# Patient Record
Sex: MALE | Race: WHITE | NOT HISPANIC OR LATINO | Employment: FULL TIME | URBAN - METROPOLITAN AREA
[De-identification: names, ages, dates, MRNs, and addresses within clinical notes are randomized per-mention and may not be internally consistent; named-entity substitution may affect disease eponyms.]

---

## 2018-01-09 ENCOUNTER — APPOINTMENT (OUTPATIENT)
Dept: URGENT CARE | Facility: CLINIC | Age: 33
End: 2018-01-09

## 2018-01-09 ENCOUNTER — TRANSCRIBE ORDERS (OUTPATIENT)
Dept: URGENT CARE | Facility: CLINIC | Age: 33
End: 2018-01-09

## 2018-01-09 DIAGNOSIS — Z02.1 PRE-EMPLOYMENT HEALTH SCREENING EXAMINATION: ICD-10-CM

## 2018-01-09 DIAGNOSIS — Z02.1 PRE-EMPLOYMENT HEALTH SCREENING EXAMINATION: Primary | ICD-10-CM

## 2018-01-09 PROCEDURE — 86480 TB TEST CELL IMMUN MEASURE: CPT

## 2018-01-09 PROCEDURE — 36415 COLL VENOUS BLD VENIPUNCTURE: CPT

## 2018-01-09 PROCEDURE — 86762 RUBELLA ANTIBODY: CPT

## 2018-01-09 PROCEDURE — 86787 VARICELLA-ZOSTER ANTIBODY: CPT

## 2018-01-09 PROCEDURE — 86765 RUBEOLA ANTIBODY: CPT

## 2018-01-09 PROCEDURE — 86735 MUMPS ANTIBODY: CPT

## 2018-01-10 LAB — RUBV IGG SERPL IA-ACNC: >175 IU/ML

## 2018-01-11 LAB
ANNOTATION COMMENT IMP: NORMAL
GAMMA INTERFERON BACKGROUND BLD IA-ACNC: 0.03 IU/ML
M TB IFN-G BLD-IMP: NEGATIVE
M TB IFN-G CD4+ BCKGRND COR BLD-ACNC: 0 IU/ML
M TB IFN-G CD4+ T-CELLS BLD-ACNC: 0.03 IU/ML
MEV IGG SER QL: NORMAL
MITOGEN IGNF BLD-ACNC: 6.84 IU/ML
MUV IGG SER QL: NORMAL
QUANTIFERON-TB GOLD IN TUBE: NORMAL
SERVICE CMNT-IMP: NORMAL
VZV IGG SER IA-ACNC: NORMAL

## 2018-08-16 ENCOUNTER — TRANSCRIBE ORDERS (OUTPATIENT)
Dept: ADMINISTRATIVE | Facility: HOSPITAL | Age: 33
End: 2018-08-16

## 2018-08-16 ENCOUNTER — APPOINTMENT (OUTPATIENT)
Dept: LAB | Facility: HOSPITAL | Age: 33
End: 2018-08-16

## 2018-08-16 DIAGNOSIS — Z00.8 HEALTH EXAMINATION IN POPULATION SURVEY: Primary | ICD-10-CM

## 2018-08-16 DIAGNOSIS — Z00.8 HEALTH EXAMINATION IN POPULATION SURVEY: ICD-10-CM

## 2018-08-16 LAB
CHOLEST SERPL-MCNC: 203 MG/DL (ref 50–200)
EST. AVERAGE GLUCOSE BLD GHB EST-MCNC: 108 MG/DL
HBA1C MFR BLD: 5.4 % (ref 4.2–6.3)
HDLC SERPL-MCNC: 48 MG/DL (ref 40–60)
LDLC SERPL CALC-MCNC: 127 MG/DL (ref 0–100)
NONHDLC SERPL-MCNC: 155 MG/DL
TRIGL SERPL-MCNC: 140 MG/DL

## 2018-08-16 PROCEDURE — 83036 HEMOGLOBIN GLYCOSYLATED A1C: CPT

## 2018-08-16 PROCEDURE — 80061 LIPID PANEL: CPT

## 2018-08-16 PROCEDURE — 36415 COLL VENOUS BLD VENIPUNCTURE: CPT

## 2018-12-11 ENCOUNTER — TRANSCRIBE ORDERS (OUTPATIENT)
Dept: ADMINISTRATIVE | Facility: HOSPITAL | Age: 33
End: 2018-12-11

## 2018-12-11 ENCOUNTER — APPOINTMENT (OUTPATIENT)
Dept: LAB | Facility: HOSPITAL | Age: 33
End: 2018-12-11
Payer: COMMERCIAL

## 2018-12-11 DIAGNOSIS — E29.1 3-OXO-5 ALPHA-STEROID DELTA 4-DEHYDROGENASE DEFICIENCY: Primary | ICD-10-CM

## 2018-12-11 DIAGNOSIS — E29.1 3-OXO-5 ALPHA-STEROID DELTA 4-DEHYDROGENASE DEFICIENCY: ICD-10-CM

## 2018-12-11 PROCEDURE — 84402 ASSAY OF FREE TESTOSTERONE: CPT

## 2018-12-11 PROCEDURE — 84403 ASSAY OF TOTAL TESTOSTERONE: CPT

## 2018-12-11 PROCEDURE — 36415 COLL VENOUS BLD VENIPUNCTURE: CPT

## 2018-12-13 LAB
TESTOST FREE SERPL-MCNC: 5.2 PG/ML (ref 8.7–25.1)
TESTOST SERPL-MCNC: 239 NG/DL (ref 264–916)

## 2019-05-22 ENCOUNTER — OFFICE VISIT (OUTPATIENT)
Dept: PODIATRY | Facility: CLINIC | Age: 34
End: 2019-05-22
Payer: COMMERCIAL

## 2019-05-22 VITALS
DIASTOLIC BLOOD PRESSURE: 78 MMHG | WEIGHT: 250 LBS | HEIGHT: 75 IN | BODY MASS INDEX: 31.08 KG/M2 | SYSTOLIC BLOOD PRESSURE: 132 MMHG

## 2019-05-22 DIAGNOSIS — B07.0 PLANTAR WART OF LEFT FOOT: Primary | ICD-10-CM

## 2019-05-22 PROCEDURE — 99202 OFFICE O/P NEW SF 15 MIN: CPT | Performed by: PODIATRIST

## 2019-05-22 RX ORDER — OXYCODONE HYDROCHLORIDE 15 MG/1
10 TABLET, FILM COATED, EXTENDED RELEASE ORAL 2 TIMES DAILY
Refills: 0 | COMMUNITY
Start: 2019-04-29 | End: 2021-07-02

## 2019-05-22 RX ORDER — GABAPENTIN 600 MG/1
600 TABLET ORAL 2 TIMES DAILY
Refills: 0 | COMMUNITY
Start: 2019-04-18

## 2019-05-22 RX ORDER — IBUPROFEN 800 MG/1
800 TABLET ORAL EVERY 6 HOURS PRN
Refills: 0 | COMMUNITY
Start: 2019-04-05 | End: 2022-04-12 | Stop reason: ALTCHOICE

## 2019-05-22 RX ORDER — RISPERIDONE 3 MG/1
3 TABLET, FILM COATED ORAL
Refills: 0 | COMMUNITY
Start: 2019-04-18

## 2019-05-22 RX ORDER — OXYCODONE AND ACETAMINOPHEN 10; 325 MG/1; MG/1
TABLET ORAL
Refills: 0 | COMMUNITY
Start: 2019-04-26

## 2019-05-22 RX ORDER — DEXTROAMPHETAMINE SACCHARATE, AMPHETAMINE ASPARTATE, DEXTROAMPHETAMINE SULFATE AND AMPHETAMINE SULFATE 5; 5; 5; 5 MG/1; MG/1; MG/1; MG/1
TABLET ORAL
Refills: 0 | COMMUNITY
Start: 2019-04-22 | End: 2021-12-12 | Stop reason: ALTCHOICE

## 2019-05-22 RX ORDER — HYDROXYZINE 50 MG/1
50 TABLET, FILM COATED ORAL 2 TIMES DAILY PRN
Refills: 1 | COMMUNITY
Start: 2019-04-25

## 2019-07-12 ENCOUNTER — OFFICE VISIT (OUTPATIENT)
Dept: NEUROLOGY | Facility: CLINIC | Age: 34
End: 2019-07-12
Payer: COMMERCIAL

## 2019-07-12 VITALS
BODY MASS INDEX: 31.25 KG/M2 | DIASTOLIC BLOOD PRESSURE: 92 MMHG | HEART RATE: 110 BPM | HEIGHT: 75 IN | SYSTOLIC BLOOD PRESSURE: 138 MMHG

## 2019-07-12 DIAGNOSIS — R25.1 TREMOR: Primary | ICD-10-CM

## 2019-07-12 PROBLEM — F31.9 BIPOLAR AFFECTIVE DISORDER (HCC): Status: ACTIVE | Noted: 2019-07-12

## 2019-07-12 PROBLEM — F98.8 ADD (ATTENTION DEFICIT DISORDER): Status: ACTIVE | Noted: 2019-07-12

## 2019-07-12 PROCEDURE — 99244 OFF/OP CNSLTJ NEW/EST MOD 40: CPT | Performed by: PSYCHIATRY & NEUROLOGY

## 2019-07-12 PROCEDURE — 99204 OFFICE O/P NEW MOD 45 MIN: CPT | Performed by: PSYCHIATRY & NEUROLOGY

## 2019-07-12 RX ORDER — ZOLPIDEM TARTRATE 10 MG/1
10 TABLET ORAL
COMMUNITY

## 2019-07-12 NOTE — PATIENT INSTRUCTIONS
The types of tremor that we talked about were "medication side effect" "enhanced physiologic tremor" and "essential tremor"     The medications that we talked about include:  Propranolol and Primidone   Other medications we sometimes use include topamax and zonisamide

## 2019-07-12 NOTE — PROGRESS NOTES
Assessment/Plan:    Tremor  64-year-old right-handed man with past history significant for bipolar disorder and ADHD on chronic stimulants an neuroleptics presents for evaluation of a progressive bilateral hand tremor which began about 10 years ago  On examination the patient has high-frequency low-amplitude bilateral posture and kinetic tremor  He has no evidence Parkinson's disease  No 1st degree relatives with Parkinson's disease  The etiology of the patient's tremor is most likely an isolated tremor disorder either enhanced physiologic tremor, medication side effect or early essential tremor  The patient did try coming off of his risperidone for a few months without any improvement in his tremor  We discussed different options regarding the treatment of tremor  Patient is already on gabapentin and has been on higher doses without tremor improvement  We discussed the use of primidone, propranolol, Topamax and zonisamide  I would like the patient to discuss these option with his psychiatrist as all could have impact on his mood  Diagnoses and all orders for this visit:    Tremor    Other orders  -     zolpidem (AMBIEN) 10 mg tablet; Take 10 mg by mouth daily at bedtime as needed for sleep        Subjective:     Patient ID: Abhay Jones is a 37 y o  male  I had the pleasure of seeing your patient, Abhay Jones in the 2020 [facility] at the [address] [facility]  The patient is a 37 y o  right handed male who presents for evaluation bilateral hand tremor  The patient works for [employer] and [employer]  He presents today with his mother  The patient believes his tremor probably started about 10 years ago and has gotten progressively worse over time  It was assumed that the tremor was a medication side effect and so was ignored until more recently when it became more bothersome    The tremor is worse when he has to concentrate or if he tries to intentionally suppress the tremor  Using a screwdriver or drinking soup are particularly difficult  He has never been evaluated by a neurologist in the past   He was tried on Cogentin which she did not find helpful  The patient has been on risperidone for the past 4 years for his bipolar  He did try coming off of this medication for about 3 months without any change in tremor severity  He has been on Adderall since childhood  Gabapentin, oxycodone and OxyContin were started in 2010 after he fell off of a roof and shattered his feet  These medicines are being managed by a pain specialist   He has been on higher doses of gabapentin, up to 600 3 times daily without any improvement in his tremor  In the past the patient has been on multiple other medications for his mood including Abilify, Lamictal and Seroquel  The patient is followed by a psychiatrist, Sheila Young @ [facility]  Family History:   PGF with Parkinson disease  MGGF Parkinson disease  No one with ET  Father may have dream reenactment  Regarding motor symptoms:   Tremor: both hands  Mostly on the right  Slowness: no   Stiffness: no   Dystonia: no   Changes in facial expression: no   Changes in voice: no   Changes in writing: bad and getting worse  Sloppy, not small   Changes in gait: no    Falls: no   Trouble with swallowing: no   Clumsiness/dexterity: no     Regarding non-motor symptoms:   Anosmia: no   Constipation: no   Lightheadedness: no   Changes in Mood: good place   Trouble with sleep: Ambien      REM behavior Disorder: no (father dose)   Memory trouble: no issues     Coffee: 2-3 per day   Alcohol: Some, never an issue   No change in tremor       The following portions of the patient's history were reviewed and updated as appropriate: allergies, current medications, past family history, past medical history, past social history, past surgical history and problem list       Objective:  /92 (BP Location: Left arm, Patient Position: Standing, Cuff Size: Standard)   Pulse (!) 110   Ht 6' 3" (1 905 m)   BMI 31 25 kg/m²     Physical Exam    Neurological Exam    GENERAL MEDICAL EXAMINATION:  General appearance: alert, in no apparent distress  Appropriately dressed and groomed  Conversing and interacting appropriately  Eyes: Sclera are non-injected  Ears, nose, Mouth, Throat: Mucous membranes are moist    Resp: Breathing comfortably on RA   Musculoskeletal: No evidence of deformities  No contractures  No Edema  Skin: No visible rashes  Warm and well perfused  Psych: psychomotor agitation, cooperative    NEUROLOGICAL EXAMINATION:  Mental Status: Alert and oriented to person place and time  Able to relate history without difficulty  Attentive: able to name HERNANDO backward without difficulty  Language is fluent and appropriate with normal prosody  There were no paraphasic errors  Speech was not dysarthric  There was no pallilalia noted  Able to follow both midline and appendicular commands  Cranial Nerves:  II:  pupils equally round and briskly reactive to light, both directly and consensually  III-IV-VI: Full and conjugate, extra-ocular eye movements in all directions of gaze  No nystagmus or diplopia  Intact smooth pursuit  VII: Face is symmetric at rest and with activation; symmetric speed and excursion with smile  Slighlty less activation of the left lower face  IX-X: Palate elevates symmetrically  XII: No tongue deviation or fasciculations    Motor:   Normal muscle bulk throughout  There were no dyskinesias or dystonia noted  No pronator drift or rebound  No asterixis or myoclonus noted      UPDRS motor:                              Time since last dose:  X      Speech  0     Facial Expression  0     Rigidity - Neck  0     Rigidity - Upper Extremity (Right)  t     Rigidity - Upper Extremity (Left)   0     Rigidity - Lower Extremity (Right)  0     Rigidity - Lower Extremity (Left)   0     Finger Taps (Right)   1 Finger Taps (Left)   1     Hand Movement (Right)  0     Hand Movement (Left)   0     Pronation/Supination (Right)  0     Pronation/Supination (Left)   0     Toe Tapping (Right) 0     Toe Tapping (Left) 1 irreg    Leg Agility (Right)  0     Leg Agility (Left)   0     Arising from Chair   0     Gait   0     Freezing of Gait 0     Postural Stability   0     Posture 0     Global spontaneity of movement 0     Postural Tremor (Right) 1     Postural Tremor (Left) 1 slighlyt more in wing    Kinetic Tremor (Right)  1     Kinetic Tremor (Left)  1 Up to 1cm    Rest tremor amplitude RUE 0     Rest tremor amplitude LUE 0     Rest tremor amplitude RLE 0     Reset tremor amplitude LLE 0     Lip/Jaw Tremor  0     Consistency of tremor 0     Motor Exam Total:            Strength:   Delt Bi Tri We FE    C5 C6 C7 C6 C7   R 5 5 5 5 5   L 5 5 5 5 5      IP Quad Hamst TA    L2 L3 L4-S1 L4   R 5 5 5 5   L 5 5 5 5     Reflexes:  2+ and symmetric throughout    Sensory: normal and symmetric perception of light touch, vibration and temperature  Coordination: Finger to nose without dysmetria bilaterally  No intention tremor  Gait: Able to rise from a chair without the use of arms  Posture was normal  Narrow-base and stable stance  Normal initiation  Normal stride length, step height, arm swing  Turn completed in 2 steps  Romberg is negative  No truncal ataxia  Tandem gait is stable- 1-2 side steps in 10  Review of Systems   Constitutional: Negative  Negative for appetite change and fever  HENT: Negative  Negative for hearing loss, tinnitus, trouble swallowing and voice change  Eyes: Negative  Negative for photophobia and pain  Respiratory: Negative  Negative for shortness of breath  Cardiovascular: Negative  Negative for palpitations  Gastrointestinal: Negative  Negative for nausea and vomiting  Endocrine: Negative  Negative for cold intolerance and heat intolerance  Genitourinary: Negative    Negative for dysuria, frequency and urgency  Musculoskeletal: Negative  Negative for myalgias and neck pain  Skin: Negative  Negative for rash  Neurological: Positive for tremors  Negative for dizziness, seizures, syncope, facial asymmetry, speech difficulty, weakness, light-headedness, numbness and headaches  Hematological: Negative  Does not bruise/bleed easily  Psychiatric/Behavioral: Positive for sleep disturbance (trouble falling asleep)  Negative for confusion and hallucinations  The above ROS was reviewed and updated  Sumi Garcia MD  Medical Director   [facility]  [facility]       Current Outpatient Medications on File Prior to Visit   Medication Sig Dispense Refill    amphetamine-dextroamphetamine (ADDERALL) 20 mg tablet TAKE 1 TABLET BY MOUTH TWICE A DAY *FILL 4/20  0    DEXILANT 60 MG capsule Take 1 capsule by mouth daily  3    gabapentin (NEURONTIN) 600 MG tablet Take 600 mg by mouth 2 (two) times a day  0    hydrOXYzine HCL (ATARAX) 50 mg tablet Take 50 mg by mouth 2 (two) times a day as needed  1    ibuprofen (MOTRIN) 800 mg tablet Take 800 mg by mouth every 6 (six) hours as needed  0    oxyCODONE-acetaminophen (PERCOCET)  mg per tablet TAKE 1 TABLET BY MOUTH EVERY 8 HOURS AS NEEDED FOR ONGOING THERAPY  0    OXYCONTIN 15 MG 12 hr tablet Take 10 mg by mouth 2 (two) times a day   0    risperiDONE (RisperDAL) 3 mg tablet Take 3 mg by mouth daily at bedtime  0    zolpidem (AMBIEN) 10 mg tablet Take 10 mg by mouth daily at bedtime as needed for sleep       No current facility-administered medications on file prior to visit

## 2019-07-12 NOTE — ASSESSMENT & PLAN NOTE
78-year-old right-handed man with past history significant for bipolar disorder and ADHD on chronic stimulants an neuroleptics presents for evaluation of a progressive bilateral hand tremor which began about 10 years ago  On examination the patient has high-frequency low-amplitude bilateral posture and kinetic tremor  He has no evidence Parkinson's disease  No 1st degree relatives with Parkinson's disease  The etiology of the patient's tremor is most likely an isolated tremor disorder either enhanced physiologic tremor, medication side effect or early essential tremor  The patient did try coming off of his risperidone for a few months without any improvement in his tremor  We discussed different options regarding the treatment of tremor  Patient is already on gabapentin and has been on higher doses without tremor improvement  We discussed the use of primidone, propranolol, Topamax and zonisamide  I would like the patient to discuss these option with his psychiatrist as all could have impact on his mood

## 2019-08-06 ENCOUNTER — OFFICE VISIT (OUTPATIENT)
Dept: URGENT CARE | Facility: CLINIC | Age: 34
End: 2019-08-06
Payer: COMMERCIAL

## 2019-08-06 VITALS
SYSTOLIC BLOOD PRESSURE: 130 MMHG | DIASTOLIC BLOOD PRESSURE: 88 MMHG | BODY MASS INDEX: 35.93 KG/M2 | TEMPERATURE: 98.4 F | HEART RATE: 86 BPM | HEIGHT: 75 IN | WEIGHT: 289 LBS | RESPIRATION RATE: 20 BRPM | OXYGEN SATURATION: 96 %

## 2019-08-06 DIAGNOSIS — J02.9 SORE THROAT: Primary | ICD-10-CM

## 2019-08-06 LAB — S PYO AG THROAT QL: NEGATIVE

## 2019-08-06 PROCEDURE — 99213 OFFICE O/P EST LOW 20 MIN: CPT | Performed by: PREVENTIVE MEDICINE

## 2019-08-06 PROCEDURE — 87880 STREP A ASSAY W/OPTIC: CPT | Performed by: PREVENTIVE MEDICINE

## 2019-08-06 PROCEDURE — S9088 SERVICES PROVIDED IN URGENT: HCPCS | Performed by: PREVENTIVE MEDICINE

## 2019-08-06 RX ORDER — OXYCODONE HYDROCHLORIDE 10 MG/1
10 TABLET, FILM COATED, EXTENDED RELEASE ORAL 2 TIMES DAILY
Refills: 0 | COMMUNITY
Start: 2019-07-25

## 2019-08-06 NOTE — PATIENT INSTRUCTIONS
Hot salt water gargles may be helpful  Cepacol lozenges for pain  Motrin or Tylenol for fever, chills or aches  Follow up with PCP in 3-5 days if no improvement in symptoms

## 2019-08-06 NOTE — PROGRESS NOTES
[facility]        NAME: Alban Hernadez is a 37 y o  male  : 1985    MRN: [id number]  DATE: 2019  TIME: 7:38 PM    Assessment and Plan   Sore throat [J02 9]  1  Sore throat  POCT rapid strepA         Patient Instructions       Follow up with PCP in 3-5 days  Proceed to  ER if symptoms worsen  Chief Complaint     Chief Complaint   Patient presents with    Sore Throat     patient reports he has had a sore throat along with a productive cough with green mucus since Friday  History of Present Illness       Sore throat and productive cough since Friday  Review of Systems   Review of Systems   HENT: Positive for sore throat  Respiratory: Positive for cough            Current Medications       Current Outpatient Medications:     amphetamine-dextroamphetamine (ADDERALL) 20 mg tablet, TAKE 1 TABLET BY MOUTH TWICE A DAY *FILL , Disp: , Rfl: 0    DEXILANT 60 MG capsule, Take 1 capsule by mouth daily, Disp: , Rfl: 3    gabapentin (NEURONTIN) 600 MG tablet, Take 600 mg by mouth 2 (two) times a day, Disp: , Rfl: 0    hydrOXYzine HCL (ATARAX) 50 mg tablet, Take 50 mg by mouth 2 (two) times a day as needed, Disp: , Rfl: 1    ibuprofen (MOTRIN) 800 mg tablet, Take 800 mg by mouth every 6 (six) hours as needed, Disp: , Rfl: 0    oxyCODONE-acetaminophen (PERCOCET)  mg per tablet, TAKE 1 TABLET BY MOUTH EVERY 8 HOURS AS NEEDED FOR ONGOING THERAPY, Disp: , Rfl: 0    OXYCONTIN 15 MG 12 hr tablet, Take 10 mg by mouth 2 (two) times a day , Disp: , Rfl: 0    risperiDONE (RisperDAL) 3 mg tablet, Take 3 mg by mouth daily at bedtime, Disp: , Rfl: 0    zolpidem (AMBIEN) 10 mg tablet, Take 10 mg by mouth daily at bedtime as needed for sleep, Disp: , Rfl:     OXYCONTIN 10 MG 12 hr tablet, Take 10 mg by mouth 2 (two) times a day, Disp: , Rfl: 0    Current Allergies     Allergies as of 2019    (No Known Allergies)            The following portions of the patient's history were reviewed and updated as appropriate: allergies, current medications, past family history, past medical history, past social history, past surgical history and problem list      Past Medical History:   Diagnosis Date    ADD (attention deficit disorder)     Chronic pain     Heartburn     Mood disorder ([address] )        History reviewed  No pertinent surgical history  Family History   Problem Relation Age of Onset    Parkinsonism Paternal Grandfather          Medications have been verified  Objective   /88   Pulse 86   Temp 98 4 °F (36 9 °C)   Resp 20   Ht 6' 3" (1 905 m)   Wt 131 kg (289 lb)   SpO2 96%   BMI 36 12 kg/m²        Physical Exam     Physical Exam   HENT:   Right Ear: Tympanic membrane normal    Left Ear: Tympanic membrane normal    Mouth/Throat: Uvula swelling present  Posterior oropharyngeal edema and posterior oropharyngeal erythema present  Cardiovascular: Normal heart sounds  Pulmonary/Chest: Breath sounds normal    Lymphadenopathy:     He has no cervical adenopathy       Rapid strep screen negative at 5 minutes

## 2019-08-12 ENCOUNTER — OFFICE VISIT (OUTPATIENT)
Dept: UROLOGY | Facility: MEDICAL CENTER | Age: 34
End: 2019-08-12
Payer: COMMERCIAL

## 2019-08-12 VITALS
WEIGHT: 280 LBS | HEART RATE: 82 BPM | HEIGHT: 75 IN | BODY MASS INDEX: 34.82 KG/M2 | DIASTOLIC BLOOD PRESSURE: 84 MMHG | SYSTOLIC BLOOD PRESSURE: 132 MMHG

## 2019-08-12 DIAGNOSIS — R79.89 LOW TESTOSTERONE: Primary | ICD-10-CM

## 2019-08-12 DIAGNOSIS — N52.1 ERECTILE DYSFUNCTION DUE TO DISEASES CLASSIFIED ELSEWHERE: ICD-10-CM

## 2019-08-12 LAB
SL AMB  POCT GLUCOSE, UA: ABNORMAL
SL AMB LEUKOCYTE ESTERASE,UA: ABNORMAL
SL AMB POCT BILIRUBIN,UA: ABNORMAL
SL AMB POCT BLOOD,UA: ABNORMAL
SL AMB POCT CLARITY,UA: ABNORMAL
SL AMB POCT COLOR,UA: ABNORMAL
SL AMB POCT KETONES,UA: ABNORMAL
SL AMB POCT NITRITE,UA: ABNORMAL
SL AMB POCT PH,UA: 5
SL AMB POCT SPECIFIC GRAVITY,UA: 1.02
SL AMB POCT URINE PROTEIN: ABNORMAL
SL AMB POCT UROBILINOGEN: 0.2

## 2019-08-12 PROCEDURE — 81003 URINALYSIS AUTO W/O SCOPE: CPT | Performed by: UROLOGY

## 2019-08-12 PROCEDURE — 99204 OFFICE O/P NEW MOD 45 MIN: CPT | Performed by: UROLOGY

## 2019-08-12 RX ORDER — TADALAFIL 5 MG/1
5 TABLET ORAL DAILY PRN
Qty: 30 TABLET | Refills: 11 | Status: SHIPPED | OUTPATIENT
Start: 2019-08-12 | End: 2020-06-12 | Stop reason: SDUPTHER

## 2019-08-12 NOTE — PROGRESS NOTES
Assessment/Plan:  1  Low testosterone-hypogonadism-the patient is currently receiving exogenous testosterone by injection and therefore will be very difficult to identify a primary cause of his low testosterone  I did speak to the patient and his significant other about potential sterility related to testosterone replacement therapy and in the future testosterone may be replaced by Clomid as a therapeutic tool  Estrogen levels as well as repeat total testosterone after his next testosterone injection will be obtained  [facility] LH and prolactin levels will also be obtained  2  Erectile dysfunction-the patient will start Cialis 5 mg p o  Daily for treatment of erectile dysfunction  No problem-specific Assessment & Plan notes found for this encounter  Diagnoses and all orders for this visit:    Low testosterone  -     POCT urine dip auto non-scope  -     Testosterone; Future  -     Estrogens, Fractionated, LC/MS/MS; Future  -     Comprehensive metabolic panel; Future  -     Prolactin; Future  -     Follicle stimulating hormone; Future  -     Luteinizing hormone; Future    Erectile dysfunction due to diseases classified elsewhere  -     Comprehensive metabolic panel; Future  -     tadalafil (CIALIS) 5 MG tablet; Take 1 tablet (5 mg total) by mouth daily as needed for erectile dysfunction          Subjective:      Patient ID: Mary Garsia is a 37 y o  male  HPI  38-year-old male pre did on multiple pain medications for pain related to a trauma approximately 10 years ago from falling off a roof and having lower extremity fractures  The patient complains of difficulty obtaining and maintaining an erection with overall decreased erectile rigidity  He also notes fatigue falling asleep easily and decreased libido associated with potential hypo gonadal status  The patient is receiving exogenous testosterone from his pain management specialist to hopefully decrease his requirements for analgesia    The following portions of the patient's history were reviewed and updated as appropriate: allergies, current medications, past family history, past medical history, past social history, past surgical history and problem list     Review of Systems   Constitutional: Positive for fatigue  HENT: Negative  Respiratory: Negative  Cardiovascular: Negative  Gastrointestinal: Negative  Endocrine: Positive for heat intolerance  Negative for cold intolerance  Genitourinary: Negative  Musculoskeletal: Negative for back pain, gait problem and myalgias  Neurological: Positive for tremors  Hematological: Negative  Psychiatric/Behavioral: Positive for decreased concentration  Objective:      /84   Pulse 82   Ht 6' 3" (1 905 m)   Wt 127 kg (280 lb)   BMI 35 00 kg/m²          Physical Exam   Constitutional: He is oriented to person, place, and time  He appears well-nourished  No distress  HENT:   Head: Normocephalic and atraumatic  Eyes: EOM are normal    Neck: Neck supple  Pulmonary/Chest: Effort normal    Abdominal: Soft  He exhibits no distension  Genitourinary: Penis normal    Genitourinary Comments: Phallus normal circumcised without cutaneous lesions meatus patent normally placed  Scrotum normal without cutaneous lesions  Testes adnexa palpably normal with a right epididymal head cyst   No evidence of inguinal hernias or varicocele noted   Neurological: He is alert and oriented to person, place, and time  Skin: He is not diaphoretic  Psychiatric: He has a normal mood and affect  His behavior is normal  Judgment and thought content normal    Vitals reviewed

## 2019-09-07 ENCOUNTER — APPOINTMENT (OUTPATIENT)
Dept: LAB | Facility: HOSPITAL | Age: 34
End: 2019-09-07
Attending: UROLOGY
Payer: COMMERCIAL

## 2019-09-07 DIAGNOSIS — R79.89 LOW TESTOSTERONE: ICD-10-CM

## 2019-09-07 DIAGNOSIS — N52.1 ERECTILE DYSFUNCTION DUE TO DISEASES CLASSIFIED ELSEWHERE: ICD-10-CM

## 2019-09-07 LAB
ALBUMIN SERPL BCP-MCNC: 3.6 G/DL (ref 3.5–5)
ALP SERPL-CCNC: 74 U/L (ref 46–116)
ALT SERPL W P-5'-P-CCNC: 39 U/L (ref 12–78)
ANION GAP SERPL CALCULATED.3IONS-SCNC: 11 MMOL/L (ref 4–13)
AST SERPL W P-5'-P-CCNC: 25 U/L (ref 5–45)
BILIRUB SERPL-MCNC: 0.6 MG/DL (ref 0.2–1)
BUN SERPL-MCNC: 9 MG/DL (ref 5–25)
CALCIUM SERPL-MCNC: 9.1 MG/DL (ref 8.3–10.1)
CHLORIDE SERPL-SCNC: 104 MMOL/L (ref 100–108)
CO2 SERPL-SCNC: 24 MMOL/L (ref 21–32)
CREAT SERPL-MCNC: 1.03 MG/DL (ref 0.6–1.3)
FSH SERPL-ACNC: <0.2 MIU/ML (ref 0.7–10.8)
GFR SERPL CREATININE-BSD FRML MDRD: 93 ML/MIN/1.73SQ M
GLUCOSE P FAST SERPL-MCNC: 90 MG/DL (ref 65–99)
LH SERPL-ACNC: <0.2 MIU/ML (ref 1.2–10.6)
POTASSIUM SERPL-SCNC: 4.3 MMOL/L (ref 3.5–5.3)
PROLACTIN SERPL-MCNC: 22.5 NG/ML (ref 2.5–17.4)
PROT SERPL-MCNC: 7.4 G/DL (ref 6.4–8.2)
SODIUM SERPL-SCNC: 139 MMOL/L (ref 136–145)
TESTOST SERPL-MCNC: 1260 NG/DL (ref 113–1065)

## 2019-09-07 PROCEDURE — 83001 ASSAY OF GONADOTROPIN (FSH): CPT

## 2019-09-07 PROCEDURE — 36415 COLL VENOUS BLD VENIPUNCTURE: CPT

## 2019-09-07 PROCEDURE — 84403 ASSAY OF TOTAL TESTOSTERONE: CPT

## 2019-09-07 PROCEDURE — 83002 ASSAY OF GONADOTROPIN (LH): CPT

## 2019-09-07 PROCEDURE — 84146 ASSAY OF PROLACTIN: CPT

## 2019-09-07 PROCEDURE — 82671 ASSAY OF ESTROGENS: CPT

## 2019-09-07 PROCEDURE — 80053 COMPREHEN METABOLIC PANEL: CPT

## 2019-09-11 ENCOUNTER — OFFICE VISIT (OUTPATIENT)
Dept: PODIATRY | Facility: CLINIC | Age: 34
End: 2019-09-11
Payer: COMMERCIAL

## 2019-09-11 VITALS
HEART RATE: 97 BPM | SYSTOLIC BLOOD PRESSURE: 142 MMHG | BODY MASS INDEX: 34.91 KG/M2 | HEIGHT: 75 IN | DIASTOLIC BLOOD PRESSURE: 94 MMHG | WEIGHT: 280.8 LBS

## 2019-09-11 DIAGNOSIS — B07.0 PLANTAR WART OF LEFT FOOT: Primary | ICD-10-CM

## 2019-09-11 PROCEDURE — 99212 OFFICE O/P EST SF 10 MIN: CPT | Performed by: PODIATRIST

## 2019-09-11 PROCEDURE — 17110 DESTRUCTION B9 LES UP TO 14: CPT | Performed by: PODIATRIST

## 2019-09-11 NOTE — PROGRESS NOTES
PATIENT:  Hyacinth Pepper  1985       ASSESSMENT:     1  Plantar wart of left foot  Lesion Destruction             PLAN:  Patient was counseled and educated on the condition and the diagnosis  The diagnosis, treatment options and prognosis were discussed with the patient  He has not been here since May  Recommended periodic debridement along with topical treatment  Will refill compound cream      Verbal consent was obtained for chemical treatment today  All the verrucoid lesion(s) and any surround hyperkeratotic skin lesions were debrided to a level of pinpoint bleeding using a sterile #15 scapel  The lesions were then cauterized with Cantharidin  An occlusive dressing was applied to the areas  Instructed to remove the dressing in the morning  Instruction was given for possible local care  The patient tolerated the procedure well and without complications  The patient will return in 4 weeks for follow-up  Lesion Destruction  Date/Time: 9/11/2019 2:37 PM  Performed by: Juaquin Smith DPM  Authorized by: Juaquin Smith DPM     Procedure Details - Lesion Destruction:     Number of Lesions:  1  Lesion 1:     Body area:  Lower extremity    Lower extremity location:  L foot    Malignancy: benign lesion      Destruction method: chemical removal            Subjective:       HPI  The patient presents with chief complaint of left foot wart  Cream has been helping him, but the wart did not go away  Pain is under control  No numbness or paresthesia  The following portions of the patient's history were reviewed and updated as appropriate: allergies, current medications, past family history, past medical history, past social history, past surgical history and problem list   All pertinent labs and images were reviewed        Past Medical History  Past Medical History:   Diagnosis Date    ADD (attention deficit disorder)     Arthritis     Chronic pain     Heartburn     Mood disorder (HCC)     Tremor        Past Surgical History  Past Surgical History:   Procedure Laterality Date    CLOSED REDUCTION CALCANEAL FRACTURE      PERONEAL TENDON EXPLORATION          Allergies:  Patient has no known allergies  Medications:  Current Outpatient Medications   Medication Sig Dispense Refill    amphetamine-dextroamphetamine (ADDERALL) 20 mg tablet TAKE 1 TABLET BY MOUTH TWICE A DAY *FILL 4/20  0    DEXILANT 60 MG capsule Take 1 capsule by mouth daily  3    gabapentin (NEURONTIN) 600 MG tablet Take 600 mg by mouth 2 (two) times a day  0    hydrOXYzine HCL (ATARAX) 50 mg tablet Take 50 mg by mouth 2 (two) times a day as needed  1    ibuprofen (MOTRIN) 800 mg tablet Take 800 mg by mouth every 6 (six) hours as needed  0    oxyCODONE-acetaminophen (PERCOCET)  mg per tablet TAKE 1 TABLET BY MOUTH EVERY 8 HOURS AS NEEDED FOR ONGOING THERAPY  0    OXYCONTIN 10 MG 12 hr tablet Take 10 mg by mouth 2 (two) times a day  0    OXYCONTIN 15 MG 12 hr tablet Take 10 mg by mouth 2 (two) times a day   0    risperiDONE (RisperDAL) 3 mg tablet Take 3 mg by mouth daily at bedtime  0    tadalafil (CIALIS) 5 MG tablet Take 1 tablet (5 mg total) by mouth daily as needed for erectile dysfunction 30 tablet 11    zolpidem (AMBIEN) 10 mg tablet Take 10 mg by mouth daily at bedtime as needed for sleep       No current facility-administered medications for this visit          Social History:  Social History     Socioeconomic History    Marital status: Single     Spouse name: None    Number of children: None    Years of education: None    Highest education level: None   Occupational History    None   Social Needs    Financial resource strain: None    Food insecurity:     Worry: None     Inability: None    Transportation needs:     Medical: None     Non-medical: None   Tobacco Use    Smoking status: Current Some Day Smoker    Smokeless tobacco: Never Used   Substance and Sexual Activity    Alcohol use: Yes     Comment: social    Drug use: Never    Sexual activity: None   Lifestyle    Physical activity:     Days per week: None     Minutes per session: None    Stress: None   Relationships    Social connections:     Talks on phone: None     Gets together: None     Attends Rastafarian service: None     Active member of club or organization: None     Attends meetings of clubs or organizations: None     Relationship status: None    Intimate partner violence:     Fear of current or ex partner: None     Emotionally abused: None     Physically abused: None     Forced sexual activity: None   Other Topics Concern    None   Social History Narrative    None          Review of Systems   Constitutional: Negative for chills and fever  Respiratory: Negative for cough and shortness of breath  Cardiovascular: Negative for chest pain and leg swelling  Gastrointestinal: Negative for nausea and vomiting  Neurological: Negative for numbness  Objective:      /94   Pulse 97   Ht 6' 3" (1 905 m)   Wt 127 kg (280 lb 12 8 oz)   BMI 35 10 kg/m²          Physical Exam   Constitutional: He is oriented to person, place, and time  He appears well-developed and well-nourished  No distress  Cardiovascular: Normal rate, regular rhythm and intact distal pulses  Pulmonary/Chest: Effort normal and breath sounds normal    Musculoskeletal: He exhibits no edema or tenderness  Pes planus noted  Neurological: He is alert and oriented to person, place, and time  He displays normal reflexes  No sensory deficit  Skin: Skin is warm  No rash noted  No erythema  Verrucoid lesion on left heel with punctate bleeding and keratosis  Decreased keratosis  Mild maceration noted  It measures about 6 X 3 6 cm  Vitals reviewed

## 2019-09-12 ENCOUNTER — OFFICE VISIT (OUTPATIENT)
Dept: URGENT CARE | Facility: CLINIC | Age: 34
End: 2019-09-12
Payer: COMMERCIAL

## 2019-09-12 VITALS
RESPIRATION RATE: 18 BRPM | OXYGEN SATURATION: 98 % | BODY MASS INDEX: 34.79 KG/M2 | SYSTOLIC BLOOD PRESSURE: 144 MMHG | TEMPERATURE: 98.2 F | HEART RATE: 117 BPM | HEIGHT: 75 IN | WEIGHT: 279.8 LBS | DIASTOLIC BLOOD PRESSURE: 90 MMHG

## 2019-09-12 DIAGNOSIS — J01.10 ACUTE NON-RECURRENT FRONTAL SINUSITIS: Primary | ICD-10-CM

## 2019-09-12 PROCEDURE — S9088 SERVICES PROVIDED IN URGENT: HCPCS | Performed by: FAMILY MEDICINE

## 2019-09-12 PROCEDURE — 99213 OFFICE O/P EST LOW 20 MIN: CPT | Performed by: FAMILY MEDICINE

## 2019-09-12 RX ORDER — FLUTICASONE PROPIONATE 50 MCG
2 SPRAY, SUSPENSION (ML) NASAL DAILY
Qty: 1 BOTTLE | Refills: 0 | Status: SHIPPED | OUTPATIENT
Start: 2019-09-12 | End: 2021-05-10 | Stop reason: ALTCHOICE

## 2019-09-12 RX ORDER — ESZOPICLONE 2 MG/1
2 TABLET, FILM COATED ORAL
Refills: 5 | COMMUNITY
Start: 2019-08-24 | End: 2021-12-12 | Stop reason: ALTCHOICE

## 2019-09-12 NOTE — LETTER
September 12, 2019     Patient: Yahaira Singh   YOB: 1985   Date of Visit: 9/12/2019       To Whom it May Concern:    Yahaira Singh was seen in my clinic on 9/12/2019  He is excused from work 9/11-13/2019 due to illness    If you have any questions or concerns, please don't hesitate to call           Sincerely,          Lindsey Red MD        CC: No Recipients

## 2019-09-12 NOTE — PATIENT INSTRUCTIONS
F/u as needed  Ibuprofen as needed  Fluids  flonase as neededSinusitis   WHAT YOU NEED TO KNOW:   Sinusitis is inflammation or infection of your sinuses  It is most often caused by a virus  Acute sinusitis may last up to 12 weeks  Chronic sinusitis lasts longer than 12 weeks  Recurrent sinusitis means you have 4 or more times in 1 year  DISCHARGE INSTRUCTIONS:   Return to the emergency department if:   · Your eye and eyelid are red, swollen, and painful  · You cannot open your eye  · You have vision changes, such as double vision  · Your eyeball bulges out or you cannot move your eye  · You are more sleepy than normal, or you notice changes in your ability to think, move, or talk  · You have a stiff neck, a fever, or a bad headache  · You have swelling of your forehead or scalp  Contact your healthcare provider if:   · Your symptoms do not improve after 3 days  · Your symptoms do not go away after 10 days  · You have nausea and are vomiting  · Your nose is bleeding  · You have questions or concerns about your condition or care  Medicines: Your symptoms may go away on their own  Your healthcare provider may recommend watchful waiting for up to 10 days before starting antibiotics  You may  need any of the following:  · Acetaminophen  decreases pain and fever  It is available without a doctor's order  Ask how much to take and how often to take it  Follow directions  Read the labels of all other medicines you are using to see if they also contain acetaminophen, or ask your doctor or pharmacist  Acetaminophen can cause liver damage if not taken correctly  Do not use more than 4 grams (4,000 milligrams) total of acetaminophen in one day  · NSAIDs , such as ibuprofen, help decrease swelling, pain, and fever  This medicine is available with or without a doctor's order  NSAIDs can cause stomach bleeding or kidney problems in certain people   If you take blood thinner medicine, always ask your healthcare provider if NSAIDs are safe for you  Always read the medicine label and follow directions  · Nasal steroid sprays  may help decrease inflammation in your nose and sinuses  · Decongestants  help reduce swelling and drain mucus in the nose and sinuses  They may help you breathe easier  · Antihistamines  help dry mucus in the nose and relieve sneezing  · Antibiotics  help treat or prevent a bacterial infection  · Take your medicine as directed  Contact your healthcare provider if you think your medicine is not helping or if you have side effects  Tell him or her if you are allergic to any medicine  Keep a list of the medicines, vitamins, and herbs you take  Include the amounts, and when and why you take them  Bring the list or the pill bottles to follow-up visits  Carry your medicine list with you in case of an emergency  Self-care:   · Rinse your sinuses  Use a sinus rinse device to rinse your nasal passages with a saline (salt water) solution or distilled water  Do not use tap water  This will help thin the mucus in your nose and rinse away pollen and dirt  It will also help reduce swelling so you can breathe normally  Ask your healthcare provider how often to do this  · Breathe in steam   Heat a bowl of water until you see steam  Lean over the bowl and make a tent over your head with a large towel  Breathe deeply for about 20 minutes  Be careful not to get too close to the steam or burn yourself  Do this 3 times a day  You can also breathe deeply when you take a hot shower  · Sleep with your head elevated  Place an extra pillow under your head before you go to sleep to help your sinuses drain  · Drink liquids as directed  Ask your healthcare provider how much liquid to drink each day and which liquids are best for you  Liquids will thin the mucus in your nose and help it drain  Avoid drinks that contain alcohol or caffeine       · Do not smoke, and avoid secondhand smoke  Nicotine and other chemicals in cigarettes and cigars can make your symptoms worse  Ask your healthcare provider for information if you currently smoke and need help to quit  E-cigarettes or smokeless tobacco still contain nicotine  Talk to your healthcare provider before you use these products  Prevent the spread of germs that cause sinusitis:  Wash your hands often with soap and water  Wash your hands after you use the bathroom, change a child's diaper, or sneeze  Wash your hands before you prepare or eat food  Follow up with your healthcare provider as directed: You may be referred to an ear, nose, and throat specialist  Write down your questions so you remember to ask them during your visits  © 2017 [employer]  Information is for End User's use only and may not be sold, redistributed or otherwise used for commercial purposes  All illustrations and images included in CareNotes® are the copyrighted property of [employer]  or [employer]  The above information is an  only  It is not intended as medical advice for individual conditions or treatments  Talk to your doctor, nurse or pharmacist before following any medical regimen to see if it is safe and effective for you

## 2019-09-12 NOTE — PROGRESS NOTES
NAME: Jose Cullen is a 37 y o  male  : 1985    MRN: [id number]      Assessment and Plan   Acute non-recurrent frontal sinusitis [J01 10]  1  Acute non-recurrent frontal sinusitis  fluticasone (FLONASE) 50 mcg/act nasal spray           Patient Instructions   Patient Instructions     F/u as needed  Ibuprofen as needed  Fluids  flonase as neededSinusitis   WHAT YOU NEED TO KNOW:   Sinusitis is inflammation or infection of your sinuses  It is most often caused by a virus  Acute sinusitis may last up to 12 weeks  Chronic sinusitis lasts longer than 12 weeks  Recurrent sinusitis means you have 4 or more times in 1 year  DISCHARGE INSTRUCTIONS:   Return to the emergency department if:   · Your eye and eyelid are red, swollen, and painful  · You cannot open your eye  · You have vision changes, such as double vision  · Your eyeball bulges out or you cannot move your eye  · You are more sleepy than normal, or you notice changes in your ability to think, move, or talk  · You have a stiff neck, a fever, or a bad headache  · You have swelling of your forehead or scalp  Contact your healthcare provider if:   · Your symptoms do not improve after 3 days  · Your symptoms do not go away after 10 days  · You have nausea and are vomiting  · Your nose is bleeding  · You have questions or concerns about your condition or care  Medicines: Your symptoms may go away on their own  Your healthcare provider may recommend watchful waiting for up to 10 days before starting antibiotics  You may  need any of the following:  · Acetaminophen  decreases pain and fever  It is available without a doctor's order  Ask how much to take and how often to take it  Follow directions  Read the labels of all other medicines you are using to see if they also contain acetaminophen, or ask your doctor or pharmacist  Acetaminophen can cause liver damage if not taken correctly   Do not use more than 4 grams (4,000 milligrams) total of acetaminophen in one day  · NSAIDs , such as ibuprofen, help decrease swelling, pain, and fever  This medicine is available with or without a doctor's order  NSAIDs can cause stomach bleeding or kidney problems in certain people  If you take blood thinner medicine, always ask your healthcare provider if NSAIDs are safe for you  Always read the medicine label and follow directions  · Nasal steroid sprays  may help decrease inflammation in your nose and sinuses  · Decongestants  help reduce swelling and drain mucus in the nose and sinuses  They may help you breathe easier  · Antihistamines  help dry mucus in the nose and relieve sneezing  · Antibiotics  help treat or prevent a bacterial infection  · Take your medicine as directed  Contact your healthcare provider if you think your medicine is not helping or if you have side effects  Tell him or her if you are allergic to any medicine  Keep a list of the medicines, vitamins, and herbs you take  Include the amounts, and when and why you take them  Bring the list or the pill bottles to follow-up visits  Carry your medicine list with you in case of an emergency  Self-care:   · Rinse your sinuses  Use a sinus rinse device to rinse your nasal passages with a saline (salt water) solution or distilled water  Do not use tap water  This will help thin the mucus in your nose and rinse away pollen and dirt  It will also help reduce swelling so you can breathe normally  Ask your healthcare provider how often to do this  · Breathe in steam   Heat a bowl of water until you see steam  Lean over the bowl and make a tent over your head with a large towel  Breathe deeply for about 20 minutes  Be careful not to get too close to the steam or burn yourself  Do this 3 times a day  You can also breathe deeply when you take a hot shower  · Sleep with your head elevated    Place an extra pillow under your head before you go to sleep to help your sinuses drain  · Drink liquids as directed  Ask your healthcare provider how much liquid to drink each day and which liquids are best for you  Liquids will thin the mucus in your nose and help it drain  Avoid drinks that contain alcohol or caffeine  · Do not smoke, and avoid secondhand smoke  Nicotine and other chemicals in cigarettes and cigars can make your symptoms worse  Ask your healthcare provider for information if you currently smoke and need help to quit  E-cigarettes or smokeless tobacco still contain nicotine  Talk to your healthcare provider before you use these products  Prevent the spread of germs that cause sinusitis:  Wash your hands often with soap and water  Wash your hands after you use the bathroom, change a child's diaper, or sneeze  Wash your hands before you prepare or eat food  Follow up with your healthcare provider as directed: You may be referred to an ear, nose, and throat specialist  Write down your questions so you remember to ask them during your visits  © 2017 [employer]  Information is for End User's use only and may not be sold, redistributed or otherwise used for commercial purposes  All illustrations and images included in CareNotes® are the copyrighted property of [employer]  or [employer]  The above information is an  only  It is not intended as medical advice for individual conditions or treatments  Talk to your doctor, nurse or pharmacist before following any medical regimen to see if it is safe and effective for you  Proceed to ER if symptoms worsen  Chief Complaint     Chief Complaint   Patient presents with    Headache     started yesterday morning; today woke up and was worse; pt asking for doctor's note         History of Present Illness   Here c/o HA  Started yesterday morning  Denies any injury  He has HA in the past- sinus related  No fevers  Denies CP/SOB  Mild cough  No meds   But taking percocet from something else- did relieve pain a little  Throbbing  Yesterday light bothering him- none today  Review of Systems   Review of Systems   Constitutional: Negative for fatigue and fever  HENT: Negative for ear pain, sore throat and trouble swallowing  Eyes: Negative for visual disturbance  Respiratory: Negative for chest tightness and shortness of breath  Cardiovascular: Negative for chest pain  Gastrointestinal: Negative for abdominal pain, diarrhea, nausea and vomiting  Genitourinary: Negative for difficulty urinating and dysuria  Skin: Negative for rash and wound  Neurological: Positive for headaches  Negative for weakness  Psychiatric/Behavioral: Negative for behavioral problems and confusion           Current Medications       Current Outpatient Medications:     amphetamine-dextroamphetamine (ADDERALL) 20 mg tablet, TAKE 1 TABLET BY MOUTH TWICE A DAY *FILL 4/20, Disp: , Rfl: 0    DEXILANT 60 MG capsule, Take 1 capsule by mouth daily, Disp: , Rfl: 3    gabapentin (NEURONTIN) 600 MG tablet, Take 600 mg by mouth 2 (two) times a day, Disp: , Rfl: 0    hydrOXYzine HCL (ATARAX) 50 mg tablet, Take 50 mg by mouth 2 (two) times a day as needed, Disp: , Rfl: 1    ibuprofen (MOTRIN) 800 mg tablet, Take 800 mg by mouth every 6 (six) hours as needed, Disp: , Rfl: 0    oxyCODONE-acetaminophen (PERCOCET)  mg per tablet, TAKE 1 TABLET BY MOUTH EVERY 8 HOURS AS NEEDED FOR ONGOING THERAPY, Disp: , Rfl: 0    OXYCONTIN 10 MG 12 hr tablet, Take 10 mg by mouth 2 (two) times a day, Disp: , Rfl: 0    risperiDONE (RisperDAL) 3 mg tablet, Take 3 mg by mouth daily at bedtime, Disp: , Rfl: 0    tadalafil (CIALIS) 5 MG tablet, Take 1 tablet (5 mg total) by mouth daily as needed for erectile dysfunction, Disp: 30 tablet, Rfl: 11    zolpidem (AMBIEN) 10 mg tablet, Take 10 mg by mouth daily at bedtime as needed for sleep, Disp: , Rfl:     eszopiclone (LUNESTA) 2 mg tablet, Take 2 mg by mouth daily at bedtime, Disp: , Rfl: 5    fluticasone (FLONASE) 50 mcg/act nasal spray, 2 sprays into each nostril daily, Disp: 1 Bottle, Rfl: 0    OXYCONTIN 15 MG 12 hr tablet, Take 10 mg by mouth 2 (two) times a day , Disp: , Rfl: 0    Current Allergies     Allergies as of 09/12/2019    (No Known Allergies)              Past Medical History:   Diagnosis Date    ADD (attention deficit disorder)     Arthritis     Chronic pain     Heartburn     Mood disorder (HCC)     Tremor        Past Surgical History:   Procedure Laterality Date    CLOSED REDUCTION CALCANEAL FRACTURE      PERONEAL TENDON EXPLORATION         Family History   Problem Relation Age of Onset    Parkinsonism Paternal Grandfather          Medications have been verified  The following portions of the patient's history were reviewed and updated as appropriate: allergies, current medications, past family history, past medical history, past social history, past surgical history and problem list     Objective   /90   Pulse (!) 117   Temp 98 2 °F (36 8 °C) (Tympanic)   Resp 18   Ht 6' 3" (1 905 m)   Wt 127 kg (279 lb 12 8 oz)   SpO2 98%   BMI 34 97 kg/m²      Physical Exam     Physical Exam   Constitutional: He is oriented to person, place, and time  He appears well-developed and well-nourished  HENT:   Head: Normocephalic  Frontal sinus TTP   Eyes: EOM are normal    Neck: Normal range of motion  Cardiovascular: Normal rate, regular rhythm and normal heart sounds  Exam reveals no gallop and no friction rub  No murmur heard  Pulmonary/Chest: Effort normal and breath sounds normal  No respiratory distress  He has no wheezes  He has no rales  Abdominal: Soft  Bowel sounds are normal  He exhibits no distension  There is no tenderness  There is no rebound and no guarding  Musculoskeletal: Normal range of motion  Neurological: He is oriented to person, place, and time  Skin: Skin is warm and dry  No rash noted  Psychiatric: He has a normal mood and affect  Thought content normal    Nursing note and vitals reviewed

## 2019-09-14 LAB — MISCELLANEOUS LAB TEST RESULT: NORMAL

## 2019-09-17 ENCOUNTER — OFFICE VISIT (OUTPATIENT)
Dept: UROLOGY | Facility: MEDICAL CENTER | Age: 34
End: 2019-09-17
Payer: COMMERCIAL

## 2019-09-17 VITALS
SYSTOLIC BLOOD PRESSURE: 150 MMHG | HEIGHT: 75 IN | BODY MASS INDEX: 34.69 KG/M2 | HEART RATE: 98 BPM | WEIGHT: 279 LBS | DIASTOLIC BLOOD PRESSURE: 90 MMHG

## 2019-09-17 DIAGNOSIS — R79.89 LOW TESTOSTERONE: Primary | ICD-10-CM

## 2019-09-17 PROCEDURE — 99214 OFFICE O/P EST MOD 30 MIN: CPT | Performed by: UROLOGY

## 2019-10-02 ENCOUNTER — APPOINTMENT (OUTPATIENT)
Dept: LAB | Facility: HOSPITAL | Age: 34
End: 2019-10-02
Attending: UROLOGY
Payer: COMMERCIAL

## 2019-10-02 DIAGNOSIS — R79.89 LOW TESTOSTERONE: ICD-10-CM

## 2019-10-02 LAB — TESTOST SERPL-MCNC: 130 NG/DL (ref 113–1065)

## 2019-10-02 PROCEDURE — 36415 COLL VENOUS BLD VENIPUNCTURE: CPT

## 2019-10-02 PROCEDURE — 84403 ASSAY OF TOTAL TESTOSTERONE: CPT

## 2019-12-12 ENCOUNTER — HOSPITAL ENCOUNTER (OUTPATIENT)
Dept: MRI IMAGING | Facility: HOSPITAL | Age: 34
Discharge: HOME | End: 2019-12-12
Attending: UROLOGY
Payer: COMMERCIAL

## 2019-12-12 DIAGNOSIS — R79.89 LOW TESTOSTERONE: ICD-10-CM

## 2019-12-12 PROCEDURE — 70553 MRI BRAIN STEM W/O & W/DYE: CPT

## 2019-12-12 PROCEDURE — A9585 GADOBUTROL INJECTION: HCPCS | Performed by: UROLOGY

## 2019-12-12 RX ADMIN — GADOBUTROL 12 ML: 604.72 INJECTION INTRAVENOUS at 17:59

## 2020-01-09 ENCOUNTER — OFFICE VISIT (OUTPATIENT)
Dept: URGENT CARE | Facility: CLINIC | Age: 35
End: 2020-01-09
Payer: COMMERCIAL

## 2020-01-09 VITALS
TEMPERATURE: 97 F | OXYGEN SATURATION: 97 % | HEIGHT: 75 IN | HEART RATE: 110 BPM | DIASTOLIC BLOOD PRESSURE: 92 MMHG | WEIGHT: 293 LBS | BODY MASS INDEX: 36.43 KG/M2 | SYSTOLIC BLOOD PRESSURE: 142 MMHG | RESPIRATION RATE: 18 BRPM

## 2020-01-09 DIAGNOSIS — J01.10 ACUTE FRONTAL SINUSITIS, RECURRENCE NOT SPECIFIED: Primary | ICD-10-CM

## 2020-01-09 PROCEDURE — 99213 OFFICE O/P EST LOW 20 MIN: CPT | Performed by: PHYSICIAN ASSISTANT

## 2020-01-09 RX ORDER — AMOXICILLIN AND CLAVULANATE POTASSIUM 875; 125 MG/1; MG/1
1 TABLET, FILM COATED ORAL EVERY 12 HOURS SCHEDULED
Qty: 20 TABLET | Refills: 0 | Status: SHIPPED | OUTPATIENT
Start: 2020-01-09 | End: 2020-01-19

## 2020-01-09 NOTE — LETTER
January 9, 2020     Patient: Gulshan Smith   YOB: 1985   Date of Visit: 1/9/2020       To Whom it May Concern:    Gulshan Smith was seen in my clinic on 1/9/2020  He may return to work on 1/11/2020  If you have any questions or concerns, please don't hesitate to call           Sincerely,          Dom Montelongo PA-C        CC: No Recipients

## 2020-01-09 NOTE — PROGRESS NOTES
NAME: Dasia Valdivia is a 37 y o  male  : 1985    MRN: [id number]      Assessment and Plan   Acute frontal sinusitis, recurrence not specified [J01 10]  1  Acute frontal sinusitis, recurrence not specified  amoxicillin-clavulanate (AUGMENTIN) 875-125 mg per tablet     pulse re-check 98- states he has been taking decongestants  Patient Instructions   Patient Instructions   augmentin as directed  Continue OTC meds  Increase fluids and rest  If no improvement in 3-4 days f/u with PCP   If anything changes or worsens f/u sooner     Proceed to ER if symptoms worsen  Chief Complaint     Chief Complaint   Patient presents with    Sinusitis     week and half  History of Present Illness   Patient who reports no pmhx presents complaining of sinus congestion x 10 days  States he started with stuffy and runny nose and reports he has worsening sinus p/p  He states he feels some chest congestion developing as well and complains of PND  He states his cough is becoming productive  Denies sore throat, fevers, chills, ear pain, chest tightness, SOB, or dyspnea  States he has been taking advil cold and flu with temporary relief  Review of Systems   Review of Systems   Constitutional: Negative for chills and fever  HENT: Positive for congestion, postnasal drip, rhinorrhea, sinus pressure and sinus pain  Negative for ear pain, sore throat and trouble swallowing  Respiratory: Positive for cough  Negative for chest tightness, shortness of breath, wheezing and stridor  Cardiovascular: Negative for chest pain and palpitations           Current Medications       Current Outpatient Medications:     amphetamine-dextroamphetamine (ADDERALL) 20 mg tablet, TAKE 1 TABLET BY MOUTH TWICE A DAY *FILL , Disp: , Rfl: 0    DEXILANT 60 MG capsule, Take 1 capsule by mouth daily, Disp: , Rfl: 3    eszopiclone (LUNESTA) 2 mg tablet, Take 2 mg by mouth daily at bedtime, Disp: , Rfl: 5    gabapentin (NEURONTIN) 600 MG tablet, Take 600 mg by mouth 2 (two) times a day, Disp: , Rfl: 0    hydrOXYzine HCL (ATARAX) 50 mg tablet, Take 50 mg by mouth 2 (two) times a day as needed, Disp: , Rfl: 1    ibuprofen (MOTRIN) 800 mg tablet, Take 800 mg by mouth every 6 (six) hours as needed, Disp: , Rfl: 0    oxyCODONE-acetaminophen (PERCOCET)  mg per tablet, TAKE 1 TABLET BY MOUTH EVERY 8 HOURS AS NEEDED FOR ONGOING THERAPY, Disp: , Rfl: 0    OXYCONTIN 10 MG 12 hr tablet, Take 10 mg by mouth 2 (two) times a day, Disp: , Rfl: 0    risperiDONE (RisperDAL) 3 mg tablet, Take 3 mg by mouth daily at bedtime, Disp: , Rfl: 0    tadalafil (CIALIS) 5 MG tablet, Take 1 tablet (5 mg total) by mouth daily as needed for erectile dysfunction, Disp: 30 tablet, Rfl: 11    zolpidem (AMBIEN) 10 mg tablet, Take 10 mg by mouth daily at bedtime as needed for sleep, Disp: , Rfl:     amoxicillin-clavulanate (AUGMENTIN) 875-125 mg per tablet, Take 1 tablet by mouth every 12 (twelve) hours for 10 days, Disp: 20 tablet, Rfl: 0    fluticasone (FLONASE) 50 mcg/act nasal spray, 2 sprays into each nostril daily (Patient not taking: Reported on 1/9/2020), Disp: 1 Bottle, Rfl: 0    OXYCONTIN 15 MG 12 hr tablet, Take 10 mg by mouth 2 (two) times a day , Disp: , Rfl: 0    Current Allergies     Allergies as of 01/09/2020    (No Known Allergies)              Past Medical History:   Diagnosis Date    ADD (attention deficit disorder)     Arthritis     Chronic pain     Heartburn     Mood disorder (HCC)     Tremor        Past Surgical History:   Procedure Laterality Date    CLOSED REDUCTION CALCANEAL FRACTURE      PERONEAL TENDON EXPLORATION         Family History   Problem Relation Age of Onset    Parkinsonism Paternal Grandfather     Breast cancer Mother          Medications have been verified      The following portions of the patient's history were reviewed and updated as appropriate: allergies, current medications, past family history, past medical history, past social history, past surgical history and problem list     Objective   /92   Pulse (!) 110   Temp (!) 97 °F (36 1 °C)   Resp 18   Ht 6' 3" (1 905 m)   Wt 133 kg (293 lb)   SpO2 97%   BMI 36 62 kg/m²      Physical Exam     Physical Exam   Constitutional: He appears well-developed and well-nourished  No distress  HENT:   TMs clear b/l without erythema or bulging  Some purulent fluid behind right TM  Nasal mucosa is erythematous and edematous  Posterior oropharynx is clear without erythema, edema, tonsillar hypertrophy or exudates  +PND   Cardiovascular: Normal rate, regular rhythm and normal heart sounds  Pulmonary/Chest: Effort normal and breath sounds normal  No stridor  No respiratory distress  He has no wheezes  He has no rales  Lymphadenopathy:     He has no cervical adenopathy  Skin: He is not diaphoretic  Nursing note and vitals reviewed

## 2020-01-09 NOTE — PATIENT INSTRUCTIONS
augmentin as directed  Continue OTC meds  Increase fluids and rest  If no improvement in 3-4 days f/u with PCP   If anything changes or worsens f/u sooner

## 2020-02-25 ENCOUNTER — TRANSCRIBE ORDERS (OUTPATIENT)
Dept: ADMINISTRATIVE | Facility: HOSPITAL | Age: 35
End: 2020-02-25

## 2020-02-25 ENCOUNTER — APPOINTMENT (OUTPATIENT)
Dept: LAB | Facility: HOSPITAL | Age: 35
End: 2020-02-25
Payer: COMMERCIAL

## 2020-02-25 DIAGNOSIS — E29.1 MALE HYPOGONADISM: ICD-10-CM

## 2020-02-25 DIAGNOSIS — E29.1 MALE HYPOGONADISM: Primary | ICD-10-CM

## 2020-02-25 PROCEDURE — 84403 ASSAY OF TOTAL TESTOSTERONE: CPT

## 2020-02-25 PROCEDURE — 84402 ASSAY OF FREE TESTOSTERONE: CPT

## 2020-02-25 PROCEDURE — 36415 COLL VENOUS BLD VENIPUNCTURE: CPT

## 2020-02-26 LAB
TESTOST FREE SERPL-MCNC: 10.6 PG/ML (ref 8.7–25.1)
TESTOST SERPL-MCNC: 396 NG/DL (ref 264–916)

## 2020-03-14 ENCOUNTER — APPOINTMENT (OUTPATIENT)
Dept: LAB | Facility: HOSPITAL | Age: 35
End: 2020-03-14

## 2020-03-14 ENCOUNTER — TRANSCRIBE ORDERS (OUTPATIENT)
Dept: ADMINISTRATIVE | Facility: HOSPITAL | Age: 35
End: 2020-03-14

## 2020-03-14 DIAGNOSIS — Z00.8 HEALTH EXAMINATION IN POPULATION SURVEY: ICD-10-CM

## 2020-03-14 DIAGNOSIS — Z00.8 HEALTH EXAMINATION IN POPULATION SURVEY: Primary | ICD-10-CM

## 2020-03-14 LAB
CHOLEST SERPL-MCNC: 239 MG/DL (ref 50–200)
EST. AVERAGE GLUCOSE BLD GHB EST-MCNC: 108 MG/DL
HBA1C MFR BLD: 5.4 %
HDLC SERPL-MCNC: 33 MG/DL
LDLC SERPL CALC-MCNC: 149 MG/DL (ref 0–100)
NONHDLC SERPL-MCNC: 206 MG/DL
TRIGL SERPL-MCNC: 283 MG/DL

## 2020-03-14 PROCEDURE — 80061 LIPID PANEL: CPT

## 2020-03-14 PROCEDURE — 36415 COLL VENOUS BLD VENIPUNCTURE: CPT

## 2020-03-14 PROCEDURE — 83036 HEMOGLOBIN GLYCOSYLATED A1C: CPT

## 2020-04-13 ENCOUNTER — OFFICE VISIT (OUTPATIENT)
Dept: PODIATRY | Facility: CLINIC | Age: 35
End: 2020-04-13
Payer: COMMERCIAL

## 2020-04-13 VITALS — BODY MASS INDEX: 36.43 KG/M2 | WEIGHT: 293 LBS | HEIGHT: 75 IN

## 2020-04-13 DIAGNOSIS — B07.0 PLANTAR WART OF LEFT FOOT: Primary | ICD-10-CM

## 2020-04-13 PROCEDURE — 99213 OFFICE O/P EST LOW 20 MIN: CPT | Performed by: PODIATRIST

## 2020-04-13 PROCEDURE — 17110 DESTRUCTION B9 LES UP TO 14: CPT | Performed by: PODIATRIST

## 2020-04-27 ENCOUNTER — OFFICE VISIT (OUTPATIENT)
Dept: PODIATRY | Facility: CLINIC | Age: 35
End: 2020-04-27
Payer: COMMERCIAL

## 2020-04-27 VITALS — HEIGHT: 75 IN | BODY MASS INDEX: 36.43 KG/M2 | WEIGHT: 293 LBS

## 2020-04-27 DIAGNOSIS — B07.0 PLANTAR WART OF LEFT FOOT: Primary | ICD-10-CM

## 2020-04-27 PROCEDURE — 99213 OFFICE O/P EST LOW 20 MIN: CPT | Performed by: PODIATRIST

## 2020-05-11 ENCOUNTER — OFFICE VISIT (OUTPATIENT)
Dept: PODIATRY | Facility: CLINIC | Age: 35
End: 2020-05-11
Payer: COMMERCIAL

## 2020-05-11 VITALS — BODY MASS INDEX: 36.43 KG/M2 | HEIGHT: 75 IN | WEIGHT: 293 LBS

## 2020-05-11 DIAGNOSIS — B07.0 PLANTAR WART OF LEFT FOOT: Primary | ICD-10-CM

## 2020-05-11 PROCEDURE — 99213 OFFICE O/P EST LOW 20 MIN: CPT | Performed by: PODIATRIST

## 2020-05-18 ENCOUNTER — OFFICE VISIT (OUTPATIENT)
Dept: PODIATRY | Facility: CLINIC | Age: 35
End: 2020-05-18
Payer: COMMERCIAL

## 2020-05-18 VITALS — WEIGHT: 293 LBS | BODY MASS INDEX: 36.43 KG/M2 | HEIGHT: 75 IN

## 2020-05-18 DIAGNOSIS — B07.0 PLANTAR WART OF LEFT FOOT: Primary | ICD-10-CM

## 2020-05-18 PROCEDURE — 99213 OFFICE O/P EST LOW 20 MIN: CPT | Performed by: PODIATRIST

## 2020-05-28 ENCOUNTER — OFFICE VISIT (OUTPATIENT)
Dept: PODIATRY | Facility: CLINIC | Age: 35
End: 2020-05-28
Payer: COMMERCIAL

## 2020-05-28 VITALS — BODY MASS INDEX: 36.43 KG/M2 | HEIGHT: 75 IN | WEIGHT: 293 LBS

## 2020-05-28 DIAGNOSIS — B07.0 PLANTAR WART OF LEFT FOOT: Primary | ICD-10-CM

## 2020-05-28 PROCEDURE — 99213 OFFICE O/P EST LOW 20 MIN: CPT | Performed by: PODIATRIST

## 2020-05-28 RX ORDER — VARENICLINE TARTRATE
KIT
COMMUNITY
Start: 2020-05-28 | End: 2022-05-31

## 2020-06-02 ENCOUNTER — TELEMEDICINE (OUTPATIENT)
Dept: DERMATOLOGY | Facility: CLINIC | Age: 35
End: 2020-06-02
Payer: COMMERCIAL

## 2020-06-02 DIAGNOSIS — B07.9 VERRUCA VULGARIS: Primary | ICD-10-CM

## 2020-06-02 PROCEDURE — 99214 OFFICE O/P EST MOD 30 MIN: CPT | Performed by: STUDENT IN AN ORGANIZED HEALTH CARE EDUCATION/TRAINING PROGRAM

## 2020-06-10 ENCOUNTER — OFFICE VISIT (OUTPATIENT)
Dept: PODIATRY | Facility: CLINIC | Age: 35
End: 2020-06-10
Payer: COMMERCIAL

## 2020-06-10 VITALS — WEIGHT: 293 LBS | BODY MASS INDEX: 36.43 KG/M2 | HEIGHT: 75 IN

## 2020-06-10 DIAGNOSIS — B07.0 PLANTAR WART OF LEFT FOOT: Primary | ICD-10-CM

## 2020-06-10 PROCEDURE — 99213 OFFICE O/P EST LOW 20 MIN: CPT | Performed by: PODIATRIST

## 2020-06-12 ENCOUNTER — TELEPHONE (OUTPATIENT)
Dept: UROLOGY | Facility: AMBULATORY SURGERY CENTER | Age: 35
End: 2020-06-12

## 2020-06-12 DIAGNOSIS — N52.1 ERECTILE DYSFUNCTION DUE TO DISEASES CLASSIFIED ELSEWHERE: ICD-10-CM

## 2020-06-12 DIAGNOSIS — N46.9 INFERTILITY MALE: Primary | ICD-10-CM

## 2020-06-13 ENCOUNTER — APPOINTMENT (OUTPATIENT)
Dept: LAB | Facility: HOSPITAL | Age: 35
End: 2020-06-13
Attending: UROLOGY
Payer: COMMERCIAL

## 2020-06-13 DIAGNOSIS — N46.9 INFERTILITY MALE: ICD-10-CM

## 2020-06-13 LAB — TESTOST SERPL-MCNC: 622 NG/DL (ref 113–1065)

## 2020-06-13 PROCEDURE — 84403 ASSAY OF TOTAL TESTOSTERONE: CPT

## 2020-06-13 PROCEDURE — 36415 COLL VENOUS BLD VENIPUNCTURE: CPT

## 2020-06-15 RX ORDER — TADALAFIL 5 MG/1
5 TABLET ORAL DAILY
Qty: 30 TABLET | Refills: 0 | Status: SHIPPED | OUTPATIENT
Start: 2020-06-15 | End: 2020-07-15

## 2020-06-19 ENCOUNTER — TELEPHONE (OUTPATIENT)
Dept: UROLOGY | Facility: MEDICAL CENTER | Age: 35
End: 2020-06-19

## 2020-07-15 ENCOUNTER — OFFICE VISIT (OUTPATIENT)
Dept: UROLOGY | Facility: MEDICAL CENTER | Age: 35
End: 2020-07-15
Payer: COMMERCIAL

## 2020-07-15 ENCOUNTER — APPOINTMENT (OUTPATIENT)
Dept: LAB | Facility: HOSPITAL | Age: 35
End: 2020-07-15
Attending: UROLOGY
Payer: COMMERCIAL

## 2020-07-15 ENCOUNTER — TRANSCRIBE ORDERS (OUTPATIENT)
Dept: LAB | Facility: HOSPITAL | Age: 35
End: 2020-07-15

## 2020-07-15 VITALS
SYSTOLIC BLOOD PRESSURE: 150 MMHG | DIASTOLIC BLOOD PRESSURE: 78 MMHG | BODY MASS INDEX: 37.3 KG/M2 | HEIGHT: 75 IN | WEIGHT: 300 LBS | HEART RATE: 99 BPM | TEMPERATURE: 97.1 F

## 2020-07-15 DIAGNOSIS — N46.021: ICD-10-CM

## 2020-07-15 DIAGNOSIS — E29.1 HYPOGONADISM IN MALE: ICD-10-CM

## 2020-07-15 DIAGNOSIS — N52.1 ERECTILE DYSFUNCTION DUE TO DISEASES CLASSIFIED ELSEWHERE: Primary | ICD-10-CM

## 2020-07-15 DIAGNOSIS — N46.9 INFERTILITY MALE: ICD-10-CM

## 2020-07-15 LAB
EPI CELLS SMN QL MICRO: ABNORMAL
LIQUEFACTION TIME SMN: 15 MIN
PH SMN: 8.3 [PH] (ref 7.2–8.6)
SEX ABSTIN DURATION TIME PATIENT: 1 D
SPECIMEN VOL SMN: 0.8 ML (ref 1–5)
SPERM # SMN: 0 MILLION/EJACULATION (ref 40–1000)
SPERM SMN: 0 /ML (ref 20–999)
VISC SMN: 4 CP (ref 3–4)
WBC SMN QL: 6 "HPF"

## 2020-07-15 PROCEDURE — 82757 ASSAY OF SEMEN FRUCTOSE: CPT

## 2020-07-15 PROCEDURE — 99214 OFFICE O/P EST MOD 30 MIN: CPT | Performed by: UROLOGY

## 2020-07-15 PROCEDURE — 36415 COLL VENOUS BLD VENIPUNCTURE: CPT

## 2020-07-15 PROCEDURE — 89320 SEMEN ANAL VOL/COUNT/MOT: CPT

## 2020-07-15 RX ORDER — SILDENAFIL 100 MG/1
100 TABLET, FILM COATED ORAL AS NEEDED
Qty: 2 TABLET | Refills: 0 | Status: SHIPPED | OUTPATIENT
Start: 2020-07-15 | End: 2020-10-07 | Stop reason: SDUPTHER

## 2020-07-15 RX ORDER — TADALAFIL 20 MG/1
20 TABLET ORAL DAILY PRN
Qty: 2 TABLET | Refills: 0 | Status: SHIPPED | OUTPATIENT
Start: 2020-07-15 | End: 2020-10-07 | Stop reason: DRUGHIGH

## 2020-07-15 NOTE — PATIENT INSTRUCTIONS
Trial of Viagra 100 mg approximately 2 hours prior to sexual stimulation taken on an empty stomach  Trial of Cialis 20 mg approximately 4 hours prior to sexual stimulation taken empty stomach      Fill PGE/alprostadil prescription and return to office is scheduled for test penile injection

## 2020-07-15 NOTE — PROGRESS NOTES
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

## 2020-07-22 LAB — MISCELLANEOUS LAB TEST RESULT: NORMAL

## 2020-08-05 ENCOUNTER — OFFICE VISIT (OUTPATIENT)
Dept: PODIATRY | Facility: CLINIC | Age: 35
End: 2020-08-05
Payer: COMMERCIAL

## 2020-08-05 VITALS — TEMPERATURE: 97.7 F | BODY MASS INDEX: 37.3 KG/M2 | WEIGHT: 300 LBS | HEIGHT: 75 IN

## 2020-08-05 DIAGNOSIS — B07.0 PLANTAR WART OF LEFT FOOT: Primary | ICD-10-CM

## 2020-08-05 PROCEDURE — 99213 OFFICE O/P EST LOW 20 MIN: CPT | Performed by: PODIATRIST

## 2020-08-05 RX ORDER — FLUOROURACIL 5 MG/G
CREAM TOPICAL DAILY
Qty: 30 G | Refills: 2 | Status: SHIPPED | OUTPATIENT
Start: 2020-08-05 | End: 2021-05-10 | Stop reason: SDUPTHER

## 2020-08-05 NOTE — PROGRESS NOTES
PATIENT:  Soraya Morris  1985         ASSESSMENT:     1  Plantar wart of left foot  fluoruracil (CARAC) 0 5 % cream       PLAN:  He has recalcitrant wart on left heel  Will hold further chemical treatment or topical medication  Oral Tagamet  Rx: Carac cream   Cryotherapy at home  RA in 4 weeks  Subjective: The patient presents for evaluation of left foot wart  Pain is minimal   No wounds  He tried freeze-off and it seemed to help  Then, it got worse again after stopping it  No numbness or paresthesia  No redness or cellulitis  The following portions of the patient's history were reviewed and updated as appropriate: allergies, current medications, past family history, past medical history, past social history, past surgical history and problem list   All pertinent labs and images were reviewed  Past Medical History  Past Medical History:   Diagnosis Date    ADD (attention deficit disorder)     Arthritis     Chronic pain     Heartburn     Mood disorder (HCC)     Tremor        Past Surgical History  Past Surgical History:   Procedure Laterality Date    CLOSED REDUCTION CALCANEAL FRACTURE      PERONEAL TENDON EXPLORATION          Allergies:  Patient has no known allergies      Medications:  Current Outpatient Medications   Medication Sig Dispense Refill    amphetamine-dextroamphetamine (ADDERALL) 20 mg tablet TAKE 1 TABLET BY MOUTH TWICE A DAY *FILL 4/20  0    CHANTIX STARTING MONTH ELIZABETH 0 5 MG X 11 & 1 MG X 42 tablet       DEXILANT 60 MG capsule Take 1 capsule by mouth daily  3    eszopiclone (LUNESTA) 2 mg tablet Take 2 mg by mouth daily at bedtime  5    fluoruracil (CARAC) 0 5 % cream Apply topically daily 30 g 2    fluticasone (FLONASE) 50 mcg/act nasal spray 2 sprays into each nostril daily (Patient not taking: Reported on 1/9/2020) 1 Bottle 0    gabapentin (NEURONTIN) 600 MG tablet Take 600 mg by mouth 2 (two) times a day  0    hydrOXYzine HCL (ATARAX) 50 mg tablet Take 50 mg by mouth 2 (two) times a day as needed  1    ibuprofen (MOTRIN) 800 mg tablet Take 800 mg by mouth every 6 (six) hours as needed  0    oxyCODONE-acetaminophen (PERCOCET)  mg per tablet TAKE 1 TABLET BY MOUTH EVERY 8 HOURS AS NEEDED FOR ONGOING THERAPY  0    OXYCONTIN 10 MG 12 hr tablet Take 10 mg by mouth 2 (two) times a day  0    OXYCONTIN 15 MG 12 hr tablet Take 10 mg by mouth 2 (two) times a day   0    risperiDONE (RisperDAL) 3 mg tablet Take 3 mg by mouth daily at bedtime  0    sildenafil (VIAGRA) 100 mg tablet Take 1 tablet (100 mg total) by mouth as needed for erectile dysfunction 2 tablet 0    tadalafil (CIALIS) 20 MG tablet Take 1 tablet (20 mg total) by mouth daily as needed for erectile dysfunction 2 tablet 0    zolpidem (AMBIEN) 10 mg tablet Take 10 mg by mouth daily at bedtime as needed for sleep       No current facility-administered medications for this visit          Social History:  Social History     Socioeconomic History    Marital status: Single     Spouse name: None    Number of children: None    Years of education: None    Highest education level: None   Occupational History    None   Social Needs    Financial resource strain: None    Food insecurity     Worry: None     Inability: None    Transportation needs     Medical: None     Non-medical: None   Tobacco Use    Smoking status: Current Some Day Smoker     Types: Cigarettes    Smokeless tobacco: Never Used   Substance and Sexual Activity    Alcohol use: Yes     Comment: social    Drug use: Never    Sexual activity: None   Lifestyle    Physical activity     Days per week: None     Minutes per session: None    Stress: None   Relationships    Social connections     Talks on phone: None     Gets together: None     Attends Orthodox service: None     Active member of club or organization: None     Attends meetings of clubs or organizations: None     Relationship status: None    Intimate partner violence     Fear of current or ex partner: None     Emotionally abused: None     Physically abused: None     Forced sexual activity: None   Other Topics Concern    None   Social History Narrative    None          Review of Systems   Constitutional: Negative for chills and fever  Respiratory: Negative for cough and shortness of breath  Cardiovascular: Negative for chest pain and leg swelling  Gastrointestinal: Negative for nausea and vomiting  Neurological: Negative for numbness  Objective:      Temp 97 7 °F (36 5 °C)   Ht 6' 3" (1 905 m)   Wt 136 kg (300 lb)   BMI 37 50 kg/m²          Physical Exam   Constitutional: He is oriented to person, place, and time  He appears well-developed  No distress  Cardiovascular: Normal rate and regular rhythm  Pulmonary/Chest: Effort normal and breath sounds normal    Musculoskeletal:         General: No tenderness  Comments: Pes planus noted  Neurological: He is alert and oriented to person, place, and time  He displays normal reflexes  No sensory deficit  Skin: Skin is warm  No rash noted  No erythema  No wounds  No cellulitis  Large verrucoid lesion on left medial and posterior heel  Mild keratosis with punctate bleeding  Psychiatric: His behavior is normal    Vitals reviewed

## 2020-08-25 ENCOUNTER — TELEPHONE (OUTPATIENT)
Dept: UROLOGY | Facility: MEDICAL CENTER | Age: 35
End: 2020-08-25

## 2020-08-25 NOTE — TELEPHONE ENCOUNTER
Call placed to patient and spoke with him  He is rescheduled for next available opening with Dr Isabel Lee on 10/8/2020 at 3:30pm  Pt is aware and accepted appointment

## 2020-08-25 NOTE — TELEPHONE ENCOUNTER
Patient managed by Dr Veto Weir  Patient called in stating he needs to cancel alprostadil penile injection appointment today at 2:30pm  and reschedule out 3 to 4 weeks    Patient can be reached at [phone]

## 2020-09-02 ENCOUNTER — OFFICE VISIT (OUTPATIENT)
Dept: PODIATRY | Facility: CLINIC | Age: 35
End: 2020-09-02
Payer: COMMERCIAL

## 2020-09-02 VITALS — HEIGHT: 75 IN | WEIGHT: 300 LBS | TEMPERATURE: 98 F | BODY MASS INDEX: 37.3 KG/M2

## 2020-09-02 DIAGNOSIS — B07.0 PLANTAR WART OF LEFT FOOT: Primary | ICD-10-CM

## 2020-09-02 PROCEDURE — 99213 OFFICE O/P EST LOW 20 MIN: CPT | Performed by: PODIATRIST

## 2020-09-03 NOTE — PROGRESS NOTES
PATIENT:  Bishop Santos  1985         ASSESSMENT:     1  Plantar wart of left foot         PLAN:  Continue oral Tagamet 400mg tid  Continue Carac cream   Following verbal consent 0 1 ml Candin injection was given around the lesion left heel  RA in 4 weeks  Subjective: The patient presents for evaluation of left foot wart  Pain is minimal   No wounds  Decreased in size  Doing well with Tagamet and Carac  No numbness or paresthesia  No redness or cellulitis  The following portions of the patient's history were reviewed and updated as appropriate: allergies, current medications, past family history, past medical history, past social history, past surgical history and problem list   All pertinent labs and images were reviewed  Past Medical History  Past Medical History:   Diagnosis Date    ADD (attention deficit disorder)     Arthritis     Chronic pain     Heartburn     Mood disorder (HCC)     Tremor        Past Surgical History  Past Surgical History:   Procedure Laterality Date    CLOSED REDUCTION CALCANEAL FRACTURE      PERONEAL TENDON EXPLORATION          Allergies:  Patient has no known allergies      Medications:  Current Outpatient Medications   Medication Sig Dispense Refill    amphetamine-dextroamphetamine (ADDERALL) 20 mg tablet TAKE 1 TABLET BY MOUTH TWICE A DAY *FILL 4/20  0    CHANTIX STARTING MONTH ELIZABETH 0 5 MG X 11 & 1 MG X 42 tablet       DEXILANT 60 MG capsule Take 1 capsule by mouth daily  3    eszopiclone (LUNESTA) 2 mg tablet Take 2 mg by mouth daily at bedtime  5    fluoruracil (CARAC) 0 5 % cream Apply topically daily 30 g 2    fluticasone (FLONASE) 50 mcg/act nasal spray 2 sprays into each nostril daily (Patient not taking: Reported on 1/9/2020) 1 Bottle 0    gabapentin (NEURONTIN) 600 MG tablet Take 600 mg by mouth 2 (two) times a day  0    hydrOXYzine HCL (ATARAX) 50 mg tablet Take 50 mg by mouth 2 (two) times a day as needed  1    ibuprofen (MOTRIN) 800 mg tablet Take 800 mg by mouth every 6 (six) hours as needed  0    methylphenidate (CONCERTA) 36 MG ER tablet       oxyCODONE-acetaminophen (PERCOCET)  mg per tablet TAKE 1 TABLET BY MOUTH EVERY 8 HOURS AS NEEDED FOR ONGOING THERAPY  0    OXYCONTIN 10 MG 12 hr tablet Take 10 mg by mouth 2 (two) times a day  0    OXYCONTIN 15 MG 12 hr tablet Take 10 mg by mouth 2 (two) times a day   0    risperiDONE (RisperDAL) 3 mg tablet Take 3 mg by mouth daily at bedtime  0    sildenafil (VIAGRA) 100 mg tablet Take 1 tablet (100 mg total) by mouth as needed for erectile dysfunction 2 tablet 0    tadalafil (CIALIS) 20 MG tablet Take 1 tablet (20 mg total) by mouth daily as needed for erectile dysfunction 2 tablet 0    tadalafil (CIALIS) 5 MG tablet TAKE ONE TABLET DAILY AS NEEDED FOR ERECTILE DYSFUNCTION      zolpidem (AMBIEN) 10 mg tablet Take 10 mg by mouth daily at bedtime as needed for sleep       No current facility-administered medications for this visit          Social History:  Social History     Socioeconomic History    Marital status: Single     Spouse name: None    Number of children: None    Years of education: None    Highest education level: None   Occupational History    None   Social Needs    Financial resource strain: None    Food insecurity     Worry: None     Inability: None    Transportation needs     Medical: None     Non-medical: None   Tobacco Use    Smoking status: Current Some Day Smoker     Types: Cigarettes    Smokeless tobacco: Never Used   Substance and Sexual Activity    Alcohol use: Yes     Comment: social    Drug use: Never    Sexual activity: None   Lifestyle    Physical activity     Days per week: None     Minutes per session: None    Stress: None   Relationships    Social connections     Talks on phone: None     Gets together: None     Attends Episcopalian service: None     Active member of club or organization: None     Attends meetings of clubs or organizations: None     Relationship status: None    Intimate partner violence     Fear of current or ex partner: None     Emotionally abused: None     Physically abused: None     Forced sexual activity: None   Other Topics Concern    None   Social History Narrative    None          Review of Systems   Constitutional: Negative for chills and fever  Respiratory: Negative for cough and shortness of breath  Cardiovascular: Negative for chest pain and leg swelling  Gastrointestinal: Negative for nausea and vomiting  Neurological: Negative for numbness  Objective:      Temp 98 °F (36 7 °C)   Ht 6' 3" (1 905 m)   Wt 136 kg (300 lb) Comment: Verbal  BMI 37 50 kg/m²          Physical Exam  Vitals signs reviewed  Constitutional:       General: He is not in acute distress  Appearance: Normal appearance  He is well-developed  Cardiovascular:      Rate and Rhythm: Normal rate and regular rhythm  Pulses: Normal pulses  Pulmonary:      Effort: Pulmonary effort is normal       Breath sounds: Normal breath sounds  Musculoskeletal:         General: No tenderness  Comments: Pes planus noted  Skin:     General: Skin is warm  Findings: No erythema or rash  Comments: No wounds  No cellulitis  Decreased verrucoid lesion on left medial and posterior heel  Decreased keratosis and punctate bleeding  Neurological:      Mental Status: He is alert and oriented to person, place, and time  Sensory: No sensory deficit        Deep Tendon Reflexes: Reflexes normal    Psychiatric:         Behavior: Behavior normal

## 2020-09-14 ENCOUNTER — TRANSCRIBE ORDERS (OUTPATIENT)
Dept: ADMINISTRATIVE | Facility: HOSPITAL | Age: 35
End: 2020-09-14

## 2020-09-14 DIAGNOSIS — S86.302D: Primary | ICD-10-CM

## 2020-09-30 ENCOUNTER — OFFICE VISIT (OUTPATIENT)
Dept: PODIATRY | Facility: CLINIC | Age: 35
End: 2020-09-30
Payer: COMMERCIAL

## 2020-09-30 VITALS — TEMPERATURE: 98.6 F | WEIGHT: 300 LBS | BODY MASS INDEX: 37.3 KG/M2 | HEIGHT: 75 IN

## 2020-09-30 DIAGNOSIS — B07.0 PLANTAR WART OF LEFT FOOT: Primary | ICD-10-CM

## 2020-09-30 PROCEDURE — 99213 OFFICE O/P EST LOW 20 MIN: CPT | Performed by: PODIATRIST

## 2020-09-30 NOTE — PROGRESS NOTES
PATIENT:  Gaby Morgan  1985         ASSESSMENT:     1  Plantar wart of left foot         PLAN:  Significant reduction of wart  Continue oral Tagamet 400mg tid  Continue Carac cream   Following verbal consent 0 1 ml Candin injection was given around the lesion left heel  RA in 4 weeks  Subjective: The patient presents for evaluation of left foot wart  There is no pain  It looks much better since the last visit  No wounds  Decreased in size  Doing well with Tagamet and Carac  No numbness or paresthesia  No redness or cellulitis  The following portions of the patient's history were reviewed and updated as appropriate: allergies, current medications, past family history, past medical history, past social history, past surgical history and problem list   All pertinent labs and images were reviewed  Past Medical History  Past Medical History:   Diagnosis Date    ADD (attention deficit disorder)     Arthritis     Chronic pain     Heartburn     Mood disorder (HCC)     Tremor        Past Surgical History  Past Surgical History:   Procedure Laterality Date    CLOSED REDUCTION CALCANEAL FRACTURE      PERONEAL TENDON EXPLORATION          Allergies:  Patient has no known allergies      Medications:  Current Outpatient Medications   Medication Sig Dispense Refill    amphetamine-dextroamphetamine (ADDERALL) 20 mg tablet TAKE 1 TABLET BY MOUTH TWICE A DAY *FILL 4/20  0    CHANTIX STARTING MONTH ELIZABETH 0 5 MG X 11 & 1 MG X 42 tablet       DEXILANT 60 MG capsule Take 1 capsule by mouth daily  3    eszopiclone (LUNESTA) 2 mg tablet Take 2 mg by mouth daily at bedtime  5    fluoruracil (CARAC) 0 5 % cream Apply topically daily 30 g 2    fluticasone (FLONASE) 50 mcg/act nasal spray 2 sprays into each nostril daily (Patient not taking: Reported on 1/9/2020) 1 Bottle 0    gabapentin (NEURONTIN) 600 MG tablet Take 600 mg by mouth 2 (two) times a day  0    hydrOXYzine HCL (ATARAX) 50 mg tablet Take 50 mg by mouth 2 (two) times a day as needed  1    ibuprofen (MOTRIN) 800 mg tablet Take 800 mg by mouth every 6 (six) hours as needed  0    methylphenidate (CONCERTA) 36 MG ER tablet       oxyCODONE-acetaminophen (PERCOCET)  mg per tablet TAKE 1 TABLET BY MOUTH EVERY 8 HOURS AS NEEDED FOR ONGOING THERAPY  0    OXYCONTIN 10 MG 12 hr tablet Take 10 mg by mouth 2 (two) times a day  0    OXYCONTIN 15 MG 12 hr tablet Take 10 mg by mouth 2 (two) times a day   0    risperiDONE (RisperDAL) 3 mg tablet Take 3 mg by mouth daily at bedtime  0    sildenafil (VIAGRA) 100 mg tablet Take 1 tablet (100 mg total) by mouth as needed for erectile dysfunction 2 tablet 0    tadalafil (CIALIS) 20 MG tablet Take 1 tablet (20 mg total) by mouth daily as needed for erectile dysfunction 2 tablet 0    tadalafil (CIALIS) 5 MG tablet TAKE ONE TABLET DAILY AS NEEDED FOR ERECTILE DYSFUNCTION      zolpidem (AMBIEN) 10 mg tablet Take 10 mg by mouth daily at bedtime as needed for sleep       No current facility-administered medications for this visit          Social History:  Social History     Socioeconomic History    Marital status: Single     Spouse name: None    Number of children: None    Years of education: None    Highest education level: None   Occupational History    None   Social Needs    Financial resource strain: None    Food insecurity     Worry: None     Inability: None    Transportation needs     Medical: None     Non-medical: None   Tobacco Use    Smoking status: Current Some Day Smoker     Types: Cigarettes    Smokeless tobacco: Never Used   Substance and Sexual Activity    Alcohol use: Yes     Comment: social    Drug use: Never    Sexual activity: None   Lifestyle    Physical activity     Days per week: None     Minutes per session: None    Stress: None   Relationships    Social connections     Talks on phone: None     Gets together: None     Attends Judaism service: None Active member of club or organization: None     Attends meetings of clubs or organizations: None     Relationship status: None    Intimate partner violence     Fear of current or ex partner: None     Emotionally abused: None     Physically abused: None     Forced sexual activity: None   Other Topics Concern    None   Social History Narrative    None          Review of Systems   Constitutional: Negative for chills and fever  Respiratory: Negative for cough and shortness of breath  Cardiovascular: Negative for chest pain and leg swelling  Gastrointestinal: Negative for nausea and vomiting  Neurological: Negative for numbness  Objective:      Temp 98 6 °F (37 °C) (Tympanic Core)   Ht 6' 3" (1 905 m)   Wt 136 kg (300 lb) Comment: Verbal  BMI 37 50 kg/m²          Physical Exam  Vitals signs reviewed  Constitutional:       General: He is not in acute distress  Appearance: Normal appearance  He is well-developed  Cardiovascular:      Rate and Rhythm: Normal rate and regular rhythm  Pulses: Normal pulses  Pulmonary:      Effort: Pulmonary effort is normal    Musculoskeletal:         General: No tenderness  Comments: Pes planus noted  Skin:     General: Skin is warm  Findings: No erythema or rash  Comments: No wounds  No cellulitis  70% reduction of verrucoid lesion on left medial and posterior heel  Decreased keratosis and punctate bleeding  Neurological:      General: No focal deficit present  Mental Status: He is alert and oriented to person, place, and time  Cranial Nerves: No cranial nerve deficit  Sensory: No sensory deficit     Psychiatric:         Mood and Affect: Mood normal          Behavior: Behavior normal

## 2020-10-07 DIAGNOSIS — N52.1 ERECTILE DYSFUNCTION DUE TO DISEASES CLASSIFIED ELSEWHERE: ICD-10-CM

## 2020-10-07 RX ORDER — SILDENAFIL 100 MG/1
TABLET, FILM COATED ORAL
Qty: 30 TABLET | Refills: 3 | Status: SHIPPED | OUTPATIENT
Start: 2020-10-07 | End: 2021-04-05 | Stop reason: SDUPTHER

## 2020-10-07 NOTE — TELEPHONE ENCOUNTER
Patient called   Medication in pill form do work  He is canceling the appointment and need for injectable  Refill requested for Viagra 100 mg   Received a trial of 2 tablets only    This prescription will be sent to the [facility]  Patient can be reached at [phone]  Thank you

## 2020-10-07 NOTE — TELEPHONE ENCOUNTER
Last entry should NOT have been documented on this message vine    Request handled on a separate Medication Refill request

## 2020-12-09 ENCOUNTER — TRANSCRIBE ORDERS (OUTPATIENT)
Dept: ADMINISTRATIVE | Facility: HOSPITAL | Age: 35
End: 2020-12-09

## 2020-12-09 ENCOUNTER — HOSPITAL ENCOUNTER (OUTPATIENT)
Dept: RADIOLOGY | Facility: HOSPITAL | Age: 35
Discharge: HOME | End: 2020-12-09
Payer: OTHER MISCELLANEOUS

## 2020-12-09 DIAGNOSIS — S86.302D PERONEAL TENDON INJURY, LEFT, SUBSEQUENT ENCOUNTER: ICD-10-CM

## 2020-12-09 DIAGNOSIS — S86.302D PERONEAL TENDON INJURY, LEFT, SUBSEQUENT ENCOUNTER: Primary | ICD-10-CM

## 2020-12-09 PROCEDURE — 73610 X-RAY EXAM OF ANKLE: CPT

## 2020-12-24 ENCOUNTER — HOSPITAL ENCOUNTER (OUTPATIENT)
Dept: RADIOLOGY | Facility: HOSPITAL | Age: 35
Discharge: HOME | End: 2020-12-24
Payer: OTHER MISCELLANEOUS

## 2020-12-24 DIAGNOSIS — S86.302D: ICD-10-CM

## 2020-12-24 PROCEDURE — 76882 US LMTD JT/FCL EVL NVASC XTR: CPT

## 2020-12-29 ENCOUNTER — IMMUNIZATIONS (OUTPATIENT)
Dept: FAMILY MEDICINE CLINIC | Facility: HOSPITAL | Age: 35
End: 2020-12-29
Payer: COMMERCIAL

## 2020-12-29 DIAGNOSIS — Z23 ENCOUNTER FOR IMMUNIZATION: ICD-10-CM

## 2020-12-29 PROCEDURE — 91301 SARS-COV-2 / COVID-19 MRNA VACCINE (MODERNA) 100 MCG: CPT

## 2020-12-29 PROCEDURE — 0011A SARS-COV-2 / COVID-19 MRNA VACCINE (MODERNA) 100 MCG: CPT

## 2021-01-26 ENCOUNTER — IMMUNIZATIONS (OUTPATIENT)
Dept: FAMILY MEDICINE CLINIC | Facility: HOSPITAL | Age: 36
End: 2021-01-26

## 2021-01-26 DIAGNOSIS — Z23 ENCOUNTER FOR IMMUNIZATION: Primary | ICD-10-CM

## 2021-01-26 PROCEDURE — 91301 SARS-COV-2 / COVID-19 MRNA VACCINE (MODERNA) 100 MCG: CPT

## 2021-01-26 PROCEDURE — 0012A SARS-COV-2 / COVID-19 MRNA VACCINE (MODERNA) 100 MCG: CPT

## 2021-04-05 DIAGNOSIS — N52.1 ERECTILE DYSFUNCTION DUE TO DISEASES CLASSIFIED ELSEWHERE: ICD-10-CM

## 2021-04-06 RX ORDER — SILDENAFIL 100 MG/1
TABLET, FILM COATED ORAL
Qty: 30 TABLET | Refills: 2 | Status: SHIPPED | OUTPATIENT
Start: 2021-04-06 | End: 2021-06-20 | Stop reason: SDUPTHER

## 2021-04-06 NOTE — TELEPHONE ENCOUNTER
The patient was last seen on 7/15/20 by Dr Dennie Kung in the [facility] location; continuation of the medication was authorized at that time    Request for same, 30 sount supply with 2 refills was queued and forwarded to the Advanced Practitioner covering the [facility] location for approval

## 2021-05-08 ENCOUNTER — LAB (OUTPATIENT)
Dept: LAB | Facility: HOSPITAL | Age: 36
End: 2021-05-08

## 2021-05-08 ENCOUNTER — TRANSCRIBE ORDERS (OUTPATIENT)
Dept: ADMINISTRATIVE | Facility: HOSPITAL | Age: 36
End: 2021-05-08

## 2021-05-08 DIAGNOSIS — Z00.8 HEALTH EXAMINATION IN POPULATION SURVEY: ICD-10-CM

## 2021-05-08 DIAGNOSIS — Z00.8 HEALTH EXAMINATION IN POPULATION SURVEY: Primary | ICD-10-CM

## 2021-05-08 LAB
CHOLEST SERPL-MCNC: 198 MG/DL (ref 50–200)
EST. AVERAGE GLUCOSE BLD GHB EST-MCNC: 117 MG/DL
HBA1C MFR BLD: 5.7 %
HDLC SERPL-MCNC: 29 MG/DL
LDLC SERPL CALC-MCNC: 104 MG/DL (ref 0–100)
NONHDLC SERPL-MCNC: 169 MG/DL
TRIGL SERPL-MCNC: 325 MG/DL

## 2021-05-08 PROCEDURE — 83036 HEMOGLOBIN GLYCOSYLATED A1C: CPT

## 2021-05-08 PROCEDURE — 36415 COLL VENOUS BLD VENIPUNCTURE: CPT

## 2021-05-08 PROCEDURE — 80061 LIPID PANEL: CPT

## 2021-05-10 ENCOUNTER — OFFICE VISIT (OUTPATIENT)
Dept: PODIATRY | Facility: CLINIC | Age: 36
End: 2021-05-10
Payer: COMMERCIAL

## 2021-05-10 VITALS
WEIGHT: 315 LBS | BODY MASS INDEX: 39.85 KG/M2 | SYSTOLIC BLOOD PRESSURE: 141 MMHG | HEART RATE: 101 BPM | DIASTOLIC BLOOD PRESSURE: 94 MMHG

## 2021-05-10 DIAGNOSIS — B07.0 PLANTAR WART OF LEFT FOOT: Primary | ICD-10-CM

## 2021-05-10 DIAGNOSIS — L85.1 ACQUIRED KERATODERMA: ICD-10-CM

## 2021-05-10 PROCEDURE — 99213 OFFICE O/P EST LOW 20 MIN: CPT | Performed by: PODIATRIST

## 2021-05-10 RX ORDER — FLUOROURACIL 5 MG/G
CREAM TOPICAL DAILY
Qty: 30 G | Refills: 2 | Status: SHIPPED | OUTPATIENT
Start: 2021-05-10 | End: 2021-12-12 | Stop reason: ALTCHOICE

## 2021-05-10 NOTE — PROGRESS NOTES
PATIENT:  Bishop Santos  1985         ASSESSMENT:     1  Plantar wart of left foot  fluoruracil (CARAC) 0 5 % cream   2  Acquired keratoderma         PLAN:  Patient was counseled and educated on the condition and the diagnosis  The diagnosis, treatment options and prognosis were discussed with the patient  Reviewed previous office notes  No obvious signs of recurring wart  It could be mild calluses on left heel  Will put him on Carac cream daily for now and monitor for possible recurring wart  Patient will return in 6 weeks for re-evaluation  Subjective: The patient presents for evaluation of left foot  He concerns wart is returning on left foot  No pain  No discoloration  No bleeding  He noticed little pressure on left heel  No edema  No numbness or paresthesia  The following portions of the patient's history were reviewed and updated as appropriate: allergies, current medications, past family history, past medical history, past social history, past surgical history and problem list   All pertinent labs and images were reviewed  Past Medical History  Past Medical History:   Diagnosis Date    ADD (attention deficit disorder)     Arthritis     Chronic pain     Heartburn     Mood disorder (HCC)     Tremor        Past Surgical History  Past Surgical History:   Procedure Laterality Date    CLOSED REDUCTION CALCANEAL FRACTURE      PERONEAL TENDON EXPLORATION          Allergies:  Patient has no known allergies      Medications:  Current Outpatient Medications   Medication Sig Dispense Refill    amphetamine-dextroamphetamine (ADDERALL) 20 mg tablet TAKE 1 TABLET BY MOUTH TWICE A DAY *FILL 4/20  0    CHANTIX STARTING MONTH ELIZABETH 0 5 MG X 11 & 1 MG X 42 tablet       DEXILANT 60 MG capsule Take 1 capsule by mouth daily  3    eszopiclone (LUNESTA) 2 mg tablet Take 2 mg by mouth daily at bedtime  5    fluoruracil (CARAC) 0 5 % cream Apply topically daily 30 g 2    gabapentin (NEURONTIN) 600 MG tablet Take 600 mg by mouth 2 (two) times a day  0    hydrOXYzine HCL (ATARAX) 50 mg tablet Take 50 mg by mouth 2 (two) times a day as needed  1    ibuprofen (MOTRIN) 800 mg tablet Take 800 mg by mouth every 6 (six) hours as needed  0    methylphenidate (CONCERTA) 36 MG ER tablet       oxyCODONE-acetaminophen (PERCOCET)  mg per tablet TAKE 1 TABLET BY MOUTH EVERY 8 HOURS AS NEEDED FOR ONGOING THERAPY  0    OXYCONTIN 10 MG 12 hr tablet Take 10 mg by mouth 2 (two) times a day  0    OXYCONTIN 15 MG 12 hr tablet Take 10 mg by mouth 2 (two) times a day   0    risperiDONE (RisperDAL) 3 mg tablet Take 3 mg by mouth daily at bedtime  0    sildenafil (VIAGRA) 100 mg tablet Take 1 tablet by mouth one (1) hour prior to intercourse on an empty stomach  Limit to 3 encounters per week  30 tablet 2    zolpidem (AMBIEN) 10 mg tablet Take 10 mg by mouth daily at bedtime as needed for sleep       No current facility-administered medications for this visit          Social History:  Social History     Socioeconomic History    Marital status: Single     Spouse name: None    Number of children: None    Years of education: None    Highest education level: None   Occupational History    None   Social Needs    Financial resource strain: None    Food insecurity     Worry: None     Inability: None    Transportation needs     Medical: None     Non-medical: None   Tobacco Use    Smoking status: Current Some Day Smoker     Types: Cigarettes    Smokeless tobacco: Never Used   Substance and Sexual Activity    Alcohol use: Yes     Comment: social    Drug use: Never    Sexual activity: None   Lifestyle    Physical activity     Days per week: None     Minutes per session: None    Stress: None   Relationships    Social connections     Talks on phone: None     Gets together: None     Attends Caodaism service: None     Active member of club or organization: None     Attends meetings of clubs or organizations: None     Relationship status: None    Intimate partner violence     Fear of current or ex partner: None     Emotionally abused: None     Physically abused: None     Forced sexual activity: None   Other Topics Concern    None   Social History Narrative    None          Review of Systems   Constitutional: Negative for chills and fever  Respiratory: Negative for cough and shortness of breath  Cardiovascular: Negative for chest pain and leg swelling  Gastrointestinal: Negative for nausea and vomiting  Neurological: Negative for numbness  Objective:      /94   Pulse 101   Wt (!) 145 kg (318 lb 12 8 oz)   BMI 39 85 kg/m²          Physical Exam  Vitals signs reviewed  Constitutional:       General: He is not in acute distress  Appearance: Normal appearance  He is well-developed  He is not toxic-appearing  Cardiovascular:      Rate and Rhythm: Normal rate and regular rhythm  Pulses: Normal pulses  Dorsalis pedis pulses are 2+ on the right side and 2+ on the left side  Posterior tibial pulses are 2+ on the right side and 2+ on the left side  Pulmonary:      Effort: Pulmonary effort is normal  No respiratory distress  Musculoskeletal:         General: No tenderness or signs of injury  Right lower leg: No edema  Left lower leg: No edema  Right foot: No foot drop  Left foot: No foot drop  Comments: Pes planus noted  Skin:     General: Skin is warm  Coloration: Skin is not cyanotic or mottled  Findings: No abscess, erythema, rash or wound  Nails: There is no clubbing  Comments: No punctate bleeding or keratosis  Mild diffuse callus on left plantar medial heel  No obvious evidence of wart left foot  Neurological:      General: No focal deficit present  Mental Status: He is alert and oriented to person, place, and time  Cranial Nerves: No cranial nerve deficit        Sensory: No sensory deficit  Motor: No weakness  Coordination: Coordination normal       Gait: Gait normal    Psychiatric:         Mood and Affect: Mood normal          Behavior: Behavior normal          Thought Content:  Thought content normal          Judgment: Judgment normal

## 2021-05-28 ENCOUNTER — APPOINTMENT (EMERGENCY)
Dept: CT IMAGING | Facility: HOSPITAL | Age: 36
End: 2021-05-28
Payer: COMMERCIAL

## 2021-05-28 ENCOUNTER — HOSPITAL ENCOUNTER (EMERGENCY)
Facility: HOSPITAL | Age: 36
Discharge: HOME | End: 2021-05-28
Attending: EMERGENCY MEDICINE | Admitting: EMERGENCY MEDICINE
Payer: COMMERCIAL

## 2021-05-28 VITALS
HEART RATE: 79 BPM | BODY MASS INDEX: 39.17 KG/M2 | OXYGEN SATURATION: 97 % | SYSTOLIC BLOOD PRESSURE: 147 MMHG | WEIGHT: 315 LBS | DIASTOLIC BLOOD PRESSURE: 91 MMHG | TEMPERATURE: 96.8 F | HEIGHT: 75 IN | RESPIRATION RATE: 18 BRPM

## 2021-05-28 DIAGNOSIS — D73.5 SPLENIC INFARCT: Primary | ICD-10-CM

## 2021-05-28 DIAGNOSIS — D50.9 MICROCYTIC ANEMIA: ICD-10-CM

## 2021-05-28 PROBLEM — F43.10 PTSD (POST-TRAUMATIC STRESS DISORDER): Status: ACTIVE | Noted: 2021-05-28

## 2021-05-28 PROBLEM — K21.9 GERD (GASTROESOPHAGEAL REFLUX DISEASE): Status: ACTIVE | Noted: 2021-05-28

## 2021-05-28 LAB
ALBUMIN SERPL BCP-MCNC: 3.4 G/DL (ref 3.5–5)
ALP SERPL-CCNC: 80 U/L (ref 46–116)
ALT SERPL W P-5'-P-CCNC: 36 U/L (ref 12–78)
ANION GAP SERPL CALCULATED.3IONS-SCNC: 12 MMOL/L (ref 4–13)
APTT PPP: 33 SECONDS (ref 23–37)
AST SERPL W P-5'-P-CCNC: 21 U/L (ref 5–45)
ATRIAL RATE: 78 BPM
BACTERIA UR QL AUTO: NORMAL /HPF
BASOPHILS # BLD AUTO: 0.05 THOUSANDS/ΜL (ref 0–0.1)
BASOPHILS NFR BLD AUTO: 1 % (ref 0–1)
BILIRUB SERPL-MCNC: 0.8 MG/DL (ref 0.2–1)
BILIRUB UR QL STRIP: NEGATIVE
BUN SERPL-MCNC: 8 MG/DL (ref 5–25)
CALCIUM ALBUM COR SERPL-MCNC: 9.4 MG/DL (ref 8.3–10.1)
CALCIUM SERPL-MCNC: 8.9 MG/DL (ref 8.3–10.1)
CHLORIDE SERPL-SCNC: 102 MMOL/L (ref 100–108)
CLARITY UR: CLEAR
CO2 SERPL-SCNC: 25 MMOL/L (ref 21–32)
COLOR UR: YELLOW
CREAT SERPL-MCNC: 1.18 MG/DL (ref 0.6–1.3)
EOSINOPHIL # BLD AUTO: 0.21 THOUSAND/ΜL (ref 0–0.61)
EOSINOPHIL NFR BLD AUTO: 3 % (ref 0–6)
ERYTHROCYTE [DISTWIDTH] IN BLOOD BY AUTOMATED COUNT: 19 % (ref 11.6–15.1)
GFR SERPL CREATININE-BSD FRML MDRD: 78 ML/MIN/1.73SQ M
GLUCOSE SERPL-MCNC: 111 MG/DL (ref 65–140)
GLUCOSE UR STRIP-MCNC: NEGATIVE MG/DL
HCT VFR BLD AUTO: 35.1 % (ref 36.5–49.3)
HGB BLD-MCNC: 10.3 G/DL (ref 12–17)
HGB UR QL STRIP.AUTO: NEGATIVE
IMM GRANULOCYTES # BLD AUTO: 0.01 THOUSAND/UL (ref 0–0.2)
IMM GRANULOCYTES NFR BLD AUTO: 0 % (ref 0–2)
INR PPP: 1.11 (ref 0.84–1.19)
KETONES UR STRIP-MCNC: NEGATIVE MG/DL
LEUKOCYTE ESTERASE UR QL STRIP: ABNORMAL
LIPASE SERPL-CCNC: 102 U/L (ref 73–393)
LYMPHOCYTES # BLD AUTO: 1.88 THOUSANDS/ΜL (ref 0.6–4.47)
LYMPHOCYTES NFR BLD AUTO: 24 % (ref 14–44)
MCH RBC QN AUTO: 21.5 PG (ref 26.8–34.3)
MCHC RBC AUTO-ENTMCNC: 29.3 G/DL (ref 31.4–37.4)
MCV RBC AUTO: 73 FL (ref 82–98)
MONOCYTES # BLD AUTO: 0.66 THOUSAND/ΜL (ref 0.17–1.22)
MONOCYTES NFR BLD AUTO: 9 % (ref 4–12)
NEUTROPHILS # BLD AUTO: 4.97 THOUSANDS/ΜL (ref 1.85–7.62)
NEUTS SEG NFR BLD AUTO: 63 % (ref 43–75)
NITRITE UR QL STRIP: NEGATIVE
NON-SQ EPI CELLS URNS QL MICRO: NORMAL /HPF
NRBC BLD AUTO-RTO: 0 /100 WBCS
P AXIS: 14 DEGREES
PH UR STRIP.AUTO: 6.5 [PH]
PLATELET # BLD AUTO: 206 THOUSANDS/UL (ref 149–390)
PMV BLD AUTO: 9.9 FL (ref 8.9–12.7)
POTASSIUM SERPL-SCNC: 4.2 MMOL/L (ref 3.5–5.3)
PR INTERVAL: 158 MS
PROT SERPL-MCNC: 7.5 G/DL (ref 6.4–8.2)
PROT UR STRIP-MCNC: NEGATIVE MG/DL
PROTHROMBIN TIME: 14.3 SECONDS (ref 11.6–14.5)
QRS AXIS: 45 DEGREES
QRSD INTERVAL: 102 MS
QT INTERVAL: 414 MS
QTC INTERVAL: 471 MS
RBC # BLD AUTO: 4.79 MILLION/UL (ref 3.88–5.62)
RBC #/AREA URNS AUTO: NORMAL /HPF
SODIUM SERPL-SCNC: 139 MMOL/L (ref 136–145)
SP GR UR STRIP.AUTO: 1.01 (ref 1–1.03)
T WAVE AXIS: 34 DEGREES
UROBILINOGEN UR QL STRIP.AUTO: 0.2 E.U./DL
VENTRICULAR RATE: 78 BPM
WBC # BLD AUTO: 7.78 THOUSAND/UL (ref 4.31–10.16)
WBC #/AREA URNS AUTO: NORMAL /HPF

## 2021-05-28 PROCEDURE — 85025 COMPLETE CBC W/AUTO DIFF WBC: CPT | Performed by: EMERGENCY MEDICINE

## 2021-05-28 PROCEDURE — 85730 THROMBOPLASTIN TIME PARTIAL: CPT | Performed by: EMERGENCY MEDICINE

## 2021-05-28 PROCEDURE — 83690 ASSAY OF LIPASE: CPT | Performed by: EMERGENCY MEDICINE

## 2021-05-28 PROCEDURE — 99284 EMERGENCY DEPT VISIT MOD MDM: CPT

## 2021-05-28 PROCEDURE — 81001 URINALYSIS AUTO W/SCOPE: CPT | Performed by: EMERGENCY MEDICINE

## 2021-05-28 PROCEDURE — 93010 ELECTROCARDIOGRAM REPORT: CPT | Performed by: INTERNAL MEDICINE

## 2021-05-28 PROCEDURE — 99284 EMERGENCY DEPT VISIT MOD MDM: CPT | Performed by: EMERGENCY MEDICINE

## 2021-05-28 PROCEDURE — 93005 ELECTROCARDIOGRAM TRACING: CPT

## 2021-05-28 PROCEDURE — G1004 CDSM NDSC: HCPCS

## 2021-05-28 PROCEDURE — 80053 COMPREHEN METABOLIC PANEL: CPT | Performed by: EMERGENCY MEDICINE

## 2021-05-28 PROCEDURE — 36415 COLL VENOUS BLD VENIPUNCTURE: CPT | Performed by: EMERGENCY MEDICINE

## 2021-05-28 PROCEDURE — 74177 CT ABD & PELVIS W/CONTRAST: CPT

## 2021-05-28 PROCEDURE — 85610 PROTHROMBIN TIME: CPT | Performed by: EMERGENCY MEDICINE

## 2021-05-28 PROCEDURE — 96374 THER/PROPH/DIAG INJ IV PUSH: CPT

## 2021-05-28 RX ORDER — KETOROLAC TROMETHAMINE 30 MG/ML
30 INJECTION, SOLUTION INTRAMUSCULAR; INTRAVENOUS ONCE
Status: COMPLETED | OUTPATIENT
Start: 2021-05-28 | End: 2021-05-28

## 2021-05-28 RX ORDER — MAGNESIUM HYDROXIDE/ALUMINUM HYDROXICE/SIMETHICONE 120; 1200; 1200 MG/30ML; MG/30ML; MG/30ML
30 SUSPENSION ORAL ONCE
Status: COMPLETED | OUTPATIENT
Start: 2021-05-28 | End: 2021-05-28

## 2021-05-28 RX ADMIN — IOHEXOL 100 ML: 350 INJECTION, SOLUTION INTRAVENOUS at 13:27

## 2021-05-28 RX ADMIN — KETOROLAC TROMETHAMINE 30 MG: 30 INJECTION, SOLUTION INTRAMUSCULAR at 12:48

## 2021-05-28 RX ADMIN — ALUMINUM HYDROXIDE, MAGNESIUM HYDROXIDE, AND SIMETHICONE 30 ML: 200; 200; 20 SUSPENSION ORAL at 14:11

## 2021-05-28 NOTE — ED PROVIDER NOTES
History  Chief Complaint   Patient presents with    Abdominal Pain     patient reports LLQ pain x3 days  constant, sharb and stabbing  DOes not radiate  nothing makes it better or worse  No n/v/d, urinary symptoms  last BM today      Patient with past medical history chronic pain from bilateral foot fractures takes Percocet presents here with his wife with concern for left abdominal pain sharp constant for 3 days now started suddenly while he was working on the computer without any recent injuries  It gets worse when he takes a deep breath or moves  Tried taking a Percocet but that did not help  Thought it was gas pain and tried to position himself on the left or right but that did not help  He has no nausea vomiting diarrhea or constipation  Pain does radiate to the left flank area that he has no urinary symptoms  Prior to Admission Medications   Prescriptions Last Dose Informant Patient Reported? Taking?    CHANTIX STARTING MONTH ELIZABETH 0 5 MG X 11 & 1 MG X 42 tablet  Self Yes No   DEXILANT 60 MG capsule  Self Yes No   Sig: Take 1 capsule by mouth daily   OXYCONTIN 10 MG 12 hr tablet  Self Yes No   Sig: Take 10 mg by mouth 2 (two) times a day   OXYCONTIN 15 MG 12 hr tablet  Self Yes No   Sig: Take 10 mg by mouth 2 (two) times a day    amphetamine-dextroamphetamine (ADDERALL) 20 mg tablet  Self Yes No   Sig: TAKE 1 TABLET BY MOUTH TWICE A DAY *FILL 4/20   eszopiclone (LUNESTA) 2 mg tablet  Self Yes No   Sig: Take 2 mg by mouth daily at bedtime   fluoruracil (CARAC) 0 5 % cream   No No   Sig: Apply topically daily   gabapentin (NEURONTIN) 600 MG tablet  Self Yes No   Sig: Take 600 mg by mouth 2 (two) times a day   hydrOXYzine HCL (ATARAX) 50 mg tablet  Self Yes No   Sig: Take 50 mg by mouth 2 (two) times a day as needed   ibuprofen (MOTRIN) 800 mg tablet  Self Yes No   Sig: Take 800 mg by mouth every 6 (six) hours as needed   methylphenidate (CONCERTA) 36 MG ER tablet   Yes No   oxyCODONE-acetaminophen (PERCOCET)  mg per tablet  Self Yes No   Sig: TAKE 1 TABLET BY MOUTH EVERY 8 HOURS AS NEEDED FOR ONGOING THERAPY   risperiDONE (RisperDAL) 3 mg tablet  Self Yes No   Sig: Take 3 mg by mouth daily at bedtime   sildenafil (VIAGRA) 100 mg tablet   No No   Sig: Take 1 tablet by mouth one (1) hour prior to intercourse on an empty stomach  Limit to 3 encounters per week  zolpidem (AMBIEN) 10 mg tablet  Self Yes No   Sig: Take 10 mg by mouth daily at bedtime as needed for sleep      Facility-Administered Medications: None       Past Medical History:   Diagnosis Date    ADD (attention deficit disorder)     Arthritis     Chronic pain     Heartburn     Mood disorder (HCC)     Tremor        Past Surgical History:   Procedure Laterality Date    CLOSED REDUCTION CALCANEAL FRACTURE      PERONEAL TENDON EXPLORATION         Family History   Problem Relation Age of Onset    Parkinsonism Paternal Grandfather     Breast cancer Mother      I have reviewed and agree with the history as documented  E-Cigarette/Vaping    E-Cigarette Use Never User      E-Cigarette/Vaping Substances    Nicotine No     THC No     CBD No     Flavoring No     Other No     Unknown No      Social History     Tobacco Use    Smoking status: Current Every Day Smoker     Packs/day: 0 50     Types: Cigarettes    Smokeless tobacco: Never Used   Substance Use Topics    Alcohol use: Yes     Comment: social    Drug use: Never       Review of Systems   Constitutional: Negative for chills and fever  HENT: Negative for rhinorrhea and sore throat  Respiratory: Negative for shortness of breath  Cardiovascular: Negative for chest pain  Gastrointestinal: Positive for abdominal pain  Negative for constipation, diarrhea, nausea and vomiting  Genitourinary: Positive for flank pain  Negative for dysuria, frequency and scrotal swelling  Skin: Negative for rash  All other systems reviewed and are negative        Physical Exam  Physical Exam  Vitals signs and nursing note reviewed  Constitutional:       Appearance: He is well-developed  HENT:      Head: Normocephalic and atraumatic  Right Ear: External ear normal       Left Ear: External ear normal       Nose: Nose normal    Eyes:      Conjunctiva/sclera: Conjunctivae normal       Pupils: Pupils are equal, round, and reactive to light  Neck:      Musculoskeletal: Normal range of motion and neck supple  No spinous process tenderness  Cardiovascular:      Rate and Rhythm: Normal rate and regular rhythm  Heart sounds: Normal heart sounds  Pulmonary:      Effort: Pulmonary effort is normal  No respiratory distress  Breath sounds: Normal breath sounds  No wheezing  Abdominal:      General: Bowel sounds are normal  There is no distension  Palpations: Abdomen is soft  Tenderness: There is abdominal tenderness in the left upper quadrant and left lower quadrant  There is no right CVA tenderness, left CVA tenderness, guarding or rebound  Genitourinary:     Rectum: Guaiac result negative  Musculoskeletal: Normal range of motion  General: No deformity  Skin:     General: Skin is warm and dry  Findings: No rash  Neurological:      General: No focal deficit present  Mental Status: He is alert  GCS: GCS eye subscore is 4  GCS verbal subscore is 5  GCS motor subscore is 6  Sensory: No sensory deficit     Psychiatric:         Mood and Affect: Mood normal          Vital Signs  ED Triage Vitals [05/28/21 1213]   Temperature Pulse Respirations Blood Pressure SpO2   (!) 96 8 °F (36 °C) 95 18 165/96 98 %      Temp Source Heart Rate Source Patient Position - Orthostatic VS BP Location FiO2 (%)   Temporal Monitor -- -- --      Pain Score       6           Vitals:    05/28/21 1213 05/28/21 1551   BP: 165/96 147/91   Pulse: 95 79         Visual Acuity      ED Medications  Medications   ketorolac (TORADOL) injection 30 mg (30 mg Intravenous Given 5/28/21 1248)   iohexol (OMNIPAQUE) 350 MG/ML injection (SINGLE-DOSE) 100 mL (100 mL Intravenous Given 5/28/21 1327)   aluminum-magnesium hydroxide-simethicone (MYLANTA) oral suspension 30 mL (30 mL Oral Given 5/28/21 1411)       Diagnostic Studies  Results Reviewed     Procedure Component Value Units Date/Time    APTT [[id number]]  (Normal) Collected: 05/28/21 1509    Lab Status: Final result Specimen: Blood from Arm, Left Updated: 05/28/21 1528     PTT 33 seconds     Protime-INR [[id number]]  (Normal) Collected: 05/28/21 1509    Lab Status: Final result Specimen: Blood from Arm, Left Updated: 05/28/21 1528     Protime 14 3 seconds      INR 1 11    Comprehensive metabolic panel [[id number]]  (Abnormal) Collected: 05/28/21 1225    Lab Status: Final result Specimen: Blood from Arm, Left Updated: 05/28/21 1255     Sodium 139 mmol/L      Potassium 4 2 mmol/L      Chloride 102 mmol/L      CO2 25 mmol/L      ANION GAP 12 mmol/L      BUN 8 mg/dL      Creatinine 1 18 mg/dL      Glucose 111 mg/dL      Calcium 8 9 mg/dL      Corrected Calcium 9 4 mg/dL      AST 21 U/L      ALT 36 U/L      Alkaline Phosphatase 80 U/L      Total Protein 7 5 g/dL      Albumin 3 4 g/dL      Total Bilirubin 0 80 mg/dL      eGFR 78 ml/min/1 73sq m     Narrative:      [place] guidelines for Chronic Kidney Disease (CKD):     Stage 1 with normal or high GFR (GFR > 90 mL/min/1 73 square meters)    Stage 2 Mild CKD (GFR = 60-89 mL/min/1 73 square meters)    Stage 3A Moderate CKD (GFR = 45-59 mL/min/1 73 square meters)    Stage 3B Moderate CKD (GFR = 30-44 mL/min/1 73 square meters)    Stage 4 Severe CKD (GFR = 15-29 mL/min/1 73 square meters)    Stage 5 End Stage CKD (GFR <15 mL/min/1 73 square meters)  Note: GFR calculation is accurate only with a steady state creatinine    Lipase [[id number]]  (Normal) Collected: 05/28/21 1225    Lab Status: Final result Specimen: Blood from Arm, Left Updated: 05/28/21 1255     Lipase 102 u/L Urine Microscopic [[id number]]  (Normal) Collected: 05/28/21 1228    Lab Status: Final result Specimen: Urine, Clean Catch Updated: 05/28/21 1246     RBC, UA None Seen /hpf      WBC, UA 1-2 /hpf      Epithelial Cells Occasional /hpf      Bacteria, UA None Seen /hpf     UA w Reflex to Microscopic w Reflex to Culture [[id number]]  (Abnormal) Collected: 05/28/21 1228    Lab Status: Final result Specimen: Urine, Clean Catch Updated: 05/28/21 1233     Color, UA Yellow     Clarity, UA Clear     Specific Gravity, UA 1 010     pH, UA 6 5     Leukocytes, UA Trace     Nitrite, UA Negative     Protein, UA Negative mg/dl      Glucose, UA Negative mg/dl      Ketones, UA Negative mg/dl      Urobilinogen, UA 0 2 E U /dl      Bilirubin, UA Negative     Blood, UA Negative    CBC and differential [[id number]]  (Abnormal) Collected: 05/28/21 1225    Lab Status: Final result Specimen: Blood from Arm, Left Updated: 05/28/21 1232     WBC 7 78 Thousand/uL      RBC 4 79 Million/uL      Hemoglobin 10 3 g/dL      Hematocrit 35 1 %      MCV 73 fL      MCH 21 5 pg      MCHC 29 3 g/dL      RDW 19 0 %      MPV 9 9 fL      Platelets 015 Thousands/uL      nRBC 0 /100 WBCs      Neutrophils Relative 63 %      Immat GRANS % 0 %      Lymphocytes Relative 24 %      Monocytes Relative 9 %      Eosinophils Relative 3 %      Basophils Relative 1 %      Neutrophils Absolute 4 97 Thousands/µL      Immature Grans Absolute 0 01 Thousand/uL      Lymphocytes Absolute 1 88 Thousands/µL      Monocytes Absolute 0 66 Thousand/µL      Eosinophils Absolute 0 21 Thousand/µL      Basophils Absolute 0 05 Thousands/µL                  CT abdomen pelvis with contrast   Final Result by Marques Demarco MD (05/28 2489)      Geographic area of hypodensity in the spleen likely representing infarct  The study was marked in [facility] for immediate notification        Workstation performed: [id number]                    Procedures  ECG 12 Lead Documentation Only    Date/Time: 5/28/2021 9:06 PM  Performed by: Poonam Dan DO  Authorized by: Poonam Dan DO     Indications / Diagnosis:  Abdominal pain  ECG reviewed by me, the ED Provider: yes    Patient location:  ED  Previous ECG:     Previous ECG:  Unavailable  Interpretation:     Interpretation: normal    Rate:     ECG rate:  78    ECG rate assessment: normal    Rhythm:     Rhythm: sinus rhythm    Ectopy:     Ectopy: none    QRS:     QRS axis:  Normal    QRS intervals:  Normal  Conduction:     Conduction: normal    ST segments:     ST segments:  Normal  T waves:     T waves: normal               ED Course  ED Course as of May 28 2124   Fri May 28, 2021   1236 May try simethicone for gas pain  Patient not interested in smoking cessation counseling  36 Texted surgery to review        surgeon Erum Freed recommends no specific treatment for splenic infarct - usually resolves within 1 week  Follow-up with hematology oncology  MDM  Number of Diagnoses or Management Options  Microcytic anemia: new and requires workup  Splenic infarct: new and requires workup     Amount and/or Complexity of Data Reviewed  Clinical lab tests: ordered and reviewed  Tests in the radiology section of CPT®: ordered and reviewed  Discuss the patient with other providers: yes    Patient Progress  Patient progress: improved      Disposition  Final diagnoses:   Splenic infarct - acute   Microcytic anemia     Time reflects when diagnosis was documented in both MDM as applicable and the Disposition within this note     Time User Action Codes Description Comment    5/28/2021  2:34 PM Brenda Paul Add [D73 5] Splenic infarct     5/28/2021  2:34 PM Brenda Paul Modify [D73 5] Splenic infarct acute    5/28/2021  3:22 PM Brenda Paul Add [D50 9] Microcytic anemia       ED Disposition     ED Disposition Condition Date/Time Comment    Discharge Stable Fri May 28, 2021  3:52 PM Efrain Calvin discharge to home/self care  Follow-up Information     Follow up With Specialties Details Why Contact Info Additional [facility] [facility]   [address] [phone] Gadiel Ferreira [facility] [address], [address] [place] [address], [place], [place], [address] [phone]          Discharge Medication List as of 5/28/2021  3:52 PM      CONTINUE these medications which have NOT CHANGED    Details   amphetamine-dextroamphetamine (ADDERALL) 20 mg tablet TAKE 1 TABLET BY MOUTH TWICE A DAY *FILL 4/20, Historical Med      CHANTIX STARTING MONTH ELIZABETH 0 5 MG X 11 & 1 MG X 42 tablet Historical Med      DEXILANT 60 MG capsule Take 1 capsule by mouth daily, Starting Thu 5/9/2019, Historical Med      eszopiclone (LUNESTA) 2 mg tablet Take 2 mg by mouth daily at bedtime, Starting Sat 8/24/2019, Historical Med      fluoruracil (CARAC) 0 5 % cream Apply topically daily, Starting Mon 5/10/2021, Normal      gabapentin (NEURONTIN) 600 MG tablet Take 600 mg by mouth 2 (two) times a day, Starting Thu 4/18/2019, Historical Med      hydrOXYzine HCL (ATARAX) 50 mg tablet Take 50 mg by mouth 2 (two) times a day as needed, Starting Thu 4/25/2019, Historical Med      ibuprofen (MOTRIN) 800 mg tablet Take 800 mg by mouth every 6 (six) hours as needed, Starting Fri 4/5/2019, Historical Med      methylphenidate (CONCERTA) 36 MG ER tablet Starting Mon 7/27/2020, Historical Med      oxyCODONE-acetaminophen (PERCOCET)  mg per tablet TAKE 1 TABLET BY MOUTH EVERY 8 HOURS AS NEEDED FOR ONGOING THERAPY, Historical Med      !! OXYCONTIN 10 MG 12 hr tablet Take 10 mg by mouth 2 (two) times a day, Starting Thu 7/25/2019, Historical Med      !! OXYCONTIN 15 MG 12 hr tablet Take 10 mg by mouth 2 (two) times a day , Starting Mon 4/29/2019, Historical Med      risperiDONE (RisperDAL) 3 mg tablet Take 3 mg by mouth daily at bedtime, Starting Thu 4/18/2019, Historical Med      sildenafil (VIAGRA) 100 mg tablet Take 1 tablet by mouth one (1) hour prior to intercourse on an empty stomach  Limit to 3 encounters per week , Normal      zolpidem (AMBIEN) 10 mg tablet Take 10 mg by mouth daily at bedtime as needed for sleep, Historical Med       !! - Potential duplicate medications found  Please discuss with provider  No discharge procedures on file      PDMP Review     None          ED Provider  Electronically Signed by           Brunilda Valencia DO  05/28/21 0600

## 2021-06-04 ENCOUNTER — TELEPHONE (OUTPATIENT)
Dept: HEMATOLOGY ONCOLOGY | Facility: CLINIC | Age: 36
End: 2021-06-04

## 2021-06-04 NOTE — TELEPHONE ENCOUNTER
New Patient Encounter    New Patient Intake Form   Patient Details:  Amrik Marte  1985  [id number]    Background Information:  [facility] starts by opening a telephone encounter and gathering the following information   Who is calling to schedule? If not self, relationship to patient? Spouse   Referring Provider ER   What is the diagnosis? Splenic infarct, anemia   Is this diagnosis confirmed? Yes   When was the diagnosis? 5/28/21   Is there a confirmed diagnosis from a biopsy/tissue reviewed by pathology? NA   Were outside slides requested? NA   Is patient aware of diagnosis? Yes   Is there a personal history and what kind? No   Is there a family history and what kind? Yes, unknown type   Reason for visit? New Diagnosis   Have you had any imaging or labs done? If so: when, where? yes  [facility]   Are records in [facility]? yes   If patient has a prior history of cancer were old records obtained? NA   Was the patient told to bring a disk? No   Does the patient smoke or Vape? Yes   If yes, how many packs or cartridges per day? 1/2 PPD   Scheduling Information:   Preferred [place]:  [facility]     Are there any dates/time the patient cannot be seen? no   Miscellaneous:    After completing the above information, please route to Financial Counselor and the appropriate Nurse Navigator for review

## 2021-06-11 ENCOUNTER — OFFICE VISIT (OUTPATIENT)
Dept: HEMATOLOGY ONCOLOGY | Facility: HOSPITAL | Age: 36
End: 2021-06-11
Payer: COMMERCIAL

## 2021-06-11 VITALS
SYSTOLIC BLOOD PRESSURE: 142 MMHG | TEMPERATURE: 97.3 F | HEART RATE: 100 BPM | DIASTOLIC BLOOD PRESSURE: 86 MMHG | OXYGEN SATURATION: 99 % | BODY MASS INDEX: 39.17 KG/M2 | HEIGHT: 75 IN | WEIGHT: 315 LBS | RESPIRATION RATE: 16 BRPM

## 2021-06-11 DIAGNOSIS — D50.0 IRON DEFICIENCY ANEMIA DUE TO CHRONIC BLOOD LOSS: Primary | ICD-10-CM

## 2021-06-11 DIAGNOSIS — D68.59 HYPERCOAGULABLE STATE (HCC): ICD-10-CM

## 2021-06-11 DIAGNOSIS — D59.5 PNH (PAROXYSMAL NOCTURNAL HEMOGLOBINURIA) (HCC): ICD-10-CM

## 2021-06-11 PROCEDURE — 99245 OFF/OP CONSLTJ NEW/EST HI 55: CPT | Performed by: INTERNAL MEDICINE

## 2021-06-11 RX ORDER — SODIUM CHLORIDE 9 MG/ML
20 INJECTION, SOLUTION INTRAVENOUS ONCE
Status: CANCELLED | OUTPATIENT
Start: 2021-06-22

## 2021-06-11 NOTE — PROGRESS NOTES
Oncology Outpatient Consult Note  Darryn Bruce 43 y o  male MRN: @ Encounter: [id number]        Date:  6/11/2021        CC:   Splenic infarct and iron deficiency anemia      HPI:  Darryn Bruce is seen for initial consultation 6/11/2021 regarding  A splenic infarct and newly diagnosed iron deficiency anemia  The patient presented to the [facility] Emergency Room  With complaints of left upper quadrant pain  Imaging done revealed a splenic infarct  No malignancy or any other abnormalities were seen  The patient's blood work revealed hypochromic microcytic anemia indicating iron deficiency  The patient states he has a negative and personal family history of blood clots  Today he states his pain has resolved  Denies any nausea denies any vomiting denies any diarrhea  Denies any black stools  Denies any GI complaints or any altered bowel habits  Overall states he is now back to baseline  The rest of his 14 point review of systems today was negative  Test Results:    Imaging: Ct Abdomen Pelvis With Contrast    Result Date: 5/28/2021  Narrative: CT ABDOMEN AND PELVIS WITH IV CONTRAST INDICATION:   LLQ abdominal pain, diverticulitis suspected left abdominal pain  COMPARISON:  None  TECHNIQUE:  CT examination of the abdomen and pelvis was performed  Axial, sagittal, and coronal 2D reformatted images were created from the source data and submitted for interpretation  Radiation dose length product (DLP) for this visit:  0587 mGy-cm   This examination, like all CT scans performed in the [facility], was performed utilizing techniques to minimize radiation dose exposure, including the use of iterative reconstruction and automated exposure control  IV Contrast:  100 mL of iohexol (OMNIPAQUE) Enteric Contrast:  Enteric contrast was not administered  FINDINGS: ABDOMEN LOWER CHEST:  No clinically significant abnormality identified in the visualized lower chest  LIVER/BILIARY TREE:  Unremarkable  GALLBLADDER:  No calcified gallstones  No pericholecystic inflammatory change  SPLEEN:  There is a geographic area of hypodensity in the spleen likely representing infarct  PANCREAS:  Unremarkable  ADRENAL GLANDS:  Unremarkable  KIDNEYS/URETERS:  No hydronephrosis or urinary tract calculus  One or more sharply circumscribed subcentimeter renal hypodensities are present, too small to accurately characterize, and statistically most likely benign findings  According to recent literature (Radiology 2019) no further workup of these findings is recommended  STOMACH AND BOWEL:  Unremarkable  APPENDIX:  No findings to suggest appendicitis  ABDOMINOPELVIC CAVITY:  No ascites  No pneumoperitoneum  No lymphadenopathy  VESSELS:  Unremarkable for patient's age  PELVIS REPRODUCTIVE ORGANS:  Unremarkable for patient's age  URINARY BLADDER:  Unremarkable  ABDOMINAL WALL/INGUINAL REGIONS:  There is a small fat-containing umbilical hernia  OSSEOUS STRUCTURES:  No acute fracture or destructive osseous lesion  Impression: Geographic area of hypodensity in the spleen likely representing infarct  The study was marked in [facility] for immediate notification  Workstation performed: [id number]       Labs:   Lab Results   Component Value Date    WBC 7 78 05/28/2021    HGB 10 3 (L) 05/28/2021    HCT 35 1 (L) 05/28/2021    MCV 73 (L) 05/28/2021     05/28/2021     Lab Results   Component Value Date    K 4 2 05/28/2021     05/28/2021    CO2 25 05/28/2021    BUN 8 05/28/2021    CREATININE 1 18 05/28/2021    GLUF 90 09/07/2019    CALCIUM 8 9 05/28/2021    CORRECTEDCA 9 4 05/28/2021    AST 21 05/28/2021    ALT 36 05/28/2021    ALKPHOS 80 05/28/2021    EGFR 78 05/28/2021       ROS: As stated in history of present illness otherwise her 14 point review of systems today was negative      Active Problems:   Patient Active Problem List   Diagnosis    Bipolar disorder (HCC)    Tremor    ADD (attention deficit disorder)    GERD (gastroesophageal reflux disease)    Tenosynovitis of ankle    Primary localized osteoarthrosis of ankle and foot    PTSD (post-traumatic stress disorder)    Scar tissue    Subluxation    Tear of tendon of lower extremity       Past Medical History:   Past Medical History:   Diagnosis Date    ADD (attention deficit disorder)     Arthritis     Chronic pain     Heartburn     Mood disorder (HCC)     Tremor        Surgical History:   Past Surgical History:   Procedure Laterality Date    CLOSED REDUCTION CALCANEAL FRACTURE      PERONEAL TENDON EXPLORATION         Family History:    Family History   Problem Relation Age of Onset    Parkinsonism Paternal Grandfather     Breast cancer Mother        Cancer-related family history includes Breast cancer in his mother      Social History:   Social History     Socioeconomic History    Marital status: Single     Spouse name: Not on file    Number of children: Not on file    Years of education: Not on file    Highest education level: Not on file   Occupational History    Not on file   Social Needs    Financial resource strain: Not on file    Food insecurity     Worry: Not on file     Inability: Not on file    Transportation needs     Medical: Not on file     Non-medical: Not on file   Tobacco Use    Smoking status: Current Every Day Smoker     Packs/day: 0 50     Types: Cigarettes    Smokeless tobacco: Never Used   Substance and Sexual Activity    Alcohol use: Yes     Comment: social    Drug use: Never    Sexual activity: Not on file   Lifestyle    Physical activity     Days per week: Not on file     Minutes per session: Not on file    Stress: Not on file   Relationships    Social connections     Talks on phone: Not on file     Gets together: Not on file     Attends Hindu service: Not on file     Active member of club or organization: Not on file     Attends meetings of clubs or organizations: Not on file     Relationship status: Not on file   [facility] Intimate partner violence     Fear of current or ex partner: Not on file     Emotionally abused: Not on file     Physically abused: Not on file     Forced sexual activity: Not on file   Other Topics Concern    Not on file   Social History Narrative    Not on file       Current Medications:   Current Outpatient Medications   Medication Sig Dispense Refill    amphetamine-dextroamphetamine (ADDERALL) 20 mg tablet TAKE 1 TABLET BY MOUTH TWICE A DAY *FILL 4/20  0    CHANTIX STARTING MONTH ELIZABETH 0 5 MG X 11 & 1 MG X 42 tablet       DEXILANT 60 MG capsule Take 1 capsule by mouth daily  3    eszopiclone (LUNESTA) 2 mg tablet Take 2 mg by mouth daily at bedtime  5    fluoruracil (CARAC) 0 5 % cream Apply topically daily 30 g 2    gabapentin (NEURONTIN) 600 MG tablet Take 600 mg by mouth 2 (two) times a day  0    hydrOXYzine HCL (ATARAX) 50 mg tablet Take 50 mg by mouth 2 (two) times a day as needed  1    ibuprofen (MOTRIN) 800 mg tablet Take 800 mg by mouth every 6 (six) hours as needed  0    methylphenidate (CONCERTA) 36 MG ER tablet       oxyCODONE-acetaminophen (PERCOCET)  mg per tablet TAKE 1 TABLET BY MOUTH EVERY 8 HOURS AS NEEDED FOR ONGOING THERAPY  0    OXYCONTIN 10 MG 12 hr tablet Take 10 mg by mouth 2 (two) times a day  0    OXYCONTIN 15 MG 12 hr tablet Take 10 mg by mouth 2 (two) times a day   0    risperiDONE (RisperDAL) 3 mg tablet Take 3 mg by mouth daily at bedtime  0    sildenafil (VIAGRA) 100 mg tablet Take 1 tablet by mouth one (1) hour prior to intercourse on an empty stomach  Limit to 3 encounters per week  30 tablet 2    zolpidem (AMBIEN) 10 mg tablet Take 10 mg by mouth daily at bedtime as needed for sleep       No current facility-administered medications for this visit  Allergies: No Known Allergies      Physical Exam:    There is no height or weight on file to calculate BSA      Wt Readings from Last 3 Encounters:   05/28/21 (!) 144 kg (318 lb)   05/10/21 (!) 145 kg (318 lb 12 8 oz)   09/30/20 136 kg (300 lb)        Temp Readings from Last 3 Encounters:   05/28/21 (!) 96 8 °F (36 °C) (Temporal)   09/30/20 98 6 °F (37 °C) (Tympanic Core)   09/02/20 98 °F (36 7 °C)        BP Readings from Last 3 Encounters:   05/28/21 147/91   05/10/21 141/94   07/15/20 150/78         Pulse Readings from Last 3 Encounters:   05/28/21 79   05/10/21 101   07/15/20 99       Physical Exam     Constitutional   General appearance: No acute distress, well appearing and well nourished  Eyes   Conjunctiva and lids: No swelling, erythema or discharge  Pupils and irises: Equal, round and reactive to light  Ears, Nose, Mouth, and Throat   External inspection of ears and nose: Normal     Nasal mucosa, septum, and turbinates: Normal without edema or erythema  Oropharynx: Normal with no erythema, edema, exudate or lesions  Pulmonary   Respiratory effort: No increased work of breathing or signs of respiratory distress  Auscultation of lungs: Clear to auscultation  Cardiovascular   Palpation of heart: Normal PMI, no thrills  Auscultation of heart: Normal rate and rhythm, normal S1 and S2, without murmurs  Examination of extremities for edema and/or varicosities: Normal     Carotid pulses: Normal     Abdomen   Abdomen: Non-tender, no masses  Liver and spleen: No hepatomegaly or splenomegaly  Lymphatic   Palpation of lymph nodes in neck: No lymphadenopathy  Musculoskeletal   Gait and station: Normal     Digits and nails: Normal without clubbing or cyanosis  Inspection/palpation of joints, bones, and muscles: Normal     Skin   Skin and subcutaneous tissue: Normal without rashes or lesions  Neurologic   Cranial nerves: Cranial nerves 2-12 intact  Sensation: No sensory loss      Psychiatric   Orientation to person, place, and time: Normal     Mood and affect: Normal         Assessment/ Plan:        The patient is a pleasant 42-year-old male who was referred to see us for a newly diagnosed splenic infarct  Blood work also revealed iron deficiency anemia with hypochromic microcytic indices  At this point I explained to him as far as his iron deficiency anemia is concerned he needs iron replacement any agreed to IV Venofer  He also needs a GI workup and I will set this up  In terms of his splenic infarct he needs to be on a baby aspirin  I will check a hypercoagulable panel  I will also do PNH testing  I will see the patient back with results of the testing within the next 3-4 weeks  He will get 10 doses of Venofer  When I see him back next time I will order for him to come back to see me in approximately 4 months with iron studies to be repeated once he finishes up his Venofer  The patient is in agreement with the plan  I will see him back in 3-4 weeks with results of hypercoagulable panel along with PNH testing  Until then if he has any questions he will call our office  Goals and Barriers:  Current Goal:  Prolong Survival from   Iron deficiency anemia and splenic infarct   Barriers: None  Patient's Capacity to Self Care:  Patient able to self care  Portions of the record may have been created with voice recognition software   Occasional wrong word or "sound a like" substitutions may have occurred due to the inherent limitations of voice recognition software   Read the chart carefully and recognize, using context, where substitutions have occurred

## 2021-06-12 ENCOUNTER — APPOINTMENT (OUTPATIENT)
Dept: LAB | Facility: HOSPITAL | Age: 36
End: 2021-06-12
Attending: INTERNAL MEDICINE
Payer: COMMERCIAL

## 2021-06-12 DIAGNOSIS — D59.5 PNH (PAROXYSMAL NOCTURNAL HEMOGLOBINURIA) (HCC): ICD-10-CM

## 2021-06-12 DIAGNOSIS — D50.0 IRON DEFICIENCY ANEMIA DUE TO CHRONIC BLOOD LOSS: ICD-10-CM

## 2021-06-12 DIAGNOSIS — D68.59 HYPERCOAGULABLE STATE (HCC): ICD-10-CM

## 2021-06-12 LAB — DEPRECATED AT III PPP: 91 % OF NORMAL (ref 92–136)

## 2021-06-12 PROCEDURE — 85705 THROMBOPLASTIN INHIBITION: CPT

## 2021-06-12 PROCEDURE — 85732 THROMBOPLASTIN TIME PARTIAL: CPT

## 2021-06-12 PROCEDURE — 85613 RUSSELL VIPER VENOM DILUTED: CPT

## 2021-06-12 PROCEDURE — 81241 F5 GENE: CPT

## 2021-06-12 PROCEDURE — 85300 ANTITHROMBIN III ACTIVITY: CPT

## 2021-06-12 PROCEDURE — 85303 CLOT INHIBIT PROT C ACTIVITY: CPT

## 2021-06-12 PROCEDURE — 85306 CLOT INHIBIT PROT S FREE: CPT

## 2021-06-12 PROCEDURE — 36415 COLL VENOUS BLD VENIPUNCTURE: CPT

## 2021-06-12 PROCEDURE — 86146 BETA-2 GLYCOPROTEIN ANTIBODY: CPT

## 2021-06-12 PROCEDURE — 86147 CARDIOLIPIN ANTIBODY EA IG: CPT

## 2021-06-12 PROCEDURE — 81240 F2 GENE: CPT

## 2021-06-12 PROCEDURE — 85305 CLOT INHIBIT PROT S TOTAL: CPT

## 2021-06-12 PROCEDURE — 85670 THROMBIN TIME PLASMA: CPT

## 2021-06-14 ENCOUNTER — TRANSCRIBE ORDERS (OUTPATIENT)
Dept: LAB | Facility: HOSPITAL | Age: 36
End: 2021-06-14

## 2021-06-14 DIAGNOSIS — D59.5 PNH (PAROXYSMAL NOCTURNAL HEMOGLOBINURIA) (HCC): ICD-10-CM

## 2021-06-14 DIAGNOSIS — D50.0 IRON DEFICIENCY ANEMIA DUE TO CHRONIC BLOOD LOSS: ICD-10-CM

## 2021-06-14 DIAGNOSIS — D68.59 HYPERCOAGULABLE STATE (HCC): Primary | ICD-10-CM

## 2021-06-14 LAB
PROT C AG ACT/NOR PPP IA: 95 % OF NORMAL (ref 60–150)
PROT S ACT/NOR PPP: 113 % (ref 71–117)
PROT S ACT/NOR PPP: 168 % (ref 57–157)
PROT S PPP-ACNC: 111 % (ref 60–150)

## 2021-06-15 ENCOUNTER — LAB (OUTPATIENT)
Dept: LAB | Facility: HOSPITAL | Age: 36
End: 2021-06-15
Attending: INTERNAL MEDICINE
Payer: COMMERCIAL

## 2021-06-15 DIAGNOSIS — D50.0 IRON DEFICIENCY ANEMIA DUE TO CHRONIC BLOOD LOSS: ICD-10-CM

## 2021-06-15 DIAGNOSIS — D59.5 PNH (PAROXYSMAL NOCTURNAL HEMOGLOBINURIA) (HCC): ICD-10-CM

## 2021-06-15 DIAGNOSIS — D68.59 HYPERCOAGULABLE STATE (HCC): ICD-10-CM

## 2021-06-15 LAB
APTT SCREEN TO CONFIRM RATIO: 0.98 RATIO (ref 0–1.4)
CONFIRM APTT/NORMAL: 40.3 SEC (ref 0–55)
LA PPP-IMP: NORMAL
SCREEN APTT: 49.6 SEC (ref 0–51.9)
SCREEN DRVVT: 46.7 SEC (ref 0–47)
THROMBIN TIME: 19.5 SEC (ref 0–23)

## 2021-06-15 PROCEDURE — 88185 FLOWCYTOMETRY/TC ADD-ON: CPT

## 2021-06-15 PROCEDURE — 88184 FLOWCYTOMETRY/ TC 1 MARKER: CPT

## 2021-06-15 PROCEDURE — 36415 COLL VENOUS BLD VENIPUNCTURE: CPT

## 2021-06-16 ENCOUNTER — TELEPHONE (OUTPATIENT)
Dept: GASTROENTEROLOGY | Facility: CLINIC | Age: 36
End: 2021-06-16

## 2021-06-16 ENCOUNTER — CONSULT (OUTPATIENT)
Dept: GASTROENTEROLOGY | Facility: CLINIC | Age: 36
End: 2021-06-16
Payer: COMMERCIAL

## 2021-06-16 VITALS
WEIGHT: 315 LBS | SYSTOLIC BLOOD PRESSURE: 140 MMHG | HEIGHT: 75 IN | BODY MASS INDEX: 39.17 KG/M2 | DIASTOLIC BLOOD PRESSURE: 90 MMHG

## 2021-06-16 DIAGNOSIS — K21.9 GASTROESOPHAGEAL REFLUX DISEASE, UNSPECIFIED WHETHER ESOPHAGITIS PRESENT: Primary | ICD-10-CM

## 2021-06-16 DIAGNOSIS — D50.0 IRON DEFICIENCY ANEMIA DUE TO CHRONIC BLOOD LOSS: ICD-10-CM

## 2021-06-16 DIAGNOSIS — G89.4 PAIN SYNDROME, CHRONIC: ICD-10-CM

## 2021-06-16 DIAGNOSIS — D68.59 HYPERCOAGULABLE STATE (HCC): ICD-10-CM

## 2021-06-16 DIAGNOSIS — D73.5 SPLENIC INFARCT: ICD-10-CM

## 2021-06-16 DIAGNOSIS — D50.0 IRON DEFICIENCY ANEMIA DUE TO CHRONIC BLOOD LOSS: Primary | ICD-10-CM

## 2021-06-16 LAB
B2 GLYCOPROT1 IGA SERPL IA-ACNC: 1.8
B2 GLYCOPROT1 IGG SERPL IA-ACNC: 1.6
B2 GLYCOPROT1 IGM SERPL IA-ACNC: <2.9
CARDIOLIPIN IGA SER IA-ACNC: 2
CARDIOLIPIN IGG SER IA-ACNC: 1.9
CARDIOLIPIN IGM SER IA-ACNC: 3.1

## 2021-06-16 PROCEDURE — 99243 OFF/OP CNSLTJ NEW/EST LOW 30: CPT | Performed by: INTERNAL MEDICINE

## 2021-06-16 NOTE — H&P (VIEW-ONLY)
[id number] - Outpatient Consultation  Jose Cullen 43 y o  male MRN: [id number]  Encounter: [id number]    ASSESSMENT AND PLAN:      1  Hypercoagulable state ([address] )  With recent splenic infarct some suspicion of possible hypercoagulable state  Patient presented to the ED within the past 2 weeks with acute left-sided abdominal pain  Now appears to be resolving  - placed on a baby aspirin for now  - Ambulatory referral to Gastroenterology    2  Iron deficiency anemia due to chronic blood loss  - patient with microcytic anemia  Hemoglobin 10 3 with an MCV of  73  Suspect chronic gastrointestinal blood loss  Patient does have chronic GERD but is on Dexilant with good control  Could have gastritis or erosions  : Source and colon neoplasm needs to be excluded  Patient could well have blood loss from use of long-term nonsteroidal anti-inflammatories with small bowel erosions  - he has no significant GI symptomatology  EGD and Colonoscopy at [facility]  - Ambulatory referral to Gastroenterology    -  If EGD and colonoscopy  Her negative would proceed with capsule endoscopy    3  Gastroesophageal reflux disease, unspecified whether esophagitis present  Stable on Dexilant     4  Splenic infarct  Please see above       5  Chronic pain syndrome related to crush injury of calcaneus bilaterally  - on therapeutic narcotics-- and long-term nonsteroidal anti-inflammatories along with gabapentin      Followup Appointment:  Pending  Studies   ______________________________________________________________________    Chief Complaint   Patient presents with    slub follow up    needs colon/egd      Patient is referred by Dr Pepe Barrios for evaluation of iron deficiency anemia    HPI:   Jose Cullen is a 43y o  year old male who presents  for evaluation of microcytic anemia  Patient presented to [facility]  ED  for evaluation of left-sided abdominal pain    This had been occurring about 48-72 hours  CT scan examination revealed evidence of a splenic infarct  This was managed conservatively  Routine lab work revealed anemia with a hemoglobin of 10 3 in low MCV  Patient denies any significant problems with dysphagia or odynophagia  He does have chronic gastroesophageal reflux symptoms and is maintained on Dexilant  He has never had an upper endoscopy  Has no history of ulcer disease and he does not any significant dyspepsia  Patient does take ibuprofen 800 mg  Even twice a day for chronic pain  Patient sustained severe crush injury to his feet a little over 10 years ago and has had 15 surgery since that time  There is also on chronic narcotics to help  manage the pain  Patient denies any recent change in bowel habit  There is no rectal bleeding  No family history of colorectal cancer      Historical Information   Past Medical History:   Diagnosis Date    ADD (attention deficit disorder)     Arthritis     Chronic pain     Heartburn     Mood disorder (HCC)     Tremor      Past Surgical History:   Procedure Laterality Date    CLOSED REDUCTION CALCANEAL FRACTURE      PERONEAL TENDON EXPLORATION       Social History     Substance and Sexual Activity   Alcohol Use Yes    Alcohol/week: 7 0 standard drinks    Types: 7 Cans of beer per week    Comment: social     Social History     Substance and Sexual Activity   Drug Use Never     Social History     Tobacco Use   Smoking Status Current Every Day Smoker    Packs/day: 0 50    Types: Cigarettes   Smokeless Tobacco Never Used     Family History   Problem Relation Age of Onset    Parkinsonism Paternal Grandfather     No Known Problems Father     Breast cancer Mother        Meds/Allergies     Current Outpatient Medications:     amphetamine-dextroamphetamine (ADDERALL) 20 mg tablet    CHANTIX STARTING MONTH ELIZABETH 0 5 MG X 11 & 1 MG X 42 tablet    DEXILANT 60 MG capsule    eszopiclone (LUNESTA) 2 mg tablet    fluoruracil (CARAC) 0 5 % cream    gabapentin (NEURONTIN) 600 MG tablet    hydrOXYzine HCL (ATARAX) 50 mg tablet    ibuprofen (MOTRIN) 800 mg tablet    methylphenidate (CONCERTA) 36 MG ER tablet    oxyCODONE-acetaminophen (PERCOCET)  mg per tablet    OXYCONTIN 10 MG 12 hr tablet    OXYCONTIN 15 MG 12 hr tablet    risperiDONE (RisperDAL) 3 mg tablet    sildenafil (VIAGRA) 100 mg tablet    zolpidem (AMBIEN) 10 mg tablet    No Known Allergies    PHYSICAL EXAM:    Blood pressure 140/90, height 6' 3" (1 905 m), weight (!) 145 kg (319 lb)  Body mass index is 39 87 kg/m²  General Appearance: NAD, cooperative, alert  Eyes: Anicteric,  Conjunctiva pink  ENT:  Normocephalic, atraumatic, normal mucosa  -- class 1 airway   Neck:  Supple, symmetrical, trachea midline,   Resp:  Clear to auscultation bilaterally; no rales, rhonchi or wheezing; respirations unlabored   CV:  S1 S2, Regular rate and rhythm; no murmur, rub, or gallop  GI:  Soft, non-tender, non-distended; normal bowel sounds; no masses, no organomegaly   Rectal: Deferred  Musculoskeletal: No cyanosis, clubbing or edema  Normal ROM    Skin:  No jaundice, rashes, or lesions   Heme/Lymph: No palpable cervical lymphadenopathy  Psych: Normal affect, good eye contact  Neuro: No gross deficits, AAOx3    Lab Results:   Lab Results   Component Value Date    WBC 7 78 05/28/2021    HGB 10 3 (L) 05/28/2021    HCT 35 1 (L) 05/28/2021    MCV 73 (L) 05/28/2021     05/28/2021     Lab Results   Component Value Date    K 4 2 05/28/2021     05/28/2021    CO2 25 05/28/2021    BUN 8 05/28/2021    CREATININE 1 18 05/28/2021    GLUF 90 09/07/2019    CALCIUM 8 9 05/28/2021    CORRECTEDCA 9 4 05/28/2021    AST 21 05/28/2021    ALT 36 05/28/2021    ALKPHOS 80 05/28/2021    EGFR 78 05/28/2021     No results found for: IRON, TIBC, FERRITIN  Lab Results   Component Value Date    LIPASE 102 05/28/2021         REVIEW OF SYSTEMS:    CONSTITUTIONAL: Denies any fever, chills, rigors, and weight loss  HEENT: No earache or tinnitus  Denies hearing loss or visual disturbances  CARDIOVASCULAR: No chest pain or palpitations  RESPIRATORY: Denies any cough, hemoptysis,  Positive for shortness of breath with exertion  GASTROINTESTINAL: As noted in the History of Present Illness  GENITOURINARY: No problems with urination  Denies any hematuria or dysuria  NEUROLOGIC: No dizziness or vertigo, denies headaches  MUSCULOSKELETAL: positive for chronic foot pain   SKIN: Denies skin rashes or itching  ENDOCRINE: Denies excessive thirst  Denies intolerance to heat or cold  PSYCHOSOCIAL: Denies depression or anxiety  Denies any recent memory loss  -- history of attention deficit disorder

## 2021-06-16 NOTE — PROGRESS NOTES
[address] [facility] - Outpatient Consultation  Pino Barrios 43 y o  male MRN: [id number]  Encounter: [id number]    ASSESSMENT AND PLAN:      1  Hypercoagulable state ([address] )  With recent splenic infarct some suspicion of possible hypercoagulable state  Patient presented to the ED within the past 2 weeks with acute left-sided abdominal pain  Now appears to be resolving  - placed on a baby aspirin for now  - Ambulatory referral to Gastroenterology    2  Iron deficiency anemia due to chronic blood loss  - patient with microcytic anemia  Hemoglobin 10 3 with an MCV of  73  Suspect chronic gastrointestinal blood loss  Patient does have chronic GERD but is on Dexilant with good control  Could have gastritis or erosions  : Source and colon neoplasm needs to be excluded  Patient could well have blood loss from use of long-term nonsteroidal anti-inflammatories with small bowel erosions  - he has no significant GI symptomatology  EGD and Colonoscopy at [facility]  - Ambulatory referral to Gastroenterology    -  If EGD and colonoscopy  Her negative would proceed with capsule endoscopy    3  Gastroesophageal reflux disease, unspecified whether esophagitis present  Stable on Dexilant     4  Splenic infarct  Please see above       5  Chronic pain syndrome related to crush injury of calcaneus bilaterally  - on therapeutic narcotics-- and long-term nonsteroidal anti-inflammatories along with gabapentin      Followup Appointment:  Pending  Studies   ______________________________________________________________________    Chief Complaint   Patient presents with    slub follow up    needs colon/egd      Patient is referred by Dr Sukhi Petersen for evaluation of iron deficiency anemia    HPI:   Pino Barrios is a 43y o  year old male who presents  for evaluation of microcytic anemia  Patient presented to [facility]  ED  for evaluation of left-sided abdominal pain    This had been occurring about 48-72 hours  CT scan examination revealed evidence of a splenic infarct  This was managed conservatively  Routine lab work revealed anemia with a hemoglobin of 10 3 in low MCV  Patient denies any significant problems with dysphagia or odynophagia  He does have chronic gastroesophageal reflux symptoms and is maintained on Dexilant  He has never had an upper endoscopy  Has no history of ulcer disease and he does not any significant dyspepsia  Patient does take ibuprofen 800 mg  Even twice a day for chronic pain  Patient sustained severe crush injury to his feet a little over 10 years ago and has had 15 surgery since that time  There is also on chronic narcotics to help  manage the pain  Patient denies any recent change in bowel habit  There is no rectal bleeding  No family history of colorectal cancer      Historical Information   Past Medical History:   Diagnosis Date    ADD (attention deficit disorder)     Arthritis     Chronic pain     Heartburn     Mood disorder (HCC)     Tremor      Past Surgical History:   Procedure Laterality Date    CLOSED REDUCTION CALCANEAL FRACTURE      PERONEAL TENDON EXPLORATION       Social History     Substance and Sexual Activity   Alcohol Use Yes    Alcohol/week: 7 0 standard drinks    Types: 7 Cans of beer per week    Comment: social     Social History     Substance and Sexual Activity   Drug Use Never     Social History     Tobacco Use   Smoking Status Current Every Day Smoker    Packs/day: 0 50    Types: Cigarettes   Smokeless Tobacco Never Used     Family History   Problem Relation Age of Onset    Parkinsonism Paternal Grandfather     No Known Problems Father     Breast cancer Mother        Meds/Allergies     Current Outpatient Medications:     amphetamine-dextroamphetamine (ADDERALL) 20 mg tablet    CHANTIX STARTING MONTH ELIZABETH 0 5 MG X 11 & 1 MG X 42 tablet    DEXILANT 60 MG capsule    eszopiclone (LUNESTA) 2 mg tablet    fluoruracil (CARAC) 0 5 % cream    gabapentin (NEURONTIN) 600 MG tablet    hydrOXYzine HCL (ATARAX) 50 mg tablet    ibuprofen (MOTRIN) 800 mg tablet    methylphenidate (CONCERTA) 36 MG ER tablet    oxyCODONE-acetaminophen (PERCOCET)  mg per tablet    OXYCONTIN 10 MG 12 hr tablet    OXYCONTIN 15 MG 12 hr tablet    risperiDONE (RisperDAL) 3 mg tablet    sildenafil (VIAGRA) 100 mg tablet    zolpidem (AMBIEN) 10 mg tablet    No Known Allergies    PHYSICAL EXAM:    Blood pressure 140/90, height 6' 3" (1 905 m), weight (!) 145 kg (319 lb)  Body mass index is 39 87 kg/m²  General Appearance: NAD, cooperative, alert  Eyes: Anicteric,  Conjunctiva pink  ENT:  Normocephalic, atraumatic, normal mucosa  -- class 1 airway   Neck:  Supple, symmetrical, trachea midline,   Resp:  Clear to auscultation bilaterally; no rales, rhonchi or wheezing; respirations unlabored   CV:  S1 S2, Regular rate and rhythm; no murmur, rub, or gallop  GI:  Soft, non-tender, non-distended; normal bowel sounds; no masses, no organomegaly   Rectal: Deferred  Musculoskeletal: No cyanosis, clubbing or edema  Normal ROM    Skin:  No jaundice, rashes, or lesions   Heme/Lymph: No palpable cervical lymphadenopathy  Psych: Normal affect, good eye contact  Neuro: No gross deficits, AAOx3    Lab Results:   Lab Results   Component Value Date    WBC 7 78 05/28/2021    HGB 10 3 (L) 05/28/2021    HCT 35 1 (L) 05/28/2021    MCV 73 (L) 05/28/2021     05/28/2021     Lab Results   Component Value Date    K 4 2 05/28/2021     05/28/2021    CO2 25 05/28/2021    BUN 8 05/28/2021    CREATININE 1 18 05/28/2021    GLUF 90 09/07/2019    CALCIUM 8 9 05/28/2021    CORRECTEDCA 9 4 05/28/2021    AST 21 05/28/2021    ALT 36 05/28/2021    ALKPHOS 80 05/28/2021    EGFR 78 05/28/2021     No results found for: IRON, TIBC, FERRITIN  Lab Results   Component Value Date    LIPASE 102 05/28/2021         REVIEW OF SYSTEMS:    CONSTITUTIONAL: Denies any fever, chills, rigors, and weight loss  HEENT: No earache or tinnitus  Denies hearing loss or visual disturbances  CARDIOVASCULAR: No chest pain or palpitations  RESPIRATORY: Denies any cough, hemoptysis,  Positive for shortness of breath with exertion  GASTROINTESTINAL: As noted in the History of Present Illness  GENITOURINARY: No problems with urination  Denies any hematuria or dysuria  NEUROLOGIC: No dizziness or vertigo, denies headaches  MUSCULOSKELETAL: positive for chronic foot pain   SKIN: Denies skin rashes or itching  ENDOCRINE: Denies excessive thirst  Denies intolerance to heat or cold  PSYCHOSOCIAL: Denies depression or anxiety  Denies any recent memory loss  -- history of attention deficit disorder

## 2021-06-16 NOTE — PATIENT INSTRUCTIONS
[facility]  Yasmeen Shipley 43 y o  male MRN: [id number]  Encounter: [id number]    ASSESSMENT AND PLAN:      1  Hypercoagulable state ([address] )  With recent splenic infarct some suspicion of possible hypercoagulable state  Patient presented to the ED within the past 2 weeks with acute left-sided abdominal pain  Now appears to be resolving  - placed on a baby aspirin for now  - Ambulatory referral to Gastroenterology    2  Iron deficiency anemia due to chronic blood loss  - patient with microcytic anemia  Hemoglobin 10 3 with an MCV of  73  Suspect chronic gastrointestinal blood loss  Patient does have chronic GERD but is on Dexilant with good control  Could have gastritis or erosions  : Source and colon neoplasm needs to be excluded  Patient could well have blood loss from use of long-term nonsteroidal anti-inflammatories with small bowel erosions  - he has no significant GI symptomatology    EGD and Colonoscopy at [facility]  - Ambulatory referral to Gastroenterology  --  If EGD and colonoscopy  Her negative would proceed with capsule endoscopy    3  Gastroesophageal reflux disease, unspecified whether esophagitis present  Stable on Dexilant     4  Splenic infarct  Please see above       5   Chronic pain syndrome related to crush injury of calcaneus bilaterally  - on therapeutic narcotics-- and long-term nonsteroidal anti-inflammatories along with gabapentin      Followup Appointment:  Pending  Studies

## 2021-06-16 NOTE — TELEPHONE ENCOUNTER
Why does your doctor want you to have this procedure? anemic    Do you have kidney disease?  no  If yes, are you on dialysis :     Have you had diverticulitis within the past 2 months? no    Are you diabetic?  no  If yes, insulin dependent: If yes, provide diabetic instructions sheet     Do take iron supplements?  no  If yes, instruct patient to hold iron supplement for 7 days prior    Are you on a blood thinner? no   Was the blood thinner sheet complete and faxed to cardiologist no  Plavix (clopidogrel), Coumadin (warfarin), Lovenox (enoxaparin), Xarelto (rivaroxaban), Pradaxa(dabigatran), Eliquis(apixaban) Savaysa/Lixiana (edoxapan)    Do you have an automatic implantable cardiac defibrillator (AICD)/pacemaker ([facility])? no  Was AICD/pacemaker sheet completed and faxed to cardiologist? no    Are you on home oxygen? no  If yes, continuous or nocturnal:     Have you been treated for MRSA, VRE or any communicable diseases? no    Heart attack, stroke, or stent within 3 months? no  Schedule at Hospital if within 3-6 months   Use nitroglycerin for chest pain in the last 6 months? no    History of organ  transplant?  no   If yes, notify Endo      History of neck/throat/tongue surgery or cancer? no  IF yes, notify Endo      Any problems with anesthesia in the past? no     Was stool C diff ordered?  no Stool specimen needs to be completed prior to procedure    Do have any facial or body piercings?no     Do you have a latex allergy? no     Do have an allergy to metals? ([facility] study only) no     If pediatric patient, was consent faxed to pediatrician no     Patient rights reviewed yes        Rx Clenpiq sent to provider for signature  Instructions emailed to patient

## 2021-06-16 NOTE — LETTER
June 16, 2021     [facility] [address]    Patient: Sallie Solis   YOB: 1985   Date of Visit: 6/16/2021       Dear Dr Floridalma Padgett:    Thank you for referring Sallie Solis to me for evaluation  Below are my notes for this consultation  If you have questions, please do not hesitate to call me  I look forward to following your patient along with you  Sincerely,        Gissell Yee MD        CC: No Recipients  Gissell Yee MD  6/16/2021  5:31 PM  Incomplete    [address] [facility]patient [facility]  Sallie Solis 43 y o  male MRN: [id number]  Encounter: [id number]    ASSESSMENT AND PLAN:      1  Hypercoagulable state ([address] )  With recent splenic infarct some suspicion of possible hypercoagulable state  Patient presented to the ED within the past 2 weeks with acute left-sided abdominal pain  Now appears to be resolving  - placed on a baby aspirin for now  - Ambulatory referral to Gastroenterology    2  Iron deficiency anemia due to chronic blood loss  - patient with microcytic anemia  Hemoglobin 10 3 with an MCV of  73  Suspect chronic gastrointestinal blood loss  Patient does have chronic GERD but is on Dexilant with good control  Could have gastritis or erosions  : Source and colon neoplasm needs to be excluded  Patient could well have blood loss from use of long-term nonsteroidal anti-inflammatories with small bowel erosions  - he has no significant GI symptomatology  EGD and Colonoscopy at [facility]  - Ambulatory referral to Gastroenterology    -  If EGD and colonoscopy  Her negative would proceed with capsule endoscopy    3  Gastroesophageal reflux disease, unspecified whether esophagitis present  Stable on Dexilant     4  Splenic infarct  Please see above       5   Chronic pain syndrome related to crush injury of calcaneus bilaterally  - on therapeutic narcotics-- and long-term nonsteroidal anti-inflammatories along with gabapentin      Followup Appointment:  Pending  Studies   ______________________________________________________________________    Chief Complaint   Patient presents with    slub follow up    needs colon/egd      Patient is referred by Dr Ravinder Garcia for evaluation of iron deficiency anemia    HPI:   Barbara Park is a 43y o  year old male who presents  for evaluation of microcytic anemia  Patient presented to [facility]  ED  for evaluation of left-sided abdominal pain  This had been occurring about 48-72 hours  CT scan examination revealed evidence of a splenic infarct  This was managed conservatively  Routine lab work revealed anemia with a hemoglobin of 10 3 in low MCV  Patient denies any significant problems with dysphagia or odynophagia  He does have chronic gastroesophageal reflux symptoms and is maintained on Dexilant  He has never had an upper endoscopy  Has no history of ulcer disease and he does not any significant dyspepsia  Patient does take ibuprofen 800 mg  Even twice a day for chronic pain  Patient sustained severe crush injury to his feet a little over 10 years ago and has had 15 surgery since that time  There is also on chronic narcotics to help  manage the pain  Patient denies any recent change in bowel habit  There is no rectal bleeding  No family history of colorectal cancer      Historical Information   Past Medical History:   Diagnosis Date    ADD (attention deficit disorder)     Arthritis     Chronic pain     Heartburn     Mood disorder (HCC)     Tremor      Past Surgical History:   Procedure Laterality Date    CLOSED REDUCTION CALCANEAL FRACTURE      PERONEAL TENDON EXPLORATION       Social History     Substance and Sexual Activity   Alcohol Use Yes    Alcohol/week: 7 0 standard drinks    Types: 7 Cans of beer per week    Comment: social     Social History     Substance and Sexual Activity   Drug Use Never     Social History     Tobacco Use Smoking Status Current Every Day Smoker    Packs/day: 0 50    Types: Cigarettes   Smokeless Tobacco Never Used     Family History   Problem Relation Age of Onset    Parkinsonism Paternal Grandfather     No Known Problems Father     Breast cancer Mother        Meds/Allergies     Current Outpatient Medications:     amphetamine-dextroamphetamine (ADDERALL) 20 mg tablet    CHANTIX STARTING MONTH ELIZABETH 0 5 MG X 11 & 1 MG X 42 tablet    DEXILANT 60 MG capsule    eszopiclone (LUNESTA) 2 mg tablet    fluoruracil (CARAC) 0 5 % cream    gabapentin (NEURONTIN) 600 MG tablet    hydrOXYzine HCL (ATARAX) 50 mg tablet    ibuprofen (MOTRIN) 800 mg tablet    methylphenidate (CONCERTA) 36 MG ER tablet    oxyCODONE-acetaminophen (PERCOCET)  mg per tablet    OXYCONTIN 10 MG 12 hr tablet    OXYCONTIN 15 MG 12 hr tablet    risperiDONE (RisperDAL) 3 mg tablet    sildenafil (VIAGRA) 100 mg tablet    zolpidem (AMBIEN) 10 mg tablet    No Known Allergies    PHYSICAL EXAM:    Blood pressure 140/90, height 6' 3" (1 905 m), weight (!) 145 kg (319 lb)  Body mass index is 39 87 kg/m²  General Appearance: NAD, cooperative, alert  Eyes: Anicteric,  Conjunctiva pink  ENT:  Normocephalic, atraumatic, normal mucosa  -- class 1 airway   Neck:  Supple, symmetrical, trachea midline,   Resp:  Clear to auscultation bilaterally; no rales, rhonchi or wheezing; respirations unlabored   CV:  S1 S2, Regular rate and rhythm; no murmur, rub, or gallop  GI:  Soft, non-tender, non-distended; normal bowel sounds; no masses, no organomegaly   Rectal: Deferred  Musculoskeletal: No cyanosis, clubbing or edema  Normal ROM    Skin:  No jaundice, rashes, or lesions   Heme/Lymph: No palpable cervical lymphadenopathy  Psych: Normal affect, good eye contact  Neuro: No gross deficits, AAOx3    Lab Results:   Lab Results   Component Value Date    WBC 7 78 05/28/2021    HGB 10 3 (L) 05/28/2021    HCT 35 1 (L) 05/28/2021    MCV 73 (L) 05/28/2021    PLT 206 05/28/2021     Lab Results   Component Value Date    K 4 2 05/28/2021     05/28/2021    CO2 25 05/28/2021    BUN 8 05/28/2021    CREATININE 1 18 05/28/2021    GLUF 90 09/07/2019    CALCIUM 8 9 05/28/2021    CORRECTEDCA 9 4 05/28/2021    AST 21 05/28/2021    ALT 36 05/28/2021    ALKPHOS 80 05/28/2021    EGFR 78 05/28/2021     No results found for: IRON, TIBC, FERRITIN  Lab Results   Component Value Date    LIPASE 102 05/28/2021         REVIEW OF SYSTEMS:    CONSTITUTIONAL: Denies any fever, chills, rigors, and weight loss  HEENT: No earache or tinnitus  Denies hearing loss or visual disturbances  CARDIOVASCULAR: No chest pain or palpitations  RESPIRATORY: Denies any cough, hemoptysis,  Positive for shortness of breath with exertion  GASTROINTESTINAL: As noted in the History of Present Illness  GENITOURINARY: No problems with urination  Denies any hematuria or dysuria  NEUROLOGIC: No dizziness or vertigo, denies headaches  MUSCULOSKELETAL: positive for chronic foot pain   SKIN: Denies skin rashes or itching  ENDOCRINE: Denies excessive thirst  Denies intolerance to heat or cold  PSYCHOSOCIAL: Denies depression or anxiety  Denies any recent memory loss  -- history of attention deficit disorder

## 2021-06-18 LAB
F2 GENE MUT ANL BLD/T: NORMAL
F5 GENE MUT ANL BLD/T: NORMAL

## 2021-06-20 DIAGNOSIS — N52.1 ERECTILE DYSFUNCTION DUE TO DISEASES CLASSIFIED ELSEWHERE: ICD-10-CM

## 2021-06-21 ENCOUNTER — OFFICE VISIT (OUTPATIENT)
Dept: PODIATRY | Facility: CLINIC | Age: 36
End: 2021-06-21
Payer: COMMERCIAL

## 2021-06-21 ENCOUNTER — HOSPITAL ENCOUNTER (OUTPATIENT)
Dept: INFUSION CENTER | Facility: HOSPITAL | Age: 36
Discharge: HOME | End: 2021-06-21
Attending: INTERNAL MEDICINE
Payer: COMMERCIAL

## 2021-06-21 VITALS
SYSTOLIC BLOOD PRESSURE: 165 MMHG | RESPIRATION RATE: 18 BRPM | HEART RATE: 110 BPM | TEMPERATURE: 97 F | DIASTOLIC BLOOD PRESSURE: 92 MMHG

## 2021-06-21 VITALS — BODY MASS INDEX: 39.87 KG/M2 | HEIGHT: 75 IN

## 2021-06-21 DIAGNOSIS — D50.0 IRON DEFICIENCY ANEMIA DUE TO CHRONIC BLOOD LOSS: Primary | ICD-10-CM

## 2021-06-21 DIAGNOSIS — B07.0 PLANTAR WART OF LEFT FOOT: Primary | ICD-10-CM

## 2021-06-21 PROCEDURE — 96365 THER/PROPH/DIAG IV INF INIT: CPT

## 2021-06-21 PROCEDURE — 99212 OFFICE O/P EST SF 10 MIN: CPT | Performed by: PODIATRIST

## 2021-06-21 RX ORDER — SODIUM CHLORIDE 9 MG/ML
20 INJECTION, SOLUTION INTRAVENOUS ONCE
Status: COMPLETED | OUTPATIENT
Start: 2021-06-21 | End: 2021-06-21

## 2021-06-21 RX ORDER — SODIUM CHLORIDE 9 MG/ML
20 INJECTION, SOLUTION INTRAVENOUS ONCE
Status: CANCELLED | OUTPATIENT
Start: 2021-06-25

## 2021-06-21 RX ORDER — SILDENAFIL 100 MG/1
TABLET, FILM COATED ORAL
Qty: 30 TABLET | Refills: 0 | Status: SHIPPED | OUTPATIENT
Start: 2021-06-21 | End: 2021-10-08 | Stop reason: SDUPTHER

## 2021-06-21 RX ADMIN — SODIUM CHLORIDE 20 ML/HR: 9 INJECTION, SOLUTION INTRAVENOUS at 09:33

## 2021-06-21 RX ADMIN — IRON SUCROSE 200 MG: 20 INJECTION, SOLUTION INTRAVENOUS at 09:32

## 2021-06-21 NOTE — PROGRESS NOTES
PATIENT:  Vi [facility]  1985         ASSESSMENT:     1  Plantar wart of left foot         PLAN:  Patient was counseled and educated on the condition and the diagnosis  The diagnosis, treatment options and prognosis were discussed with the patient  [place] as prescribed  Continue to monitor for any recurring wart  Subjective: The patient presents for evaluation of left foot  No redness or discoloration  No edema  The following portions of the patient's history were reviewed and updated as appropriate: allergies, current medications, past family history, past medical history, past social history, past surgical history and problem list   All pertinent labs and images were reviewed  Past Medical History  Past Medical History:   Diagnosis Date    ADD (attention deficit disorder)     Arthritis     Chronic pain     Heartburn     Mood disorder (HCC)     Tremor        Past Surgical History  Past Surgical History:   Procedure Laterality Date    CLOSED REDUCTION CALCANEAL FRACTURE      PERONEAL TENDON EXPLORATION          Allergies:  Patient has no known allergies      Medications:  Current Outpatient Medications   Medication Sig Dispense Refill    CHANTIX STARTING MONTH ELIZABETH 0 5 MG X 11 & 1 MG X 42 tablet       DEXILANT 60 MG capsule Take 1 capsule by mouth daily  3    fluoruracil (CARAC) 0 5 % cream Apply topically daily 30 g 2    gabapentin (NEURONTIN) 600 MG tablet Take 600 mg by mouth 2 (two) times a day  0    hydrOXYzine HCL (ATARAX) 50 mg tablet Take 50 mg by mouth 2 (two) times a day as needed  1    ibuprofen (MOTRIN) 800 mg tablet Take 800 mg by mouth every 6 (six) hours as needed  0    methylphenidate (CONCERTA) 36 MG ER tablet       oxyCODONE-acetaminophen (PERCOCET)  mg per tablet TAKE 1 TABLET BY MOUTH EVERY 8 HOURS AS NEEDED FOR ONGOING THERAPY  0    OXYCONTIN 10 MG 12 hr tablet Take 10 mg by mouth 2 (two) times a day  0    risperiDONE (RisperDAL) 3 mg tablet Take 3 mg by mouth daily at bedtime  0    sildenafil (VIAGRA) 100 mg tablet Take 1 tablet by mouth one (1) hour prior to intercourse on an empty stomach  Limit to 3 encounters per week  30 tablet 0    zolpidem (AMBIEN) 10 mg tablet Take 10 mg by mouth daily at bedtime as needed for sleep      amphetamine-dextroamphetamine (ADDERALL) 20 mg tablet TAKE 1 TABLET BY MOUTH TWICE A DAY *FILL 4/20 (Patient not taking: Reported on 6/21/2021)  0    eszopiclone (LUNESTA) 2 mg tablet Take 2 mg by mouth daily at bedtime (Patient not taking: Reported on 6/21/2021)  5    OXYCONTIN 15 MG 12 hr tablet Take 10 mg by mouth 2 (two) times a day  (Patient not taking: Reported on 6/21/2021)  0     No current facility-administered medications for this visit  Social History:  Social History     Socioeconomic History    Marital status: Single     Spouse name: None    Number of children: None    Years of education: None    Highest education level: None   Occupational History    None   Tobacco Use    Smoking status: Current Every Day Smoker     Packs/day: 0 50     Types: Cigarettes    Smokeless tobacco: Never Used   Vaping Use    Vaping Use: Never used   Substance and Sexual Activity    Alcohol use: Yes     Alcohol/week: 7 0 standard drinks     Types: 7 Cans of beer per week     Comment: social    Drug use: Never    Sexual activity: Yes   Other Topics Concern    None   Social History Narrative    None     Social Determinants of Health     Financial Resource Strain:     Difficulty of Paying Living Expenses:    Food Insecurity:     Worried About Running Out of Food in the Last Year:     Ran Out of Food in the Last Year:    Transportation Needs:     Lack of Transportation (Medical):      Lack of Transportation (Non-Medical):    Physical Activity:     Days of Exercise per Week:     Minutes of Exercise per Session:    Stress:     Feeling of Stress :    Social Connections:     Frequency of Communication with Friends and Family:     Frequency of Social Gatherings with Friends and Family:     Attends Christianity Services:     Active Member of Clubs or Organizations:     Attends Club or Organization Meetings:     Marital Status:    Intimate Partner Violence:     Fear of Current or Ex-Partner:     Emotionally Abused:     Physically Abused:     Sexually Abused:           Review of Systems   Constitutional: Negative for chills and fever  Respiratory: Negative for cough and shortness of breath  Cardiovascular: Negative for chest pain and leg swelling  Gastrointestinal: Negative for nausea and vomiting  Neurological: Negative for numbness  Objective:      Ht 6' 3" (1 905 m) Comment: verbal  BMI 39 87 kg/m²          Physical Exam  Vitals reviewed  Constitutional:       General: He is not in acute distress  Appearance: Normal appearance  He is well-developed  He is not toxic-appearing  Cardiovascular:      Rate and Rhythm: Normal rate and regular rhythm  Pulses: Normal pulses  Dorsalis pedis pulses are 2+ on the right side and 2+ on the left side  Posterior tibial pulses are 2+ on the right side and 2+ on the left side  Pulmonary:      Effort: Pulmonary effort is normal  No respiratory distress  Musculoskeletal:         General: No tenderness or signs of injury  Right lower leg: No edema  Left lower leg: No edema  Right foot: No foot drop  Left foot: No foot drop  Comments: Pes planus noted  Skin:     General: Skin is warm  Coloration: Skin is not cyanotic or mottled  Findings: No abscess, erythema, rash or wound  Nails: There is no clubbing  Comments: No punctate bleeding or keratosis  Mild diffuse callus on left plantar medial heel  Neurological:      General: No focal deficit present  Mental Status: He is alert and oriented to person, place, and time        Cranial Nerves: No cranial nerve deficit  Sensory: No sensory deficit  Motor: No weakness  Coordination: Coordination normal       Gait: Gait normal    Psychiatric:         Mood and Affect: Mood normal          Behavior: Behavior normal          Thought Content:  Thought content normal          Judgment: Judgment normal

## 2021-06-22 ENCOUNTER — HOSPITAL ENCOUNTER (OUTPATIENT)
Dept: INFUSION CENTER | Facility: HOSPITAL | Age: 36
End: 2021-06-22
Attending: INTERNAL MEDICINE

## 2021-06-22 RX ORDER — SODIUM PICOSULFATE, MAGNESIUM OXIDE, AND ANHYDROUS CITRIC ACID 10; 3.5; 12 MG/160ML; G/160ML; G/160ML
LIQUID ORAL
Qty: 320 ML | Refills: 0 | Status: SHIPPED | COMMUNITY
Start: 2021-06-22 | End: 2021-07-08 | Stop reason: HOSPADM

## 2021-06-25 ENCOUNTER — HOSPITAL ENCOUNTER (OUTPATIENT)
Dept: INFUSION CENTER | Facility: HOSPITAL | Age: 36
Discharge: HOME | End: 2021-06-25
Attending: INTERNAL MEDICINE
Payer: COMMERCIAL

## 2021-06-25 VITALS
OXYGEN SATURATION: 95 % | SYSTOLIC BLOOD PRESSURE: 133 MMHG | DIASTOLIC BLOOD PRESSURE: 78 MMHG | TEMPERATURE: 98 F | RESPIRATION RATE: 16 BRPM | HEART RATE: 96 BPM

## 2021-06-25 DIAGNOSIS — D50.0 IRON DEFICIENCY ANEMIA DUE TO CHRONIC BLOOD LOSS: Primary | ICD-10-CM

## 2021-06-25 PROCEDURE — 96365 THER/PROPH/DIAG IV INF INIT: CPT

## 2021-06-25 RX ORDER — SODIUM CHLORIDE 9 MG/ML
20 INJECTION, SOLUTION INTRAVENOUS ONCE
Status: COMPLETED | OUTPATIENT
Start: 2021-06-25 | End: 2021-06-25

## 2021-06-25 RX ORDER — SODIUM CHLORIDE 9 MG/ML
20 INJECTION, SOLUTION INTRAVENOUS ONCE
Status: CANCELLED | OUTPATIENT
Start: 2021-06-28

## 2021-06-25 RX ADMIN — SODIUM CHLORIDE 20 ML/HR: 9 INJECTION, SOLUTION INTRAVENOUS at 08:32

## 2021-06-25 RX ADMIN — IRON SUCROSE 200 MG: 20 INJECTION, SOLUTION INTRAVENOUS at 08:36

## 2021-06-25 NOTE — PROGRESS NOTES
Pt here for Venofer infusion, denies c/o  PIV inserted, NSS to kvo,   Venofer infused, silvia well w/o AR  PIV removed intact  Dsd applied  Disch amb to home, steady gait      Did not want AVS

## 2021-06-28 ENCOUNTER — HOSPITAL ENCOUNTER (OUTPATIENT)
Dept: INFUSION CENTER | Facility: HOSPITAL | Age: 36
Discharge: HOME | End: 2021-06-28
Attending: INTERNAL MEDICINE
Payer: COMMERCIAL

## 2021-06-28 VITALS
HEART RATE: 95 BPM | RESPIRATION RATE: 18 BRPM | SYSTOLIC BLOOD PRESSURE: 160 MMHG | DIASTOLIC BLOOD PRESSURE: 87 MMHG | TEMPERATURE: 98.6 F | OXYGEN SATURATION: 97 %

## 2021-06-28 DIAGNOSIS — D50.0 IRON DEFICIENCY ANEMIA DUE TO CHRONIC BLOOD LOSS: Primary | ICD-10-CM

## 2021-06-28 PROCEDURE — 96365 THER/PROPH/DIAG IV INF INIT: CPT

## 2021-06-28 RX ORDER — SODIUM CHLORIDE 9 MG/ML
20 INJECTION, SOLUTION INTRAVENOUS ONCE
Status: CANCELLED | OUTPATIENT
Start: 2021-06-30

## 2021-06-28 RX ORDER — SODIUM CHLORIDE 9 MG/ML
20 INJECTION, SOLUTION INTRAVENOUS ONCE
Status: COMPLETED | OUTPATIENT
Start: 2021-06-28 | End: 2021-06-28

## 2021-06-28 RX ADMIN — IRON SUCROSE 200 MG: 20 INJECTION, SOLUTION INTRAVENOUS at 08:30

## 2021-06-28 RX ADMIN — SODIUM CHLORIDE 20 ML/HR: 9 INJECTION, SOLUTION INTRAVENOUS at 08:29

## 2021-06-28 NOTE — PLAN OF CARE
Problem: Knowledge Deficit  Goal: Patient/family/caregiver demonstrates understanding of disease process, treatment plan, medications, and discharge instructions  Description: Complete learning assessment and assess knowledge base    Interventions:  - Provide teaching at level of understanding  - Provide teaching via preferred learning methods  6/28/2021 0833 by Ernestina Mayfield RN  Outcome: Progressing  6/28/2021 0819 by Ernestina Mayfield RN  Outcome: Progressing

## 2021-06-30 ENCOUNTER — HOSPITAL ENCOUNTER (OUTPATIENT)
Dept: INFUSION CENTER | Facility: HOSPITAL | Age: 36
Discharge: HOME | End: 2021-06-30
Attending: INTERNAL MEDICINE
Payer: COMMERCIAL

## 2021-06-30 VITALS
TEMPERATURE: 97.9 F | DIASTOLIC BLOOD PRESSURE: 81 MMHG | HEART RATE: 92 BPM | RESPIRATION RATE: 16 BRPM | OXYGEN SATURATION: 97 % | SYSTOLIC BLOOD PRESSURE: 144 MMHG

## 2021-06-30 DIAGNOSIS — D50.0 IRON DEFICIENCY ANEMIA DUE TO CHRONIC BLOOD LOSS: Primary | ICD-10-CM

## 2021-06-30 PROCEDURE — 96365 THER/PROPH/DIAG IV INF INIT: CPT

## 2021-06-30 RX ORDER — SODIUM CHLORIDE 9 MG/ML
20 INJECTION, SOLUTION INTRAVENOUS ONCE
Status: CANCELLED | OUTPATIENT
Start: 2021-07-06

## 2021-06-30 RX ORDER — SODIUM CHLORIDE 9 MG/ML
20 INJECTION, SOLUTION INTRAVENOUS ONCE
Status: COMPLETED | OUTPATIENT
Start: 2021-06-30 | End: 2021-06-30

## 2021-06-30 RX ADMIN — SODIUM CHLORIDE 20 ML/HR: 9 INJECTION, SOLUTION INTRAVENOUS at 09:01

## 2021-06-30 RX ADMIN — IRON SUCROSE 200 MG: 20 INJECTION, SOLUTION INTRAVENOUS at 09:01

## 2021-06-30 NOTE — PROGRESS NOTES
Pt tolerated venofer infusion well with no reactions  PIV removed with bandage applied  Next appts scheduled  Pt left ambulatory with steady gait for d/c to home

## 2021-07-02 ENCOUNTER — OFFICE VISIT (OUTPATIENT)
Dept: CARDIOLOGY CLINIC | Facility: CLINIC | Age: 36
End: 2021-07-02
Payer: COMMERCIAL

## 2021-07-02 VITALS
BODY MASS INDEX: 39.17 KG/M2 | WEIGHT: 315 LBS | HEART RATE: 100 BPM | DIASTOLIC BLOOD PRESSURE: 98 MMHG | SYSTOLIC BLOOD PRESSURE: 130 MMHG | HEIGHT: 75 IN

## 2021-07-02 DIAGNOSIS — D73.5 SPLENIC INFARCT: Primary | ICD-10-CM

## 2021-07-02 DIAGNOSIS — I74.9 THROMBOEMBOLISM (HCC): ICD-10-CM

## 2021-07-02 PROCEDURE — 99204 OFFICE O/P NEW MOD 45 MIN: CPT | Performed by: INTERNAL MEDICINE

## 2021-07-02 NOTE — PROGRESS NOTES
Cardiology   MD Aki Kumar MD Ather Mansoor, MD Izora Moore, DO, Zane Lynn DO, Gaurang Gonzalez DO, [facility] - [place]  -------------------------------------------------------------------  [facility]  [address] [place] [id number]  [address] [place] [address]                        Luisen Marine                     1985                     [id number]          Assessment/Plan:    1  Splenic infarction 05/28/2021:  Patient with multiple risk factors for cardiovascular/thromboembolic disease, including daily tobacco use, high blood pressure, dyslipidemia, obesity, daily alcohol use, sedentary lifestyle  I will schedule for RONI to rule out intracardiac and proximal aortic source of embolism  He does get palpitations, mostly with exertion, and almost never with rest, therefore I will schedule for 2 week Zio monitor  If his left atrium appears dilated, I will consider a long-term monitoring with loop recorder implantation and after his next visit, to monitor for atrial dysrhythmia  In the meantime, I have counseled him on the importance of reducing his cardiovascular risk factors, including tobacco cessation, consuming heart healthy diet, exercising, losing weight, cutting down on alcohol intake, and becoming more active  Have strongly suggested a sleep study, which he states he will think about  2  Morbid obesity   3  High blood pressure:  Counseled patient on consideration of sleep study, importance of alcohol and smoking cessation, and aggressive lifestyle modification to reduce his weight, eat healthier, and exercise  If he is unable to implement this process over the next 3 months, would start antihypertensive medication  4  Dyslipidemia with low HDL, elevated triglycerides:  Elevated lifetime risk of ASCVD    Have advised him to consider statin therapy which he will consider if he is unable to change his lifestyle over the next 3 months or so     5  Ongoing tobacco use:  Strongly suggested tobacco cessation   6  Daily alcohol use:  advise cutting down on alcohol use   7  Iron deficiency anemia: Continue following up with GI   I requested the patient to call me after completion of his EGD so that we may review this report prior to his RONI  Follow-up in 1-2 months      Interval History: This is a very pleasant 75-year-old male with no prior cardiac history who has dyslipidemia, high blood pressure readings, it is obese  He went to the ER 05/28/2021 with abdominal pain, and CT abdomen revealed evidence of splenic infarction  He was discharged from the ER has followed up with Hematology/Oncology was placed him on aspirin  Hypercoagulable workup is in progress  He was also noted to have iron deficiency anemia with hemoglobin 10 3 and MCV 73, is now following gastroenterology for IV iron infusions  He presents today for initial cardiac evaluation in light of his splenic infarction  He is a chronic smoker, smoking half a pack a day since his teenage years  He drinks about 4 beers a day and does not exercise  He denies any family history of premature CAD, sudden death, or congenital cardiac abnormalities  From a symptomatic standpoint he denies exertional chest pain, shortness of breath, dizziness or lightheadedness, presyncope or syncope  He admits to mild lower extremity edema for the past couple of years  He admits to palpitations which she feels mostly with exertion, and not necessarily with rest   He states he has diagnosed for an EGD and colonoscopy in the near future               Vitals:  Vitals:    07/02/21 0812   BP: 130/98   BP Location: Left arm   Patient Position: Sitting   Cuff Size: Adult   Pulse: 100   Weight: (!) 146 kg (322 lb 12 8 oz)   Height: 6' 3" (1 905 m)         Past Medical History:   Diagnosis Date    ADD (attention deficit disorder)     Arthritis     Chronic pain     Heartburn     Mood disorder ([facility] )     Tremor      Social History     Socioeconomic History    Marital status: Single     Spouse name: Not on file    Number of children: Not on file    Years of education: Not on file    Highest education level: Not on file   Occupational History    Not on file   Tobacco Use    Smoking status: Current Every Day Smoker     Packs/day: 0 50     Types: Cigarettes    Smokeless tobacco: Never Used   Vaping Use    Vaping Use: Never used   Substance and Sexual Activity    Alcohol use: Yes     Alcohol/week: 7 0 standard drinks     Types: 7 Cans of beer per week     Comment: social    Drug use: Never    Sexual activity: Yes   Other Topics Concern    Not on file   Social History Narrative    Not on file     Social Determinants of Health     Financial Resource Strain:     Difficulty of Paying Living Expenses:    Food Insecurity:     Worried About Running Out of Food in the Last Year:     [address] in the Last Year:    Transportation Needs:     Lack of Transportation (Medical):      Lack of Transportation (Non-Medical):    Physical Activity:     Days of Exercise per Week:     Minutes of Exercise per Session:    Stress:     Feeling of Stress :    Social Connections:     Frequency of Communication with Friends and Family:     Frequency of Social Gatherings with Friends and Family:     Attends [employer]ab[employer] Services:     Active Member of Clubs or Organizations:     Attends Club or Organization Meetings:     Marital Status:    Intimate Partner Violence:     Fear of Current or Ex-Partner:     Emotionally Abused:     Physically Abused:     Sexually Abused:       Family History   Problem Relation Age of Onset    Parkinsonism Paternal Grandfather     No Known Problems Father     Breast cancer Mother      Past Surgical History:   Procedure Laterality Date    CLOSED REDUCTION CALCANEAL FRACTURE      PERONEAL TENDON EXPLORATION         Current Outpatient Medications:     DEXILANT 60 MG capsule, Take 1 capsule by mouth daily, Disp: , Rfl: 3    fluoruracil (CARAC) 0 5 % cream, Apply topically daily, Disp: 30 g, Rfl: 2    gabapentin (NEURONTIN) 600 MG tablet, Take 600 mg by mouth 2 (two) times a day, Disp: , Rfl: 0    hydrOXYzine HCL (ATARAX) 50 mg tablet, Take 50 mg by mouth 2 (two) times a day as needed, Disp: , Rfl: 1    ibuprofen (MOTRIN) 800 mg tablet, Take 800 mg by mouth every 6 (six) hours as needed, Disp: , Rfl: 0    methylphenidate (CONCERTA) 36 MG ER tablet, daily , Disp: , Rfl:     oxyCODONE-acetaminophen (PERCOCET)  mg per tablet, TAKE 1 TABLET BY MOUTH EVERY 8 HOURS AS NEEDED FOR ONGOING THERAPY, Disp: , Rfl: 0    OXYCONTIN 10 MG 12 hr tablet, Take 10 mg by mouth 2 (two) times a day, Disp: , Rfl: 0    risperiDONE (RisperDAL) 3 mg tablet, Take 3 mg by mouth daily at bedtime, Disp: , Rfl: 0    sildenafil (VIAGRA) 100 mg tablet, Take 1 tablet by mouth one (1) hour prior to intercourse on an empty stomach  Limit to 3 encounters per week , Disp: 30 tablet, Rfl: 0    Sod Picosulfate-Mag Ox-Cit Acd (Clenpiq) 10-3 5-12 MG-GM -GM/160ML SOLN, Use as directed, Disp: 320 mL, Rfl: 0    zolpidem (AMBIEN) 10 mg tablet, Take 10 mg by mouth daily at bedtime as needed for sleep, Disp: , Rfl:     amphetamine-dextroamphetamine (ADDERALL) 20 mg tablet, TAKE 1 TABLET BY MOUTH TWICE A DAY *FILL 4/20 (Patient not taking: Reported on 6/21/2021), Disp: , Rfl: 0    CHANTIX STARTING MONTH ELIZABETH 0 5 MG X 11 & 1 MG X 42 tablet, , Disp: , Rfl:     eszopiclone (LUNESTA) 2 mg tablet, Take 2 mg by mouth daily at bedtime (Patient not taking: Reported on 6/21/2021), Disp: , Rfl: 5        Review of Systems:  Review of Systems   Respiratory: Negative  Cardiovascular: Positive for palpitations  All other systems reviewed and are negative  Physical Exam:  Physical Exam  Constitutional:       General: He is not in acute distress  Appearance: He is well-developed  He is not diaphoretic  HENT:      Head: Normocephalic and atraumatic  Eyes:      General: No scleral icterus  Right eye: No discharge  Pupils: Pupils are equal, round, and reactive to light  Neck:      Thyroid: No thyromegaly  Cardiovascular:      Rate and Rhythm: Normal rate and regular rhythm  Heart sounds: Normal heart sounds  No murmur heard  No friction rub  No gallop  Pulmonary:      Effort: Pulmonary effort is normal       Breath sounds: Normal breath sounds  Abdominal:      General: There is no distension  Tenderness: There is no abdominal tenderness  There is no guarding or rebound  Musculoskeletal:         General: Normal range of motion  Cervical back: Normal range of motion and neck supple  Skin:     General: Skin is warm and dry  Coloration: Skin is not pale  Findings: No erythema or rash  Neurological:      Mental Status: He is alert and oriented to person, place, and time  Coordination: Coordination normal    Psychiatric:         Behavior: Behavior normal          Thought Content: Thought content normal          Judgment: Judgment normal          This note was completed in part utilizing [email or web address] Direct Software  Grammatical errors, random word insertions, spelling mistakes, and incomplete sentences can be an occasional consequence of this system secondary to software limitations, ambient noise, and hardware issues  If you have any questions or concerns about the content, text, or information contained within the body of this dictation, please contact the provider for clarification

## 2021-07-06 ENCOUNTER — HOSPITAL ENCOUNTER (OUTPATIENT)
Dept: INFUSION CENTER | Facility: HOSPITAL | Age: 36
Discharge: HOME | End: 2021-07-06
Attending: INTERNAL MEDICINE
Payer: COMMERCIAL

## 2021-07-06 VITALS
OXYGEN SATURATION: 98 % | HEART RATE: 85 BPM | DIASTOLIC BLOOD PRESSURE: 69 MMHG | RESPIRATION RATE: 14 BRPM | TEMPERATURE: 98.1 F | SYSTOLIC BLOOD PRESSURE: 144 MMHG

## 2021-07-06 DIAGNOSIS — D50.0 IRON DEFICIENCY ANEMIA DUE TO CHRONIC BLOOD LOSS: Primary | ICD-10-CM

## 2021-07-06 PROCEDURE — 96365 THER/PROPH/DIAG IV INF INIT: CPT

## 2021-07-06 RX ORDER — SODIUM CHLORIDE 9 MG/ML
20 INJECTION, SOLUTION INTRAVENOUS ONCE
Status: COMPLETED | OUTPATIENT
Start: 2021-07-06 | End: 2021-07-06

## 2021-07-06 RX ORDER — SODIUM CHLORIDE 9 MG/ML
20 INJECTION, SOLUTION INTRAVENOUS ONCE
Status: CANCELLED | OUTPATIENT
Start: 2021-07-09

## 2021-07-06 RX ADMIN — SODIUM CHLORIDE 20 ML/HR: 9 INJECTION, SOLUTION INTRAVENOUS at 14:56

## 2021-07-06 RX ADMIN — IRON SUCROSE 200 MG: 20 INJECTION, SOLUTION INTRAVENOUS at 14:56

## 2021-07-07 ENCOUNTER — ANESTHESIA EVENT (OUTPATIENT)
Dept: GASTROENTEROLOGY | Facility: AMBULATORY SURGERY CENTER | Age: 36
End: 2021-07-07

## 2021-07-07 NOTE — ANESTHESIA PREPROCEDURE EVALUATION
Procedure:  COLONOSCOPY  EGD    Relevant Problems   GI/HEPATIC   (+) GERD (gastroesophageal reflux disease)      HEMATOLOGY   (+) Iron deficiency anemia due to chronic blood loss      MUSCULOSKELETAL   (+) Primary localized osteoarthrosis of ankle and foot      NEURO/PSYCH   (+) PTSD (post-traumatic stress disorder)      Other   (+) ADD (attention deficit disorder)   (+) Bipolar disorder (HCC)   HX  OF SPLENIC INFARCTION   MORBID [id number]  PERCOCET 10/325MG THIS AM, 10MG OXCONTIN LAST NIGHT  FEET PAIN  Physical Exam    Airway    Mallampati score: III  TM Distance: >3 FB  Neck ROM: full     Dental   No notable dental hx     Cardiovascular      Pulmonary      Other Findings        Anesthesia Plan  ASA Score- 3     Anesthesia Type- IV sedation with anesthesia with ASA Monitors  Additional Monitors:   Airway Plan:           Plan Factors-Exercise tolerance (METS): >4 METS  EKG reviewed  Imaging results reviewed  Existing labs reviewed  Patient is a current smoker (1/2 PPD)  Patient smoked on day of surgery (1 THIS AM)  Induction- intravenous  Postoperative Plan-     Informed Consent- Anesthetic plan and risks discussed with patient  I personally reviewed this patient with the CRNA  Discussed and agreed on the Anesthesia Plan with the CRNA  Phan Jimenez

## 2021-07-08 ENCOUNTER — ANESTHESIA (OUTPATIENT)
Dept: GASTROENTEROLOGY | Facility: AMBULATORY SURGERY CENTER | Age: 36
End: 2021-07-08

## 2021-07-08 ENCOUNTER — HOSPITAL ENCOUNTER (OUTPATIENT)
Dept: GASTROENTEROLOGY | Facility: AMBULATORY SURGERY CENTER | Age: 36
Discharge: HOME | End: 2021-07-08
Payer: COMMERCIAL

## 2021-07-08 VITALS
OXYGEN SATURATION: 96 % | SYSTOLIC BLOOD PRESSURE: 150 MMHG | TEMPERATURE: 98.2 F | HEART RATE: 66 BPM | RESPIRATION RATE: 20 BRPM | DIASTOLIC BLOOD PRESSURE: 89 MMHG

## 2021-07-08 DIAGNOSIS — D50.0 IRON DEFICIENCY ANEMIA DUE TO CHRONIC BLOOD LOSS: ICD-10-CM

## 2021-07-08 PROCEDURE — 45380 COLONOSCOPY AND BIOPSY: CPT | Performed by: INTERNAL MEDICINE

## 2021-07-08 PROCEDURE — 45381 COLONOSCOPY SUBMUCOUS NJX: CPT | Performed by: INTERNAL MEDICINE

## 2021-07-08 PROCEDURE — 43239 EGD BIOPSY SINGLE/MULTIPLE: CPT | Performed by: INTERNAL MEDICINE

## 2021-07-08 RX ORDER — SODIUM CHLORIDE, SODIUM LACTATE, POTASSIUM CHLORIDE, CALCIUM CHLORIDE 600; 310; 30; 20 MG/100ML; MG/100ML; MG/100ML; MG/100ML
50 INJECTION, SOLUTION INTRAVENOUS CONTINUOUS
Status: DISCONTINUED | OUTPATIENT
Start: 2021-07-08 | End: 2021-07-12 | Stop reason: HOSPADM

## 2021-07-08 RX ORDER — LIDOCAINE HYDROCHLORIDE 10 MG/ML
INJECTION, SOLUTION EPIDURAL; INFILTRATION; INTRACAUDAL; PERINEURAL AS NEEDED
Status: DISCONTINUED | OUTPATIENT
Start: 2021-07-08 | End: 2021-07-08

## 2021-07-08 RX ORDER — SODIUM CHLORIDE 9 MG/ML
INJECTION, SOLUTION INTRAVENOUS CONTINUOUS PRN
Status: DISCONTINUED | OUTPATIENT
Start: 2021-07-08 | End: 2021-07-08

## 2021-07-08 RX ORDER — FENTANYL CITRATE 50 UG/ML
INJECTION, SOLUTION INTRAMUSCULAR; INTRAVENOUS AS NEEDED
Status: DISCONTINUED | OUTPATIENT
Start: 2021-07-08 | End: 2021-07-08

## 2021-07-08 RX ORDER — PROPOFOL 10 MG/ML
INJECTION, EMULSION INTRAVENOUS AS NEEDED
Status: DISCONTINUED | OUTPATIENT
Start: 2021-07-08 | End: 2021-07-08

## 2021-07-08 RX ADMIN — FENTANYL CITRATE 25 MCG: 50 INJECTION, SOLUTION INTRAMUSCULAR; INTRAVENOUS at 14:02

## 2021-07-08 RX ADMIN — FENTANYL CITRATE 25 MCG: 50 INJECTION, SOLUTION INTRAMUSCULAR; INTRAVENOUS at 13:59

## 2021-07-08 RX ADMIN — PROPOFOL 100 MG: 10 INJECTION, EMULSION INTRAVENOUS at 14:30

## 2021-07-08 RX ADMIN — FENTANYL CITRATE 25 MCG: 50 INJECTION, SOLUTION INTRAMUSCULAR; INTRAVENOUS at 14:13

## 2021-07-08 RX ADMIN — PROPOFOL 100 MG: 10 INJECTION, EMULSION INTRAVENOUS at 14:08

## 2021-07-08 RX ADMIN — PROPOFOL 100 MG: 10 INJECTION, EMULSION INTRAVENOUS at 14:19

## 2021-07-08 RX ADMIN — PROPOFOL 100 MG: 10 INJECTION, EMULSION INTRAVENOUS at 14:11

## 2021-07-08 RX ADMIN — PROPOFOL 100 MG: 10 INJECTION, EMULSION INTRAVENOUS at 14:24

## 2021-07-08 RX ADMIN — PROPOFOL 100 MG: 10 INJECTION, EMULSION INTRAVENOUS at 14:13

## 2021-07-08 RX ADMIN — PROPOFOL 100 MG: 10 INJECTION, EMULSION INTRAVENOUS at 14:02

## 2021-07-08 RX ADMIN — PROPOFOL 100 MG: 10 INJECTION, EMULSION INTRAVENOUS at 14:16

## 2021-07-08 RX ADMIN — PROPOFOL 60 MG: 10 INJECTION, EMULSION INTRAVENOUS at 14:00

## 2021-07-08 RX ADMIN — LIDOCAINE HYDROCHLORIDE 50 MG: 10 INJECTION, SOLUTION EPIDURAL; INFILTRATION; INTRACAUDAL; PERINEURAL at 13:59

## 2021-07-08 RX ADMIN — PROPOFOL 100 MG: 10 INJECTION, EMULSION INTRAVENOUS at 14:04

## 2021-07-08 RX ADMIN — FENTANYL CITRATE 25 MCG: 50 INJECTION, SOLUTION INTRAMUSCULAR; INTRAVENOUS at 13:57

## 2021-07-08 RX ADMIN — PROPOFOL 140 MG: 10 INJECTION, EMULSION INTRAVENOUS at 13:59

## 2021-07-08 RX ADMIN — SODIUM CHLORIDE: 9 INJECTION, SOLUTION INTRAVENOUS at 13:58

## 2021-07-08 RX ADMIN — PROPOFOL 100 MG: 10 INJECTION, EMULSION INTRAVENOUS at 14:05

## 2021-07-08 NOTE — DISCHARGE INSTRUCTIONS
Upper Endoscopy   WHAT YOU NEED TO KNOW:   An upper endoscopy is also called an upper gastrointestinal (GI) endoscopy, or an esophagogastroduodenoscopy (EGD)  It is a procedure to examine the inside of your esophagus, stomach, and duodenum (first part of the small intestine) with a scope  You may feel bloated, gassy, or have some abdominal discomfort after your procedure  Your throat may be sore for 24 to 36 hours  You may burp or pass gas from air that is still inside your body  DISCHARGE INSTRUCTIONS:   Seek care immediately if:    You have sudden, severe abdominal pain   You have problems swallowing   You have a large amount of black, sticky bowel movements or blood in your bowel movements   You have sudden trouble breathing   You feel weak, lightheaded, or faint or your heart beats faster than normal for you  Contact your healthcare provider if:    You have a fever and chills   You have nausea or are vomiting   Your abdomen is bloated or feels full and hard   You have abdominal pain   You have black, sticky bowel movements or blood in your bowel movements   You have not had a bowel movement for 3 days after your procedure   You have rash or hives   You have questions or concerns about your procedure  Activity:    Do not lift, strain, or run for 24 hours after your procedure   Rest after your procedure  You have been given medicine to relax you  Do not drive or make important decisions until the day after your procedure  Return to your normal activity as directed   Relieve gas and discomfort from bloating by lying on your right side with a heating pad on your abdomen  You may need to take short walks to help the gas move out  Eat small meals until bloating is relieved  Follow up with your healthcare provider as directed: Write down your questions so you remember to ask them during your visits       If you take a blood thinner, please review the specific instructions from your endoscopist about when you should resume it  These can be found in the Recommendation and Your Medication list sections of this After Visit Summary  Colonoscopy   WHAT YOU NEED TO KNOW:   A colonoscopy is a procedure to examine the inside of your colon (intestine) with a scope  Polyps or tissue growths may have been removed during your colonoscopy  It is normal to feel bloated and to have some abdominal discomfort  You should be passing gas  If you have hemorrhoids or you had polyps removed, you may have a small amount of bleeding  DISCHARGE INSTRUCTIONS:   Seek care immediately if:    You have sudden, severe abdominal pain   You have problems swallowing   You have a large amount of black, sticky bowel movements or blood in your bowel movements   You have sudden trouble breathing   You feel weak, lightheaded, or faint or your heart beats faster than normal for you  Contact your healthcare provider if:    You have a fever and chills   You have nausea or are vomiting   Your abdomen is bloated or feels full and hard   You have abdominal pain   You have black, sticky bowel movements or blood in your bowel movements   You have not had a bowel movement for 3 days after your procedure   You have rash or hives   You have questions or concerns about your procedure  Activity:    Do not lift, strain, or run for 24 hours after your procedure   Rest after your procedure  You have been given medicine to relax you  Do not drive or make important decisions until the day after your procedure  Return to your normal activity as directed   Relieve gas and discomfort from bloating by lying on your right side with a heating pad on your abdomen  You may need to take short walks to help the gas move out  Eat small meals until bloating is relieved    Follow up with your healthcare provider as directed: Write down your questions so you remember to ask them during your visits  If you take a blood thinner, please review the specific instructions from your endoscopist about when you should resume it  These can be found in the Recommendation and Your Medication list sections of this After Visit Summary  Colorectal Polyps   WHAT YOU NEED TO KNOW:   What are colorectal polyps? Colorectal polyps are small growths of tissue in the lining of the colon and rectum  Most polyps are hyperplastic polyps and are usually benign (noncancerous)  Certain types of polyps, called adenomatous polyps, may turn into cancer  What increases my risk of colorectal polyps? The exact cause of colorectal polyps is unknown  The following may increase your risk:  · Older age    · A diet of foods high in fat and low in fiber     · Family history of polyps    · Intestinal diseases, such as Crohn's disease or ulcerative colitis    · An unhealthy lifestyle, such as physical inactivity, smoking, or drinking alcohol    · Obesity    What are the signs and symptoms of colorectal polyps? · Blood in your bowel movement or bleeding from the rectum    · Change in bowel movement habits, such as diarrhea and constipation    · Abdominal pain    How are colorectal polyps diagnosed? You should have fecal blood screening once a year for colorectal disease if you are over 48years old  You should be screened earlier if you have an intestinal disease or a family history of polyps or colorectal cancer  During this screening, a sample of your bowel movement is checked for blood, which may be an early sign of colorectal polyps or cancer  You may also need any of the following tests:  · Digital rectal exam:  Your healthcare provider will examine your anus and use a finger to check your rectum for polyps  · Barium enema: A barium enema is an x-ray of the colon  A tube is put into your anus, and a liquid called barium is put through the tube   Barium is used so that healthcare providers can see your colon better on the x-ray film  · Virtual colonoscopy: This is a CT scan that takes pictures of the inside of your colon and rectum  A small, flexible tube is put into your rectum and air or carbon dioxide (gas) is used to expand your colon  This lets healthcare providers clearly see your colon and any polyps on a monitor  · Colonoscopy or sigmoidoscopy: These procedures help your healthcare provider see the inside of your colon using a flexible tube with a small light and camera on the end  During a sigmoidoscopy, your healthcare provider will only look at rectum and lower colon  During a colonoscopy, healthcare providers will look at the full length of your colon  Healthcare providers may remove a small amount of tissue from the colon for a biopsy  How are colorectal polyps treated? A polypectomy is a minimally invasive procedure to remove your polyps  They may be removed during a colonoscopy or sigmoidoscopy  Your healthcare provider may need to remove the polyps with a laparoscope  Laparoscopy is done by inserting a small, flexible scope into incisions made on your abdomen  What are the risks of colorectal polyps? You may bleed during a colonoscopy procedure  Your bowel may be perforated (torn) when polyps are removed  This may lead to an open abdominal surgery  During surgery, you may bleed too much or get an infection  Adenomatous polyps that are not removed may turn into cancer and become more difficult to treat  Where can I find support and more information? · [facility] ([facility]) [address], [place] [address]  Phone: [phone]  Web Address: Lester Marshall  Levine, Susan. \[facility].\"" nih gov    When should I contact my healthcare provider? · You have a fever  · You have chills, a cough, or feel weak and achy      · You have abdominal pain that does not go away or gets worse after you take medicine  · Your abdomen is swollen  · You are losing weight without trying  · You have questions or concerns about your condition or care  When should I seek immediate care or call 911? · You have sudden shortness of breath  · You have a fast heart rate, fast breathing, or are too dizzy to stand up  · You have severe abdominal pain  · You see blood in your bowel movement  CARE AGREEMENT:   You have the right to help plan your care  Learn about your health condition and how it may be treated  Discuss treatment options with your healthcare providers to decide what care you want to receive  You always have the right to refuse treatment  The above information is an  only  It is not intended as medical advice for individual conditions or treatments  Talk to your doctor, nurse or pharmacist before following any medical regimen to see if it is safe and effective for you  © Copyright [address] Information is for End User's use only and may not be sold, redistributed or otherwise used for commercial purposes  All illustrations and images included in CareNotes® are the copyrighted property of A D A M , Inc  or [facility] Ildefonso Carvalho   Hemorrhoids   WHAT YOU NEED TO KNOW:   What are hemorrhoids? Hemorrhoids are swollen blood vessels inside your rectum (internal hemorrhoids) or on your anus (external hemorrhoids)  Sometimes a hemorrhoid may prolapse  This means it extends out of your anus  What increases my risk for hemorrhoids? · Pregnancy or obesity    · Straining or sitting for a long time during bowel movements    · Liver disease    · Weak muscles around the anus caused by older age, rectal surgery, or anal intercourse    · A lack of physical activity    · Chronic diarrhea or constipation    · A low-fiber diet    What are the signs and symptoms of hemorrhoids?    · Pain or itching around your anus or inside your rectum    · Swelling or bumps around your anus    · Bright red blood in your bowel movement, on the toilet paper, or in the toilet bowl    · Tissue bulging out of your anus (prolapsed hemorrhoids)    · Incontinence (poor control over urine or bowel movements)    How are hemorrhoids diagnosed? Your healthcare provider will ask about your symptoms, the foods you eat, and your bowel movements  He or she will examine your anus for external hemorrhoids  You may need the following:  · A digital rectal exam  is a test to check for hemorrhoids  Your healthcare provider will put a gloved finger inside your anus to feel for the hemorrhoids  · An anoscopy  is a test that uses a scope (small tube with a light and camera on the end) to look at your hemorrhoids  How are hemorrhoids treated? Treatment will depend on your symptoms  You may need any of the following:  · Medicines  can help decrease pain and swelling, and soften your bowel movement  The medicine may be a pill, pad, cream, or ointment  · Procedures  may be used to shrink or remove your hemorrhoid  Examples include rubber-band ligation, sclerotherapy, and photocoagulation  These procedures may be done in your healthcare provider's office  Ask your healthcare provider for more information about these procedures  · Surgery  may be needed to shrink or remove your hemorrhoids  How can I manage my symptoms? · Apply ice on your anus for 15 to 20 minutes every hour or as directed  Use an ice pack, or put crushed ice in a plastic bag  Cover it with a towel before you apply it to your anus  Ice helps prevent tissue damage and decreases swelling and pain  · Take a sitz bath  Fill a bathtub with 4 to 6 inches of warm water  You may also use a sitz bath pan that fits inside a toilet bowl  Sit in the sitz bath for 15 minutes  Do this 3 times a day, and after each bowel movement  The warm water can help decrease pain and swelling  · Keep your anal area clean  Gently wash the area with warm water daily   Soap may irritate the area  After a bowel movement, wipe with moist towelettes or wet toilet paper  Dry toilet paper can irritate the area  How can I help prevent hemorrhoids? · Do not strain to have a bowel movement  Do not sit on the toilet too long  These actions can increase pressure on the tissues in your rectum and anus  · Drink plenty of liquids  Liquids can help prevent constipation  Ask how much liquid to drink each day and which liquids are best for you  · Eat a variety of high-fiber foods  Examples include fruits, vegetables, and whole grains  Ask your healthcare provider how much fiber you need each day  You may need to take a fiber supplement  · Exercise as directed  Exercise, such as walking, may make it easier to have a bowel movement  Ask your healthcare provider to help you create an exercise plan  · Do not have anal sex  Anal sex can weaken the skin around your rectum and anus  · Avoid heavy lifting  This can cause straining and increase your risk for another hemorrhoid  When should I seek immediate care? · You have severe pain in your rectum or around your anus  · You have severe pain in your abdomen and you are vomiting  · You have bleeding from your anus that soaks through your underwear  When should I contact my healthcare provider? · You have frequent and painful bowel movements  · Your hemorrhoid looks or feels more swollen than usual      · You do not have a bowel movement for 2 days or more  · You see or feel tissue coming through your anus  · You have questions or concerns about your condition or care  CARE AGREEMENT:   You have the right to help plan your care  Learn about your health condition and how it may be treated  Discuss treatment options with your healthcare providers to decide what care you want to receive  You always have the right to refuse treatment  The above information is an  only   It is not intended as medical advice for individual conditions or treatments  Talk to your doctor, nurse or pharmacist before following any medical regimen to see if it is safe and effective for you  © Copyright [address] Information is for End User's use only and may not be sold, redistributed or otherwise used for commercial purposes  All illustrations and images included in CareNotes® are the copyrighted property of A D A M , Inc  or Jarek Carvalho   Gastritis   WHAT YOU NEED TO KNOW:   What is gastritis? Gastritis is inflammation or irritation of the lining of your stomach  What increases my risk for gastritis? · Infection with bacteria, a virus, or a parasite    · NSAIDs, aspirin, or steroid medicine    · Use of tobacco products or alcohol    · Trauma such as an injury to your stomach or intestine    · Autoimmune disorders such as diabetes, thyroid disease, or Crohn disease    · Stress    · Age older than 60 years    · Illegal drugs, such as cocaine    What are the signs and symptoms of gastritis? · Stomach pain, burning, or tenderness when you press on it    · Stomach fullness or tightness    · Nausea or vomiting    · Loss of appetite, or feeling full quickly when you eat    · Bad breath    · Fatigue or feeling more tired than usual    · Heartburn    How is gastritis diagnosed? Your healthcare provider will ask about your signs and symptoms and examine you  You may need any of the following:  · Blood tests  may be used to show an infection, dehydration, or anemia (low red blood cell levels)  · A bowel movement sample  may be tested for blood or the germ that may be causing your gastritis  · A breath test  may show if H pylori is causing your gastritis  You will be given a liquid to drink  Then you will breathe into a bag  Your healthcare provider will measure the amount of carbon dioxide in your breath  Extra amounts of carbon dioxide may mean you have an H pylori infection      · An endoscopy  may be used to look for irritation or bleeding in your stomach  Your healthcare provider will use an endoscope (tube with a light and camera on the end) during the procedure  He or she may take a sample from your stomach to be tested  How is gastritis treated? Your symptoms may go away without treatment  Treatment will depend on what is causing your gastritis  Your healthcare provider may recommend changes to the medicines you take  Medicines may be given to help treat a bacterial infection or decrease stomach acid  How can I manage or prevent gastritis? · Do not smoke  Nicotine and other chemicals in cigarettes and cigars can make your symptoms worse and cause lung damage  Ask your healthcare provider for information if you currently smoke and need help to quit  E-cigarettes or smokeless tobacco still contain nicotine  Talk to your healthcare provider before you use these products  · Do not drink alcohol  Alcohol can prevent healing and make your gastritis worse  Talk to your healthcare provider if you need help to stop drinking  · Do not take NSAIDs or aspirin unless directed  These and similar medicines can cause irritation of your stomach lining  If your healthcare provider says it is okay to take NSAIDs, take them with food  · Do not eat foods that cause irritation  Foods such as oranges and salsa can cause burning or pain  Eat a variety of healthy foods  Examples include fruits (not citrus), vegetables, low-fat dairy products, beans, whole-grain breads, and lean meats and fish  Try to eat small meals, and drink water with your meals  Do not eat for at least 3 hours before you go to bed  · Find ways to relax and decrease stress  Stress can increase stomach acid and make gastritis worse  Activities such as yoga, meditation, or listening to music can help you relax  Spend time with friends, or do things you enjoy      Call 911 for any of the following:   · You develop chest pain or shortness of breath  When should I seek immediate care? · You vomit blood  · You have black or bloody bowel movements  · You have severe stomach or back pain  When should I contact my healthcare provider? · You have a fever  · You have new or worsening symptoms, even after treatment  · You have questions or concerns about your condition or care  CARE AGREEMENT:   You have the right to help plan your care  Learn about your health condition and how it may be treated  Discuss treatment options with your healthcare providers to decide what care you want to receive  You always have the right to refuse treatment  The above information is an  only  It is not intended as medical advice for individual conditions or treatments  Talk to your doctor, nurse or pharmacist before following any medical regimen to see if it is safe and effective for you  © Copyright [address] Information is for End User's use only and may not be sold, redistributed or otherwise used for commercial purposes   All illustrations and images included in CareNotes® are the copyrighted property of A D A M , Inc  or [address] [place] [address]

## 2021-07-08 NOTE — INTERVAL H&P NOTE
H&P reviewed  After examining the patient I find no changes in the patients condition since the H&P had been written      Vitals:    07/08/21 1318   BP: 163/99   Pulse: 76   Resp: 14   Temp: 98 2 °F (36 8 °C)   SpO2: 96%

## 2021-07-08 NOTE — ANESTHESIA POSTPROCEDURE EVALUATION
Post-Op Assessment Note    CV Status:  Stable  Pain Score: 0    Pain management: adequate     Mental Status:  Sleepy   Hydration Status:  Stable   PONV Controlled:  None   Airway Patency:  Patent      Post Op Vitals Reviewed: Yes      Staff: Anesthesiologist, CRNA         No complications documented      BP      Temp      Pulse     Resp      SpO2

## 2021-07-09 ENCOUNTER — HOSPITAL ENCOUNTER (OUTPATIENT)
Dept: INFUSION CENTER | Facility: HOSPITAL | Age: 36
Discharge: HOME | End: 2021-07-09
Attending: INTERNAL MEDICINE
Payer: COMMERCIAL

## 2021-07-09 VITALS
DIASTOLIC BLOOD PRESSURE: 87 MMHG | SYSTOLIC BLOOD PRESSURE: 137 MMHG | OXYGEN SATURATION: 97 % | TEMPERATURE: 97 F | HEART RATE: 87 BPM

## 2021-07-09 DIAGNOSIS — D50.0 IRON DEFICIENCY ANEMIA DUE TO CHRONIC BLOOD LOSS: Primary | ICD-10-CM

## 2021-07-09 PROCEDURE — 96365 THER/PROPH/DIAG IV INF INIT: CPT

## 2021-07-09 RX ORDER — SODIUM CHLORIDE 9 MG/ML
20 INJECTION, SOLUTION INTRAVENOUS ONCE
Status: CANCELLED | OUTPATIENT
Start: 2021-07-12

## 2021-07-09 RX ORDER — SODIUM CHLORIDE 9 MG/ML
20 INJECTION, SOLUTION INTRAVENOUS ONCE
Status: COMPLETED | OUTPATIENT
Start: 2021-07-09 | End: 2021-07-09

## 2021-07-09 RX ADMIN — IRON SUCROSE 200 MG: 20 INJECTION, SOLUTION INTRAVENOUS at 09:01

## 2021-07-09 RX ADMIN — SODIUM CHLORIDE 20 ML/HR: 9 INJECTION, SOLUTION INTRAVENOUS at 09:01

## 2021-07-12 ENCOUNTER — HOSPITAL ENCOUNTER (OUTPATIENT)
Dept: INFUSION CENTER | Facility: HOSPITAL | Age: 36
Discharge: HOME | End: 2021-07-12
Attending: INTERNAL MEDICINE
Payer: COMMERCIAL

## 2021-07-12 ENCOUNTER — TELEPHONE (OUTPATIENT)
Dept: NON INVASIVE DIAGNOSTICS | Facility: HOSPITAL | Age: 36
End: 2021-07-12

## 2021-07-12 VITALS
HEART RATE: 101 BPM | TEMPERATURE: 97.3 F | OXYGEN SATURATION: 96 % | SYSTOLIC BLOOD PRESSURE: 141 MMHG | RESPIRATION RATE: 14 BRPM | DIASTOLIC BLOOD PRESSURE: 91 MMHG

## 2021-07-12 DIAGNOSIS — D50.0 IRON DEFICIENCY ANEMIA DUE TO CHRONIC BLOOD LOSS: Primary | ICD-10-CM

## 2021-07-12 PROCEDURE — 96365 THER/PROPH/DIAG IV INF INIT: CPT

## 2021-07-12 RX ORDER — SODIUM CHLORIDE 9 MG/ML
20 INJECTION, SOLUTION INTRAVENOUS ONCE
Status: COMPLETED | OUTPATIENT
Start: 2021-07-12 | End: 2021-07-12

## 2021-07-12 RX ORDER — SODIUM CHLORIDE 9 MG/ML
20 INJECTION, SOLUTION INTRAVENOUS ONCE
Status: CANCELLED | OUTPATIENT
Start: 2021-07-14

## 2021-07-12 RX ADMIN — IRON SUCROSE 200 MG: 20 INJECTION, SOLUTION INTRAVENOUS at 08:44

## 2021-07-12 RX ADMIN — SODIUM CHLORIDE 20 ML/HR: 9 INJECTION, SOLUTION INTRAVENOUS at 08:40

## 2021-07-12 NOTE — PROGRESS NOTES
Pt tolerated venofer infusion well with no reactions  PIV removed  Pt has next appts  Left ambulatory with steady gait for d/c to home

## 2021-07-14 ENCOUNTER — HOSPITAL ENCOUNTER (OUTPATIENT)
Dept: INFUSION CENTER | Facility: HOSPITAL | Age: 36
Discharge: HOME | End: 2021-07-14
Attending: INTERNAL MEDICINE
Payer: COMMERCIAL

## 2021-07-14 ENCOUNTER — ANESTHESIA EVENT (OUTPATIENT)
Dept: NON INVASIVE DIAGNOSTICS | Facility: HOSPITAL | Age: 36
End: 2021-07-14

## 2021-07-14 ENCOUNTER — ANESTHESIA (OUTPATIENT)
Dept: NON INVASIVE DIAGNOSTICS | Facility: HOSPITAL | Age: 36
End: 2021-07-14

## 2021-07-14 ENCOUNTER — HOSPITAL ENCOUNTER (OUTPATIENT)
Dept: NON INVASIVE DIAGNOSTICS | Facility: HOSPITAL | Age: 36
Discharge: HOME | End: 2021-07-14
Attending: INTERNAL MEDICINE
Payer: COMMERCIAL

## 2021-07-14 VITALS
SYSTOLIC BLOOD PRESSURE: 161 MMHG | TEMPERATURE: 96.9 F | RESPIRATION RATE: 14 BRPM | OXYGEN SATURATION: 98 % | DIASTOLIC BLOOD PRESSURE: 89 MMHG | HEART RATE: 85 BPM

## 2021-07-14 VITALS
RESPIRATION RATE: 17 BRPM | HEART RATE: 69 BPM | OXYGEN SATURATION: 96 % | DIASTOLIC BLOOD PRESSURE: 66 MMHG | SYSTOLIC BLOOD PRESSURE: 140 MMHG | TEMPERATURE: 98.3 F

## 2021-07-14 DIAGNOSIS — D73.5 SPLENIC INFARCT: ICD-10-CM

## 2021-07-14 DIAGNOSIS — D50.0 IRON DEFICIENCY ANEMIA DUE TO CHRONIC BLOOD LOSS: Primary | ICD-10-CM

## 2021-07-14 DIAGNOSIS — I74.9 THROMBOEMBOLISM (HCC): ICD-10-CM

## 2021-07-14 PROBLEM — G47.30 SLEEP APNEA: Status: ACTIVE | Noted: 2021-07-14

## 2021-07-14 PROCEDURE — 93325 DOPPLER ECHO COLOR FLOW MAPG: CPT | Performed by: INTERNAL MEDICINE

## 2021-07-14 PROCEDURE — 93312 ECHO TRANSESOPHAGEAL: CPT | Performed by: INTERNAL MEDICINE

## 2021-07-14 PROCEDURE — 93799 UNLISTED CV SVC/PROCEDURE: CPT

## 2021-07-14 PROCEDURE — 93321 DOPPLER ECHO F-UP/LMTD STD: CPT | Performed by: INTERNAL MEDICINE

## 2021-07-14 PROCEDURE — 93312 ECHO TRANSESOPHAGEAL: CPT

## 2021-07-14 PROCEDURE — 96365 THER/PROPH/DIAG IV INF INIT: CPT

## 2021-07-14 RX ORDER — SODIUM CHLORIDE 9 MG/ML
20 INJECTION, SOLUTION INTRAVENOUS ONCE
Status: CANCELLED | OUTPATIENT
Start: 2021-07-19

## 2021-07-14 RX ORDER — FENTANYL CITRATE 50 UG/ML
INJECTION, SOLUTION INTRAMUSCULAR; INTRAVENOUS AS NEEDED
Status: DISCONTINUED | OUTPATIENT
Start: 2021-07-14 | End: 2021-07-14

## 2021-07-14 RX ORDER — SODIUM CHLORIDE 9 MG/ML
20 INJECTION, SOLUTION INTRAVENOUS ONCE
Status: COMPLETED | OUTPATIENT
Start: 2021-07-14 | End: 2021-07-14

## 2021-07-14 RX ORDER — ONDANSETRON 2 MG/ML
4 INJECTION INTRAMUSCULAR; INTRAVENOUS ONCE AS NEEDED
Status: DISCONTINUED | OUTPATIENT
Start: 2021-07-14 | End: 2021-07-18 | Stop reason: HOSPADM

## 2021-07-14 RX ORDER — SODIUM CHLORIDE 9 MG/ML
INJECTION, SOLUTION INTRAVENOUS CONTINUOUS PRN
Status: DISCONTINUED | OUTPATIENT
Start: 2021-07-14 | End: 2021-07-14

## 2021-07-14 RX ORDER — GLYCOPYRROLATE 0.2 MG/ML
INJECTION INTRAMUSCULAR; INTRAVENOUS AS NEEDED
Status: DISCONTINUED | OUTPATIENT
Start: 2021-07-14 | End: 2021-07-14

## 2021-07-14 RX ORDER — PROPOFOL 10 MG/ML
INJECTION, EMULSION INTRAVENOUS AS NEEDED
Status: DISCONTINUED | OUTPATIENT
Start: 2021-07-14 | End: 2021-07-14

## 2021-07-14 RX ORDER — FENTANYL CITRATE/PF 50 MCG/ML
25 SYRINGE (ML) INJECTION
Status: DISCONTINUED | OUTPATIENT
Start: 2021-07-14 | End: 2021-07-18 | Stop reason: HOSPADM

## 2021-07-14 RX ORDER — LIDOCAINE HYDROCHLORIDE 10 MG/ML
INJECTION, SOLUTION EPIDURAL; INFILTRATION; INTRACAUDAL; PERINEURAL AS NEEDED
Status: DISCONTINUED | OUTPATIENT
Start: 2021-07-14 | End: 2021-07-14

## 2021-07-14 RX ADMIN — PROPOFOL 150 MG: 10 INJECTION, EMULSION INTRAVENOUS at 14:33

## 2021-07-14 RX ADMIN — GLYCOPYRROLATE 0.1 MG: 0.2 INJECTION, SOLUTION INTRAMUSCULAR; INTRAVENOUS at 14:14

## 2021-07-14 RX ADMIN — PROPOFOL 50 MG: 10 INJECTION, EMULSION INTRAVENOUS at 14:50

## 2021-07-14 RX ADMIN — PROPOFOL 50 MG: 10 INJECTION, EMULSION INTRAVENOUS at 14:39

## 2021-07-14 RX ADMIN — SODIUM CHLORIDE: 0.9 INJECTION, SOLUTION INTRAVENOUS at 14:38

## 2021-07-14 RX ADMIN — PROPOFOL 50 MG: 10 INJECTION, EMULSION INTRAVENOUS at 14:40

## 2021-07-14 RX ADMIN — PROPOFOL 50 MG: 10 INJECTION, EMULSION INTRAVENOUS at 14:48

## 2021-07-14 RX ADMIN — PROPOFOL 50 MG: 10 INJECTION, EMULSION INTRAVENOUS at 14:36

## 2021-07-14 RX ADMIN — FENTANYL CITRATE 25 MCG: 50 INJECTION, SOLUTION INTRAMUSCULAR; INTRAVENOUS at 14:38

## 2021-07-14 RX ADMIN — PROPOFOL 50 MG: 10 INJECTION, EMULSION INTRAVENOUS at 14:47

## 2021-07-14 RX ADMIN — FENTANYL CITRATE 50 MCG: 50 INJECTION, SOLUTION INTRAMUSCULAR; INTRAVENOUS at 14:32

## 2021-07-14 RX ADMIN — PROPOFOL 50 MG: 10 INJECTION, EMULSION INTRAVENOUS at 14:35

## 2021-07-14 RX ADMIN — IRON SUCROSE 200 MG: 20 INJECTION, SOLUTION INTRAVENOUS at 08:27

## 2021-07-14 RX ADMIN — SODIUM CHLORIDE: 0.9 INJECTION, SOLUTION INTRAVENOUS at 14:02

## 2021-07-14 RX ADMIN — PROPOFOL 100 MG: 10 INJECTION, EMULSION INTRAVENOUS at 14:43

## 2021-07-14 RX ADMIN — LIDOCAINE HYDROCHLORIDE 100 MG: 10 INJECTION, SOLUTION EPIDURAL; INFILTRATION; INTRACAUDAL; PERINEURAL at 14:14

## 2021-07-14 RX ADMIN — PROPOFOL 50 MG: 10 INJECTION, EMULSION INTRAVENOUS at 14:45

## 2021-07-14 RX ADMIN — SODIUM CHLORIDE 20 ML/HR: 9 INJECTION, SOLUTION INTRAVENOUS at 08:28

## 2021-07-14 RX ADMIN — PROPOFOL 50 MG: 10 INJECTION, EMULSION INTRAVENOUS at 14:37

## 2021-07-14 RX ADMIN — FENTANYL CITRATE 25 MCG: 50 INJECTION, SOLUTION INTRAMUSCULAR; INTRAVENOUS at 14:37

## 2021-07-14 NOTE — PROGRESS NOTES
Pt tolerated venofer infusion well with no reactions  PIV removed and bandage applied  Next appt scheduled  Pt left ambulatory with steady gait for d/c to home

## 2021-07-14 NOTE — ANESTHESIA POSTPROCEDURE EVALUATION
Post-Op Assessment Note    CV Status:  Stable  Pain Score: 0    Pain management: adequate     Mental Status:  Alert and awake   Hydration Status:  Euvolemic   PONV Controlled:  Controlled   Airway Patency:  Patent      Post Op Vitals Reviewed: Yes      Staff: Anesthesiologist, CRNA         No complications documented      BP  129/77   Temp   97 8   Pulse  96   Resp   14   SpO2   96

## 2021-07-14 NOTE — ANESTHESIA PREPROCEDURE EVALUATION
Procedure:  RONI    Relevant Problems   ANESTHESIA (within normal limits)  high anesthetic requirement for recent MAC      CARDIO (within normal limits)      GI/HEPATIC  splenic infarct  daily alcohol use   (+) GERD (gastroesophageal reflux disease)      HEMATOLOGY   (+) Iron deficiency anemia due to chronic blood loss      NEURO/PSYCH   (+) Chronic pain (chronic opiate use)   (+) PTSD (post-traumatic stress disorder)      PULMONARY   (+) Sleep apnea (no sleep study; witnessed by spouse)   (+) Smoking   (-) URI (upper respiratory infection)      Other   (+) ADD (attention deficit disorder)   (+) Bipolar disorder (HCC)    BMI 40    Physical Exam    Airway    Mallampati score: I  TM Distance: >3 FB  Neck ROM: full     Dental       Cardiovascular      Pulmonary      Other Findings        Anesthesia Plan  ASA Score- 3     Anesthesia Type- IV sedation with anesthesia with ASA Monitors  Additional Monitors:   Airway Plan:           Plan Factors-Exercise tolerance (METS): >4 METS  Chart reviewed  EKG reviewed  Existing labs reviewed  Patient summary reviewed  Patient is a current smoker (1/2 PPD)  Patient smoked on day of surgery  Induction- intravenous  Postoperative Plan-     Informed Consent- Anesthetic plan and risks discussed with patient and spouse  I personally reviewed this patient with the CRNA  Discussed and agreed on the Anesthesia Plan with the CRNA  Oumar Boone

## 2021-07-14 NOTE — NURSING NOTE
RONI complete  Patient VSS and transferred to PACU with CRNA and RN  Dr Jerson Blanton to speak with patient about results

## 2021-07-15 ENCOUNTER — TELEPHONE (OUTPATIENT)
Dept: GASTROENTEROLOGY | Facility: CLINIC | Age: 36
End: 2021-07-15

## 2021-07-15 ENCOUNTER — TELEPHONE (OUTPATIENT)
Dept: HEMATOLOGY ONCOLOGY | Facility: CLINIC | Age: 36
End: 2021-07-15

## 2021-07-15 DIAGNOSIS — R93.3 ABNORMAL COLONOSCOPY: Primary | ICD-10-CM

## 2021-07-15 NOTE — TELEPHONE ENCOUNTER
----- Message from Anat Cuevas MD sent at 7/15/2021 11:15 AM EDT -----  I called the patient left voice message  Upper endoscopy no celiac disease  Should iron malabsorption  Mild intestinal metaplasia gastric antrum  Probably low risk for gastric neoplasm but should have EGD in 3 years  Colonoscopy multiple hyperplastic polyps sigmoid and rectum  One polyp in the ascending colon very sessile  Was not clear if this was a fold or neoplastic tissue 1 colonoscopy  Should have repeat colonoscopy in 3 years  Her to Dr Todd or Raissa Lehman --may need endoscopic mucosa resection  Patient should have a capsule endoscopy to evaluate source of anemia has no clear source noted on EGD and colonoscopy  Invited patient call back with questions provided myself number he wants to call

## 2021-07-15 NOTE — TELEPHONE ENCOUNTER
----- Message from Mirta Marques MD sent at 7/15/2021 11:11 AM EDT -----  I called the patient left voice message  With respect to the EGD no source for anemia  Biopsies in the stomach some mild intestinal metaplasia H pylori is negative  Gastric intestinal metaplasia generally followed up with endoscopy  Not is risky and Caucasians  Recall EGD in 3 years  With respect to the colonoscopy multiple small polyps negative just hyperplastic polyps  He did have a sessile adenoma which I was not sure of was a fold or polyp  Biopsies came back adenomatous tissue but no dysplasia  Recommend follow-up colonoscopy with Dr Lizet Lawrence or Curt Bennett --may need endoscopic mucosal resection  Could do in about 3 months or so  Invited patient to call back with any questions--patient following up with Hematology tomorrow    No contraindications to anticoagulation

## 2021-07-15 NOTE — TELEPHONE ENCOUNTER
Miscellaneous lab order for [facility] w/[facility] order by Dr Pepe Barrios and released on 6/15/2021 ans submitted to [facility] for testing  Contacted [facility] to request result  Representative could not find any lab results in their system for this patient dating back 6 months  Call was transferred to the escalation department  Spoke to Dang who also could not find any results for this patient within the past 6 months      Called [place] lab where the specimen was collected to inquire, Continuous busy signal

## 2021-07-16 ENCOUNTER — OFFICE VISIT (OUTPATIENT)
Dept: HEMATOLOGY ONCOLOGY | Facility: HOSPITAL | Age: 36
End: 2021-07-16
Payer: COMMERCIAL

## 2021-07-16 VITALS
BODY MASS INDEX: 39.17 KG/M2 | TEMPERATURE: 98.5 F | WEIGHT: 315 LBS | DIASTOLIC BLOOD PRESSURE: 80 MMHG | RESPIRATION RATE: 18 BRPM | OXYGEN SATURATION: 96 % | HEIGHT: 75 IN | HEART RATE: 90 BPM | SYSTOLIC BLOOD PRESSURE: 138 MMHG

## 2021-07-16 DIAGNOSIS — D68.59 HYPERCOAGULABLE STATE (HCC): ICD-10-CM

## 2021-07-16 DIAGNOSIS — D50.0 IRON DEFICIENCY ANEMIA DUE TO CHRONIC BLOOD LOSS: Primary | ICD-10-CM

## 2021-07-16 PROCEDURE — 99214 OFFICE O/P EST MOD 30 MIN: CPT | Performed by: INTERNAL MEDICINE

## 2021-07-16 NOTE — TELEPHONE ENCOUNTER
Referral placed for GI, Dr Hayes Sensing via Epic for consultation  Cameron Cole note copied on referral and noted to contact patient to schedule consult

## 2021-07-16 NOTE — TELEPHONE ENCOUNTER
Called [facility] [phone] to PA[facility] 7/16/2021 at 2:35 pm who advised [phone] does not need PA   Reference # is [id number]

## 2021-07-16 NOTE — PROGRESS NOTES
Hematology/Oncology Outpatient Follow- up Note  Ofelia Souza 43 y o  male MRN: @ Encounter: [id number]        Date:  7/16/2021    Presenting Complaint/Diagnosis :  Splenic infarct and iron deficiency anemia    HPI:    Ofelia Souza is seen for initial consultation 6/11/2021 regarding  A splenic infarct and newly diagnosed iron deficiency anemia  The patient presented to the [facility] Emergency Room  With complaints of left upper quadrant pain  Imaging done revealed a splenic infarct  No malignancy or any other abnormalities were seen  The patient's blood work revealed hypochromic microcytic anemia indicating iron deficiency  Previous Hematologic/ Oncologic History:      Workup    Current Hematologic/ Oncologic Treatment:      Venofer  The patient has 2 doses of Venofer left  Interval History:     the patient returns for follow-up visit  He states he is doing well  His hypercoagulable workup was all negative apart from an antithrombin 3 that was slightly low I e  1 below normal   This is less than 1% in terms of total   He is otherwise doing well  Is taking his baby aspirin  Does feel slightly better on the Venofer  His EGD did not reveal any malignancy or bleeding  There was a question of iron deficiency  There was also a polyp which his gastroenterologist is arranging follow-up for per the notes in pathology  Otherwise denies any nausea denies any vomiting denies any diarrhea  The rest of his 14 point review of systems today was negative  Test Results:    Imaging: EGD    Result Date: 7/8/2021  Narrative: [address] [place] [address] [phone] DATE OF SERVICE: 7/08/21 PHYSICIAN(S): Ace Carrasquillo MD - Attending Physician INDICATION: Iron deficiency anemia due to chronic blood loss History of GERD POST-OP DIAGNOSIS: See the impression below  PREPROCEDURE: Informed consent was obtained for the procedure, including sedation  Risks of perforation, hemorrhage, adverse drug reaction and aspiration were discussed  The patient was placed in the left lateral decubitus position  Patient was explained about the risks and benefits of the procedure  Risks including but not limited to bleeding, infection, and perforation were explained in detail  Also explained about less than 100% sensitivity with the exam and other alternatives  DETAILS OF PROCEDURE: Patient was taken to the procedure room where a time out was performed to confirm correct patient and correct procedure  The patient underwent monitored anesthesia care, which was administered by an anesthesia professional  The patient's blood pressure, heart rate, level of consciousness, respirations and oxygen were monitored throughout the procedure  The scope was advanced to the second part of the duodenum  Retroflexion was performed in the fundus  Prior to the procedure, the patient's H  Pylori status was unknown  The patient experienced no blood loss  The procedure was not difficult  The patient tolerated the procedure well  There were no apparent complications  ANESTHESIA INFORMATION: ASA: III Anesthesia Type: IV Sedation with Anesthesia MEDICATIONS: No administrations occurring from 1356 to 1409 on 07/08/21 FINDINGS: All observed locations appeared normal, including the esophagus, fundus of the stomach, body of the stomach, duodenal bulb and 2nd part of the duodenum  The esophagus, fundus of the stomach, body of the stomach, duodenal bulb and 2nd part of the duodenum appeared normal  Performed 4 random biopsies  Biopsies taken in 2nd portion the duodenum to rule out celiac disease Mild, localized edematous, erythematous and nodular mucosa in the stomach; performed 6 cold forceps biopsies  One area nodularity SPECIMENS: * No specimens in log *     Impression: Normal esophagus and duodenum    Biopsies taken 2nd portion the duodenum to rule out celiac given iron deficiency Mild antral gastritis with 1 small nodule out area in the antrum biopsies taken RECOMMENDATION: Review biopsies when available  Will call for results 7-10 days Continue present medications Proceed with colonoscopy for further evaluation No findings on this examination that would explain the patient's anemia  Hans Garcia MD     Colonoscopy    Result Date: 7/8/2021  Narrative: 1100 [facility] [address] [address] [place] [address] [phone] DATE OF SERVICE: 7/08/21 PHYSICIAN(S): Hans Garcia MD - Attending Physician INDICATION: Iron deficiency anemia due to chronic blood loss Colonoscopy performed for a diagnostic indication  POST-OP DIAGNOSIS: See the impression below  HISTORY: Prior colonoscopy: No prior colonoscopy  BOWEL PREPARATION:  PREPROCEDURE: Informed consent was obtained for the procedure, including sedation  Risks including but not limited to bleeding, infection, perforation, adverse drug reaction and aspiration were explained in detail  Also explained about less than 100% sensitivity with the exam and other alternatives  The patient was placed in the left lateral decubitus position  DETAILS OF PROCEDURE: Patient was taken to the procedure room where a time out was performed to confirm correct patient and correct procedure  The patient underwent monitored anesthesia care, which was administered by an anesthesia professional  The patient's blood pressure, heart rate, level of consciousness, oxygen and respirations were monitored throughout the procedure  A digital rectal exam was performed  The scope was introduced through the anus and advanced to the cecum  Retroflexion was performed in the rectum  The quality of bowel preparation was evaluated using the [facility] Bowel Preparation Scale with scores of: right colon = 3, transverse colon = 3, left colon = 2  The total BBPS score was 8  Bowel prep was adequate  The patient experienced no blood loss  The procedure was not difficult   The patient tolerated the procedure well  There were no apparent complications  ANESTHESIA INFORMATION: ASA: III Anesthesia Type: IV Sedation with Anesthesia MEDICATIONS: No administrations occurring from 1356 to 1443 on 07/08/21 FINDINGS: All observed locations appeared normal  Note the ileocecal valve was also clearly identified Approximately a 14 mm lesion or thickened fold in the proximal ascending colon  Not clear if this represents polypoid tissue or not  Small central ulceration  Biopsies taken to exclude neoplasm  Area marked with spot in case relocation need--did not attempt snare polypectomy not knowing underlying pathology Six sessile, hyperplastic polyps measuring from 2 mm up to 3 mm in the sigmoid colon and rectum; performed complete en bloc removal by cold forceps biopsy  Note 3 small polyps 2-3 mm in the sigmoid and 3 small polyps in the rectum  None appeared adenomatous multiple biopsies taken for histologic review Small, internal (grade 2) hemorrhoids EVENTS: Procedure Events Event Event Time ENDO SCOPE OUT TIME 7/8/2021  2:08 PM ENDO CECUM REACHED 7/8/2021  2:18 PM ENDO SCOPE OUT TIME 7/8/2021  2:41 PM SPECIMENS: ID Type Source Tests Collected by Time Destination 1 : duodenum bx r/o celiac Tissue Duodenum TISSUE EXAM, EXT-TISSUE EXAM (CBL,HNL   ) Gaurang Fajardo MD 7/8/2021  2:18 PM  2 : antral bx R/o h pylori Tissue Stomach EXT-TISSUE EXAM (DESTINY,HNL   ) Gaurang Fajardo MD 7/8/2021  2:22 PM  EQUIPMENT:     Impression: Abnormal thickened fold question polypoid lesion with depressed central ulcer multiple biopsies taken for histologic review  Six small polyps 3 in the sigmoid colon 3 in the rectum 2-3 mm removed with biopsy forceps    Appeared hyperplastic Grade 2 internal hemorrhoids No clear source for GI bleeding and anemia RECOMMENDATION: Await pathology results--interval follow-up colonoscopy depending on pathology results Will call with biopsy results 7-10 days Resume previous medication May require small-bowel capsule endoscopy for further evaluation pending biopsy results Margarita Conrad MD     RONI    Result Date: 2021  Narrative: [facility] [place] [id number] Transesophageal Echocardiogram Limited 2D and Color Doppler Study date:  2021 Patient: Anibal Calix MR number: [id number] Account number: [de-identified] : 1985 Age: 43 years Gender: Male Status: Outpatient Location: [address] Height: Weight: BP: 161/ 97 mmHg Indications: Thromboembolism Diagnoses: I74 9 - Embolism and thrombosis of unspecified artery Sonographer:  Adrienne Bentley RDCS Referring Physician:  Henry Mondragon DO Group:  [facility] RN:  Len Cleveland RN Interpreting Physician:  Ana Andrade MD IMPRESSIONS: There was no echocardiographic evidence for a cardiac source of embolism  SUMMARY LEFT VENTRICLE: Systolic function was normal  Ejection fraction was estimated to be 60 %  There were no regional wall motion abnormalities  LEFT ATRIUM: No thrombus was identified  LEFT ATRIAL APPENDAGE: No thrombus was identified  ATRIAL SEPTUM: No defect or patent foramen ovale was identified  A bubble study was performed  There was no right-to-left shunt, with provocative maneuvers to increase right atrial pressure  MITRAL VALVE: There was trace regurgitation  PROCEDURE: The study was performed in the [address] [place] [address]  This was a routine study  The risks and alternatives of the procedure were explained to the patient and informed consent was obtained  The transesophageal approach was used  The study included limited 2D imaging and color Doppler  The heart rate was 98 bpm, at the start of the study  An adult omniplane probe was inserted by the attending cardiologist  Intubated with ease  One intubation attempt(s)  There was no blood detected on the probe  Intravenous contrast (agitated saline) was administered to evaluate possible R-L intracardiac shunting  Image quality was good  There were no complications during the procedure  LEFT VENTRICLE: Size was normal  Systolic function was normal  Ejection fraction was estimated to be 60 %  There were no regional wall motion abnormalities  Wall thickness was normal  No evidence of apical thrombus  RIGHT VENTRICLE: The size was normal  Systolic function was normal  Wall thickness was normal  LEFT ATRIUM: Size was normal  No thrombus was identified  APPENDAGE: The size was normal  No thrombus was identified  ATRIAL SEPTUM: No defect or patent foramen ovale was identified  A bubble study was performed  There was no right-to-left shunt, with provocative maneuvers to increase right atrial pressure  RIGHT ATRIUM: Size was normal  MITRAL VALVE: Valve structure was normal  There was normal leaflet separation  DOPPLER: The transmitral velocity was within the normal range  There was no evidence for stenosis  There was trace regurgitation  AORTIC VALVE: The valve was trileaflet  Leaflets exhibited normal thickness and normal cuspal separation  DOPPLER: Transaortic velocity was within the normal range  There was no evidence for stenosis  There was no significant regurgitation  TRICUSPID VALVE: The valve structure was normal  There was normal leaflet separation  DOPPLER: There was no significant regurgitation  PULMONIC VALVE: Leaflets exhibited normal thickness, no calcification, and normal cuspal separation  DOPPLER: The transpulmonic velocity was within the normal range  There was no significant regurgitation  PERICARDIUM: There was no pericardial effusion  The pericardium was normal in appearance  AORTA: The root exhibited normal size  SYSTEMIC VEINS: IVC: The inferior vena cava was normal in size  PULMONARY VEINS: DOPPLER: Doppler flow pattern was normal in the pulmonary vein(s)   MEASUREMENT TABLES DOPPLER MEASUREMENTS Left atrium   (Reference normals) ELIDA peak krys   60 cm/s   (--) [facility] Prepared and electronically signed by Ritesh Andrade MD Signed 14-Jul-2021 16:36:25       Labs:   Lab Results   Component Value Date    WBC 7 78 05/28/2021    HGB 10 3 (L) 05/28/2021    HCT 35 1 (L) 05/28/2021    MCV 73 (L) 05/28/2021     05/28/2021     Lab Results   Component Value Date    K 4 2 05/28/2021     05/28/2021    CO2 25 05/28/2021    BUN 8 05/28/2021    CREATININE 1 18 05/28/2021    GLUF 90 09/07/2019    CALCIUM 8 9 05/28/2021    CORRECTEDCA 9 4 05/28/2021    AST 21 05/28/2021    ALT 36 05/28/2021    ALKPHOS 80 05/28/2021    EGFR 78 05/28/2021       ROS: As stated in the history of present illness otherwise his 14 point review of systems today was negative  Active Problems:   Patient Active Problem List   Diagnosis    Bipolar disorder (HCC)    Tremor    ADD (attention deficit disorder)    GERD (gastroesophageal reflux disease)    Tenosynovitis of ankle    Primary localized osteoarthrosis of ankle and foot    PTSD (post-traumatic stress disorder)    Scar tissue    Subluxation    Tear of tendon of lower extremity    Iron deficiency anemia due to chronic blood loss    Chronic pain    Smoking    Sleep apnea       Past Medical History:   Past Medical History:   Diagnosis Date    ADD (attention deficit disorder)     Arthritis     Chronic pain     Heartburn     Mood disorder (HCC)     Tremor        Surgical History:   Past Surgical History:   Procedure Laterality Date    CLOSED REDUCTION CALCANEAL FRACTURE      PERONEAL TENDON EXPLORATION         Family History:    Family History   Problem Relation Age of Onset    Parkinsonism Paternal Grandfather     No Known Problems Father     Breast cancer Mother        Cancer-related family history includes Breast cancer in his mother      Social History:   Social History     Socioeconomic History    Marital status: Single     Spouse name: Not on file    Number of children: Not on file    Years of education: Not on file    Highest education level: Not on file   Occupational History    Not on file   Tobacco Use    Smoking status: Current Every Day Smoker     Packs/day: 0 50     Years: 10 00     Pack years: 5 00     Types: Cigarettes    Smokeless tobacco: Never Used   Vaping Use    Vaping Use: Never used   Substance and Sexual Activity    Alcohol use: Yes     Alcohol/week: 7 0 standard drinks     Types: 7 Cans of beer per week     Comment: social    Drug use: Never    Sexual activity: Yes   Other Topics Concern    Not on file   Social History Narrative    Not on file     Social Determinants of Health     Financial Resource Strain:     Difficulty of Paying Living Expenses:    Food Insecurity:     Worried About Running Out of Food in the Last Year:     [address] in the Last Year:    Transportation Needs:     Lack of Transportation (Medical):      Lack of Transportation (Non-Medical):    Physical Activity:     Days of Exercise per Week:     Minutes of Exercise per Session:    Stress:     Feeling of Stress :    Social Connections:     Frequency of Communication with Friends and Family:     Frequency of Social Gatherings with Friends and Family:     Attends Islam Services:     Active Member of Clubs or Organizations:     Attends Club or Organization Meetings:     Marital Status:    Intimate Partner Violence:     Fear of Current or Ex-Partner:     Emotionally Abused:     Physically Abused:     Sexually Abused:        Current Medications:   Current Outpatient Medications   Medication Sig Dispense Refill    DEXILANT 60 MG capsule Take 1 capsule by mouth daily  3    fluoruracil (CARAC) 0 5 % cream Apply topically daily 30 g 2    gabapentin (NEURONTIN) 600 MG tablet Take 600 mg by mouth 2 (two) times a day  0    hydrOXYzine HCL (ATARAX) 50 mg tablet Take 50 mg by mouth 2 (two) times a day as needed  1    ibuprofen (MOTRIN) 800 mg tablet Take 800 mg by mouth every 6 (six) hours as needed  0    methylphenidate (CONCERTA) 36 MG ER tablet daily       oxyCODONE-acetaminophen (PERCOCET)  mg per tablet TAKE 1 TABLET BY MOUTH EVERY 8 HOURS AS NEEDED FOR ONGOING THERAPY  0    OXYCONTIN 10 MG 12 hr tablet Take 10 mg by mouth 2 (two) times a day  0    risperiDONE (RisperDAL) 3 mg tablet Take 3 mg by mouth daily at bedtime  0    sildenafil (VIAGRA) 100 mg tablet Take 1 tablet by mouth one (1) hour prior to intercourse on an empty stomach  Limit to 3 encounters per week  30 tablet 0    zolpidem (AMBIEN) 10 mg tablet Take 10 mg by mouth daily at bedtime as needed for sleep      amphetamine-dextroamphetamine (ADDERALL) 20 mg tablet TAKE 1 TABLET BY MOUTH TWICE A DAY *FILL 4/20 (Patient not taking: Reported on 6/21/2021)  0    CHANTIX STARTING MONTH ELIZABETH 0 5 MG X 11 & 1 MG X 42 tablet  (Patient not taking: Reported on 7/2/2021)      eszopiclone (LUNESTA) 2 mg tablet Take 2 mg by mouth daily at bedtime (Patient not taking: Reported on 6/21/2021)  5     No current facility-administered medications for this visit  Facility-Administered Medications Ordered in Other Visits   Medication Dose Route Frequency Provider Last Rate Last Admin    fentaNYL (SUBLIMAZE) injection 25 mcg  25 mcg Intravenous Q5 Min PRN Eli Figueroa CRNA        ondansetron [facility]) injection 4 mg  4 mg Intravenous Once PRN Eli Figueroa CRNA           Allergies: No Known Allergies    Physical Exam:    Body surface area is 2 67 meters squared      Wt Readings from Last 3 Encounters:   07/16/21 (!) 144 kg (316 lb 9 6 oz)   07/02/21 (!) 146 kg (322 lb 12 8 oz)   06/16/21 (!) 145 kg (319 lb)        Temp Readings from Last 3 Encounters:   07/16/21 98 5 °F (36 9 °C) (Temporal)   07/14/21 98 3 °F (36 8 °C)   07/14/21 (!) 96 9 °F (36 1 °C) (Temporal)        BP Readings from Last 3 Encounters:   07/16/21 138/80   07/14/21 140/66   07/14/21 161/89         Pulse Readings from Last 3 Encounters:   07/16/21 90   07/14/21 69   07/14/21 85 Physical Exam     Constitutional   General appearance: No acute distress, well appearing and well nourished  Eyes   Conjunctiva and lids: No swelling, erythema or discharge  Pupils and irises: Equal, round and reactive to light  Ears, Nose, Mouth, and Throat   External inspection of ears and nose: Normal     Nasal mucosa, septum, and turbinates: Normal without edema or erythema  Oropharynx: Normal with no erythema, edema, exudate or lesions  Pulmonary   Respiratory effort: No increased work of breathing or signs of respiratory distress  Auscultation of lungs: Clear to auscultation  Cardiovascular   Palpation of heart: Normal PMI, no thrills  Auscultation of heart: Normal rate and rhythm, normal S1 and S2, without murmurs  Examination of extremities for edema and/or varicosities: Normal     Carotid pulses: Normal     Abdomen   Abdomen: Non-tender, no masses  Liver and spleen: No hepatomegaly or splenomegaly  Lymphatic   Palpation of lymph nodes in neck: No lymphadenopathy  Musculoskeletal   Gait and station: Normal     Digits and nails: Normal without clubbing or cyanosis  Inspection/palpation of joints, bones, and muscles: Normal     Skin   Skin and subcutaneous tissue: Normal without rashes or lesions  Neurologic   Cranial nerves: Cranial nerves 2-12 intact  Sensation: No sensory loss  Psychiatric   Orientation to person, place, and time: Normal     Mood and affect: Normal         Assessment / Plan:      The patient is a pleasant 64-year-old male who was referred to see us for splenic infarct  He also had iron deficiency anemia  Replaced his iron with IV  He does feel better  He has 2 infusions left  I ordered [facility] testing which for some reason is still not back  We will look into this as it was ordered and the patient thinks they did draw it    In terms of his hypercoagulable panel his antithrombin 3 level was very slightly low I e  1 below normal which I do not think is significant looking at the clinical picture  He will stay on a baby aspirin  If he ever has another blood clot he will need lifelong anticoagulation  I did explain to him what the signs and symptoms of DVT and PE were in case he had any of these he was advised to go to the emergency room  The patient and his  agree with the plan  I will see him back in 3 months with repeat blood work to include iron studies  If his [facility] testing is not back by then we will repeat it  He will continue to follow with Gastroenterology   To follow-up on his endoscopy findings  Goals and Barriers:  Current Goal:  Prolong Survival from   Splenic infarct and iron deficiency anemia  Barriers: None  Patient's Capacity to Self Care:  Patient  able to self care  Portions of the record may have been created with voice recognition software   Occasional wrong word or "sound a like" substitutions may have occurred due to the inherent limitations of voice recognition software   Read the chart carefully and recognize, using context, where substitutions have occurred

## 2021-07-19 ENCOUNTER — HOSPITAL ENCOUNTER (OUTPATIENT)
Dept: INFUSION CENTER | Facility: HOSPITAL | Age: 36
Discharge: HOME | End: 2021-07-19
Attending: INTERNAL MEDICINE
Payer: COMMERCIAL

## 2021-07-19 VITALS
OXYGEN SATURATION: 99 % | DIASTOLIC BLOOD PRESSURE: 94 MMHG | RESPIRATION RATE: 16 BRPM | TEMPERATURE: 97.5 F | HEART RATE: 100 BPM | SYSTOLIC BLOOD PRESSURE: 147 MMHG

## 2021-07-19 DIAGNOSIS — D50.0 IRON DEFICIENCY ANEMIA DUE TO CHRONIC BLOOD LOSS: Primary | ICD-10-CM

## 2021-07-19 PROCEDURE — 96365 THER/PROPH/DIAG IV INF INIT: CPT

## 2021-07-19 RX ORDER — SODIUM CHLORIDE 9 MG/ML
20 INJECTION, SOLUTION INTRAVENOUS ONCE
Status: CANCELLED | OUTPATIENT
Start: 2021-07-21

## 2021-07-19 RX ORDER — SODIUM CHLORIDE 9 MG/ML
20 INJECTION, SOLUTION INTRAVENOUS ONCE
Status: COMPLETED | OUTPATIENT
Start: 2021-07-19 | End: 2021-07-19

## 2021-07-19 RX ADMIN — IRON SUCROSE 200 MG: 20 INJECTION, SOLUTION INTRAVENOUS at 08:31

## 2021-07-19 RX ADMIN — SODIUM CHLORIDE 20 ML/HR: 9 INJECTION, SOLUTION INTRAVENOUS at 08:33

## 2021-07-19 NOTE — PROGRESS NOTES
Pt tolerated venofer with no reactions  PIV removed and bandage applied  Next appt scheduled  Pt left ambulatory with steady gait for d/c to home

## 2021-07-21 ENCOUNTER — HOSPITAL ENCOUNTER (OUTPATIENT)
Dept: INFUSION CENTER | Facility: HOSPITAL | Age: 36
Discharge: HOME | End: 2021-07-21
Attending: INTERNAL MEDICINE
Payer: COMMERCIAL

## 2021-07-21 VITALS
OXYGEN SATURATION: 98 % | HEART RATE: 97 BPM | SYSTOLIC BLOOD PRESSURE: 164 MMHG | TEMPERATURE: 96.8 F | RESPIRATION RATE: 16 BRPM | DIASTOLIC BLOOD PRESSURE: 90 MMHG

## 2021-07-21 DIAGNOSIS — D50.0 IRON DEFICIENCY ANEMIA DUE TO CHRONIC BLOOD LOSS: Primary | ICD-10-CM

## 2021-07-21 PROCEDURE — 96365 THER/PROPH/DIAG IV INF INIT: CPT

## 2021-07-21 RX ORDER — SODIUM CHLORIDE 9 MG/ML
20 INJECTION, SOLUTION INTRAVENOUS ONCE
Status: COMPLETED | OUTPATIENT
Start: 2021-07-21 | End: 2021-07-21

## 2021-07-21 RX ORDER — SODIUM CHLORIDE 9 MG/ML
20 INJECTION, SOLUTION INTRAVENOUS ONCE
Status: CANCELLED | OUTPATIENT
Start: 2021-07-21

## 2021-07-21 RX ADMIN — IRON SUCROSE 200 MG: 20 INJECTION, SOLUTION INTRAVENOUS at 08:59

## 2021-07-21 RX ADMIN — SODIUM CHLORIDE 20 ML/HR: 9 INJECTION, SOLUTION INTRAVENOUS at 08:58

## 2021-07-21 NOTE — PROGRESS NOTES
Pt tolerated venofer with no reactions  PIV removed and bandage applied  Pt left ambulatory with steady gait for d/c to home

## 2021-07-22 NOTE — TELEPHONE ENCOUNTER
Patient advised re capsule  He would like to call back on 8/2/2021 to schedule capsule  I did email instructions today  Still need to do prep questions

## 2021-07-29 NOTE — TELEPHONE ENCOUNTER
Could you please clarify if you want patient to see Hernán Todd/Nikko for colonoscopy in 3 months? You have one note to TK with that info and another into M[facility] Luna without  Gabriel Bell called to schedule the colon and patient states you just wanted him to have capsule and is not aware of colon with may need endoscopic resection

## 2021-08-11 ENCOUNTER — PREP FOR PROCEDURE (OUTPATIENT)
Dept: GASTROENTEROLOGY | Facility: CLINIC | Age: 36
End: 2021-08-11

## 2021-08-11 DIAGNOSIS — K63.5 POLYP OF COLON, UNSPECIFIED PART OF COLON, UNSPECIFIED TYPE: Primary | ICD-10-CM

## 2021-08-11 NOTE — TELEPHONE ENCOUNTER
Colon EMR scheduled on 11/9 at [facility] with Marilou SINGH gave pt verbal instructions/mailed   Prep-Suprep

## 2021-08-17 NOTE — TELEPHONE ENCOUNTER
Patient called today  Now scheduled for 8/27/2021  Requested to use SuPrep as he has on hand for his next colonoscopy in November  We actually switched to clenpiq as we were able to sample that for patient

## 2021-08-27 ENCOUNTER — CLINICAL SUPPORT (OUTPATIENT)
Dept: GASTROENTEROLOGY | Facility: CLINIC | Age: 36
End: 2021-08-27
Payer: COMMERCIAL

## 2021-08-27 DIAGNOSIS — D50.9 IRON DEFICIENCY ANEMIA, UNSPECIFIED IRON DEFICIENCY ANEMIA TYPE: ICD-10-CM

## 2021-08-27 PROCEDURE — 91110 GI TRC IMG INTRAL ESOPH-ILE: CPT | Performed by: INTERNAL MEDICINE

## 2021-08-31 ENCOUNTER — TELEPHONE (OUTPATIENT)
Dept: CARDIOLOGY CLINIC | Facility: CLINIC | Age: 36
End: 2021-08-31

## 2021-08-31 NOTE — PROGRESS NOTES
Capsule endoscopy -   Indication - iron deficiency anemia    Normal transit through esophagus  Rapid transit to small intestine, capsule reaches the cecum by the 90 minute vic  No mucosal abnormalities to explain GI blood loss    Discussed results with patient  He completed iron infusions and is due for follow-up labs in October    No further GI workup is needed at this time and less iron deficiency anemia persists

## 2021-08-31 NOTE — TELEPHONE ENCOUNTER
Called spoke to pt - message relayed as given  Pt will contact [employer] before applying  If they cannot process this serial ## any longer as it is marked lost, he will let us know and we will apply at [facility] on Friday

## 2021-08-31 NOTE — TELEPHONE ENCOUNTER
Zio site audit - pt's Zio was marked "lost"  Called, spoke to pt  He states he has the monitor at home but was waiting until after his RONI to hear whether he was supposed to wear Zio or not  Please advise if you still want pt to wear?

## 2021-10-09 ENCOUNTER — APPOINTMENT (OUTPATIENT)
Dept: LAB | Facility: HOSPITAL | Age: 36
End: 2021-10-09
Attending: INTERNAL MEDICINE
Payer: COMMERCIAL

## 2021-10-09 DIAGNOSIS — D68.59 HYPERCOAGULABLE STATE (HCC): ICD-10-CM

## 2021-10-09 DIAGNOSIS — D50.0 IRON DEFICIENCY ANEMIA DUE TO CHRONIC BLOOD LOSS: ICD-10-CM

## 2021-10-09 LAB
ALBUMIN SERPL BCP-MCNC: 3.3 G/DL (ref 3.5–5)
ALP SERPL-CCNC: 66 U/L (ref 46–116)
ALT SERPL W P-5'-P-CCNC: 45 U/L (ref 12–78)
ANION GAP SERPL CALCULATED.3IONS-SCNC: 8 MMOL/L (ref 4–13)
AST SERPL W P-5'-P-CCNC: 25 U/L (ref 5–45)
BASOPHILS # BLD AUTO: 0.08 THOUSANDS/ΜL (ref 0–0.1)
BASOPHILS NFR BLD AUTO: 1 % (ref 0–1)
BILIRUB SERPL-MCNC: 0.4 MG/DL (ref 0.2–1)
BUN SERPL-MCNC: 6 MG/DL (ref 5–25)
CALCIUM ALBUM COR SERPL-MCNC: 9.2 MG/DL (ref 8.3–10.1)
CALCIUM SERPL-MCNC: 8.6 MG/DL (ref 8.3–10.1)
CHLORIDE SERPL-SCNC: 101 MMOL/L (ref 100–108)
CO2 SERPL-SCNC: 23 MMOL/L (ref 21–32)
CREAT SERPL-MCNC: 1.01 MG/DL (ref 0.6–1.3)
EOSINOPHIL # BLD AUTO: 0.19 THOUSAND/ΜL (ref 0–0.61)
EOSINOPHIL NFR BLD AUTO: 3 % (ref 0–6)
ERYTHROCYTE [DISTWIDTH] IN BLOOD BY AUTOMATED COUNT: 13.2 % (ref 11.6–15.1)
FERRITIN SERPL-MCNC: 32 NG/ML (ref 8–388)
GFR SERPL CREATININE-BSD FRML MDRD: 94 ML/MIN/1.73SQ M
GLUCOSE P FAST SERPL-MCNC: 125 MG/DL (ref 65–99)
HCT VFR BLD AUTO: 48 % (ref 36.5–49.3)
HGB BLD-MCNC: 15.8 G/DL (ref 12–17)
IMM GRANULOCYTES # BLD AUTO: 0.03 THOUSAND/UL (ref 0–0.2)
IMM GRANULOCYTES NFR BLD AUTO: 0 % (ref 0–2)
IRON SATN MFR SERPL: 31 % (ref 20–50)
IRON SERPL-MCNC: 115 UG/DL (ref 65–175)
LYMPHOCYTES # BLD AUTO: 2.6 THOUSANDS/ΜL (ref 0.6–4.47)
LYMPHOCYTES NFR BLD AUTO: 39 % (ref 14–44)
MCH RBC QN AUTO: 28.3 PG (ref 26.8–34.3)
MCHC RBC AUTO-ENTMCNC: 32.9 G/DL (ref 31.4–37.4)
MCV RBC AUTO: 86 FL (ref 82–98)
MONOCYTES # BLD AUTO: 0.52 THOUSAND/ΜL (ref 0.17–1.22)
MONOCYTES NFR BLD AUTO: 8 % (ref 4–12)
NEUTROPHILS # BLD AUTO: 3.31 THOUSANDS/ΜL (ref 1.85–7.62)
NEUTS SEG NFR BLD AUTO: 49 % (ref 43–75)
NRBC BLD AUTO-RTO: 0 /100 WBCS
PLATELET # BLD AUTO: 153 THOUSANDS/UL (ref 149–390)
PMV BLD AUTO: 10.2 FL (ref 8.9–12.7)
POTASSIUM SERPL-SCNC: 4.3 MMOL/L (ref 3.5–5.3)
PROT SERPL-MCNC: 6.8 G/DL (ref 6.4–8.2)
RBC # BLD AUTO: 5.58 MILLION/UL (ref 3.88–5.62)
SODIUM SERPL-SCNC: 132 MMOL/L (ref 136–145)
TIBC SERPL-MCNC: 373 UG/DL (ref 250–450)
WBC # BLD AUTO: 6.73 THOUSAND/UL (ref 4.31–10.16)

## 2021-10-09 PROCEDURE — 80053 COMPREHEN METABOLIC PANEL: CPT

## 2021-10-09 PROCEDURE — 36415 COLL VENOUS BLD VENIPUNCTURE: CPT

## 2021-10-09 PROCEDURE — 83540 ASSAY OF IRON: CPT

## 2021-10-09 PROCEDURE — 83550 IRON BINDING TEST: CPT

## 2021-10-09 PROCEDURE — 83918 ORGANIC ACIDS TOTAL QUANT: CPT

## 2021-10-09 PROCEDURE — 82728 ASSAY OF FERRITIN: CPT

## 2021-10-09 PROCEDURE — 85025 COMPLETE CBC W/AUTO DIFF WBC: CPT

## 2021-10-14 LAB
METHYLMALONATE SERPL-SCNC: 102 NMOL/L (ref 0–378)
SL AMB DISCLAIMER: NORMAL

## 2021-10-22 ENCOUNTER — OFFICE VISIT (OUTPATIENT)
Dept: HEMATOLOGY ONCOLOGY | Facility: HOSPITAL | Age: 36
End: 2021-10-22
Payer: COMMERCIAL

## 2021-10-22 VITALS
OXYGEN SATURATION: 99 % | DIASTOLIC BLOOD PRESSURE: 92 MMHG | WEIGHT: 314 LBS | SYSTOLIC BLOOD PRESSURE: 144 MMHG | TEMPERATURE: 96.1 F | BODY MASS INDEX: 39.04 KG/M2 | HEART RATE: 89 BPM | HEIGHT: 75 IN

## 2021-10-22 DIAGNOSIS — D50.0 IRON DEFICIENCY ANEMIA DUE TO CHRONIC BLOOD LOSS: ICD-10-CM

## 2021-10-22 DIAGNOSIS — D68.59 HYPERCOAGULABLE STATE (HCC): Primary | ICD-10-CM

## 2021-10-22 DIAGNOSIS — D59.5 PNH (PAROXYSMAL NOCTURNAL HEMOGLOBINURIA) (HCC): ICD-10-CM

## 2021-10-22 DIAGNOSIS — R53.82 CHRONIC FATIGUE: ICD-10-CM

## 2021-10-22 PROCEDURE — 99214 OFFICE O/P EST MOD 30 MIN: CPT | Performed by: INTERNAL MEDICINE

## 2021-10-26 ENCOUNTER — TELEPHONE (OUTPATIENT)
Dept: GASTROENTEROLOGY | Facility: CLINIC | Age: 36
End: 2021-10-26

## 2021-11-08 ENCOUNTER — TELEPHONE (OUTPATIENT)
Dept: GASTROENTEROLOGY | Facility: HOSPITAL | Age: 36
End: 2021-11-08

## 2021-11-09 ENCOUNTER — ANESTHESIA EVENT (OUTPATIENT)
Dept: GASTROENTEROLOGY | Facility: HOSPITAL | Age: 36
End: 2021-11-09

## 2021-11-09 ENCOUNTER — ANESTHESIA (OUTPATIENT)
Dept: GASTROENTEROLOGY | Facility: HOSPITAL | Age: 36
End: 2021-11-09

## 2021-11-09 ENCOUNTER — HOSPITAL ENCOUNTER (OUTPATIENT)
Dept: GASTROENTEROLOGY | Facility: HOSPITAL | Age: 36
Setting detail: OUTPATIENT SURGERY
Discharge: HOME | End: 2021-11-09
Attending: INTERNAL MEDICINE | Admitting: INTERNAL MEDICINE
Payer: COMMERCIAL

## 2021-11-09 VITALS
TEMPERATURE: 97.3 F | SYSTOLIC BLOOD PRESSURE: 125 MMHG | RESPIRATION RATE: 20 BRPM | DIASTOLIC BLOOD PRESSURE: 78 MMHG | HEART RATE: 111 BPM | OXYGEN SATURATION: 92 %

## 2021-11-09 DIAGNOSIS — K63.5 POLYP OF COLON, UNSPECIFIED PART OF COLON, UNSPECIFIED TYPE: ICD-10-CM

## 2021-11-09 PROCEDURE — 45381 COLONOSCOPY SUBMUCOUS NJX: CPT | Performed by: INTERNAL MEDICINE

## 2021-11-09 PROCEDURE — 88305 TISSUE EXAM BY PATHOLOGIST: CPT | Performed by: PATHOLOGY

## 2021-11-09 PROCEDURE — 88313 SPECIAL STAINS GROUP 2: CPT | Performed by: PATHOLOGY

## 2021-11-09 PROCEDURE — 45385 COLONOSCOPY W/LESION REMOVAL: CPT | Performed by: INTERNAL MEDICINE

## 2021-11-09 RX ORDER — KETAMINE HYDROCHLORIDE 50 MG/ML
INJECTION, SOLUTION, CONCENTRATE INTRAMUSCULAR; INTRAVENOUS AS NEEDED
Status: DISCONTINUED | OUTPATIENT
Start: 2021-11-09 | End: 2021-11-09

## 2021-11-09 RX ORDER — PROPOFOL 10 MG/ML
INJECTION, EMULSION INTRAVENOUS CONTINUOUS PRN
Status: DISCONTINUED | OUTPATIENT
Start: 2021-11-09 | End: 2021-11-09

## 2021-11-09 RX ORDER — PROPOFOL 10 MG/ML
INJECTION, EMULSION INTRAVENOUS AS NEEDED
Status: DISCONTINUED | OUTPATIENT
Start: 2021-11-09 | End: 2021-11-09

## 2021-11-09 RX ORDER — GLYCOPYRROLATE 0.2 MG/ML
INJECTION INTRAMUSCULAR; INTRAVENOUS AS NEEDED
Status: DISCONTINUED | OUTPATIENT
Start: 2021-11-09 | End: 2021-11-09

## 2021-11-09 RX ORDER — SODIUM CHLORIDE 9 MG/ML
INJECTION, SOLUTION INTRAVENOUS CONTINUOUS PRN
Status: DISCONTINUED | OUTPATIENT
Start: 2021-11-09 | End: 2021-11-09

## 2021-11-09 RX ADMIN — KETAMINE HYDROCHLORIDE 20 MG: 50 INJECTION, SOLUTION INTRAMUSCULAR; INTRAVENOUS at 14:01

## 2021-11-09 RX ADMIN — GLYCOPYRROLATE 0.2 MG: 0.2 INJECTION, SOLUTION INTRAMUSCULAR; INTRAVENOUS at 13:44

## 2021-11-09 RX ADMIN — LIDOCAINE HYDROCHLORIDE 50 MG: 20 INJECTION, SOLUTION INTRAVENOUS at 13:43

## 2021-11-09 RX ADMIN — PROPOFOL 50 MG: 10 INJECTION, EMULSION INTRAVENOUS at 13:45

## 2021-11-09 RX ADMIN — PROPOFOL 150 MG: 10 INJECTION, EMULSION INTRAVENOUS at 13:44

## 2021-11-09 RX ADMIN — PROPOFOL 50 MG: 10 INJECTION, EMULSION INTRAVENOUS at 13:47

## 2021-11-09 RX ADMIN — LIDOCAINE HYDROCHLORIDE 50 MG: 20 INJECTION, SOLUTION INTRAVENOUS at 13:44

## 2021-11-09 RX ADMIN — PROPOFOL 50 MG: 10 INJECTION, EMULSION INTRAVENOUS at 13:59

## 2021-11-09 RX ADMIN — PROPOFOL 100 MG: 10 INJECTION, EMULSION INTRAVENOUS at 14:30

## 2021-11-09 RX ADMIN — PROPOFOL 150 MCG/KG/MIN: 10 INJECTION, EMULSION INTRAVENOUS at 13:44

## 2021-11-09 RX ADMIN — PROPOFOL 50 MG: 10 INJECTION, EMULSION INTRAVENOUS at 13:48

## 2021-11-09 RX ADMIN — SODIUM CHLORIDE: 9 INJECTION, SOLUTION INTRAVENOUS at 13:40

## 2021-11-09 RX ADMIN — PROPOFOL 100 MG: 10 INJECTION, EMULSION INTRAVENOUS at 14:27

## 2021-11-09 RX ADMIN — PROPOFOL 200 MG: 10 INJECTION, EMULSION INTRAVENOUS at 14:23

## 2021-11-09 RX ADMIN — KETAMINE HYDROCHLORIDE 30 MG: 50 INJECTION, SOLUTION INTRAMUSCULAR; INTRAVENOUS at 13:44

## 2021-11-30 ENCOUNTER — OFFICE VISIT (OUTPATIENT)
Dept: URGENT CARE | Facility: CLINIC | Age: 36
End: 2021-11-30
Payer: COMMERCIAL

## 2021-11-30 VITALS
OXYGEN SATURATION: 98 % | TEMPERATURE: 98.4 F | BODY MASS INDEX: 39.17 KG/M2 | WEIGHT: 315 LBS | HEART RATE: 98 BPM | RESPIRATION RATE: 20 BRPM | HEIGHT: 75 IN

## 2021-11-30 DIAGNOSIS — Z11.59 SPECIAL SCREENING EXAMINATION FOR VIRAL DISEASE: Primary | ICD-10-CM

## 2021-11-30 DIAGNOSIS — J06.9 UPPER RESPIRATORY TRACT INFECTION, UNSPECIFIED TYPE: ICD-10-CM

## 2021-11-30 PROCEDURE — G0382 LEV 3 HOSP TYPE B ED VISIT: HCPCS | Performed by: FAMILY MEDICINE

## 2021-11-30 PROCEDURE — 0241U HB NFCT DS VIR RESP RNA 4 TRGT: CPT | Performed by: FAMILY MEDICINE

## 2021-12-01 LAB
FLUAV RNA RESP QL NAA+PROBE: NEGATIVE
FLUBV RNA RESP QL NAA+PROBE: NEGATIVE
RSV RNA RESP QL NAA+PROBE: NEGATIVE
SARS-COV-2 RNA RESP QL NAA+PROBE: NEGATIVE

## 2021-12-12 ENCOUNTER — AMB VIDEO VISIT (OUTPATIENT)
Dept: OTHER | Facility: HOSPITAL | Age: 36
End: 2021-12-12
Payer: COMMERCIAL

## 2021-12-12 DIAGNOSIS — J01.00 ACUTE NON-RECURRENT MAXILLARY SINUSITIS: Primary | ICD-10-CM

## 2021-12-12 PROCEDURE — 99212 OFFICE O/P EST SF 10 MIN: CPT | Performed by: PHYSICIAN ASSISTANT

## 2021-12-12 RX ORDER — DOXYCYCLINE 100 MG/1
100 TABLET ORAL 2 TIMES DAILY
Qty: 20 TABLET | Refills: 0 | Status: SHIPPED | OUTPATIENT
Start: 2021-12-12 | End: 2021-12-22

## 2021-12-17 ENCOUNTER — IMMUNIZATIONS (OUTPATIENT)
Dept: FAMILY MEDICINE CLINIC | Facility: HOSPITAL | Age: 36
End: 2021-12-17

## 2021-12-17 DIAGNOSIS — Z23 ENCOUNTER FOR IMMUNIZATION: Primary | ICD-10-CM

## 2021-12-17 PROCEDURE — 91306 COVID-19 MODERNA VACC 0.25 ML BOOSTER: CPT

## 2021-12-17 PROCEDURE — 0064A COVID-19 MODERNA VACC 0.25 ML BOOSTER: CPT

## 2021-12-28 ENCOUNTER — APPOINTMENT (OUTPATIENT)
Dept: LAB | Facility: HOSPITAL | Age: 36
End: 2021-12-28
Attending: INTERNAL MEDICINE
Payer: COMMERCIAL

## 2021-12-28 DIAGNOSIS — D50.0 IRON DEFICIENCY ANEMIA DUE TO CHRONIC BLOOD LOSS: ICD-10-CM

## 2021-12-28 DIAGNOSIS — D59.5 PNH (PAROXYSMAL NOCTURNAL HEMOGLOBINURIA) (HCC): ICD-10-CM

## 2021-12-28 DIAGNOSIS — D68.59 HYPERCOAGULABLE STATE (HCC): ICD-10-CM

## 2021-12-28 DIAGNOSIS — R53.82 CHRONIC FATIGUE: ICD-10-CM

## 2021-12-28 LAB
ALBUMIN SERPL BCP-MCNC: 3.6 G/DL (ref 3.5–5)
ALP SERPL-CCNC: 78 U/L (ref 46–116)
ALT SERPL W P-5'-P-CCNC: 60 U/L (ref 12–78)
ANION GAP SERPL CALCULATED.3IONS-SCNC: 5 MMOL/L (ref 4–13)
AST SERPL W P-5'-P-CCNC: 50 U/L (ref 5–45)
BASOPHILS # BLD AUTO: 0.06 THOUSANDS/ΜL (ref 0–0.1)
BASOPHILS NFR BLD AUTO: 1 % (ref 0–1)
BILIRUB SERPL-MCNC: 0.61 MG/DL (ref 0.2–1)
BUN SERPL-MCNC: 10 MG/DL (ref 5–25)
CALCIUM SERPL-MCNC: 9.7 MG/DL (ref 8.3–10.1)
CHLORIDE SERPL-SCNC: 106 MMOL/L (ref 100–108)
CO2 SERPL-SCNC: 26 MMOL/L (ref 21–32)
CREAT SERPL-MCNC: 1.17 MG/DL (ref 0.6–1.3)
EOSINOPHIL # BLD AUTO: 0.19 THOUSAND/ΜL (ref 0–0.61)
EOSINOPHIL NFR BLD AUTO: 2 % (ref 0–6)
ERYTHROCYTE [DISTWIDTH] IN BLOOD BY AUTOMATED COUNT: 13.8 % (ref 11.6–15.1)
FERRITIN SERPL-MCNC: 28 NG/ML (ref 8–388)
GFR SERPL CREATININE-BSD FRML MDRD: 78 ML/MIN/1.73SQ M
GLUCOSE SERPL-MCNC: 109 MG/DL (ref 65–140)
HCT VFR BLD AUTO: 49.5 % (ref 36.5–49.3)
HGB BLD-MCNC: 16.4 G/DL (ref 12–17)
IMM GRANULOCYTES # BLD AUTO: 0.05 THOUSAND/UL (ref 0–0.2)
IMM GRANULOCYTES NFR BLD AUTO: 1 % (ref 0–2)
IRON SATN MFR SERPL: 28 % (ref 20–50)
IRON SERPL-MCNC: 111 UG/DL (ref 65–175)
LYMPHOCYTES # BLD AUTO: 2.3 THOUSANDS/ΜL (ref 0.6–4.47)
LYMPHOCYTES NFR BLD AUTO: 29 % (ref 14–44)
MCH RBC QN AUTO: 28.8 PG (ref 26.8–34.3)
MCHC RBC AUTO-ENTMCNC: 33.1 G/DL (ref 31.4–37.4)
MCV RBC AUTO: 87 FL (ref 82–98)
MONOCYTES # BLD AUTO: 0.43 THOUSAND/ΜL (ref 0.17–1.22)
MONOCYTES NFR BLD AUTO: 5 % (ref 4–12)
NEUTROPHILS # BLD AUTO: 4.99 THOUSANDS/ΜL (ref 1.85–7.62)
NEUTS SEG NFR BLD AUTO: 62 % (ref 43–75)
NRBC BLD AUTO-RTO: 0 /100 WBCS
PLATELET # BLD AUTO: 177 THOUSANDS/UL (ref 149–390)
PMV BLD AUTO: 10.9 FL (ref 8.9–12.7)
POTASSIUM SERPL-SCNC: 4.7 MMOL/L (ref 3.5–5.3)
PROT SERPL-MCNC: 7.8 G/DL (ref 6.4–8.2)
RBC # BLD AUTO: 5.69 MILLION/UL (ref 3.88–5.62)
SODIUM SERPL-SCNC: 137 MMOL/L (ref 136–145)
TIBC SERPL-MCNC: 401 UG/DL (ref 250–450)
TSH SERPL DL<=0.05 MIU/L-ACNC: 1.5 UIU/ML (ref 0.36–3.74)
WBC # BLD AUTO: 8.02 THOUSAND/UL (ref 4.31–10.16)

## 2021-12-28 PROCEDURE — 83550 IRON BINDING TEST: CPT

## 2021-12-28 PROCEDURE — 85025 COMPLETE CBC W/AUTO DIFF WBC: CPT

## 2021-12-28 PROCEDURE — 83918 ORGANIC ACIDS TOTAL QUANT: CPT

## 2021-12-28 PROCEDURE — 83540 ASSAY OF IRON: CPT

## 2021-12-28 PROCEDURE — 84443 ASSAY THYROID STIM HORMONE: CPT

## 2021-12-28 PROCEDURE — 36415 COLL VENOUS BLD VENIPUNCTURE: CPT

## 2021-12-28 PROCEDURE — 88185 FLOWCYTOMETRY/TC ADD-ON: CPT

## 2021-12-28 PROCEDURE — 82728 ASSAY OF FERRITIN: CPT

## 2021-12-28 PROCEDURE — 88184 FLOWCYTOMETRY/ TC 1 MARKER: CPT

## 2021-12-28 PROCEDURE — 80053 COMPREHEN METABOLIC PANEL: CPT

## 2021-12-28 PROCEDURE — 85300 ANTITHROMBIN III ACTIVITY: CPT

## 2021-12-29 LAB — SCAN RESULT: NORMAL

## 2022-01-02 LAB — METHYLMALONATE SERPL-SCNC: 180 NMOL/L (ref 0–378)

## 2022-01-10 ENCOUNTER — APPOINTMENT (OUTPATIENT)
Dept: LAB | Facility: HOSPITAL | Age: 37
End: 2022-01-10
Payer: COMMERCIAL

## 2022-01-10 DIAGNOSIS — R53.83 OTHER FATIGUE: ICD-10-CM

## 2022-01-10 PROCEDURE — 84403 ASSAY OF TOTAL TESTOSTERONE: CPT

## 2022-01-10 PROCEDURE — 84402 ASSAY OF FREE TESTOSTERONE: CPT

## 2022-01-10 PROCEDURE — 36415 COLL VENOUS BLD VENIPUNCTURE: CPT

## 2022-01-12 LAB
TESTOST FREE SERPL-MCNC: 22.2 PG/ML (ref 8.7–25.1)
TESTOST SERPL-MCNC: 597 NG/DL (ref 264–916)

## 2022-01-27 LAB — MISCELLANEOUS LAB TEST RESULT: NORMAL

## 2022-01-28 ENCOUNTER — OFFICE VISIT (OUTPATIENT)
Dept: HEMATOLOGY ONCOLOGY | Facility: HOSPITAL | Age: 37
End: 2022-01-28
Payer: COMMERCIAL

## 2022-01-28 VITALS
HEART RATE: 93 BPM | TEMPERATURE: 97.6 F | OXYGEN SATURATION: 98 % | RESPIRATION RATE: 16 BRPM | WEIGHT: 306 LBS | HEIGHT: 75 IN | SYSTOLIC BLOOD PRESSURE: 142 MMHG | BODY MASS INDEX: 38.05 KG/M2 | DIASTOLIC BLOOD PRESSURE: 98 MMHG

## 2022-01-28 DIAGNOSIS — D68.59 HYPERCOAGULABLE STATE (HCC): Primary | ICD-10-CM

## 2022-01-28 DIAGNOSIS — D50.0 IRON DEFICIENCY ANEMIA DUE TO CHRONIC BLOOD LOSS: ICD-10-CM

## 2022-01-28 PROCEDURE — 99214 OFFICE O/P EST MOD 30 MIN: CPT | Performed by: INTERNAL MEDICINE

## 2022-01-28 NOTE — LETTER
January 28, 2022     [address] [facility]  [address] [facility]    Patient: Efrain Calvin   YOB: 1985   Date of Visit: 1/28/2022       Dear Dr Chris Luke: Thank you for referring Efrain Calvin to me for evaluation  Below are my notes for this consultation  If you have questions, please do not hesitate to call me  I look forward to following your patient along with you  Sincerely,        Mitra Paniagua MD        CC: No Recipients  Mitra Paniagua MD  1/28/2022  8:47 AM  Incomplete      Hematology/Oncology Outpatient Follow- up Note  Efrain Calvin 43 y o  male MRN: @ Encounter: [id number]        Date:  1/28/2022    Presenting Complaint/Diagnosis : Splenic infarct and iron deficiency anemia    HPI:    Kristy Persaud seen for initial consultation 6/11/2021 regarding  A splenic infarct and newly diagnosed iron deficiency anemia   The patient presented to the [facility] Emergency Room  With complaints of left upper quadrant pain   Imaging done revealed a splenic infarct   No malignancy or any other abnormalities were seen   The patient's blood work revealed hypochromic microcytic anemia indicating iron deficiency  Previous Hematologic/ Oncologic History:    Workup  Venofer    Current Hematologic/ Oncologic Treatment:    Observation    Interval History:    Patient returns for follow-up visit  He is doing well  His testing is all negative  Iron studies have normalized  Today the patient informed me once I asked about why he had a testosterone level that he is on testosterone injections through his pain physician  I explained testosterone can provoke arterial blood clots including heart attacks and strokes  Since he had a splenic infarct while on testosterone my recommendation would be to discontinue the testosterone as it can provoked blood clots  The patient understands this and states he will discuss this with his prescribing physician    I did explain the nature of the clots and he is aware  Otherwise he is doing well  Denies any nausea denies any vomiting denies any diarrhea  The rest of his 14 point review of systems today was negative  Test Results:    Imaging: No results found  Labs:   Lab Results   Component Value Date    WBC 8 02 12/28/2021    HGB 16 4 12/28/2021    HCT 49 5 (H) 12/28/2021    MCV 87 12/28/2021     12/28/2021     Lab Results   Component Value Date    K 4 7 12/28/2021     12/28/2021    CO2 26 12/28/2021    BUN 10 12/28/2021    CREATININE 1 17 12/28/2021    GLUF 125 (H) 10/09/2021    CALCIUM 9 7 12/28/2021    CORRECTEDCA 9 2 10/09/2021    AST 50 (H) 12/28/2021    ALT 60 12/28/2021    ALKPHOS 78 12/28/2021    EGFR 78 12/28/2021       Lab Results   Component Value Date    IRON 111 12/28/2021    TIBC 401 12/28/2021    FERRITIN 28 12/28/2021       ROS: As stated in the history of present illness otherwise his 14 point review of systems today was negative        Active Problems:   Patient Active Problem List   Diagnosis    Bipolar disorder ([address] )    Tremor    ADD (attention deficit disorder)    GERD (gastroesophageal reflux disease)    Tenosynovitis of ankle    Primary localized osteoarthrosis of ankle and foot    PTSD (post-traumatic stress disorder)    Scar tissue    Subluxation    Tear of tendon of lower extremity    Iron deficiency anemia due to chronic blood loss    Chronic pain    Smoking    Sleep apnea    Special screening examination for viral disease    Upper respiratory tract infection       Past Medical History:   Past Medical History:   Diagnosis Date    ADD (attention deficit disorder)     Arthritis     Chronic pain     Heartburn     Mood disorder (HCC)     Tremor        Surgical History:   Past Surgical History:   Procedure Laterality Date    CLOSED REDUCTION CALCANEAL FRACTURE      PERONEAL TENDON EXPLORATION         Family History:    Family History   Problem Relation Age of Onset    Parkinsonism Paternal Grandfather     No Known Problems Father     Breast cancer Mother        Cancer-related family history includes Breast cancer in his mother  Social History:   Social History     Socioeconomic History    Marital status: Single     Spouse name: Not on file    Number of children: Not on file    Years of education: Not on file    Highest education level: Not on file   Occupational History    Not on file   Tobacco Use    Smoking status: Current Every Day Smoker     Packs/day: 0 50     Years: 10 00     Pack years: 5 00     Types: Cigarettes    Smokeless tobacco: Never Used   Vaping Use    Vaping Use: Never used   Substance and Sexual Activity    Alcohol use:  Yes     Alcohol/week: 7 0 standard drinks     Types: 7 Cans of beer per week     Comment: social    Drug use: Never    Sexual activity: Yes   Other Topics Concern    Not on file   Social History Narrative    Not on file     Social Determinants of Health     Financial Resource Strain: Not on file   Food Insecurity: Not on file   Transportation Needs: Not on file   Physical Activity: Not on file   Stress: Not on file   Social Connections: Not on file   Intimate Partner Violence: Not on file   Housing Stability: Not on file       Current Medications:   Current Outpatient Medications   Medication Sig Dispense Refill    DEXILANT 60 MG capsule Take 1 capsule by mouth daily  3    gabapentin (NEURONTIN) 600 MG tablet Take 600 mg by mouth 2 (two) times a day    0    hydrOXYzine HCL (ATARAX) 50 mg tablet Take 50 mg by mouth 2 (two) times a day as needed  1    ibuprofen (MOTRIN) 800 mg tablet Take 800 mg by mouth every 6 (six) hours as needed  0    methylphenidate (CONCERTA) 36 MG ER tablet daily       oxyCODONE-acetaminophen (PERCOCET)  mg per tablet TAKE 1 TABLET BY MOUTH EVERY 8 HOURS AS NEEDED FOR ONGOING THERAPY  0    OXYCONTIN 10 MG 12 hr tablet Take 10 mg by mouth 2 (two) times a day  0    risperiDONE (RisperDAL) 3 mg tablet Take 3 mg by mouth daily at bedtime  0    sildenafil (VIAGRA) 100 mg tablet Take 1 tablet by mouth one (1) hour prior to intercourse on an empty stomach  Limit to 3 encounters per week  30 tablet 3    zolpidem (AMBIEN) 10 mg tablet Take 10 mg by mouth daily at bedtime as needed for sleep      CHANTIX STARTING MONTH ELIZABETH 0 5 MG X 11 & 1 MG X 42 tablet  (Patient not taking: Reported on 7/2/2021)       No current facility-administered medications for this visit  Allergies: No Known Allergies    Physical Exam:    Body surface area is 2 63 meters squared  Wt Readings from Last 3 Encounters:   01/28/22 (!) 139 kg (306 lb)   11/30/21 (!) 143 kg (316 lb)   10/22/21 (!) 142 kg (314 lb)        Temp Readings from Last 3 Encounters:   01/28/22 97 6 °F (36 4 °C) (Temporal)   11/30/21 98 4 °F (36 9 °C)   11/09/21 (!) 97 3 °F (36 3 °C) (Tympanic)        BP Readings from Last 3 Encounters:   01/28/22 142/98   11/09/21 125/78   10/22/21 144/92         Pulse Readings from Last 3 Encounters:   01/28/22 93   11/30/21 98   11/09/21 (!) 111        Physical Exam     Constitutional   General appearance: No acute distress, well appearing and well nourished  Eyes   Conjunctiva and lids: No swelling, erythema or discharge  Pupils and irises: Equal, round and reactive to light  Ears, Nose, Mouth, and Throat   External inspection of ears and nose: Normal     Nasal mucosa, septum, and turbinates: Normal without edema or erythema  Oropharynx: Normal with no erythema, edema, exudate or lesions  Pulmonary   Respiratory effort: No increased work of breathing or signs of respiratory distress  Auscultation of lungs: Clear to auscultation  Cardiovascular   Palpation of heart: Normal PMI, no thrills  Auscultation of heart: Normal rate and rhythm, normal S1 and S2, without murmurs      Examination of extremities for edema and/or varicosities: Normal     Carotid pulses: Normal     Abdomen   Abdomen: Non-tender, no masses  Liver and spleen: No hepatomegaly or splenomegaly  Lymphatic   Palpation of lymph nodes in neck: No lymphadenopathy  Musculoskeletal   Gait and station: Normal     Digits and nails: Normal without clubbing or cyanosis  Inspection/palpation of joints, bones, and muscles: Normal     Skin   Skin and subcutaneous tissue: Normal without rashes or lesions  Neurologic   Cranial nerves: Cranial nerves 2-12 intact  Sensation: No sensory loss  Psychiatric   Orientation to person, place, and time: Normal     Mood and affect: Normal         Assessment / Plan:      The patient is a pleasant 26-year-old male who was referred to see us for splenic infarct  [facility] also had iron deficiency anemia  His testing reveals his antithrombin 3 level is actually normal   PNH testing was negative  Thyroid function was normal   Iron saturation was 28%  Iron was normal at 1 1 1  TIBC was normal at 401  I advised him he should take a multivitamin that has Sol Sb will stay on a baby aspirin  The patient today told me he is on testosterone shots through his pain physician  I explained testosterone is a provoking substance for blood clots  He had an arterial thrombus and in my opinion he should not be on testosterone since he did have an arterial event i e  Splenic infarct  I did explain this to the patient  He had not mention the testosterone before but today when I asked him about his testosterone level which was checked in the chart he explained his pain physician has been given him testosterone shots every few weeks  I explained this is not a good idea with his recent clot  The patient fully understands  He will address this with his pain doctor  I have advised him not to take testosterone based on the clot    He understands if he has another clot which is a heart attack or stroke this could be fatal or cause major complications   If he ever has another blood clot he will need lifelong anticoagulation   I did explain to him what the signs and symptoms of DVT and PE were in case he had any of these he was advised to go to the emergency room  I advised him he should stay up-to-date on his screening  He has already followed up with GI in November  Goals and Barriers:  Current Goal:  Prolong Survival from splenic infarct  Barriers: None  Patient's Capacity to Self Care:  Patient  able to self care  Portions of the record may have been created with voice recognition software  Occasional wrong word or "sound a like" substitutions may have occurred due to the inherent limitations of voice recognition software  Read the chart carefully and recognize, using context, where substitutions have occurred

## 2022-01-28 NOTE — PROGRESS NOTES
Hematology/Oncology Outpatient Follow- up Note  Barbara Park 43 y o  male MRN: @ Encounter: [id number]        Date:  1/28/2022    Presenting Complaint/Diagnosis : Splenic infarct and iron deficiency anemia    HPI:    Jose Bee seen for initial consultation 6/11/2021 regarding  A splenic infarct and newly diagnosed iron deficiency anemia   The patient presented to the [facility] Emergency Room  With complaints of left upper quadrant pain   Imaging done revealed a splenic infarct   No malignancy or any other abnormalities were seen   The patient's blood work revealed hypochromic microcytic anemia indicating iron deficiency  Previous Hematologic/ Oncologic History:    Workup  Venofer    Current Hematologic/ Oncologic Treatment:    Observation    Interval History:    Patient returns for follow-up visit  He is doing well  His testing is all negative  Iron studies have normalized  Today the patient informed me once I asked about why he had a testosterone level that he is on testosterone injections through his pain physician  I explained testosterone can provoke arterial blood clots including heart attacks and strokes  Since he had a splenic infarct while on testosterone my recommendation would be to discontinue the testosterone as it can provoked blood clots  The patient understands this and states he will discuss this with his prescribing physician  I did explain the nature of the clots and he is aware  Otherwise he is doing well  Denies any nausea denies any vomiting denies any diarrhea  The rest of his 14 point review of systems today was negative  Test Results:    Imaging: No results found      Labs:   Lab Results   Component Value Date    WBC 8 02 12/28/2021    HGB 16 4 12/28/2021    HCT 49 5 (H) 12/28/2021    MCV 87 12/28/2021     12/28/2021     Lab Results   Component Value Date    K 4 7 12/28/2021     12/28/2021    CO2 26 12/28/2021    BUN 10 12/28/2021    CREATININE 1 17 12/28/2021    GLUF 125 (H) 10/09/2021    CALCIUM 9 7 12/28/2021    CORRECTEDCA 9 2 10/09/2021    AST 50 (H) 12/28/2021    ALT 60 12/28/2021    ALKPHOS 78 12/28/2021    EGFR 78 12/28/2021       Lab Results   Component Value Date    IRON 111 12/28/2021    TIBC 401 12/28/2021    FERRITIN 28 12/28/2021       ROS: As stated in the history of present illness otherwise his 14 point review of systems today was negative  Active Problems:   Patient Active Problem List   Diagnosis    Bipolar disorder (HCC)    Tremor    ADD (attention deficit disorder)    GERD (gastroesophageal reflux disease)    Tenosynovitis of ankle    Primary localized osteoarthrosis of ankle and foot    PTSD (post-traumatic stress disorder)    Scar tissue    Subluxation    Tear of tendon of lower extremity    Iron deficiency anemia due to chronic blood loss    Chronic pain    Smoking    Sleep apnea    Special screening examination for viral disease    Upper respiratory tract infection       Past Medical History:   Past Medical History:   Diagnosis Date    ADD (attention deficit disorder)     Arthritis     Chronic pain     Heartburn     Mood disorder (HCC)     Tremor        Surgical History:   Past Surgical History:   Procedure Laterality Date    CLOSED REDUCTION CALCANEAL FRACTURE      PERONEAL TENDON EXPLORATION         Family History:    Family History   Problem Relation Age of Onset    Parkinsonism Paternal Grandfather     No Known Problems Father     Breast cancer Mother        Cancer-related family history includes Breast cancer in his mother      Social History:   Social History     Socioeconomic History    Marital status: Single     Spouse name: Not on file    Number of children: Not on file    Years of education: Not on file    Highest education level: Not on file   Occupational History    Not on file   Tobacco Use    Smoking status: Current Every Day Smoker     Packs/day: 0 50     Years: 10 00     Pack years: 5 00     Types: Cigarettes    Smokeless tobacco: Never Used   Vaping Use    Vaping Use: Never used   Substance and Sexual Activity    Alcohol use: Yes     Alcohol/week: 7 0 standard drinks     Types: 7 Cans of beer per week     Comment: social    Drug use: Never    Sexual activity: Yes   Other Topics Concern    Not on file   Social History Narrative    Not on file     Social Determinants of Health     Financial Resource Strain: Not on file   Food Insecurity: Not on file   Transportation Needs: Not on file   Physical Activity: Not on file   Stress: Not on file   Social Connections: Not on file   Intimate Partner Violence: Not on file   Housing Stability: Not on file       Current Medications:   Current Outpatient Medications   Medication Sig Dispense Refill    DEXILANT 60 MG capsule Take 1 capsule by mouth daily  3    gabapentin (NEURONTIN) 600 MG tablet Take 600 mg by mouth 2 (two) times a day    0    hydrOXYzine HCL (ATARAX) 50 mg tablet Take 50 mg by mouth 2 (two) times a day as needed  1    ibuprofen (MOTRIN) 800 mg tablet Take 800 mg by mouth every 6 (six) hours as needed  0    methylphenidate (CONCERTA) 36 MG ER tablet daily       oxyCODONE-acetaminophen (PERCOCET)  mg per tablet TAKE 1 TABLET BY MOUTH EVERY 8 HOURS AS NEEDED FOR ONGOING THERAPY  0    OXYCONTIN 10 MG 12 hr tablet Take 10 mg by mouth 2 (two) times a day  0    risperiDONE (RisperDAL) 3 mg tablet Take 3 mg by mouth daily at bedtime  0    sildenafil (VIAGRA) 100 mg tablet Take 1 tablet by mouth one (1) hour prior to intercourse on an empty stomach  Limit to 3 encounters per week  30 tablet 3    zolpidem (AMBIEN) 10 mg tablet Take 10 mg by mouth daily at bedtime as needed for sleep      CHANTIX STARTING MONTH ELIZABETH 0 5 MG X 11 & 1 MG X 42 tablet  (Patient not taking: Reported on 7/2/2021)       No current facility-administered medications for this visit         Allergies: No Known Allergies    Physical Exam:    Body surface area is 2 63 meters squared  Wt Readings from Last 3 Encounters:   01/28/22 (!) 139 kg (306 lb)   11/30/21 (!) 143 kg (316 lb)   10/22/21 (!) 142 kg (314 lb)        Temp Readings from Last 3 Encounters:   01/28/22 97 6 °F (36 4 °C) (Temporal)   11/30/21 98 4 °F (36 9 °C)   11/09/21 (!) 97 3 °F (36 3 °C) (Tympanic)        BP Readings from Last 3 Encounters:   01/28/22 142/98   11/09/21 125/78   10/22/21 144/92         Pulse Readings from Last 3 Encounters:   01/28/22 93   11/30/21 98   11/09/21 (!) 111        Physical Exam     Constitutional   General appearance: No acute distress, well appearing and well nourished  Eyes   Conjunctiva and lids: No swelling, erythema or discharge  Pupils and irises: Equal, round and reactive to light  Ears, Nose, Mouth, and Throat   External inspection of ears and nose: Normal     Nasal mucosa, septum, and turbinates: Normal without edema or erythema  Oropharynx: Normal with no erythema, edema, exudate or lesions  Pulmonary   Respiratory effort: No increased work of breathing or signs of respiratory distress  Auscultation of lungs: Clear to auscultation  Cardiovascular   Palpation of heart: Normal PMI, no thrills  Auscultation of heart: Normal rate and rhythm, normal S1 and S2, without murmurs  Examination of extremities for edema and/or varicosities: Normal     Carotid pulses: Normal     Abdomen   Abdomen: Non-tender, no masses  Liver and spleen: No hepatomegaly or splenomegaly  Lymphatic   Palpation of lymph nodes in neck: No lymphadenopathy  Musculoskeletal   Gait and station: Normal     Digits and nails: Normal without clubbing or cyanosis  Inspection/palpation of joints, bones, and muscles: Normal     Skin   Skin and subcutaneous tissue: Normal without rashes or lesions  Neurologic   Cranial nerves: Cranial nerves 2-12 intact  Sensation: No sensory loss      Psychiatric   Orientation to person, place, and time: Normal     Mood and affect: Normal         Assessment / Plan:      The patient is a pleasant 53-year-old male who was referred to see us for splenic infarct  [facility] also had iron deficiency anemia  His testing reveals his antithrombin 3 level is actually normal   PNH testing was negative  Thyroid function was normal   Iron saturation was 28%  Iron was normal at 1 1 1  TIBC was normal at 401  I advised him he should take a multivitamin that has Kim Carrasco will stay on a baby aspirin  The patient today told me he is on testosterone shots through his pain physician  I explained testosterone is a provoking substance for blood clots  He had an arterial thrombus and in my opinion he should not be on testosterone since he did have an arterial event i e  Splenic infarct  I did explain this to the patient  He had not mention the testosterone before but today when I asked him about his testosterone level which was checked in the chart he explained his pain physician has been given him testosterone shots every few weeks  I explained this is not a good idea with his recent clot  The patient fully understands  He will address this with his pain doctor  I have advised him not to take testosterone based on the clot  He understands if he has another clot which is a heart attack or stroke this could be fatal or cause major complications   If he ever has another blood clot he will need lifelong anticoagulation   I did explain to him what the signs and symptoms of DVT and PE were in case he had any of these he was advised to go to the emergency room  I advised him he should stay up-to-date on his screening  He has already followed up with GI in November  Goals and Barriers:  Current Goal:  Prolong Survival from splenic infarct  Barriers: None  Patient's Capacity to Self Care:  Patient  able to self care  Portions of the record may have been created with voice recognition software    Occasional wrong word or "sound a like" substitutions may have occurred due to the inherent limitations of voice recognition software  Read the chart carefully and recognize, using context, where substitutions have occurred

## 2022-03-26 ENCOUNTER — APPOINTMENT (OUTPATIENT)
Dept: LAB | Facility: HOSPITAL | Age: 37
End: 2022-03-26

## 2022-03-26 DIAGNOSIS — Z00.8 HEALTH EXAMINATION IN POPULATION SURVEY: ICD-10-CM

## 2022-03-26 LAB
CHOLEST SERPL-MCNC: 226 MG/DL
EST. AVERAGE GLUCOSE BLD GHB EST-MCNC: 103 MG/DL
HBA1C MFR BLD: 5.2 %
HDLC SERPL-MCNC: 32 MG/DL
LDLC SERPL CALC-MCNC: 137 MG/DL (ref 0–100)
NONHDLC SERPL-MCNC: 194 MG/DL
TRIGL SERPL-MCNC: 284 MG/DL

## 2022-03-26 PROCEDURE — 80061 LIPID PANEL: CPT

## 2022-03-26 PROCEDURE — 36415 COLL VENOUS BLD VENIPUNCTURE: CPT

## 2022-03-26 PROCEDURE — 83036 HEMOGLOBIN GLYCOSYLATED A1C: CPT

## 2022-04-02 ENCOUNTER — OFFICE VISIT (OUTPATIENT)
Dept: URGENT CARE | Facility: CLINIC | Age: 37
End: 2022-04-02
Payer: COMMERCIAL

## 2022-04-02 VITALS
OXYGEN SATURATION: 99 % | TEMPERATURE: 98.7 F | WEIGHT: 275 LBS | BODY MASS INDEX: 34.19 KG/M2 | HEIGHT: 75 IN | RESPIRATION RATE: 20 BRPM | HEART RATE: 100 BPM

## 2022-04-02 DIAGNOSIS — J01.10 ACUTE FRONTAL SINUSITIS, RECURRENCE NOT SPECIFIED: Primary | ICD-10-CM

## 2022-04-02 PROCEDURE — S9083 URGENT CARE CENTER GLOBAL: HCPCS | Performed by: PHYSICIAN ASSISTANT

## 2022-04-02 PROCEDURE — G0382 LEV 3 HOSP TYPE B ED VISIT: HCPCS | Performed by: PHYSICIAN ASSISTANT

## 2022-04-02 RX ORDER — AMOXICILLIN AND CLAVULANATE POTASSIUM 875; 125 MG/1; MG/1
1 TABLET, FILM COATED ORAL EVERY 12 HOURS SCHEDULED
Qty: 14 TABLET | Refills: 0 | Status: SHIPPED | OUTPATIENT
Start: 2022-04-02 | End: 2022-04-09

## 2022-04-02 NOTE — PROGRESS NOTES
[facility]        NAME: Dwight Quiroz is a 42 y o  male  : 1985    MRN: [id number]  DATE: 2022  TIME: 2:19 AM    Assessment and Plan   Acute frontal sinusitis, recurrence not specified [J01 10]  1  Acute frontal sinusitis, recurrence not specified  amoxicillin-clavulanate (AUGMENTIN) 875-125 mg per tablet         Patient Instructions       Follow up with PCP in 3-5 days  Proceed to  ER if symptoms worsen  Chief Complaint     Chief Complaint   Patient presents with    Nasal Congestion     nasal congestion x 1 wk, at home covid test negative         History of Present Illness       advil cold and sinus  No hx of seasonal allergies or asthma  No sick contacts  Review of Systems   Review of Systems   Constitutional: Negative for appetite change, chills, fatigue and fever  HENT: Positive for congestion, postnasal drip, rhinorrhea, sinus pressure and sinus pain  Negative for sore throat  Respiratory: Positive for cough  Gastrointestinal: Negative for abdominal pain, diarrhea, nausea and vomiting  Musculoskeletal: Negative for myalgias  Skin: Negative for rash  Neurological: Positive for headaches  Negative for dizziness and light-headedness           Current Medications       Current Outpatient Medications:     DEXILANT 60 MG capsule, Take 1 capsule by mouth daily, Disp: , Rfl: 3    gabapentin (NEURONTIN) 600 MG tablet, Take 600 mg by mouth 2 (two) times a day  , Disp: , Rfl: 0    hydrOXYzine HCL (ATARAX) 50 mg tablet, Take 50 mg by mouth 2 (two) times a day as needed, Disp: , Rfl: 1    ibuprofen (MOTRIN) 800 mg tablet, Take 800 mg by mouth every 6 (six) hours as needed, Disp: , Rfl: 0    methylphenidate (CONCERTA) 36 MG ER tablet, daily , Disp: , Rfl:     oxyCODONE-acetaminophen (PERCOCET)  mg per tablet, TAKE 1 TABLET BY MOUTH EVERY 8 HOURS AS NEEDED FOR ONGOING THERAPY, Disp: , Rfl: 0    OXYCONTIN 10 MG 12 hr tablet, Take 10 mg by mouth 2 (two) times a day, Disp: , Rfl: 0    risperiDONE (RisperDAL) 3 mg tablet, Take 3 mg by mouth daily at bedtime, Disp: , Rfl: 0    sildenafil (VIAGRA) 100 mg tablet, Take 1 tablet by mouth one (1) hour prior to intercourse on an empty stomach  Limit to 3 encounters per week , Disp: 30 tablet, Rfl: 3    zolpidem (AMBIEN) 10 mg tablet, Take 10 mg by mouth daily at bedtime as needed for sleep, Disp: , Rfl:     CHANTIX STARTING MONTH ELIZABETH 0 5 MG X 11 & 1 MG X 42 tablet, , Disp: , Rfl:     Current Allergies     Allergies as of 04/02/2022    (No Known Allergies)            The following portions of the patient's history were reviewed and updated as appropriate: allergies, current medications, past family history, past medical history, past social history, past surgical history and problem list      Past Medical History:   Diagnosis Date    ADD (attention deficit disorder)     Arthritis     Chronic pain     Heartburn     Mood disorder (HCC)     Tremor        Past Surgical History:   Procedure Laterality Date    CLOSED REDUCTION CALCANEAL FRACTURE      PERONEAL TENDON EXPLORATION         Family History   Problem Relation Age of Onset    Parkinsonism Paternal Grandfather     No Known Problems Father     Breast cancer Mother          Medications have been verified  Objective   Pulse 100   Temp 98 7 °F (37 1 °C)   Resp 20   Ht 6' 3" (1 905 m)   Wt 125 kg (275 lb)   SpO2 99%   BMI 34 37 kg/m²   No LMP for male patient  Physical Exam     Physical Exam  Vitals and nursing note reviewed  Constitutional:       General: He is not in acute distress  Appearance: He is well-developed  He is not diaphoretic  HENT:      Head: Normocephalic and atraumatic  Right Ear: Tympanic membrane normal       Left Ear: Tympanic membrane normal       Nose: Mucosal edema and congestion present  No rhinorrhea  Right Sinus: Frontal sinus tenderness present  Left Sinus: Frontal sinus tenderness present  Mouth/Throat:      Pharynx: Uvula midline  Posterior oropharyngeal erythema present  Eyes:      Conjunctiva/sclera: Conjunctivae normal    Cardiovascular:      Rate and Rhythm: Normal rate and regular rhythm  Heart sounds: Normal heart sounds  Pulmonary:      Effort: Pulmonary effort is normal       Breath sounds: Normal breath sounds  Musculoskeletal:         General: Normal range of motion  Skin:     General: Skin is warm and dry  Neurological:      Mental Status: He is alert and oriented to person, place, and time

## 2022-04-02 NOTE — PATIENT INSTRUCTIONS

## 2022-04-12 ENCOUNTER — OFFICE VISIT (OUTPATIENT)
Dept: FAMILY MEDICINE CLINIC | Facility: HOSPITAL | Age: 37
End: 2022-04-12
Payer: COMMERCIAL

## 2022-04-12 VITALS
DIASTOLIC BLOOD PRESSURE: 98 MMHG | WEIGHT: 290 LBS | RESPIRATION RATE: 16 BRPM | BODY MASS INDEX: 36.06 KG/M2 | SYSTOLIC BLOOD PRESSURE: 138 MMHG | HEIGHT: 75 IN | OXYGEN SATURATION: 96 % | HEART RATE: 96 BPM

## 2022-04-12 DIAGNOSIS — F11.20 NARCOTIC DEPENDENCY, CONTINUOUS (HCC): ICD-10-CM

## 2022-04-12 DIAGNOSIS — R40.0 DAYTIME SOMNOLENCE: ICD-10-CM

## 2022-04-12 DIAGNOSIS — I10 PRIMARY HYPERTENSION: Primary | ICD-10-CM

## 2022-04-12 DIAGNOSIS — F31.70 BIPOLAR DISORDER IN FULL REMISSION, MOST RECENT EPISODE UNSPECIFIED TYPE (HCC): ICD-10-CM

## 2022-04-12 PROBLEM — J06.9 UPPER RESPIRATORY TRACT INFECTION: Status: RESOLVED | Noted: 2021-11-30 | Resolved: 2022-04-12

## 2022-04-12 PROBLEM — G47.30 SLEEP APNEA: Status: RESOLVED | Noted: 2021-07-14 | Resolved: 2022-04-12

## 2022-04-12 PROCEDURE — 99204 OFFICE O/P NEW MOD 45 MIN: CPT | Performed by: INTERNAL MEDICINE

## 2022-04-12 RX ORDER — LOSARTAN POTASSIUM AND HYDROCHLOROTHIAZIDE 12.5; 5 MG/1; MG/1
1 TABLET ORAL DAILY
Qty: 90 TABLET | Refills: 3 | Status: SHIPPED | OUTPATIENT
Start: 2022-04-12

## 2022-04-12 NOTE — ASSESSMENT & PLAN NOTE
Patient has symptoms of sleep apnea such as daytime somnolence, unrefreshing sleep, stopping breathing while asleep according to patient's wife    I ordered home sleep study and will refer patient to sleep specialist   He does not have a formal diagnosis of sleep apnea

## 2022-04-12 NOTE — ASSESSMENT & PLAN NOTE
Patient presents with his wife to establish care  Chief complaint is hypertension  Patient has had hypertension least for 2 years  When he went to see doctors he had hypertension in the offices  At home patient's blood pressure runs between 153-939 systolic and 816-901 diastolic  Patient is hypertensive in the office today  We discussed low-salt diet, increasing physical activity to 150 minutes of physical activity a week  Patient has symptoms of sleep apnea that may make his hypertension worse  I ordered home sleep study  Patient's Concerta may make his hypertension worse but his moods are stable on Concerta therefore he will continue the medication  He does not take NSAIDs, does not drink alcohol and does not use illicit drugs  I will start patient on losartan/HCTZ, check labs in 2 weeks    I will reassess patient in 4 weeks

## 2022-04-12 NOTE — ASSESSMENT & PLAN NOTE
Pt fell from a roof 30 ft, 12 years ago and has chronic foot pain  He's on oxycontin and percocet    I reviewed [place]

## 2022-04-12 NOTE — PATIENT INSTRUCTIONS
Check her blood pressure once a day every day after resting for 10 minutes at various times of the day! Keep a log and send me a Friday message with her blood pressure reading in 2 weeks! Bring your blood pressure log with you for the next appointment! Chronic Hypertension   AMBULATORY CARE:   Hypertension  is high blood pressure  Your blood pressure is the force of your blood moving against the walls of your arteries  Hypertension causes your blood pressure to get so high that your heart has to work much harder than normal  This can damage your heart  Even if you have hypertension for years, lifestyle changes, medicines, or both can help bring your blood pressure to normal   Call your local emergency number (911 in the [address] [place] [address],[address]) or have someone call if:   · You have chest pain  · You have any of the following signs of a heart attack:      ? Squeezing, pressure, or pain in your chest    ? You may  also have any of the following:     § Discomfort or pain in your back, neck, jaw, stomach, or arm    § Shortness of breath    § Nausea or vomiting    § Lightheadedness or a sudden cold sweat    · You become confused or have difficulty speaking  · You suddenly feel lightheaded or have trouble breathing  Seek care immediately if:   · You have a severe headache or vision loss  · You have weakness in an arm or leg  Call your doctor or cardiologist if:   · You feel faint, dizzy, confused, or drowsy  · You have been taking your blood pressure medicine but your pressure is higher than your provider says it should be  · You have questions or concerns about your condition or care  Treatment for chronic hypertension  may include medicine to lower your blood pressure and cholesterol levels  A low cholesterol level helps prevent heart disease and makes it easier to control your blood pressure  Heart disease can make your blood pressure harder to control  You may also need to make lifestyle changes    What you need to know about the stages of hypertension:       · Normal blood pressure is 119/79 or lower   Your healthcare provider may only check your blood pressure each year if it stays at a normal level  · Elevated blood pressure is 120/79 to 129/79   This is sometimes called prehypertension  Your healthcare provider may suggest lifestyle changes to help lower your blood pressure to a normal level  He or she may then check it again in 3 to 6 months  · Stage 1 hypertension is 130/80  to 139/89   Your provider may recommend lifestyle changes, medication, and checks every 3 to 6 months until your blood pressure is controlled  · Stage 2 hypertension is 140/90 or higher   Your provider will recommend lifestyle changes and have you take 2 kinds of hypertension medicines  You will also need to have your blood pressure checked monthly until it is controlled  Manage chronic hypertension:   · Check your blood pressure at home  Avoid smoking, caffeine, and exercise at least 30 minutes before checking your blood pressure  Sit and rest for 5 minutes before you take your blood pressure  Extend your arm and support it on a flat surface  Your arm should be at the same level as your heart  Follow the directions that came with your blood pressure monitor  Check your blood pressure 2 times, 1 minute apart, before you take your medicine in the morning  Also check your blood pressure before your evening meal  Keep a record of your readings and bring it to your follow-up visits  Ask your healthcare provider what your blood pressure should be  · Manage any other health conditions you have  Health conditions such as diabetes can increase your risk for hypertension  Follow your healthcare provider's instructions and take all your medicines as directed  Talk to your healthcare provider about any new health conditions you have recently developed  · Ask about all medicines    Certain medicines can increase your blood pressure  Examples include oral birth control pills, decongestants, herbal supplements, and NSAIDs, such as ibuprofen  Your healthcare provider can tell you which medicines are safe for you to take  This includes prescription and over-the-counter medicines  Lifestyle changes you can make to lower your blood pressure: Your provider may want you to make more lifestyle changes if you are having trouble controlling your blood pressure  This may feel difficult over time, especially if you think you are making good changes but your pressure is still high  It might help to focus on one new change at a time  For example, try to add 1 more day of exercise, or exercise for an extra 10 minutes on 2 days  Small changes can make a big difference  Your healthcare provider can also refer you to specialists such as a dietitian who can help you make small changes  Your family members may be included in helping you learn to create lifestyle changes, such as the following:     · Limit sodium (salt) as directed  Too much sodium can affect your fluid balance  Check labels to find low-sodium or no-salt-added foods  Some low-sodium foods use potassium salts for flavor  Too much potassium can also cause health problems  Your healthcare provider will tell you how much sodium and potassium are safe for you to have in a day  He or she may recommend that you limit sodium to 2,300 mg a day  · Follow the meal plan recommended by your healthcare provider  A dietitian or your provider can give you more information on low-sodium plans or the DASH (Dietary Approaches to Stop Hypertension) eating plan  The DASH plan is low in sodium, processed sugar, unhealthy fats, and total fat  It is high in potassium, calcium, and fiber  These can be found in vegetables, fruit, and whole-grain foods  · Be physically active throughout the day  Physical activity, such as exercise, can help control your blood pressure and your weight   Be physically active for at least 30 minutes per day, on most days of the week  Include aerobic activity, such as walking or riding a bicycle  Also include strength training at least 2 times each week  Your healthcare providers can help you create a physical activity plan  · Decrease stress  This may help lower your blood pressure  Learn ways to relax, such as deep breathing or listening to music  · Limit alcohol as directed  Alcohol can increase your blood pressure  A drink of alcohol is 12 ounces of beer, 5 ounces of wine, or 1½ ounces of liquor  · Do not smoke  Nicotine and other chemicals in cigarettes and cigars can increase your blood pressure and also cause lung damage  Ask your healthcare provider for information if you currently smoke and need help to quit  E-cigarettes or smokeless tobacco still contain nicotine  Talk to your healthcare provider before you use these products  Follow up with your doctor or cardiologist as directed: You will need to return to have your blood pressure checked and to have other lab tests done  Write down your questions so you remember to ask them during your visits  © Copyright [employer] 2022 Information is for End User's use only and may not be sold, redistributed or otherwise used for commercial purposes  All illustrations and images included in CareNotes® are the copyrighted property of [employer]  or Jarek Carvalho   The above information is an  only  It is not intended as medical advice for individual conditions or treatments  Talk to your doctor, nurse or pharmacist before following any medical regimen to see if it is safe and effective for you

## 2022-04-12 NOTE — PROGRESS NOTES
Assessment/Plan:    Narcotic dependency, continuous (HCC)  Pt fell from a roof 30 ft, 12 years ago and has chronic foot pain  He's on oxycontin and percocet  [place]    Primary hypertension  Patient presents with his wife to establish care  Chief complaint is hypertension  Patient has had hypertension least for 2 years  When he went to see doctors he had hypertension in the offices  At home patient's blood pressure runs between 551-546 systolic and 638-119 diastolic  Patient is hypertensive in the office today  We discussed low-salt diet, increasing physical activity to 150 minutes of physical activity a week  Patient has symptoms of sleep apnea that may make his hypertension worse  I ordered home sleep study  Patient's Concerta may make his hypertension worse but his moods are stable on Concerta therefore he will continue the medication  He does not take NSAIDs, does not drink alcohol and does not use illicit drugs  I will start patient on losartan/HCTZ, check labs in 2 weeks  I will reassess patient in 4 weeks    Daytime somnolence  Patient has symptoms of sleep apnea such as daytime somnolence, unrefreshing sleep, stopping breathing while asleep according to patient's wife  I ordered home sleep study and will refer patient to sleep specialist   He does not have a formal diagnosis of sleep apnea    Bipolar disorder [facility])  Patient has bipolar disorder  He follows with [facility]  His moods are stable:  Denies depression  will continue Risperdal, hydroxyzine, Concerta       Diagnoses and all orders for this visit:    Primary hypertension  -     losartan-hydrochlorothiazide (HYZAAR) 50-12 5 mg per tablet; Take 1 tablet by mouth daily  -     CBC; Future  -     Comprehensive metabolic panel; Future  -     TSH, 3rd generation with Free T4 reflex; Future    Daytime somnolence  -     Home Study;  Future    Bipolar disorder in full remission, most recent episode unspecified type [facility])    Narcotic dependency, continuous ([facility] [address] )          Subjective:      Patient ID: Josue Patel is a 42 y o  male  Hypertension  This is a new problem  The current episode started more than 1 month ago  The problem has been gradually worsening since onset  The problem is uncontrolled  Associated symptoms include anxiety, headaches, malaise/fatigue, orthopnea, palpitations, PND, shortness of breath (When walking up stairs but not when walking on level surface) and sweats  Pertinent negatives include no blurred vision, chest pain, neck pain or peripheral edema  Agents associated with hypertension include amphetamines  There are no known risk factors for coronary artery disease  There are no compliance problems  Patient presents for follow-up of chronic conditions as detailed in the assessment and plan  The following portions of the patient's history were reviewed and updated as appropriate: current medications, past family history, past medical history, past social history, past surgical history and problem list     Review of Systems   Constitutional: Positive for fatigue and malaise/fatigue  Eyes: Negative for blurred vision  Respiratory: Positive for shortness of breath (When walking up stairs but not when walking on level surface)  Negative for cough and wheezing  Cardiovascular: Positive for palpitations, orthopnea and PND  Negative for chest pain and leg swelling  Gastrointestinal: Negative for abdominal pain and blood in stool  Genitourinary: Negative for difficulty urinating and hematuria  Musculoskeletal: Negative for neck pain  Skin: Negative for rash  Neurological: Positive for headaches  Psychiatric/Behavioral: Negative for dysphoric mood  The patient is not nervous/anxious            Objective:    /98 (BP Location: Right arm, Patient Position: Sitting)   Pulse 96   Resp 16   Ht 6' 3" (1 905 m)   Wt 132 kg (290 lb)   SpO2 96%   BMI 36 25 kg/m²      Physical Exam  HENT:      Head: Normocephalic  Nose: No congestion or rhinorrhea  Mouth/Throat:      Pharynx: No oropharyngeal exudate or posterior oropharyngeal erythema  Eyes:      Conjunctiva/sclera: Conjunctivae normal    Cardiovascular:      Rate and Rhythm: Normal rate and regular rhythm  Heart sounds: No murmur heard  Pulmonary:      Effort: No respiratory distress  Breath sounds: No wheezing or rales  Abdominal:      General: Bowel sounds are normal       Palpations: Abdomen is soft  Tenderness: There is no abdominal tenderness  Musculoskeletal:      Cervical back: Neck supple  Skin:     General: Skin is warm and dry  Comments: Patient has lower extremity varicose veins without ulcers or dermatitis  Neurological:      Mental Status: He is alert and oriented to person, place, and time  Cranial Nerves: No cranial nerve deficit  Motor: No weakness  Gait: Gait normal    Psychiatric:         Mood and Affect: Mood normal          Thought Content:  Thought content normal              Vanessa Brennan MD

## 2022-05-12 ENCOUNTER — OFFICE VISIT (OUTPATIENT)
Dept: FAMILY MEDICINE CLINIC | Facility: HOSPITAL | Age: 37
End: 2022-05-12
Payer: COMMERCIAL

## 2022-05-12 VITALS
BODY MASS INDEX: 35.93 KG/M2 | RESPIRATION RATE: 16 BRPM | TEMPERATURE: 97.5 F | OXYGEN SATURATION: 96 % | SYSTOLIC BLOOD PRESSURE: 128 MMHG | DIASTOLIC BLOOD PRESSURE: 86 MMHG | WEIGHT: 289 LBS | HEART RATE: 100 BPM | HEIGHT: 75 IN

## 2022-05-12 DIAGNOSIS — I10 PRIMARY HYPERTENSION: ICD-10-CM

## 2022-05-12 DIAGNOSIS — J01.00 ACUTE MAXILLARY SINUSITIS, RECURRENCE NOT SPECIFIED: Primary | ICD-10-CM

## 2022-05-12 PROCEDURE — 99213 OFFICE O/P EST LOW 20 MIN: CPT | Performed by: INTERNAL MEDICINE

## 2022-05-12 RX ORDER — AMOXICILLIN AND CLAVULANATE POTASSIUM 875; 125 MG/1; MG/1
1 TABLET, FILM COATED ORAL EVERY 12 HOURS SCHEDULED
Qty: 14 TABLET | Refills: 0 | Status: SHIPPED | OUTPATIENT
Start: 2022-05-12 | End: 2022-05-19

## 2022-05-12 RX ORDER — FLUTICASONE PROPIONATE 50 MCG
1 SPRAY, SUSPENSION (ML) NASAL 2 TIMES DAILY
Qty: 11.1 ML | Refills: 1 | Status: SHIPPED | OUTPATIENT
Start: 2022-05-12 | End: 2022-06-03

## 2022-05-12 NOTE — ASSESSMENT & PLAN NOTE
Patient presents for follow-up of hypertension  He started taking losartan/HCTZ and avoids salty food  His blood pressures are much better controlled on losartan:  776-364 systolic  Patient tolerates medications without excessive fatigue, dizziness, lightheadedness  He has not got the blood test done yet but he will go to lab this weekend to check electrolytes renal function  His blood pressure is stable today I will continue same dose of losartan and will review CMP, TSH, CBC once the become available    Patient will schedule outpatient at home sleep study

## 2022-05-12 NOTE — PROGRESS NOTES
Assessment/Plan:    Primary hypertension  Patient presents for follow-up of hypertension  He started taking losartan/HCTZ and avoids salty food  His blood pressures are much better controlled on losartan:  796-626 systolic  Patient tolerates medications without excessive fatigue, dizziness, lightheadedness  He has not got the blood test done yet but he will go to lab this weekend to check electrolytes renal function  His blood pressure is stable today I will continue same dose of losartan and will review CMP, TSH, CBC once the become available    Patient will schedule outpatient at home sleep study  Diagnoses and all orders for this visit:    Acute maxillary sinusitis, recurrence not specified  -     amoxicillin-clavulanate (AUGMENTIN) 875-125 mg per tablet; Take 1 tablet by mouth every 12 (twelve) hours for 7 days  -     fluticasone (FLONASE) 50 mcg/act nasal spray; 1 spray into each nostril in the morning and 1 spray in the evening  Primary hypertension          Patient likely has sinus infection  He denies symptoms of allergic rhinitis  Since his condition has been getting worse or weak will treat patient with Augmentin for 7 days and Flonase nasal spray for probable bacterial sinus infection    Subjective:      Patient ID: Casey Li is a 42 y o  male  Patient also presents with a chief complaint of sinus congestion, postnasal dripping, cough with phlegm that started about a week ago and has been getting worse despite taking Advil cold and Sinus  He denies fevers, shortness of breath, chest pain      Patient presents for follow-up of chronic conditions as detailed in the assessment and plan        The following portions of the patient's history were reviewed and updated as appropriate: current medications, past family history, past medical history, past social history, past surgical history and problem list     Review of Systems      Objective:    /86   Pulse 100   Temp 97 5 °F (36 4 °C) (Tympanic)   Resp 16   Ht 6' 3" (1 905 m)   Wt 131 kg (289 lb)   SpO2 96%   BMI 36 12 kg/m²      Physical Exam  Constitutional:       General: He is not in acute distress  Appearance: He is not toxic-appearing  HENT:      Head: Normocephalic  Nose: Congestion present  Mouth/Throat:      Mouth: Mucous membranes are moist       Comments: Purulent postnasal discharge is present  Eyes:      Conjunctiva/sclera: Conjunctivae normal    Cardiovascular:      Rate and Rhythm: Normal rate and regular rhythm  Heart sounds: No murmur heard  Pulmonary:      Effort: No respiratory distress  Breath sounds: No wheezing or rales  Neurological:      Mental Status: He is alert and oriented to person, place, and time  Cranial Nerves: No cranial nerve deficit  Motor: No weakness        Gait: Gait normal    Psychiatric:         Mood and Affect: Mood normal              Fenton Homans, MD

## 2022-05-14 ENCOUNTER — LAB (OUTPATIENT)
Dept: LAB | Facility: HOSPITAL | Age: 37
End: 2022-05-14
Attending: INTERNAL MEDICINE
Payer: COMMERCIAL

## 2022-05-14 DIAGNOSIS — I10 PRIMARY HYPERTENSION: ICD-10-CM

## 2022-05-14 LAB
ALBUMIN SERPL BCP-MCNC: 3.3 G/DL (ref 3.5–5)
ALP SERPL-CCNC: 98 U/L (ref 46–116)
ALT SERPL W P-5'-P-CCNC: 61 U/L (ref 12–78)
ANION GAP SERPL CALCULATED.3IONS-SCNC: 10 MMOL/L (ref 4–13)
AST SERPL W P-5'-P-CCNC: 52 U/L (ref 5–45)
BILIRUB SERPL-MCNC: 0.8 MG/DL (ref 0.2–1)
BUN SERPL-MCNC: 9 MG/DL (ref 5–25)
CALCIUM ALBUM COR SERPL-MCNC: 9.5 MG/DL (ref 8.3–10.1)
CALCIUM SERPL-MCNC: 8.9 MG/DL (ref 8.3–10.1)
CHLORIDE SERPL-SCNC: 103 MMOL/L (ref 100–108)
CO2 SERPL-SCNC: 22 MMOL/L (ref 21–32)
CREAT SERPL-MCNC: 0.94 MG/DL (ref 0.6–1.3)
ERYTHROCYTE [DISTWIDTH] IN BLOOD BY AUTOMATED COUNT: 15.2 % (ref 11.6–15.1)
GFR SERPL CREATININE-BSD FRML MDRD: 101 ML/MIN/1.73SQ M
GLUCOSE P FAST SERPL-MCNC: 100 MG/DL (ref 65–99)
HCT VFR BLD AUTO: 49.6 % (ref 36.5–49.3)
HGB BLD-MCNC: 16.4 G/DL (ref 12–17)
MCH RBC QN AUTO: 28.9 PG (ref 26.8–34.3)
MCHC RBC AUTO-ENTMCNC: 33.1 G/DL (ref 31.4–37.4)
MCV RBC AUTO: 87 FL (ref 82–98)
PLATELET # BLD AUTO: 184 THOUSANDS/UL (ref 149–390)
PMV BLD AUTO: 10.4 FL (ref 8.9–12.7)
POTASSIUM SERPL-SCNC: 3.9 MMOL/L (ref 3.5–5.3)
PROT SERPL-MCNC: 7.4 G/DL (ref 6.4–8.2)
RBC # BLD AUTO: 5.68 MILLION/UL (ref 3.88–5.62)
SODIUM SERPL-SCNC: 135 MMOL/L (ref 136–145)
TSH SERPL DL<=0.05 MIU/L-ACNC: 3.03 UIU/ML (ref 0.45–4.5)
WBC # BLD AUTO: 5.98 THOUSAND/UL (ref 4.31–10.16)

## 2022-05-14 PROCEDURE — 85027 COMPLETE CBC AUTOMATED: CPT

## 2022-05-14 PROCEDURE — 36415 COLL VENOUS BLD VENIPUNCTURE: CPT

## 2022-05-14 PROCEDURE — 80053 COMPREHEN METABOLIC PANEL: CPT

## 2022-05-14 PROCEDURE — 84443 ASSAY THYROID STIM HORMONE: CPT

## 2022-05-31 DIAGNOSIS — F17.219 CIGARETTE NICOTINE DEPENDENCE WITH NICOTINE-INDUCED DISORDER: Primary | ICD-10-CM

## 2022-05-31 RX ORDER — VARENICLINE TARTRATE 1 MG/1
1 TABLET, FILM COATED ORAL 2 TIMES DAILY
Qty: 60 TABLET | Refills: 3 | Status: SHIPPED | OUTPATIENT
Start: 2022-05-31 | End: 2022-12-28 | Stop reason: SDUPTHER

## 2022-06-03 DIAGNOSIS — J01.00 ACUTE MAXILLARY SINUSITIS, RECURRENCE NOT SPECIFIED: ICD-10-CM

## 2022-06-03 RX ORDER — FLUTICASONE PROPIONATE 50 MCG
1 SPRAY, SUSPENSION (ML) NASAL 2 TIMES DAILY
Qty: 48 ML | Refills: 1 | Status: SHIPPED | OUTPATIENT
Start: 2022-06-03

## 2022-07-18 NOTE — PROGRESS NOTES
100% counseling 30 minutes    Patient is wife and I discussed the followin  Testosterone replacement therapy  The patient has a peak testosterone after injection of approximately 1200  He will repeat the testosterone level just prior to his next injection to see his rina  2  LH and FSH are suppressed as expected with testosterone replacement therapy  3  Prolactin is elevated which may be secondary to opiate usage however MRI of the pituitary will be ordered in order to rule out micro adenoma  4   Fertility issues-semen analysis will be ordered
0

## 2022-08-16 ENCOUNTER — HOSPITAL ENCOUNTER (OUTPATIENT)
Dept: RADIOLOGY | Facility: HOSPITAL | Age: 37
Discharge: HOME | End: 2022-08-16
Payer: COMMERCIAL

## 2022-08-16 DIAGNOSIS — M12.572 TRAUMATIC ARTHRITIS OF FOOT, LEFT: ICD-10-CM

## 2022-08-16 DIAGNOSIS — M12.571 TRAUMATIC ARTHROPATHY OF ANKLE, RIGHT: ICD-10-CM

## 2022-08-16 PROCEDURE — 73610 X-RAY EXAM OF ANKLE: CPT

## 2022-08-19 ENCOUNTER — HOSPITAL ENCOUNTER (OUTPATIENT)
Dept: RADIOLOGY | Facility: HOSPITAL | Age: 37
Discharge: HOME | End: 2022-08-19
Payer: COMMERCIAL

## 2022-08-19 DIAGNOSIS — M67.88 OTHER SPECIFIED DISORDERS OF SYNOVIUM AND TENDON, OTHER SITE: ICD-10-CM

## 2022-08-19 PROCEDURE — 76882 US LMTD JT/FCL EVL NVASC XTR: CPT

## 2022-09-14 ENCOUNTER — OFFICE VISIT (OUTPATIENT)
Dept: GASTROENTEROLOGY | Facility: CLINIC | Age: 37
End: 2022-09-14

## 2022-09-14 VITALS
BODY MASS INDEX: 34.32 KG/M2 | SYSTOLIC BLOOD PRESSURE: 120 MMHG | HEIGHT: 75 IN | WEIGHT: 276 LBS | HEART RATE: 85 BPM | DIASTOLIC BLOOD PRESSURE: 82 MMHG

## 2022-09-14 DIAGNOSIS — K64.4 ANAL SKIN TAG: ICD-10-CM

## 2022-09-14 DIAGNOSIS — K64.1 PROLAPSED INTERNAL HEMORRHOIDS, GRADE 2: Primary | ICD-10-CM

## 2022-09-14 DIAGNOSIS — Z86.010 PERSONAL HISTORY OF COLONIC POLYPS: ICD-10-CM

## 2022-09-14 DIAGNOSIS — D50.9 IRON DEFICIENCY ANEMIA, UNSPECIFIED IRON DEFICIENCY ANEMIA TYPE: ICD-10-CM

## 2022-09-14 PROCEDURE — 46221 LIGATION OF HEMORRHOID(S): CPT | Performed by: INTERNAL MEDICINE

## 2022-09-14 NOTE — LETTER
September 15, 2022     Brock Prescott, Ray Teran  [address] [place] [id number]    Patient: Ofelia Beth   YOB: 1985   Date of Visit: 9/14/2022       Dear Dr Serena Joseph: Thank you for referring Ofelia Beth to me for evaluation  Below are my notes for this consultation  If you have questions, please do not hesitate to call me  I look forward to following your patient along with you  Sincerely,        Leonard Khalil MD        CC: No Recipients  Leonard Khalil MD  9/15/2022  1:42 PM  Incomplete  [address] [facility] - Hemorrhoid Banding Ofelia Beth 42 y o  male MRN: [id number]  Encounter: [id number]    ASSESSMENT AND PLAN:    The right anterior hemorrhoid area was banded today  The patient was instructed to avoid constipation and straining, and educated in appropriate fiber intake  HPI: Ofelia Beth is a 42y o  year old male who presents with  Week patient difficulty with hygiene  due to hemorrhoids  Previous treatments include:  none  Bands were previously placed: none  Current Fiber intake:  Regular doctor  Complications of prior therapy include:  none    The patient provided consent for banding  and was placed in the left lateral position  Rectal exam showed-- external skin to skin tags and grade 2 internal hemorrhoids no fissure  The O'Clifton ligator was advanced and a band was applied without difficulty   Repeat rectal exam confirmed appropriate placement  The patient was discharged home after observation in the waiting area  The exam was chaperoned by [address] [place] [address] [facility] - Outpatient Follow-up Note  Ofelia Beth 42 y o  male MRN: [id number]  Encounter: [id number]    ASSESSMENT AND PLAN:      1  Prolapsed internal hemorrhoids, grade 2  -Two internal hemorrhoids  No major per collapse but noted on previous colonoscopy by review  Previous endoscopy reports    Patient's major issue is leakage and  Need for frequent cleansing   -  Probably would benefit from hemorrhoid banding  - reviewed with patient and he agrees    2  Anal skin tag  -- noted with an external tags  This may be scarring from an old external hemorrhoid  This is annoying but patient reports problem 1  Is worse for him    3  Personal history of colonic polyps  -- history of colon polyps  Patient was referred to our endoscopic expert for follow-up  A small 2nd polyp was removed  The concern of the thickened fold area in the right colon turned out to be benign tissue  Patient would be due for colonoscopy again in the fall of 2024    4  Iron deficiency anemia, unspecified iron deficiency anemia type  -- has resolved 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  Followup Appointment: 2- 3 weeks for banding   ______________________________________________________________________    Chief Complaint   Patient presents with    Follow up to colonoscopy     HPI:  Patient returns to the office for follow-up visit both major complaints with respect to anal leakage in discomfort  He does not have problems with bleeding however does the that he has leakage around the rectum and difficulty with hygiene  He has no problems with diarrhea or constipation  He has about 2 soft bowel movements per day  Colonoscopy last fall did reveal some serrated polyps  There was a question of a remaining polyp the patient was sent for 2nd opinion with respect to his colonoscopy by Dr Todd from findings were favorable  Examination did reveal 1st to second-degree hemorrhoids  Patient does report some discomfort of area of skin or tags in the anal area  This is not a major complaint however as he has no issues with the leakage      Historical Information   Past Medical History:   Diagnosis Date    ADD (attention deficit disorder)     Arthritis     Chronic pain     Heartburn     Mood disorder (HCC)     Tremor      Past Surgical History:   Procedure Laterality Date    CLOSED REDUCTION CALCANEAL FRACTURE      PERONEAL TENDON EXPLORATION       Social History     Substance and Sexual Activity   Alcohol Use Yes    Alcohol/week: 7 0 standard drinks    Types: 7 Cans of beer per week    Comment: social     Social History     Substance and Sexual Activity   Drug Use Never     Social History     Tobacco Use   Smoking Status Current Every Day Smoker    Packs/day: 0 50    Years: 10 00    Pack years: 5 00    Types: Cigarettes   Smokeless Tobacco Never Used     Family History   Problem Relation Age of Onset    Parkinsonism Paternal Grandfather     Heart disease Father     Hypertension Father     Breast cancer Mother     Intellectual disability Sister     Heart disease Maternal Grandfather     Substance Abuse Neg Hx     Mental illness Neg Hx          Current Outpatient Medications:     DEXILANT 60 MG capsule    fluticasone (FLONASE) 50 mcg/act nasal spray    gabapentin (NEURONTIN) 600 MG tablet    hydrOXYzine HCL (ATARAX) 50 mg tablet    losartan-hydrochlorothiazide (HYZAAR) 50-12 5 mg per tablet    methylphenidate (CONCERTA) 36 MG ER tablet    oxyCODONE-acetaminophen (PERCOCET)  mg per tablet    OXYCONTIN 10 MG 12 hr tablet    risperiDONE (RisperDAL) 3 mg tablet    sildenafil (VIAGRA) 100 mg tablet    varenicline (CHANTIX) 1 mg tablet    zolpidem (AMBIEN) 10 mg tablet  No Known Allergies  Reviewed medications and allergies and updated as indicated    PHYSICAL EXAM:    Blood pressure 120/82, pulse 85, height 6' 3" (1 905 m), weight 125 kg (276 lb)  Body mass index is 34 5 kg/m²  General Appearance: NAD, cooperative, alert  Eyes: Anicteric, conjunctiva pink  ENT:  Normocephalic, atraumatic, normal mucosa  Neck:  Supple, symmetrical, trachea midline  Resp:  Clear to auscultation bilaterally; no rales, rhonchi or wheezing; respirations unlabored   CV:  S1 S2, Regular rate and rhythm; no murmur, rub, or gallop    GI:  Soft, non-tender, non-distended; normal bowel sounds; no masses, no organomegaly   Rectal:  The large external tags  No prolapse  Grade 2 internal hemorrhoids  Musculoskeletal: No cyanosis, clubbing or edema  Normal ROM  Skin:  No jaundice, rashes, or lesions   Heme/Lymph: No palpable cervical lymphadenopathy  Psych: Normal affect, good eye contact  Neuro: No gross deficits, AAOx3    Lab Results:   Lab Results   Component Value Date    WBC 5 98 05/14/2022    HGB 16 4 05/14/2022    HCT 49 6 (H) 05/14/2022    MCV 87 05/14/2022     05/14/2022     Lab Results   Component Value Date    K 3 9 05/14/2022     05/14/2022    CO2 22 05/14/2022    BUN 9 05/14/2022    CREATININE 0 94 05/14/2022    GLUF 100 (H) 05/14/2022    CALCIUM 8 9 05/14/2022    CORRECTEDCA 9 5 05/14/2022    AST 52 (H) 05/14/2022    ALT 61 05/14/2022    ALKPHOS 98 05/14/2022    EGFR 101 05/14/2022     Lab Results   Component Value Date    IRON 111 12/28/2021    TIBC 401 12/28/2021    FERRITIN 28 12/28/2021            Alice Mojica MD  9/14/2022 12:31 PM  Sign when Signing Visit  [address] [facility] - Hem[facility] Barbara Park 42 y o  male MRN: [id number]  Encounter: [id number]    ASSESSMENT AND PLAN:    The right anterior hemorrhoid area was banded today  The patient was instructed to avoid constipation and straining, and educated in appropriate fiber intake  HPI: Barbara Park is a 42y o  year old male who presents with  Week patient difficulty with hygiene  due to hemorrhoids       Previous treatments include:  none  Bands were previously placed: none  Current Fiber intake:  Regular doctor  Complications of prior therapy include:  none    The patient provided consent for banding  and was placed in the left lateral position  Rectal exam showed-- external skin to skin tags and grade 2 internal hemorrhoids no fissure  The O'Clifton ligator was advanced and a band was applied without difficulty   Repeat rectal exam confirmed appropriate placement  The patient was discharged home after observation in the waiting area  The exam was chaperoned by [address] [facility] - Outpatient Follow-up Note  Mat Prom 42 y o  male MRN: [id number]  Encounter: [id number]    ASSESSMENT AND PLAN:      1  Prolapsed internal hemorrhoids, grade 2  -Two internal hemorrhoids  No major per collapse but noted on previous colonoscopy by review  Previous endoscopy reports  Patient's major issue is leakage and  Need for frequent cleansing   -  Probably would benefit from hemorrhoid banding  - reviewed with patient and he agrees    2  Anal skin tag  -- noted with an external tags  This may be scarring from an old external hemorrhoid  This is annoying but patient reports problem 1  Is worse for him    3  Personal history of colonic polyps  -- history of colon polyps  Patient was referred to our endoscopic expert for follow-up  A small 2nd polyp was removed  The concern of the thickened fold area in the right colon turned out to be benign tissue  Patient would be due for colonoscopy again in the fall of 2024    4  Iron deficiency anemia, unspecified iron deficiency anemia type  -- has resolved 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        Followup Appointment: 2- 3 weeks for banding   ______________________________________________________________________    Chief Complaint   Patient presents with    Follow up to colonoscopy     HPI:     Historical Information   Past Medical History:   Diagnosis Date    ADD (attention deficit disorder)     Arthritis     Chronic pain     Heartburn     Mood disorder (HCC)     Tremor      Past Surgical History:   Procedure Laterality Date    CLOSED REDUCTION CALCANEAL FRACTURE      PERONEAL TENDON EXPLORATION       Social History     Substance and Sexual Activity   Alcohol Use Yes    Alcohol/week: 7 0 standard drinks    Types: 7 Cans of beer per week    Comment: social     Social History     Substance and Sexual Activity   Drug Use Never     Social History     Tobacco Use   Smoking Status Current Every Day Smoker    Packs/day: 0 50    Years: 10 00    Pack years: 5 00    Types: Cigarettes   Smokeless Tobacco Never Used     Family History   Problem Relation Age of Onset    Parkinsonism Paternal Grandfather     Heart disease Father     Hypertension Father     Breast cancer Mother     Intellectual disability Sister     Heart disease Maternal Grandfather     Substance Abuse Neg Hx     Mental illness Neg Hx          Current Outpatient Medications:     DEXILANT 60 MG capsule    fluticasone (FLONASE) 50 mcg/act nasal spray    gabapentin (NEURONTIN) 600 MG tablet    hydrOXYzine HCL (ATARAX) 50 mg tablet    losartan-hydrochlorothiazide (HYZAAR) 50-12 5 mg per tablet    methylphenidate (CONCERTA) 36 MG ER tablet    oxyCODONE-acetaminophen (PERCOCET)  mg per tablet    OXYCONTIN 10 MG 12 hr tablet    risperiDONE (RisperDAL) 3 mg tablet    sildenafil (VIAGRA) 100 mg tablet    varenicline (CHANTIX) 1 mg tablet    zolpidem (AMBIEN) 10 mg tablet  No Known Allergies  Reviewed medications and allergies and updated as indicated    PHYSICAL EXAM:    Blood pressure 120/82, pulse 85, height 6' 3" (1 905 m), weight 125 kg (276 lb)  Body mass index is 34 5 kg/m²  General Appearance: NAD, cooperative, alert  Eyes: Anicteric, PERRLA, EOMI  ENT:  Normocephalic, atraumatic, normal mucosa  Neck:  Supple, symmetrical, trachea midline  Resp:  Clear to auscultation bilaterally; no rales, rhonchi or wheezing; respirations unlabored   CV:  S1 S2, Regular rate and rhythm; no murmur, rub, or gallop  GI:  Soft, non-tender, non-distended; normal bowel sounds; no masses, no organomegaly   Rectal: Deferred  Musculoskeletal: No cyanosis, clubbing or edema  Normal ROM    Skin:  No jaundice, rashes, or lesions   Heme/Lymph: No palpable cervical lymphadenopathy  Psych: Normal affect, good eye contact  Neuro: No gross deficits, AAOx3    Lab Results:   Lab Results   Component Value Date    WBC 5 98 05/14/2022    HGB 16 4 05/14/2022    HCT 49 6 (H) 05/14/2022    MCV 87 05/14/2022     05/14/2022     Lab Results   Component Value Date    K 3 9 05/14/2022     05/14/2022    CO2 22 05/14/2022    BUN 9 05/14/2022    CREATININE 0 94 05/14/2022    GLUF 100 (H) 05/14/2022    CALCIUM 8 9 05/14/2022    CORRECTEDCA 9 5 05/14/2022    AST 52 (H) 05/14/2022    ALT 61 05/14/2022    ALKPHOS 98 05/14/2022    EGFR 101 05/14/2022     Lab Results   Component Value Date    IRON 111 12/28/2021    TIBC 401 12/28/2021    FERRITIN 28 12/28/2021     Lab Results   Component Value Date    LIPASE 102 05/28/2021       Radiology Results:   XR ankle 3+ vw left    Result Date: 8/17/2022  Narrative: LEFT ANKLE INDICATION:   M12 572: Traumatic arthropathy, left ankle and foot M12 571: Traumatic arthropathy, right ankle and foot  COMPARISON:  Radiographs of the left ankle December 9, 2020 VIEWS:  XR ANKLE 3+ VW LEFT FINDINGS: Side plates and screws transfix the calcaneus  Additional orthopedic pin within the calcaneus  Hardware is intact and unchanged in appearance and alignment  Suture anchors along the distal fibula are also unchanged  Degenerative change with joint space narrowing at the subtalar joint is similar to prior study  Mild degenerative change of the visualized midfoot with minimal osteophytosis and minimal joint space narrowing is also similar to prior study  A retrocalcaneal spur at the insertion of the Achilles tendon is unchanged  No suspicious osseous lesion  No acute fracture or dislocation

## 2022-09-14 NOTE — PROGRESS NOTES
[address] - Hemorrhoid Banding Carlos Session 42 y o  male MRN: [id number]  Encounter: [id number]    ASSESSMENT AND PLAN:    The right anterior hemorrhoid area was banded today  The patient was instructed to avoid constipation and straining, and educated in appropriate fiber intake  HPI: Carlos Aleman is a 42y o  year old male who presents with  Week patient difficulty with hygiene  due to hemorrhoids  Previous treatments include:  none  Bands were previously placed: none  Current Fiber intake:  Regular doctor  Complications of prior therapy include:  none    The patient provided consent for banding  and was placed in the left lateral position  Rectal exam showed-- external skin to skin tags and grade 2 internal hemorrhoids no fissure  The [place] ligator was advanced and a band was applied without difficulty   Repeat rectal exam confirmed appropriate placement  The patient was discharged home after observation in the waiting area  The exam was chaperoned by [address] [place] [address] [facility] - Outpatient Follow-up Note  Carlos Session 42 y o  male MRN: [id number]  Encounter: [id number]    ASSESSMENT AND PLAN:      1  Prolapsed internal hemorrhoids, grade 2  -Two internal hemorrhoids  No major per collapse but noted on previous colonoscopy by review  Previous endoscopy reports  Patient's major issue is leakage and  Need for frequent cleansing   -  Probably would benefit from hemorrhoid banding  - reviewed with patient and he agrees    2  Anal skin tag  -- noted with an external tags  This may be scarring from an old external hemorrhoid  This is annoying but patient reports problem 1  Is worse for him    3  Personal history of colonic polyps  -- history of colon polyps  Patient was referred to our endoscopic expert for follow-up  A small 2nd polyp was removed    The concern of the thickened fold area in the right colon turned out to be benign tissue  Patient would be due for colonoscopy again in the fall of 2024    4  Iron deficiency anemia, unspecified iron deficiency anemia type  -- has resolved 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  Followup Appointment: 2- 3 weeks for banding   ______________________________________________________________________    Chief Complaint   Patient presents with    Follow up to colonoscopy     HPI:  Patient returns to the office for follow-up visit both major complaints with respect to anal leakage in discomfort  He does not have problems with bleeding however does the that he has leakage around the rectum and difficulty with hygiene  He has no problems with diarrhea or constipation  He has about 2 soft bowel movements per day  Colonoscopy last fall did reveal some serrated polyps  There was a question of a remaining polyp the patient was sent for 2nd opinion with respect to his colonoscopy by Dr Todd from findings were favorable  Examination did reveal 1st to second-degree hemorrhoids  Patient does report some discomfort of area of skin or tags in the anal area  This is not a major complaint however as he has no issues with the leakage      Historical Information   Past Medical History:   Diagnosis Date    ADD (attention deficit disorder)     Arthritis     Chronic pain     Heartburn     Mood disorder (HCC)     Tremor      Past Surgical History:   Procedure Laterality Date    CLOSED REDUCTION CALCANEAL FRACTURE      PERONEAL TENDON EXPLORATION       Social History     Substance and Sexual Activity   Alcohol Use Yes    Alcohol/week: 7 0 standard drinks    Types: 7 Cans of beer per week    Comment: social     Social History     Substance and Sexual Activity   Drug Use Never     Social History     Tobacco Use   Smoking Status Current Every Day Smoker    Packs/day: 0 50    Years: 10 00    Pack years: 5 00    Types: Cigarettes   Smokeless Tobacco Never Used     Family History Problem Relation Age of Onset    Parkinsonism Paternal Grandfather     Heart disease Father     Hypertension Father     Breast cancer Mother     Intellectual disability Sister     Heart disease Maternal Grandfather     Substance Abuse Neg Hx     Mental illness Neg Hx          Current Outpatient Medications:     DEXILANT 60 MG capsule    fluticasone (FLONASE) 50 mcg/act nasal spray    gabapentin (NEURONTIN) 600 MG tablet    hydrOXYzine HCL (ATARAX) 50 mg tablet    losartan-hydrochlorothiazide (HYZAAR) 50-12 5 mg per tablet    methylphenidate (CONCERTA) 36 MG ER tablet    oxyCODONE-acetaminophen (PERCOCET)  mg per tablet    OXYCONTIN 10 MG 12 hr tablet    risperiDONE (RisperDAL) 3 mg tablet    sildenafil (VIAGRA) 100 mg tablet    varenicline (CHANTIX) 1 mg tablet    zolpidem (AMBIEN) 10 mg tablet  No Known Allergies  Reviewed medications and allergies and updated as indicated    PHYSICAL EXAM:    Blood pressure 120/82, pulse 85, height 6' 3" (1 905 m), weight 125 kg (276 lb)  Body mass index is 34 5 kg/m²  General Appearance: NAD, cooperative, alert  Eyes: Anicteric, conjunctiva pink  ENT:  Normocephalic, atraumatic, normal mucosa  Neck:  Supple, symmetrical, trachea midline  Resp:  Clear to auscultation bilaterally; no rales, rhonchi or wheezing; respirations unlabored   CV:  S1 S2, Regular rate and rhythm; no murmur, rub, or gallop  GI:  Soft, non-tender, non-distended; normal bowel sounds; no masses, no organomegaly   Rectal:  The large external tags  No prolapse  Grade 2 internal hemorrhoids  Musculoskeletal: No cyanosis, clubbing or edema  Normal ROM    Skin:  No jaundice, rashes, or lesions   Heme/Lymph: No palpable cervical lymphadenopathy  Psych: Normal affect, good eye contact  Neuro: No gross deficits, AAOx3    Lab Results:   Lab Results   Component Value Date    WBC 5 98 05/14/2022    HGB 16 4 05/14/2022    HCT 49 6 (H) 05/14/2022    MCV 87 05/14/2022     05/14/2022 Lab Results   Component Value Date    K 3 9 05/14/2022     05/14/2022    CO2 22 05/14/2022    BUN 9 05/14/2022    CREATININE 0 94 05/14/2022    GLUF 100 (H) 05/14/2022    CALCIUM 8 9 05/14/2022    CORRECTEDCA 9 5 05/14/2022    AST 52 (H) 05/14/2022    ALT 61 05/14/2022    ALKPHOS 98 05/14/2022    EGFR 101 05/14/2022     Lab Results   Component Value Date    IRON 111 12/28/2021    TIBC 401 12/28/2021    FERRITIN 28 12/28/2021

## 2022-09-14 NOTE — PATIENT INSTRUCTIONS
[address] [facility] - Outpatient Follow-up Note  Davie Aranda 42 y o  male MRN: [id number]  Encounter: [id number]    ASSESSMENT AND PLAN:      1  Prolapsed internal hemorrhoids, grade 2  -Two internal hemorrhoids  No major per collapse but noted on previous colonoscopy by review  Previous endoscopy reports  Patient's major issue is leakage and  Need for frequent cleansing   -  Probably would benefit from hemorrhoid banding  - reviewed with patient and he agrees      -- patient did have successful banding of a area of the hemorrhoids in the anterior column  Patient advised to call for bleeding  Can take Tylenol or ibuprofen for pain  Should not have major issues  Will repeat procedure in 2-3 weeks  Not able to help with the band but hopefully this will help with the patient's leakage issues    2  Anal skin tag  -- noted with an external tags  This may be scarring from an old external hemorrhoid  This is annoying but patient reports problem 1  Is worse for him    3  Personal history of colonic polyps  -- history of colon polyps  Patient was referred to our endoscopic expert for follow-up  A small 2nd polyp was removed  The concern of the thickened fold area in the right colon turned out to be benign tissue  Patient would be due for colonoscopy again in the fall of 2024    4  Iron deficiency anemia, unspecified iron deficiency anemia type  -- has resolved 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  [address] [facility] - Hemorrhoid Banding Davie Aranda 42 y o  male MRN: [id number]  Encounter: [id number]    ASSESSMENT AND PLAN:    The right anterior hemorrhoid area was banded today  The patient was instructed to avoid constipation and straining, and educated in appropriate fiber intake  HPI: Davie Aranda is a 42y o  year old male who presents with  Week patient difficulty with hygiene  due to hemorrhoids       Previous treatments include:  none  Bands were previously placed: none  Current Fiber intake:  Regular doctor  Complications of prior therapy include:  none    The patient provided consent for banding  and was placed in the left lateral position  Rectal exam showed-- external skin to skin tags and grade 2 internal hemorrhoids no fissure  The O'Clifton ligator was advanced and a band was applied without difficulty   Repeat rectal exam confirmed appropriate placement  The patient was discharged home after observation in the waiting area  The exam was chaperoned by Roberta Oconnor                Followup Appointment: 2- 3 weeks for banding

## 2022-10-12 PROBLEM — Z11.59 SPECIAL SCREENING EXAMINATION FOR VIRAL DISEASE: Status: RESOLVED | Noted: 2021-11-30 | Resolved: 2022-10-12

## 2022-10-26 DIAGNOSIS — N52.1 ERECTILE DYSFUNCTION DUE TO DISEASES CLASSIFIED ELSEWHERE: ICD-10-CM

## 2022-10-26 RX ORDER — SILDENAFIL 100 MG/1
TABLET, FILM COATED ORAL
Qty: 30 TABLET | Refills: 0 | Status: CANCELLED | OUTPATIENT
Start: 2022-10-26

## 2022-10-28 DIAGNOSIS — N52.1 ERECTILE DYSFUNCTION DUE TO DISEASES CLASSIFIED ELSEWHERE: ICD-10-CM

## 2022-10-28 RX ORDER — SILDENAFIL 100 MG/1
TABLET, FILM COATED ORAL
Qty: 30 TABLET | Refills: 3 | Status: SHIPPED | OUTPATIENT
Start: 2022-10-28

## 2022-10-28 NOTE — TELEPHONE ENCOUNTER
Medication Refill Request     Name sildenafil (VIAGRA) 100 mg tablet  Dose/Frequency as needed  Quantity 30  Verified pharmacy   [x]  Verified ordering Provider   [x]  Does patient have enough for the next 3 days?  Yes [] No [x]

## 2022-12-28 ENCOUNTER — AMB VIDEO VISIT (OUTPATIENT)
Dept: OTHER | Facility: HOSPITAL | Age: 37
End: 2022-12-28

## 2022-12-28 DIAGNOSIS — F17.219 CIGARETTE NICOTINE DEPENDENCE WITH NICOTINE-INDUCED DISORDER: ICD-10-CM

## 2022-12-28 DIAGNOSIS — N52.1 ERECTILE DYSFUNCTION DUE TO DISEASES CLASSIFIED ELSEWHERE: ICD-10-CM

## 2022-12-28 DIAGNOSIS — J01.00 ACUTE NON-RECURRENT MAXILLARY SINUSITIS: Primary | ICD-10-CM

## 2022-12-28 DIAGNOSIS — I10 PRIMARY HYPERTENSION: ICD-10-CM

## 2022-12-28 RX ORDER — FLUTICASONE PROPIONATE 50 MCG
1 SPRAY, SUSPENSION (ML) NASAL DAILY
Qty: 9.9 ML | Refills: 0 | Status: SHIPPED | OUTPATIENT
Start: 2022-12-28

## 2022-12-28 RX ORDER — BENZONATATE 100 MG/1
100 CAPSULE ORAL 3 TIMES DAILY PRN
Qty: 20 CAPSULE | Refills: 0 | Status: SHIPPED | OUTPATIENT
Start: 2022-12-28

## 2022-12-28 RX ORDER — AMOXICILLIN AND CLAVULANATE POTASSIUM 875; 125 MG/1; MG/1
1 TABLET, FILM COATED ORAL EVERY 12 HOURS SCHEDULED
Qty: 20 TABLET | Refills: 0 | Status: SHIPPED | OUTPATIENT
Start: 2022-12-28 | End: 2023-01-07

## 2022-12-28 NOTE — PATIENT INSTRUCTIONS
Sinusitis   WHAT YOU NEED TO KNOW:   Sinusitis is inflammation or infection of your sinuses  Sinusitis is most often caused by a virus  Acute sinusitis may last up to 12 weeks  Chronic sinusitis lasts longer than 12 weeks  Recurrent sinusitis means you have 4 or more infections in 1 year  DISCHARGE INSTRUCTIONS:   Return to the emergency department if:   You have trouble breathing or wheezing that is getting worse  You have a stiff neck, a fever, or a bad headache  You cannot open your eye  Your eyeball bulges out or you cannot move your eye  You are more sleepy than normal, or you notice changes in your ability to think, move, or talk  You have swelling of your forehead or scalp  Call your doctor if:   You have vision changes, such as double vision  Your eye and eyelid are red, swollen, and painful  Your symptoms do not improve or go away after 10 days  You have nausea and are vomiting  Your nose is bleeding  You have questions or concerns about your condition or care  Medicines: Your symptoms may go away on their own  Your healthcare provider may recommend watchful waiting for up to 10 days before starting antibiotics  You may need any of the following:  Acetaminophen  decreases pain and fever  It is available without a doctor's order  Ask how much to take and how often to take it  Follow directions  Read the labels of all other medicines you are using to see if they also contain acetaminophen, or ask your doctor or pharmacist  Acetaminophen can cause liver damage if not taken correctly  Do not use more than 4 grams (4,000 milligrams) total of acetaminophen in one day  NSAIDs , such as ibuprofen, help decrease swelling, pain, and fever  This medicine is available with or without a doctor's order  NSAIDs can cause stomach bleeding or kidney problems in certain people   If you take blood thinner medicine, always ask your healthcare provider if NSAIDs are safe for you  Always read the medicine label and follow directions  Nasal steroid sprays  may help decrease inflammation in your nose and sinuses  Decongestants  help reduce swelling and drain mucus in the nose and sinuses  They may help you breathe easier  Antihistamines  help dry mucus in the nose and relieve sneezing  Antibiotics  help treat or prevent a bacterial infection  Take your medicine as directed  Contact your healthcare provider if you think your medicine is not helping or if you have side effects  Tell him or her if you are allergic to any medicine  Keep a list of the medicines, vitamins, and herbs you take  Include the amounts, and when and why you take them  Bring the list or the pill bottles to follow-up visits  Carry your medicine list with you in case of an emergency  Self-care:   Rinse your sinuses as directed  Use a sinus rinse device to rinse your nasal passages with a saline (salt water) solution or distilled water  Do not use tap water  This will help thin the mucus in your nose and rinse away pollen and dirt  It will also help reduce swelling so you can breathe normally  Use a humidifier  to increase air moisture in your home  This may make it easier for you to breathe and help decrease your cough  Sleep with your head elevated  Place an extra pillow under your head before you go to sleep to help your sinuses drain  Drink liquids as directed  Ask your healthcare provider how much liquid to drink each day and which liquids are best for you  Liquids will thin the mucus in your nose and help it drain  Avoid drinks that contain alcohol or caffeine  Do not smoke, and avoid secondhand smoke  Nicotine and other chemicals in cigarettes and cigars can make your symptoms worse  Ask your healthcare provider for information if you currently smoke and need help to quit  E-cigarettes or smokeless tobacco still contain nicotine   Talk to your healthcare provider before you use these products  Prevent the spread of germs:   Wash your hands often with soap and water  Wash your hands after you use the bathroom, change a child's diaper, or sneeze  Wash your hands before you prepare or eat food  Stay away from people who are sick  Some germs spread easily and quickly through contact  Follow up with your doctor as directed: You may be referred to an ear, nose, and throat specialist  Write down your questions so you remember to ask them during your visits  © Copyright [employer] 2022 Information is for End User's use only and may not be sold, redistributed or otherwise used for commercial purposes  All illustrations and images included in CareNotes® are the copyrighted property of A D A M , Inc  or [facility] Ildefonso Carvalho   The above information is an  only  It is not intended as medical advice for individual conditions or treatments  Talk to your doctor, nurse or pharmacist before following any medical regimen to see if it is safe and effective for you

## 2022-12-28 NOTE — PROGRESS NOTES
Video Visit - Denys Holder 42 y o  male MRN: [id number]    REQUIRED DOCUMENTATION:         1  This service was provided via [employer]  2  Provider located at [address] [place] [id number] [phone]  3  [facility] provider: Vasyl Montalvo PA-C   4  Identify all parties in room with patient during [facility] visit:  No one else  5  After connecting through [employer], patient was identified by name and date of birth  Patient was then informed that this was a Telemedicine visit and that the exam was being conducted confidentially over secure lines  My office door was closed  No one else was in the room  Patient acknowledged consent and understanding of privacy and security of the Telemedicine visit  I informed the patient that I have reviewed their record in Epic and presented the opportunity for them to ask any questions regarding the visit today  The patient agreed to participate  HPI  The whole family is sick  His daughter was positive for the flu  He has the same symptoms but did have a flu shot  He has a nasty cough, nasal congestion, and green mucus  He feels like he isn't improving  He is taking advil cold and sinus  He denies any fevers, chills, nausea, vomiitng, SOB, CP  He does have some SOB after coughing  It has been over a week  Physical Exam  Constitutional:       General: He is not in acute distress  Appearance: Normal appearance  He is not ill-appearing, toxic-appearing or diaphoretic  HENT:      Head: Normocephalic and atraumatic  Nose: Congestion present  Pulmonary:      Effort: Pulmonary effort is normal  No respiratory distress  Lymphadenopathy:      Cervical: No cervical adenopathy  Skin:     General: Skin is dry  Neurological:      General: No focal deficit present  Mental Status: He is alert and oriented to person, place, and time     Psychiatric:         Mood and Affect: Mood normal          Behavior: Behavior normal          Diagnoses and all orders for this visit:    Acute non-recurrent maxillary sinusitis  -     benzonatate (TESSALON PERLES) 100 mg capsule; Take 1 capsule (100 mg total) by mouth 3 (three) times a day as needed for cough  -     fluticasone (FLONASE) 50 mcg/act nasal spray; 1 spray into each nostril daily  -     amoxicillin-clavulanate (AUGMENTIN) 875-125 mg per tablet; Take 1 tablet by mouth every 12 (twelve) hours for 10 days      Patient Instructions     Sinusitis   WHAT YOU NEED TO KNOW:   Sinusitis is inflammation or infection of your sinuses  Sinusitis is most often caused by a virus  Acute sinusitis may last up to 12 weeks  Chronic sinusitis lasts longer than 12 weeks  Recurrent sinusitis means you have 4 or more infections in 1 year  DISCHARGE INSTRUCTIONS:   Return to the emergency department if:   · You have trouble breathing or wheezing that is getting worse  · You have a stiff neck, a fever, or a bad headache  · You cannot open your eye  · Your eyeball bulges out or you cannot move your eye  · You are more sleepy than normal, or you notice changes in your ability to think, move, or talk  · You have swelling of your forehead or scalp  Call your doctor if:   · You have vision changes, such as double vision  · Your eye and eyelid are red, swollen, and painful  · Your symptoms do not improve or go away after 10 days  · You have nausea and are vomiting  · Your nose is bleeding  · You have questions or concerns about your condition or care  Medicines: Your symptoms may go away on their own  Your healthcare provider may recommend watchful waiting for up to 10 days before starting antibiotics  You may need any of the following:  · Acetaminophen  decreases pain and fever  It is available without a doctor's order  Ask how much to take and how often to take it  Follow directions   Read the labels of all other medicines you are using to see if they also contain acetaminophen, or ask your doctor or pharmacist  Acetaminophen can cause liver damage if not taken correctly  Do not use more than 4 grams (4,000 milligrams) total of acetaminophen in one day  · NSAIDs , such as ibuprofen, help decrease swelling, pain, and fever  This medicine is available with or without a doctor's order  NSAIDs can cause stomach bleeding or kidney problems in certain people  If you take blood thinner medicine, always ask your healthcare provider if NSAIDs are safe for you  Always read the medicine label and follow directions  · Nasal steroid sprays  may help decrease inflammation in your nose and sinuses  · Decongestants  help reduce swelling and drain mucus in the nose and sinuses  They may help you breathe easier  · Antihistamines  help dry mucus in the nose and relieve sneezing  · Antibiotics  help treat or prevent a bacterial infection  · Take your medicine as directed  Contact your healthcare provider if you think your medicine is not helping or if you have side effects  Tell him or her if you are allergic to any medicine  Keep a list of the medicines, vitamins, and herbs you take  Include the amounts, and when and why you take them  Bring the list or the pill bottles to follow-up visits  Carry your medicine list with you in case of an emergency  Self-care:   · Rinse your sinuses as directed  Use a sinus rinse device to rinse your nasal passages with a saline (salt water) solution or distilled water  Do not use tap water  This will help thin the mucus in your nose and rinse away pollen and dirt  It will also help reduce swelling so you can breathe normally  · Use a humidifier  to increase air moisture in your home  This may make it easier for you to breathe and help decrease your cough  · Sleep with your head elevated  Place an extra pillow under your head before you go to sleep to help your sinuses drain  · Drink liquids as directed    Ask your healthcare provider how much liquid to drink each day and which liquids are best for you  Liquids will thin the mucus in your nose and help it drain  Avoid drinks that contain alcohol or caffeine  · Do not smoke, and avoid secondhand smoke  Nicotine and other chemicals in cigarettes and cigars can make your symptoms worse  Ask your healthcare provider for information if you currently smoke and need help to quit  E-cigarettes or smokeless tobacco still contain nicotine  Talk to your healthcare provider before you use these products  Prevent the spread of germs:   · Wash your hands often with soap and water  Wash your hands after you use the bathroom, change a child's diaper, or sneeze  Wash your hands before you prepare or eat food  · Stay away from people who are sick  Some germs spread easily and quickly through contact  Follow up with your doctor as directed: You may be referred to an ear, nose, and throat specialist  Write down your questions so you remember to ask them during your visits  © Copyright [employer] 2022 Information is for End User's use only and may not be sold, redistributed or otherwise used for commercial purposes  All illustrations and images included in CareNotes® are the copyrighted property of [employer] M , Inc  or [address] [place], [place] [address]nidia ching   The above information is an  only  It is not intended as medical advice for individual conditions or treatments  Talk to your doctor, nurse or pharmacist before following any medical regimen to see if it is safe and effective for you

## 2022-12-28 NOTE — CARE ANYWHERE EVISITS
Visit Summary for NAYA Howard - Gender: Male - Date of Birth: [id number]  Date: [id number] - Duration: 3 minutes  Patient: NAYA Howard  Provider: Moishe Cogan PA-C    Patient Contact Information  Address  [address] [place] [id number]      Visit Topics  Stress / Anxiety [Added By: Self - 2022-12-28]  Headache [Added By: Self - 2022-12-28]  Flu-Like Symptoms [Added By: Self - 2022-12-28]  Cold [Added By: Self - 2022-12-28]    Triage Questions   What is your current physical address in the event of a medical emergency? Answer []  Are you allergic to any medications? Answer []  Are you now or could you be pregnant? Answer []  Do you have any immune system compromise or chronic lung   disease? Answer []  Do you have any vulnerable family members in the home (infant, pregnant, cancer, elderly)? Answer []     Conversation Transcripts  [0A][0A] [Notification] You are connected with Moishe Cogan PA-C, Urgent Care Specialist [0A][Notification] Honey Thompson is located in [place]  [0A][Notification] Honey Thompson has shared health history  Regina Fagan  [0A]    Diagnosis  Acute maxillary sinusitis, unspecified    Procedures  Value: [id number] Code: CPT-4 UNLISTED E&M SERVICE    Medications Prescribed    No prescriptions ordered    Electronically signed by: Sharlene Arnold(NPI [id number])

## 2022-12-29 RX ORDER — LOSARTAN POTASSIUM AND HYDROCHLOROTHIAZIDE 12.5; 5 MG/1; MG/1
1 TABLET ORAL DAILY
Qty: 90 TABLET | Refills: 0 | Status: SHIPPED | OUTPATIENT
Start: 2022-12-29

## 2022-12-29 RX ORDER — SILDENAFIL 100 MG/1
TABLET, FILM COATED ORAL
Qty: 30 TABLET | Refills: 0 | Status: SHIPPED | OUTPATIENT
Start: 2022-12-29

## 2022-12-29 RX ORDER — VARENICLINE TARTRATE 1 MG/1
1 TABLET, FILM COATED ORAL 2 TIMES DAILY
Qty: 60 TABLET | Refills: 0 | Status: SHIPPED | OUTPATIENT
Start: 2022-12-29

## 2023-01-20 DIAGNOSIS — J01.00 ACUTE MAXILLARY SINUSITIS, RECURRENCE NOT SPECIFIED: ICD-10-CM

## 2023-01-20 RX ORDER — FLUTICASONE PROPIONATE 50 MCG
1 SPRAY, SUSPENSION (ML) NASAL 2 TIMES DAILY
Qty: 48 ML | Refills: 2 | Status: SHIPPED | OUTPATIENT
Start: 2023-01-20

## 2023-02-08 ENCOUNTER — OFFICE VISIT (OUTPATIENT)
Dept: FAMILY MEDICINE CLINIC | Facility: HOSPITAL | Age: 38
End: 2023-02-08

## 2023-02-08 VITALS
HEIGHT: 75 IN | HEART RATE: 109 BPM | TEMPERATURE: 98.4 F | BODY MASS INDEX: 32.83 KG/M2 | OXYGEN SATURATION: 98 % | SYSTOLIC BLOOD PRESSURE: 138 MMHG | DIASTOLIC BLOOD PRESSURE: 82 MMHG | WEIGHT: 264 LBS | RESPIRATION RATE: 16 BRPM

## 2023-02-08 DIAGNOSIS — I10 PRIMARY HYPERTENSION: Primary | ICD-10-CM

## 2023-02-08 DIAGNOSIS — F11.20 NARCOTIC DEPENDENCY, CONTINUOUS (HCC): ICD-10-CM

## 2023-02-08 DIAGNOSIS — R73.01 IMPAIRED FASTING GLUCOSE: ICD-10-CM

## 2023-02-08 DIAGNOSIS — F17.200 TOBACCO DEPENDENCE: ICD-10-CM

## 2023-02-08 DIAGNOSIS — Z79.890 LONG-TERM CURRENT USE OF TESTOSTERONE REPLACEMENT THERAPY: ICD-10-CM

## 2023-02-08 DIAGNOSIS — F31.70 BIPOLAR DISORDER IN FULL REMISSION, MOST RECENT EPISODE UNSPECIFIED TYPE (HCC): ICD-10-CM

## 2023-02-08 DIAGNOSIS — Z00.00 ANNUAL PHYSICAL EXAM: ICD-10-CM

## 2023-02-08 RX ORDER — SYRINGE, DISPOSABLE, 1 ML
SYRINGE, EMPTY DISPOSABLE MISCELLANEOUS
COMMUNITY
Start: 2022-12-14

## 2023-02-08 RX ORDER — LOSARTAN POTASSIUM AND HYDROCHLOROTHIAZIDE 12.5; 5 MG/1; MG/1
1 TABLET ORAL DAILY
Qty: 90 TABLET | Refills: 3 | Status: SHIPPED | OUTPATIENT
Start: 2023-02-08

## 2023-02-08 RX ORDER — OXYCODONE HYDROCHLORIDE AND ACETAMINOPHEN 5; 325 MG/1; MG/1
1 TABLET ORAL EVERY 6 HOURS PRN
COMMUNITY
Start: 2023-01-06

## 2023-02-08 RX ORDER — TESTOSTERONE CYPIONATE 200 MG/ML
INJECTION INTRAMUSCULAR
COMMUNITY
Start: 2023-01-13

## 2023-02-08 RX ORDER — NEEDLES, DISPOSABLE 25GX5/8"
NEEDLE, DISPOSABLE MISCELLANEOUS
COMMUNITY
Start: 2022-12-15

## 2023-02-08 RX ORDER — VARENICLINE TARTRATE 25 MG
KIT ORAL
Qty: 53 TABLET | Refills: 0 | Status: SHIPPED | OUTPATIENT
Start: 2023-02-08

## 2023-02-08 NOTE — ASSESSMENT & PLAN NOTE
Patient's chronic hypertension  Blood pressure is acceptable today  Continue losartan/HCTZ    I will check his electrolytes, renal function, thyroid function and blood counts

## 2023-02-08 NOTE — ASSESSMENT & PLAN NOTE
Patient's moods are stable  He sees psychiatry regularly    I made him aware that Chantix may worsen his moods and he will let me know if that happens and he will discontinue Chantix

## 2023-02-08 NOTE — ASSESSMENT & PLAN NOTE
Patient has history of impaired fasting glucose  I will check his fasting blood sugar and his A1c  Discussed decreasing portion sizes and starting exercise to lose weight

## 2023-02-08 NOTE — PATIENT INSTRUCTIONS
Please discontinue Chantix if it makes your moods worse! If Chantix does not bother your moods and you are able to quit smoking then please call me a week before you run out of Chantix so I could refill it for you! Set a smoking quit date and start taking Chantix a week before that  You may smoke and take Chantix at the same time for that week but after the quit date do not smoke anymore just take the Chantix and the nicotine gum as needed for cravings! Wellness Visit for Adults   AMBULATORY CARE:   A wellness visit  is when you see your healthcare provider to get screened for health problems  Your healthcare provider will also give you advice on how to stay healthy  Write down your questions so you remember to ask them  Ask your healthcare provider how often you should have a wellness visit  What happens at a wellness visit:  Your healthcare provider will ask about your health, and your family history of health problems  This includes high blood pressure, heart disease, and cancer  He or she will ask if you have symptoms that concern you, if you smoke, and about your mood  You may also be asked about your intake of medicines, supplements, food, and alcohol  Any of the following may be done: Your weight  will be checked  Your height may also be checked so your body mass index (BMI) can be calculated  Your BMI shows if you are at a healthy weight  Your blood pressure  and heart rate will be checked  Your temperature may also be checked  Blood and urine tests  may be done  Blood tests may be done to check your cholesterol levels  Abnormal cholesterol levels increase your risk for heart disease and stroke  You may also need a blood or urine test to check for diabetes if you are at increased risk  Urine tests may be done to look for signs of an infection or kidney disease  A physical exam  includes checking your heartbeat and lungs with a stethoscope   Your healthcare provider may also check your skin to look for sun damage  Screening tests  may be recommended  A screening test is done to check for diseases that may not cause symptoms  The screening tests you may need depend on your age, gender, family history, and lifestyle habits  For example, colorectal screening may be recommended if you are 48years old or older  Screening tests you need if you are a woman:   A Pap smear  is used to screen for cervical cancer  Pap smears are usually done every 3 to 5 years depending on your age  You may need them more often if you have had abnormal Pap smear test results in the past  Ask your healthcare provider how often you should have a Pap smear  A mammogram  is an x-ray of your breasts to screen for breast cancer  Experts recommend mammograms every 2 years starting at age 48 years  You may need a mammogram at age 52 years or younger if you have an increased risk for breast cancer  Talk to your healthcare provider about when you should start having mammograms and how often you need them  Vaccines you may need:   Get an influenza vaccine  every year  The influenza vaccine protects you from the flu  Several types of viruses cause the flu  The viruses change over time, so new vaccines are made each year  Get a tetanus-diphtheria (Td) booster vaccine  every 10 years  This vaccine protects you against tetanus and diphtheria  Tetanus is a severe infection that may cause painful muscle spasms and lockjaw  Diphtheria is a severe bacterial infection that causes a thick covering in the back of your mouth and throat  Get a human papillomavirus (HPV) vaccine  if you are female and aged 23 to 32 or male 23 to 24 and never received it  This vaccine protects you from HPV infection  HPV is the most common infection spread by sexual contact  HPV may also cause vaginal, penile, and anal cancers  Get a pneumococcal vaccine  if you are aged 72 years or older   The pneumococcal vaccine is an injection given to protect you from pneumococcal disease  Pneumococcal disease is an infection caused by pneumococcal bacteria  The infection may cause pneumonia, meningitis, or an ear infection  Get a shingles vaccine  if you are 60 or older, even if you have had shingles before  The shingles vaccine is an injection to protect you from the varicella-zoster virus  This is the same virus that causes chickenpox  Shingles is a painful rash that develops in people who had chickenpox or have been exposed to the virus  How to eat healthy:  My Plate is a model for planning healthy meals  It shows the types and amounts of foods that should go on your plate  Fruits and vegetables make up about half of your plate, and grains and protein make up the other half  A serving of dairy is included on the side of your plate  The amount of calories and serving sizes you need depends on your age, gender, weight, and height  Examples of healthy foods are listed below:  Eat a variety of vegetables  such as dark green, red, and orange vegetables  You can also include canned vegetables low in sodium (salt) and frozen vegetables without added butter or sauces  Eat a variety of fresh fruits , canned fruit in 100% juice, frozen fruit, and dried fruit  Include whole grains  At least half of the grains you eat should be whole grains  Examples include whole-wheat bread, wheat pasta, brown rice, and whole-grain cereals such as oatmeal     Eat a variety of protein foods such as seafood (fish and shellfish), lean meat, and poultry without skin (turkey and chicken)  Examples of lean meats include pork leg, shoulder, or tenderloin, and beef round, sirloin, tenderloin, and extra lean ground beef  Other protein foods include eggs and egg substitutes, beans, peas, soy products, nuts, and seeds  Choose low-fat dairy products such as skim or 1% milk or low-fat yogurt, cheese, and cottage cheese      Limit unhealthy fats  such as butter, hard margarine, and shortening  Exercise:  Exercise at least 30 minutes per day on most days of the week  Some examples of exercise include walking, biking, dancing, and swimming  You can also fit in more physical activity by taking the stairs instead of the elevator or parking farther away from stores  Include muscle strengthening activities 2 days each week  Regular exercise provides many health benefits  It helps you manage your weight, and decreases your risk for type 2 diabetes, heart disease, stroke, and high blood pressure  Exercise can also help improve your mood  Ask your healthcare provider about the best exercise plan for you  General health and safety guidelines:   Do not smoke  Nicotine and other chemicals in cigarettes and cigars can cause lung damage  Ask your healthcare provider for information if you currently smoke and need help to quit  E-cigarettes or smokeless tobacco still contain nicotine  Talk to your healthcare provider before you use these products  Limit alcohol  A drink of alcohol is 12 ounces of beer, 5 ounces of wine, or 1½ ounces of liquor  Lose weight, if needed  Being overweight increases your risk of certain health conditions  These include heart disease, high blood pressure, type 2 diabetes, and certain types of cancer  Protect your skin  Do not sunbathe or use tanning beds  Use sunscreen with a SPF 15 or higher  Apply sunscreen at least 15 minutes before you go outside  Reapply sunscreen every 2 hours  Wear protective clothing, hats, and sunglasses when you are outside  Drive safely  Always wear your seatbelt  Make sure everyone in your car wears a seatbelt  A seatbelt can save your life if you are in an accident  Do not use your cell phone when you are driving  This could distract you and cause an accident  Pull over if you need to make a call or send a text message  Practice safe sex  Use latex condoms if are sexually active and have more than one partner   Your healthcare provider may recommend screening tests for sexually transmitted infections (STIs)  Wear helmets, lifejackets, and protective gear  Always wear a helmet when you ride a bike or motorcycle, go skiing, or play sports that could cause a head injury  Wear protective equipment when you play sports  Wear a lifejacket when you are on a boat or doing water sports  © Copyright [employer] 2022 Information is for End User's use only and may not be sold, redistributed or otherwise used for commercial purposes  All illustrations and images included in CareNotes® are the copyrighted property of [employer] Inc  or [facility] Ildefonso Carvalho   The above information is an  only  It is not intended as medical advice for individual conditions or treatments  Talk to your doctor, nurse or pharmacist before following any medical regimen to see if it is safe and effective for you

## 2023-02-08 NOTE — PROGRESS NOTES
[facility] PRIMARY CARE [address]    NAME: José Luis Kendall  AGE: 42 y o  SEX: male  : 1985     DATE: 2023     Assessment and Plan:     Problem List Items Addressed This Visit        Endocrine    Impaired fasting glucose     Patient has history of impaired fasting glucose  I will check his fasting blood sugar and his A1c  Discussed decreasing portion sizes and starting exercise to lose weight  Relevant Orders    Hemoglobin A1C       Cardiovascular and Mediastinum    Primary hypertension - Primary     Patient's chronic hypertension  Blood pressure is acceptable today  Continue losartan/HCTZ  I will check his electrolytes, renal function, thyroid function and blood counts         Relevant Medications    losartan-hydrochlorothiazide (HYZAAR) 50-12 5 mg per tablet    Other Relevant Orders    CBC and differential    Comprehensive metabolic panel    TSH, 3rd generation with Free T4 reflex    Lipid panel       Other    Bipolar disorder ([facility] [address] )     Patient's moods are stable  He sees psychiatry regularly  I made him aware that Chantix may worsen his moods and he will let me know if that happens and he will discontinue Chantix         Relevant Medications    varenicline 0 5 MG X 11 & 1 MG X 42 tablet therapy pack    nicotine polacrilex (Nicorette) 2 mg gum    Narcotic dependency, continuous (HCC)     Patient has chronic right ankle pain after injury  He uses oxycodone  He sees pain management  Other Visit Diagnoses     Annual physical exam        Tobacco dependence        Relevant Medications    varenicline 0 5 MG X 11 & 1 MG X 42 tablet therapy pack    nicotine polacrilex (Nicorette) 2 mg gum    Long-term current use of testosterone replacement therapy        Relevant Orders    PSA, Total Screen          Immunizations and preventive care screenings were discussed with patient today   Appropriate education was printed on patient's after visit summary  Counseling:  Exercise: the importance of regular exercise/physical activity was discussed  Recommend exercise 3-5 times per week for at least 30 minutes  · Patient is up-to-date on colon cancer screening  · He is using testosterone  I will check his PSA to make sure it is normal on chronic testosterone therapy  BMI Counseling: Body mass index is 33 kg/m²  The BMI is above normal  Nutrition recommendations include decreasing portion sizes  Exercise recommendations include moderate physical activity 150 minutes/week  Rationale for BMI follow-up plan is due to patient being overweight or obese  Depression Screening and Follow-up Plan: Patient assessed for underlying major depression  Brief counseling provided and recommend additional follow-up/re-evaluation next office visit  Tobacco Cessation Counseling: Tobacco cessation counseling was provided  The patient is sincerely urged to quit consumption of tobacco  He is ready to quit tobacco  Medication options and side effects of medication discussed  Nicotine gum and varenicline (chantix) was prescribed  We will try Chantix since it helps patient before  If it makes his mental health and is worse we will discontinue Chantix  No follow-ups on file  Chief Complaint:     No chief complaint on file  History of Present Illness:     Adult Annual Physical   Patient here for a comprehensive physical exam  The patient reports problems - Patient noticed that the color of his scrotum was dark as it bruised last week on Thursday or Friday  He had intermittent left testicular pain at that time  He does not recall any trauma  He has no penile discharge, scrotal pain, urinary frequency, urgency, hematuria, difficulty urinating today I found normal hyperpigmentation of the scrotum on exam today otherwise the genital exam was completely negative  Diet and Physical Activity  · Diet/Nutrition: Variable diet     · Exercise: Patient joined the gym and I encouraged him to start exercising regularly  Depression Screening  PHQ-2/9 Depression Screening         General Health  · Sleep: sleeps well  · Hearing: normal - bilateral   · Vision: no vision problems  · Dental: regular dental visits   Health  · History of STDs?: no      Review of Systems:     Review of Systems   HENT: Negative for hearing loss  Eyes: Negative for visual disturbance  Respiratory: Negative for cough and shortness of breath  Cardiovascular: Negative for chest pain  Gastrointestinal: Negative for abdominal pain and constipation  Genitourinary: Negative for difficulty urinating, dysuria, frequency, hematuria, penile discharge, penile pain and urgency  Musculoskeletal: Positive for myalgias  Negative for back pain  He complains of pain in the right posterior thigh after sitting that radiates down to the right calf  There was no tenderness on examination   Skin: Negative for rash and wound  Psychiatric/Behavioral: Negative for dysphoric mood  Past Medical History:     Past Medical History:   Diagnosis Date   • ADD (attention deficit disorder)    • Arthritis    • Chronic pain    • Heartburn    • Mood disorder ([facility] [address] )    • Tremor       Past Surgical History:     Past Surgical History:   Procedure Laterality Date   • CLOSED REDUCTION CALCANEAL FRACTURE     • PERONEAL TENDON EXPLORATION        Social History:     Social History     Socioeconomic History   • Marital status: /Civil Union     Spouse name: None   • Number of children: None   • Years of education: None   • Highest education level: None   Occupational History   • None   Tobacco Use   • Smoking status: Every Day     Packs/day: 1 00     Years: 19 00     Pack years: 19 00     Types: Cigarettes     Start date: 1999   • Smokeless tobacco: Never   Vaping Use   • Vaping Use: Never used   Substance and Sexual Activity   • Alcohol use:  Yes     Alcohol/week: 7 0 standard drinks     Types: 7 Cans of beer per week     Comment: social   • Drug use: Never   • Sexual activity: Yes   Other Topics Concern   • None   Social History Narrative    Lives with wife    Sees dentist occasionally    No living will     Social Determinants of Health     Financial Resource Strain: Not on file   Food Insecurity: Not on file   Transportation Needs: Not on file   Physical Activity: Not on file   Stress: Not on file   Social Connections: Not on file   Intimate Partner Violence: Not on file   Housing Stability: Not on file      Family History:     Family History   Problem Relation Age of Onset   • Parkinsonism Paternal Grandfather    • Heart disease Father    • Hypertension Father    • Breast cancer Mother    • Intellectual disability Sister    • Heart disease Maternal Grandfather    • Substance Abuse Neg Hx    • Mental illness Neg Hx       Current Medications:     Current Outpatient Medications   Medication Sig Dispense Refill   • B-D SYRINGE LUER-MILAGROS 1CC 1 ML MISC USE FOR BI WEEKLY TESTOSTERONE INJECTIONS     • BD Hypodermic Needle 25G X 1-1/2" MISC USE FOR INJECTING TESTOSTERONE BIWEEKLY     • DEXILANT 60 MG capsule Take 1 capsule by mouth daily  3   • fluticasone (FLONASE) 50 mcg/act nasal spray 1 SPRAY INTO EACH NOSTRIL IN THE MORNING AND 1 SPRAY IN THE EVENING   48 mL 2   • gabapentin (NEURONTIN) 600 MG tablet Take 600 mg by mouth 2 (two) times a day    0   • hydrOXYzine HCL (ATARAX) 50 mg tablet Take 50 mg by mouth 2 (two) times a day as needed  1   • losartan-hydrochlorothiazide (HYZAAR) 50-12 5 mg per tablet Take 1 tablet by mouth daily 90 tablet 3   • methylphenidate (CONCERTA) 36 MG ER tablet 1 twice daily      • nicotine polacrilex (Nicorette) 2 mg gum Chew 1 each (2 mg total) every 2 (two) hours as needed for smoking cessation 120 each 3   • oxyCODONE-acetaminophen (PERCOCET) 5-325 mg per tablet Take 1 tablet by mouth every 6 (six) hours as needed     • OXYCONTIN 10 MG 12 hr tablet Take 10 mg by mouth 2 (two) times a day  0   • risperiDONE (RisperDAL) 3 mg tablet Take 3 mg by mouth daily at bedtime  0   • sildenafil (VIAGRA) 100 mg tablet Take 1 tablet by mouth one (1) hour prior to intercourse on an empty stomach  Limit to 3 encounters per week  30 tablet 0   • testosterone cypionate (DEPO-TESTOSTERONE) 200 mg/mL SOLN INJECT 1ML EVERY 2 WEEKS  DISCARD VIAL AFTER 30 DAYS FROM OPENING DATE     • varenicline 0 5 MG X 11 & 1 MG X 42 tablet therapy pack Take 0 5mg tab by mouth daily for 3 days, then increase to 0 5mg tab twice daily for 3 days, then increase to 1mg twice daily 53 tablet 0   • zolpidem (AMBIEN) 10 mg tablet Take 10 mg by mouth daily at bedtime as needed for sleep       No current facility-administered medications for this visit  Allergies:     No Known Allergies   Physical Exam:     /82   Pulse (!) 109   Temp 98 4 °F (36 9 °C) (Tympanic)   Resp 16   Ht 6' 3" (1 905 m)   Wt 120 kg (264 lb)   SpO2 98%   BMI 33 00 kg/m²     Physical Exam  Constitutional:       Appearance: He is well-developed  HENT:      Head: Normocephalic  Right Ear: Tympanic membrane normal  There is no impacted cerumen  Left Ear: Tympanic membrane normal  There is no impacted cerumen  Mouth/Throat:      Mouth: Mucous membranes are moist       Pharynx: No oropharyngeal exudate  Eyes:      Conjunctiva/sclera: Conjunctivae normal    Cardiovascular:      Rate and Rhythm: Normal rate and regular rhythm  Heart sounds: No murmur heard  Pulmonary:      Effort: No respiratory distress  Breath sounds: No wheezing or rales  Abdominal:      Tenderness: There is no abdominal tenderness  Genitourinary:     Penis: Normal        Testes: Normal       Comments: I saw no erythema, bruising, hematoma of the skin of the scrotum  Patient had no scrotal masses  I felt no testicular lumps  Testicles were nontender to palpation    Patient had no inguinal lymphadenopathy  Musculoskeletal: General: No tenderness  Cervical back: Neck supple  Comments: Patient no tenderness of the lumbosacral spinous process since on palpation  He had range of motion in the right hip  He had no right hip tenderness to palpation  He had no tenderness of the right posterior thigh on palpation   Skin:     General: Skin is warm and dry  Findings: No erythema  Neurological:      Mental Status: He is alert and oriented to person, place, and time  Cranial Nerves: No cranial nerve deficit  Motor: No weakness  Gait: Gait normal       Deep Tendon Reflexes: Reflexes normal (Knee and ankle jerks were 2/4 equal)  Psychiatric:         Mood and Affect: Mood normal          Thought Content:  Thought content normal           Elige Nyhan, MD   [facility] [address] [phone]

## 2023-02-09 ENCOUNTER — APPOINTMENT (OUTPATIENT)
Dept: LAB | Facility: HOSPITAL | Age: 38
End: 2023-02-09
Attending: INTERNAL MEDICINE

## 2023-02-09 DIAGNOSIS — I10 PRIMARY HYPERTENSION: ICD-10-CM

## 2023-02-09 DIAGNOSIS — R73.01 IMPAIRED FASTING GLUCOSE: ICD-10-CM

## 2023-02-09 DIAGNOSIS — Z79.890 LONG-TERM CURRENT USE OF TESTOSTERONE REPLACEMENT THERAPY: ICD-10-CM

## 2023-02-09 LAB
ALBUMIN SERPL BCP-MCNC: 3.3 G/DL (ref 3.5–5)
ALP SERPL-CCNC: 69 U/L (ref 46–116)
ALT SERPL W P-5'-P-CCNC: 27 U/L (ref 12–78)
ANION GAP SERPL CALCULATED.3IONS-SCNC: 5 MMOL/L (ref 4–13)
AST SERPL W P-5'-P-CCNC: 27 U/L (ref 5–45)
BASOPHILS # BLD AUTO: 0.05 THOUSANDS/ÂΜL (ref 0–0.1)
BASOPHILS NFR BLD AUTO: 1 % (ref 0–1)
BILIRUB SERPL-MCNC: 0.53 MG/DL (ref 0.2–1)
BUN SERPL-MCNC: 7 MG/DL (ref 5–25)
CALCIUM ALBUM COR SERPL-MCNC: 9.6 MG/DL (ref 8.3–10.1)
CALCIUM SERPL-MCNC: 9 MG/DL (ref 8.3–10.1)
CHLORIDE SERPL-SCNC: 105 MMOL/L (ref 96–108)
CHOLEST SERPL-MCNC: 194 MG/DL
CO2 SERPL-SCNC: 25 MMOL/L (ref 21–32)
CREAT SERPL-MCNC: 1.03 MG/DL (ref 0.6–1.3)
EOSINOPHIL # BLD AUTO: 0.16 THOUSAND/ÂΜL (ref 0–0.61)
EOSINOPHIL NFR BLD AUTO: 3 % (ref 0–6)
ERYTHROCYTE [DISTWIDTH] IN BLOOD BY AUTOMATED COUNT: 16.4 % (ref 11.6–15.1)
GFR SERPL CREATININE-BSD FRML MDRD: 91 ML/MIN/1.73SQ M
GLUCOSE P FAST SERPL-MCNC: 95 MG/DL (ref 65–99)
HCT VFR BLD AUTO: 42.5 % (ref 36.5–49.3)
HDLC SERPL-MCNC: 48 MG/DL
HGB BLD-MCNC: 13.6 G/DL (ref 12–17)
IMM GRANULOCYTES # BLD AUTO: 0.01 THOUSAND/UL (ref 0–0.2)
IMM GRANULOCYTES NFR BLD AUTO: 0 % (ref 0–2)
LDLC SERPL CALC-MCNC: 109 MG/DL (ref 0–100)
LYMPHOCYTES # BLD AUTO: 1.7 THOUSANDS/ÂΜL (ref 0.6–4.47)
LYMPHOCYTES NFR BLD AUTO: 27 % (ref 14–44)
MCH RBC QN AUTO: 28.1 PG (ref 26.8–34.3)
MCHC RBC AUTO-ENTMCNC: 32 G/DL (ref 31.4–37.4)
MCV RBC AUTO: 88 FL (ref 82–98)
MONOCYTES # BLD AUTO: 0.32 THOUSAND/ÂΜL (ref 0.17–1.22)
MONOCYTES NFR BLD AUTO: 5 % (ref 4–12)
NEUTROPHILS # BLD AUTO: 3.97 THOUSANDS/ÂΜL (ref 1.85–7.62)
NEUTS SEG NFR BLD AUTO: 64 % (ref 43–75)
NONHDLC SERPL-MCNC: 146 MG/DL
NRBC BLD AUTO-RTO: 0 /100 WBCS
PLATELET # BLD AUTO: 144 THOUSANDS/UL (ref 149–390)
PMV BLD AUTO: 11 FL (ref 8.9–12.7)
POTASSIUM SERPL-SCNC: 3.8 MMOL/L (ref 3.5–5.3)
PROT SERPL-MCNC: 6.8 G/DL (ref 6.4–8.4)
PSA SERPL-MCNC: 0.4 NG/ML (ref 0–4)
RBC # BLD AUTO: 4.84 MILLION/UL (ref 3.88–5.62)
SODIUM SERPL-SCNC: 135 MMOL/L (ref 135–147)
TRIGL SERPL-MCNC: 187 MG/DL
TSH SERPL DL<=0.05 MIU/L-ACNC: 1.69 UIU/ML (ref 0.45–4.5)
WBC # BLD AUTO: 6.21 THOUSAND/UL (ref 4.31–10.16)

## 2023-02-11 LAB
EST. AVERAGE GLUCOSE BLD GHB EST-MCNC: 100 MG/DL
HBA1C MFR BLD: 5.1 %

## 2023-02-15 DIAGNOSIS — M79.651 RIGHT THIGH PAIN: Primary | ICD-10-CM

## 2023-03-28 ENCOUNTER — OFFICE VISIT (OUTPATIENT)
Dept: FAMILY MEDICINE CLINIC | Facility: HOSPITAL | Age: 38
End: 2023-03-28

## 2023-03-28 VITALS
BODY MASS INDEX: 32.95 KG/M2 | HEIGHT: 75 IN | SYSTOLIC BLOOD PRESSURE: 144 MMHG | DIASTOLIC BLOOD PRESSURE: 95 MMHG | HEART RATE: 95 BPM | WEIGHT: 265 LBS | RESPIRATION RATE: 16 BRPM | OXYGEN SATURATION: 97 %

## 2023-03-28 DIAGNOSIS — F17.200 TOBACCO DEPENDENCE: Primary | ICD-10-CM

## 2023-03-28 DIAGNOSIS — M79.651 PAIN OF RIGHT THIGH: ICD-10-CM

## 2023-03-28 RX ORDER — RISPERIDONE 4 MG/1
TABLET ORAL
COMMUNITY
Start: 2023-03-10

## 2023-03-28 RX ORDER — POLYETHYLENE GLYCOL 3350 17 G
4 POWDER IN PACKET (EA) ORAL AS NEEDED
Qty: 120 EACH | Refills: 4 | Status: SHIPPED | OUTPATIENT
Start: 2023-03-28

## 2023-03-28 RX ORDER — RISPERIDONE 0.5 MG/1
TABLET ORAL
COMMUNITY
Start: 2023-03-14

## 2023-03-28 NOTE — PROGRESS NOTES
Smoking Cessation Program  Consult     Dottie Mccabe is a 34 y o  y o  male  [id number]     Assessment/Plan:      Problem List Items Addressed This Visit    None  Visit Diagnoses     Tobacco dependence    -  Primary    Relevant Medications    nicotine polacrilex (COMMIT) 4 MG lozenge    Other Relevant Orders    Ambulatory referral to Smoking Cessation Program          {plan; smoking cessation:18119}  Barriers to quitting include: {barriers:14336}  Treatment Plan: ***     Additional Treatments: ***    Dependence Severity:  ***      Surgical Optimization Plan:  ***  Medications recommended / Prescribed ***      No follow-ups on file  All questions are answered to the patient's satisfaction and understanding  He verbalizes understanding  He is encouraged to call with any further questions or concerns  ______________________________________________________________________     Patient ID: Young Maya is a 34 y o  male  CC:    No chief complaint on file        HPI:    HPI    Smoking history:  What type of tobacco product used:  {[facility] Type used:55501}    How many Packs of Cigarettes per day on average:  {[facility] PPD:35487}    Largest amount of tobacco EVER used regularly: {[facility] PPD:22644}    How old when started using Tobacco regularly:  *** years old    How many years using tobacco regularly:  *** years    How soon after waking up do you smoke:  {[facility] time to first in Am:99935}    Past Attempts to go Tobacco free:   Intentionally Cut Down or limit use:  {YES/NO:20200:::1}     How many serious attempt to go tobacco free:  {[facility] 0 1 more:59950}      Last Serious attempt:   {[facility] LAST QUIT WTWS:57890}      During last attemp, How long without use?  {[facility] How long without:74153}     Which symptoms experienced when tried to go Tobacco Free:  {[facility] Quit Sx:39250}     Methods to used on prior attempt:  {[facility] Prior Methods:29713}     Desire to Quit:  {[facility] Desire to quit:81438}    Motivations to Quit:  {[facility] Motivate quit:66715}    Scale on 1-10, how motivated:  {Numbers; 1-81:32970}    Has a Healthcare Professional recommend smoking cessation? {YES/NO:62014}    What Obstacles do you face for quitting:  {[facility] Quit Obstacles:24196}    From 1-10, how confident are you that you will bee Tobacco free in 6 Months? {Numbers; 1-75:85127} 1: Not confident, 10 Extremely Confident      Occupational/Exposure history: {[employer] Wild/none:66491}    Pets/Enviroment:  {[employer] Wild/none:65547}    Travel history:  {[facility] [employer] Wild/none:85785}    {Common ambulatory SmartLinks:93725}      Review of Systems          The following portions of the patient's history were reviewed and updated as appropriate: allergies, current medications, past family history, past medical history, past social history, past surgical history and problem list      He reports that he has been smoking cigarettes  He started smoking about 24 years ago  He has a 19 00 pack-year smoking history  He has never used smokeless tobacco     Social history: He reports that he has been smoking cigarettes  He started smoking about 24 years ago  He has a 19 00 pack-year smoking history  He has never used smokeless tobacco  He reports current alcohol use of about 7 0 standard drinks per week  He reports that he does not use drugs    Past Medical History:   Diagnosis Date   • ADD (attention deficit disorder)    • Arthritis    • Chronic pain    • Heartburn    • Mood disorder (HCC)    • Tremor      Past Surgical History:   Procedure Laterality Date   • CLOSED REDUCTION CALCANEAL FRACTURE     • PERONEAL TENDON EXPLORATION         Family History   Problem Relation Age of Onset   • Parkinsonism Paternal Grandfather    • Heart disease Father    • Hypertension Father    • Breast cancer Mother    • Intellectual disability Sister    • Heart disease Maternal Grandfather    • Substance Abuse Neg Hx    • Mental illness Neg Hx      Immunization "History   Administered Date(s) Administered   • COVID-19 MODERNA VACC 0 25 ML IM BOOSTER 12/17/2021   • COVID-19 MODERNA VACC 0 5 ML IM 12/29/2020, 01/26/2021   • INFLUENZA 11/12/2018, 10/07/2021   • Influenza, seasonal, injectable 11/12/2018, 10/07/2021     (Not in a hospital admission)      Allergies: Patient has no known allergies  Objective:  Vitals:    03/28/23 1329   BP: 144/95   Pulse: 95   Resp: 16   SpO2: 97%   Weight: 120 kg (265 lb)   Height: 6' 3\" (1 905 m)       Body mass index is 33 12 kg/m²      Physical Exam    Lab Review:   {Recent FBDO:09752::\"JCS applicable\"}  Results Reviewed     None           Diagnostics:  No orders to display           Electronically Signed by Dot Sibley MD    {Tobacco Time Spent:45798}  "

## 2023-03-28 NOTE — PROGRESS NOTES
"Assessment/Plan:    Pain of right thigh  Patient has chronic posterior lateral right thigh pain going on for 6 months  He denies any back discomfort  Discomfort in the right posterior thigh is worse on straight leg raising  He will start physical therapy  He is seeing a pain specialist     On physical examination today he has normal muscle strength, tone and muscle bulk in the right lower leg  Knee and ankle jerks were 2/4 equal bilaterally  He had range of motion of the lumbar spine  Gait was normal    I told patient that his discomfort could be muscular in origin or due to piriformis syndrome  I agree with physical therapy    Tobacco dependence  Patient is trying to quit smoking  He smokes 3 cigarettes a day when in the morning, at noon and in the p m  Shunt is did not help   2 nightly continue, did not help  We will try nicotine lozenge 4 mg and I referred patient to the smoking cessation program to help him quit smoking       Diagnoses and all orders for this visit:    Tobacco dependence  -     nicotine polacrilex (COMMIT) 4 MG lozenge; Apply 1 lozenge (4 mg total) to the mouth or throat as needed for smoking cessation  -     Ambulatory referral to Smoking Cessation Program; Future    Pain of right thigh    Other orders  -     risperiDONE (RisperDAL) 4 mg tablet; 1 at hs  -     risperiDONE (RisperDAL) 0 5 mg tablet; 1/2 tab in the AM          Subjective:      Patient ID: Pepe Hutchinson is a 34 y o  male  HPI    Patient presents for follow-up of chronic conditions as detailed in the assessment and plan        The following portions of the patient's history were reviewed and updated as appropriate: current medications, past family history, past medical history, past social history, past surgical history and problem list     Review of Systems      Objective:    /95   Pulse 95   Resp 16   Ht 6' 3\" (1 905 m)   Wt 120 kg (265 lb)   SpO2 97%   BMI 33 12 kg/m²      Physical " Exam  Constitutional:       General: He is not in acute distress  Appearance: He is not toxic-appearing  Musculoskeletal:         General: No tenderness  Normal range of motion  Skin:     General: Skin is warm and dry  Neurological:      Mental Status: He is alert and oriented to person, place, and time  Motor: No weakness  Gait: Gait normal       Deep Tendon Reflexes: Reflexes normal    Psychiatric:         Mood and Affect: Mood normal          Thought Content:  Thought content normal              Bryn Chapman MD

## 2023-03-28 NOTE — ASSESSMENT & PLAN NOTE
Patient is trying to quit smoking  He smokes 3 cigarettes a day when in the morning, at noon and in the p m  Shunt is did not help   2 nightly continue, did not help    We will try nicotine lozenge 4 mg and I referred patient to the smoking cessation program to help him quit smoking

## 2023-03-28 NOTE — ASSESSMENT & PLAN NOTE
Patient has chronic posterior lateral right thigh pain going on for 6 months  He denies any back discomfort  Discomfort in the right posterior thigh is worse on straight leg raising  He will start physical therapy  He is seeing a pain specialist     On physical examination today he has normal muscle strength, tone and muscle bulk in the right lower leg  Knee and ankle jerks were 2/4 equal bilaterally  He had range of motion of the lumbar spine  Gait was normal    I told patient that his discomfort could be muscular in origin or due to piriformis syndrome    I agree with physical therapy

## 2023-03-30 ENCOUNTER — EVALUATION (OUTPATIENT)
Dept: PHYSICAL THERAPY | Facility: CLINIC | Age: 38
End: 2023-03-30

## 2023-03-30 DIAGNOSIS — M79.651 RIGHT THIGH PAIN: Primary | ICD-10-CM

## 2023-03-30 NOTE — PROGRESS NOTES
PT Evaluation     Today's date: 3/30/2023  Patient name: Garland Man  : 1985  MRN: [id number]  Referring provider: Adriana Marie MD  Dx:   Encounter Diagnosis     ICD-10-CM    1  Right thigh pain  M79 [id number] Ambulatory Referral to Physical Therapy                     Assessment  Assessment details: Garland Man is a 34 y o  male presenting to outpatient physical [facility] at [facility] with complaints of RLE pain, stiffness and weakness   He presents with decreased RLE range of motion, decreased strength, limited flexibility, poor postural awareness, poor body mechanics, altered gait pattern, poor balance, decreased tolerance to activity and decreased functional mobility due to Right thigh pain  (primary encounter diagnosis)   He would benefit from skilled PT services in order to address these deficits and reach maximum level of function   Thank you for the referral!    Impairments: abnormal or restricted ROM, activity intolerance and pain with function    Symptom irritability: moderateUnderstanding of Dx/Px/POC: excellent  Goals  STG  1  Independent with HEP in 2 weeks  2  Decrease pain at worst by 50% in 2 weeks    LTG  1  Increase RLE mobility in all planes to 5/5 in 4 weeks  2  Return to full, unrestricted sitting for work in 4 weeks    Plan  Patient would benefit from: skilled physical therapy  Planned modality interventions: thermotherapy: hydrocollator packs  Planned therapy interventions: neuromuscular re-education, manual therapy, therapeutic exercise, patient education, therapeutic activities and home exercise program  Frequency: 2x week  Duration in weeks: 4  Treatment plan discussed with: patient        Subjective Evaluation    History of Present Illness  Date of onset: 2022  Mechanism of injury: Pt is a 33 y/o male reporting waking up with R posterior thigh pain that radiates down the R calf but not the foot  Pain pills not helping  Pain wakes him up at night   Pt denies pain with coughing or "sneezing  Pain is worse with sitting, better with standing and laying  PMH include fall from roof and bilateral ankle and calcaneus fracture   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  Pt sleeps on memory foam    Quality of life: good    Pain  Current pain ratin  At best pain rating: 3  At worst pain ratin  Location: R LE pain radiating posteriorly to calf  Quality: burning and dull ache  Aggravating factors: sitting  Progression: worsening      Diagnostic Tests  No diagnostic tests performed  Treatments  Previous treatment: chiropractic and medication  Patient Goals  Patient goals for therapy: decreased pain          Objective     Active Range of Motion     Additional Active Range of Motion Details  + R hamstring tightness > L  + R piriformis tightness > L    Passive Range of Motion     Lumbar   Flexion: WFL and with pain  Extension: WFL and with pain  Left lateral flexion: WFL  Right lateral flexion: WFL and with pain  Left rotation: WFL  Right rotation: [facility] and with pain    Tests     Lumbar     Left   Negative crossed SLR, passive SLR and slump test      Right   Positive slump test    Negative passive SLR  Left Hip   Negative SYDNIE  Right Hip   Positive SYDNIE       Additional Tests Details  STANDING:   Repeated flexion: no worse  Repeated extension: increased sxs  R side bending & R rotation: increased sxs        EPOC: 5/10/23      Manuals 3/30            R sciatic nerve glide NS            R hamstring stretch NS            R piriformis stretch   NS            R lateral hamstring (cross body) stretch NV            Neuro Re-Ed             Bridge iso             SLR 4 way                                                                              Ther Ex             SKTC stretch 10x10\"            Sciatic nerve glide 10x10\"            Seated piriformis stretch 10x10\"            Seated HS stretch 10x10\"                                                   Pt education: pathoanatomy, nature " of sxs, POC, HEP 8'            Ther Activity             Bike for improved RLE endurance                          Gait Training                                       Modalities             Heat on R post thigh

## 2023-04-03 ENCOUNTER — OFFICE VISIT (OUTPATIENT)
Dept: PHYSICAL THERAPY | Facility: CLINIC | Age: 38
End: 2023-04-03

## 2023-04-03 DIAGNOSIS — M79.651 RIGHT THIGH PAIN: Primary | ICD-10-CM

## 2023-04-03 NOTE — PROGRESS NOTES
"Daily Note     Today's date: 4/3/2023  Patient name: Lona Cifuentes  : 1985  MRN: [id number]  Referring provider: Gurmeet Espitia MD  Dx:   Encounter Diagnosis     ICD-10-CM    1  Right thigh pain  M79 651                      Subjective: Patient reports right buttock pain rated 3/10 today  Continued difficulty with sitting activities  Objective: See treatment diary below      Assessment: Tolerated treatment well  Patient demonstrated fatigue post treatment, exhibited good technique with therapeutic exercises and would benefit from continued PT  Continued right lateral hip tightness  Mobility improved following stretches and manual techniques  Sciatic nerve glides with noted tightness  Initiated TvA with bridges with verbal and tactile cues for proper technique  Plan: Continue per plan of care        Precautions:        Manuals 3/30  4/3                   R sciatic nerve glide NS  ksg                   R hamstring stretch NS  ksg                   R piriformis stretch    NS ksg                   R lateral hamstring (cross body) stretch NV  ksg                   Neuro Re-Ed                       Bridge iso    2x10                   SLR 4 way                        iso hip flex/ext 90/90    x10 ea 5\"                                                                                                                   Ther Ex                       SKTC stretch 10x10\" 10x10\"                   Sciatic nerve glide 10x10\"  10x10\"                   Seated piriformis stretch 10x10\"  10x10\"                   Seated HS stretch 10x10\"  10x10\"  supine                                                                                           Pt education: pathoanatomy, nature of sxs, POC, HEP 8'                     Ther Activity                       Bike for improved RLE endurance                                               Gait Training                                                                     " Modalities                       Heat on R post thigh

## 2023-04-05 ENCOUNTER — OFFICE VISIT (OUTPATIENT)
Dept: PHYSICAL THERAPY | Facility: CLINIC | Age: 38
End: 2023-04-05

## 2023-04-05 DIAGNOSIS — M79.651 RIGHT THIGH PAIN: Primary | ICD-10-CM

## 2023-04-05 NOTE — PROGRESS NOTES
"Daily Note     Today's date: 2023  Patient name: Pepe Hutchinson  : 1985  MRN: [id number]  Referring provider: Vicky Burns MD  Dx:   Encounter Diagnosis     ICD-10-CM    1  Right thigh pain  M79 651           Subjective: had good pain relief after last visit, however pain returned the following day      Objective: See treatment diary below  Added crossed trunk rotation stretch to HEP, see handout  Assessment: Held seated HS stretch today due to sxs exacerbation report  Tolerated treatment well with progression of treatment program as noted below requiring verbal and tactile cues from PT for safe execution of therapeutic exercise  Patient demonstrated fatigue post treatment, exhibited good technique with therapeutic exercises and would benefit from continued PT      Plan: Progress treatment as tolerated  Assess HEP technique & effects of implementation        Precautions:        Manuals 3/30  4/3  4/5                  R sciatic nerve glide NS  ksg  NS                 R hamstring stretch NS  ksg  NS                 R piriformis stretch    NS ksg  NS                 R lateral hamstring (cross body) stretch NV  ksg  NS                 Neuro Re-Ed                       Bridge iso    2x10  2x10                 SLR 4 way                        iso hip flex/ext 90/90    x10 ea 5\"                                                                                                                   Ther Ex                       SKTC stretch 10x10\" 10x10\"  5x10\"                 Sciatic nerve glide 10x10\"  10x10\"  5x10\"                 Seated piriformis stretch 10x10\"  10x10\"  NV                 Seated HS stretch 10x10\"  10x10\"  supine  HELD                 Crossed LTR     10x10\"                                                                 Pt education: pathoanatomy, nature of sxs, POC, HEP 8'                     Ther Activity                       Bike for improved RLE endurance                               "                 Gait Training                                                                       Modalities                       Heat on R post thigh

## 2023-04-24 ENCOUNTER — OFFICE VISIT (OUTPATIENT)
Dept: PHYSICAL THERAPY | Facility: CLINIC | Age: 38
End: 2023-04-24

## 2023-04-24 DIAGNOSIS — M79.651 RIGHT THIGH PAIN: Primary | ICD-10-CM

## 2023-04-24 NOTE — PROGRESS NOTES
"Daily Note     Today's date: 2023  Patient name: Young Maya  : 1985  MRN: [id number]  Referring provider: Shade Lefort, MD  Dx:   Encounter Diagnosis     ICD-10-CM    1  Right thigh pain  M79 651           Start Time: 1655          Subjective: reports change in care plan worsened symptoms last visit      Objective: See treatment diary below      Assessment: Patient had good tolerance to manual treatment - ROM is limited in R hip flexion direction but did improve with manual treatment, pt reports improved sxs from baseline following MT  HEP was updated with toe out HS stretch  He had some fatigue with bridge exercises  Based on timeframe from injury she/he is progressing well - R hip ext strength continues to be limited which is expected  Skilled PT continues to be required to guide progression of R hip mobility and strength to allow her/him to return to full, pain-free & unrestricted standing and walking during ADLs  Plan: Continue per plan of care  Precautions:        Manuals       R sciatic nerve glide  NS     R hamstring stretch  NS     R piriformis stretch     NS     CPA to L  S     R lateral hamstring (cross body) stretch       Neuro Re-Ed       Bridge iso  x15 3\"      iso hip flex/ext 90/90        prone hip ext iso  stnd 10x      HS curl prone  stnd 10x                     Ther Ex       SKTC stretch  10x10\"     Sciatic nerve glide  10x10\"     Seated piriformis stretch       Seated HS stretch  10x10\" toe out     Crossed LTR        TYSHAWN        Prone press up       Pt education: pathoanatomy, nature of sxs, POC, HEP       Ther Activity       Bike for improved RLE endurance               Gait Training                       Modalities       Heat on R post thigh                              "

## 2023-04-26 ENCOUNTER — OFFICE VISIT (OUTPATIENT)
Dept: PHYSICAL THERAPY | Facility: CLINIC | Age: 38
End: 2023-04-26

## 2023-04-26 DIAGNOSIS — M79.651 RIGHT THIGH PAIN: Primary | ICD-10-CM

## 2023-04-26 NOTE — PROGRESS NOTES
"Daily Note     Today's date: 2023  Patient name: Cong Alejandre  : 1985  MRN: [id number]  Referring provider: Sunita Virgen MD  Dx:   Encounter Diagnosis     ICD-10-CM    1  Right thigh pain  M79 651           Subjective: RLE pain worse after sitting and laying on R side on soft mattress       Objective: See treatment diary below  Added Samantha EXT program, see handout  Assessment: Tolerated treatment well  with initiation of Samantha lumbar Ext program as noted below requiring verbal and tactile cues from PT for safe execution of therapeutic exercise  Patient exhibited good technique with therapeutic exercises and would benefit from continued PT      Plan: Progress treatment as tolerated         Precautions:        Manuals      R sciatic nerve glide  NS     R hamstring stretch  NS     PPU with PT OP  NS   R piriformis stretch     NS     R UPA to L S  NS   R lateral hamstring (cross body) stretch       Neuro Re-Ed       Bridge iso  x15 3\"      iso hip flex/ext 90/90        prone hip ext iso  stnd 10x      HS curl prone  stnd 10x      prone glut set   10x10\"    prone hip ext   10x10\"   Ther Ex       SKTC stretch  10x10\"     Sciatic nerve glide  10x10\"     Seated piriformis stretch       Seated HS stretch  10x10\" toe out     Crossed LTR       Lumbar support in sitting   5'    Prone press up    10x10\"   Pt education: pathoanatomy, nature of sxs, POC, HEP    NS 8'   Ther Activity       Bike for improved RLE endurance               Gait Training                       Modalities       Heat on R post thigh                                "

## 2023-05-01 ENCOUNTER — APPOINTMENT (OUTPATIENT)
Dept: PHYSICAL THERAPY | Facility: CLINIC | Age: 38
End: 2023-05-01
Payer: COMMERCIAL

## 2023-05-03 ENCOUNTER — OFFICE VISIT (OUTPATIENT)
Dept: PHYSICAL THERAPY | Facility: CLINIC | Age: 38
End: 2023-05-03

## 2023-05-03 DIAGNOSIS — M79.651 RIGHT THIGH PAIN: Primary | ICD-10-CM

## 2023-05-03 NOTE — PROGRESS NOTES
"Daily Note     Today's date: 5/3/2023  Patient name: Beni Shaikh  : 1985  MRN: [id number]  Referring provider: Reza Sow MD  Dx:   Encounter Diagnosis     ICD-10-CM    1  Right thigh pain  M79 651           Subjective: Samantha program increased sxs      Objective: See treatment diary below  Added 3way hamstring stretches and omitted Samantha & seated stretches  Assessment: Tolerated treatment well with resumption of LLE stretching program as noted below requiring verbal and tactile cues from PT for safe execution of therapeutic exercise  Pt reports improved sxs following addition of Graston  Patient exhibited good technique with therapeutic exercises and would benefit from continued PT      Plan: Progress treatment as tolerated  assess HEP technique & effects of implementation     Precautions:        Manuals  5/3   GRASTON R HS & post thigh NS   R sciatic nerve glide  NS   R hamstring stretch  NS   PPU with PT OP    R piriformis stretch     NS   R UPA to L  S    R lateral hamstring (cross body) stretch     Neuro Re-Ed     Bridge iso  x20 3\"    iso hip flex/ext 90/90      prone hip ext iso  stnd 10x    HS curl prone  stnd 10x    prone glut set      prone hip ext     Ther Ex     SKTC stretch  10x10\"   Sciatic nerve glide  10x10\"   Seated piriformis stretch     Seated HS stretch  10x10\" toe out   3-way hamstring stretch 5x10\" ea   Lumbar support in sitting      Prone press up     Pt education: pathoanatomy, nature of sxs, POC, HEP     Ther Activity     Bike for improved RLE endurance           Gait Training                 Modalities     Heat on R post thigh                             "

## 2023-05-08 ENCOUNTER — OFFICE VISIT (OUTPATIENT)
Dept: PHYSICAL THERAPY | Facility: CLINIC | Age: 38
End: 2023-05-08

## 2023-05-08 DIAGNOSIS — M79.651 RIGHT THIGH PAIN: Primary | ICD-10-CM

## 2023-05-08 NOTE — PROGRESS NOTES
"Daily Note     Today's date: 2023  Patient name: Carole Rosas  : 1985  MRN: [id number]  Referring provider: Devorah Lala MD  Dx:   Encounter Diagnosis     ICD-10-CM    1  Right thigh pain  M79 651           Subjective: sxs very much improved, Compliant with HEP, no questions regarding POC, motivated to continue PT      Objective: See treatment diary below  Assessment: Tolerated treatment well with resumption of LLE stretching program as noted below requiring verbal and tactile cues from PT for safe execution of therapeutic exercise  Pt reports improved sxs following addition of Graston and 3way hamstring stretch in supine (NOT SEATED)  Patient exhibited good technique with therapeutic exercises and would benefit from continued PT      Plan: Progress treatment as tolerated  Perform RE NV  EPOC: 5/10/23       Manuals     GRASTON R HS & post thigh NS   R sciatic nerve glide  NS   R hamstring stretch  NS   PPU with PT OP    R piriformis stretch     NS   R UPA to L  S    R lateral hamstring (cross body) stretch     Neuro Re-Ed     Bridge iso  x20 3\"    iso hip flex/ext 90/90      prone hip ext iso  stnd 10x    HS curl prone  stnd 10x    prone glut set      prone hip ext     Ther Ex     SKTC stretch  10x10\"   Sciatic nerve glide  10x10\"   Seated piriformis stretch     Seated HS stretch  10x10\" toe out   3-way hamstring stretch 5x10\" ea   Lumbar support in sitting      Prone press up     Pt education: pathoanatomy, nature of sxs, POC, HEP     Ther Activity     Bike for improved RLE endurance           Gait Training                 Modalities     Heat on R post thigh                             "

## 2023-05-10 ENCOUNTER — APPOINTMENT (OUTPATIENT)
Dept: PHYSICAL THERAPY | Facility: CLINIC | Age: 38
End: 2023-05-10
Payer: COMMERCIAL

## 2023-05-15 ENCOUNTER — APPOINTMENT (OUTPATIENT)
Dept: PHYSICAL THERAPY | Facility: CLINIC | Age: 38
End: 2023-05-15
Payer: COMMERCIAL

## 2023-05-17 ENCOUNTER — OFFICE VISIT (OUTPATIENT)
Dept: PHYSICAL THERAPY | Facility: CLINIC | Age: 38
End: 2023-05-17

## 2023-05-17 DIAGNOSIS — M79.651 RIGHT THIGH PAIN: Primary | ICD-10-CM

## 2023-05-17 NOTE — PROGRESS NOTES
"Daily Note     Today's date: 2023  Patient name: Andrea Acuna  : 1985  MRN: [id number]  Referring provider: Mariia Mckee MD  Dx:   Encounter Diagnosis     ICD-10-CM    1  Right thigh pain  M79 651           Subjective: sxs improved for 2 days following last visit but 3rd day, sx returned  Objective: See treatment diary below       Assessment: Tolerated treatment well with continuation of LLE posterior thigh stretching program as noted below requiring verbal and tactile cues from PT for safe execution of therapeutic exercise  Pt reports continued sxs improvement following Graston and 3way hamstring stretch in supine (modified from seated position in previous sessions)  Patient exhibited good technique with therapeutic exercises and would benefit from continued PT      Plan: Progress treatment as tolerated  Perform RE in 2 visits (23)   EPOC: 23       Manuals     GRASTON R HS & post thigh NS   R sciatic nerve glide  NS   R hamstring stretch  NS   PPU with PT OP    R piriformis stretch     NS   R UPA to L  S    R lateral hamstring (cross body) stretch     Neuro Re-Ed     Bridge iso  x20 3\"    iso hip flex/ext 90/90      prone hip ext iso   20x 3\"    HS curl prone   20x 3\"    prone glut set      prone hip ext     Ther Ex     SKTC stretch  10x10\"   Sciatic nerve glide  10x10\"   Seated piriformis stretch     3-way hamstring stretch 5x10\" ea seated   Lumbar support in sitting      Prone press up     Pt education: pathoanatomy, nature of sxs, POC, HEP     Ther Activity     Bike for improved RLE endurance           Gait Training                 Modalities     Heat on R post thigh pre tx 5'                           "

## 2023-05-22 ENCOUNTER — APPOINTMENT (OUTPATIENT)
Dept: PHYSICAL THERAPY | Facility: CLINIC | Age: 38
End: 2023-05-22
Payer: COMMERCIAL

## 2023-05-26 ENCOUNTER — OFFICE VISIT (OUTPATIENT)
Dept: FAMILY MEDICINE CLINIC | Facility: HOSPITAL | Age: 38
End: 2023-05-26

## 2023-05-26 VITALS
DIASTOLIC BLOOD PRESSURE: 84 MMHG | SYSTOLIC BLOOD PRESSURE: 122 MMHG | OXYGEN SATURATION: 98 % | HEIGHT: 75 IN | TEMPERATURE: 97.5 F | HEART RATE: 95 BPM | RESPIRATION RATE: 16 BRPM | WEIGHT: 256 LBS | BODY MASS INDEX: 31.83 KG/M2

## 2023-05-26 DIAGNOSIS — G89.29 CHRONIC RIGHT-SIDED LOW BACK PAIN WITH RIGHT-SIDED SCIATICA: Primary | ICD-10-CM

## 2023-05-26 DIAGNOSIS — R73.01 IMPAIRED FASTING GLUCOSE: ICD-10-CM

## 2023-05-26 DIAGNOSIS — I10 PRIMARY HYPERTENSION: ICD-10-CM

## 2023-05-26 DIAGNOSIS — M54.41 CHRONIC RIGHT-SIDED LOW BACK PAIN WITH RIGHT-SIDED SCIATICA: Primary | ICD-10-CM

## 2023-05-26 DIAGNOSIS — J30.1 SEASONAL ALLERGIC RHINITIS DUE TO POLLEN: ICD-10-CM

## 2023-05-26 RX ORDER — NEEDLES, DISPOSABLE 18GX1 1/2"
NEEDLE, DISPOSABLE MISCELLANEOUS
COMMUNITY
Start: 2023-05-17

## 2023-05-26 RX ORDER — SYRINGE, DISPOSABLE, 1 ML
SYRINGE, EMPTY DISPOSABLE MISCELLANEOUS
COMMUNITY
Start: 2023-05-17

## 2023-05-26 RX ORDER — AZELASTINE 1 MG/ML
1 SPRAY, METERED NASAL 2 TIMES DAILY
Qty: 30 ML | Refills: 2 | Status: SHIPPED | OUTPATIENT
Start: 2023-05-26

## 2023-05-26 NOTE — ASSESSMENT & PLAN NOTE
Pt has had a right sided low back area pain with radiation to the right posterior thigh for >6 months  The discomfort is constant but gets sharp shooting pains when he sits, stands, walks without rhyme or reason  Has tried PT for 2 months without relief  He had a fall with b/l foot injuries in 2010 and his pain management physician has been prescribing oxycontin, oxycodone, gabapentin and testosterone  The pain meds alleviate the discomfort but when the effect wears of the pain recurs  He also tried acetaminophen and gets temporary relief  I'm concerned about lumbar osteoarthritis or DDD causing radiculopathy  Pt also has tenderness around the right lateral trochanteric area  Neuro exam was unremarkable    Ordered MRI of spine and will consider referral to pain and spine specialist at [address] [place] [address]  I also requested ortho evaluation

## 2023-05-26 NOTE — PROGRESS NOTES
Assessment/Plan:    Chronic right-sided low back pain with right-sided sciatica  Pt has had a right sided low back area pain with radiation to the right posterior thigh for >6 months  The discomfort is constant but gets sharp shooting pains when he sits, stands, walks without rhyme or reason  Has tried PT for 2 months without relief  He had a fall with b/l foot injuries in 2010 and his pain management physician has been prescribing oxycontin, oxycodone, gabapentin and testosterone  The pain meds alleviate the discomfort but when the effect wears of the pain recurs  He also tried acetaminophen and gets temporary relief  I'm concerned about lumbar osteoarthritis or DDD causing radiculopathy  Pt also has tenderness around the right lateral trochanteric area  Neuro exam was unremarkable    Ordered MRI of spine and will consider referral to pain and spine specialist at [facility]  I also requested ortho evaluation  Seasonal allergic rhinitis due to pollen  Pt also c/o postnasal drip, frequent throat clearing in the morning culminating in vomiting  This has been going on for couple of weeks now  Denies fever, dyspnea, dysphagia  Suspect postnasal drip causing his symptoms  I found no signs of bacterial upper or lower respiratory infection  Could be duet to allergies  Try astelin nasal spray  Primary hypertension  Pt has chronic htn  BP is controlled today  Doubt acei induced cough as it only occurs in the morning associated with postnasal drip  Continue acei/hctz  Check labs       Diagnoses and all orders for this visit:    Chronic right-sided low back pain with right-sided sciatica  -     MRI lumbar spine wo contrast; Future  -     Ambulatory referral to Orthopedic Surgery; Future    Primary hypertension  -     Comprehensive metabolic panel; Future  -     Lipid panel; Future  -     CBC; Future    Impaired fasting glucose  -     HEMOGLOBIN A1C W/ EAG ESTIMATION;  Future    Seasonal allergic rhinitis "due to pollen  -     azelastine (ASTELIN) 0 1 % nasal spray; 1 spray into each nostril 2 (two) times a day Use in each nostril as directed    Other orders  -     B-D HYPODERMIC NEEDLE 18GX1 5\" 18G X 1-1/2\" MISC; Use as directed---for Testosterone  -     B-D SYRINGE LUER-MILAGROS 3CC 3 ML MISC; USE AS DIRECTED FOR TESTOSTERONE INJECTION          Subjective:      Patient ID: Francisca Starkey is a 34 y o  male  HPI    Patient presents for follow-up of chronic conditions as detailed in the assessment and plan  The following portions of the patient's history were reviewed and updated as appropriate: current medications, past family history, past medical history, past social history, past surgical history and problem list     Review of Systems   Constitutional: Negative for appetite change, chills, fever and unexpected weight change  HENT: Positive for postnasal drip  Respiratory: Positive for cough  Negative for shortness of breath and wheezing  Cardiovascular: Negative for chest pain  Gastrointestinal: Negative for abdominal pain, blood in stool and constipation  Genitourinary: Negative for difficulty urinating, dysuria and hematuria  Musculoskeletal: Positive for back pain  Negative for gait problem and neck pain  Skin: Negative for rash  Neurological: Negative for weakness, numbness and headaches  Objective:    /84   Pulse 95   Temp 97 5 °F (36 4 °C) (Tympanic)   Resp 16   Ht 6' 3\" (1 905 m)   Wt 116 kg (256 lb)   SpO2 98%   BMI 32 00 kg/m²      Physical Exam  HENT:      Head: Normocephalic  Nose: Rhinorrhea (clear nasal discharge is present) present  No congestion  Mouth/Throat:      Pharynx: No oropharyngeal exudate  Eyes:      Conjunctiva/sclera: Conjunctivae normal    Cardiovascular:      Rate and Rhythm: Normal rate and regular rhythm  Heart sounds: No murmur heard  Pulmonary:      Effort: No respiratory distress  Breath sounds: No wheezing or rales   " Musculoskeletal:      Cervical back: Neck supple  Comments: Straight leg raising is + on the right  Lateral hip area tenderness and right sided low back are tenderness are present on palpation  ROM is intact in the lumbar spine and right hip   Skin:     General: Skin is warm and dry  Findings: No bruising, erythema or rash  Neurological:      Mental Status: He is alert and oriented to person, place, and time  Cranial Nerves: No cranial nerve deficit  Motor: No weakness        Gait: Gait normal       Deep Tendon Reflexes: Reflexes normal       Comments: LE muscles strenght, bulk, tone are normal and =   Knee and ankle jerks are 2/4 =   Psychiatric:         Mood and Affect: Mood normal              Quan Fry MD

## 2023-05-26 NOTE — ASSESSMENT & PLAN NOTE
Pt has chronic htn  BP is controlled today  Doubt acei induced cough as it only occurs in the morning associated with postnasal drip    Continue acei/hctz  Check labs

## 2023-05-26 NOTE — ASSESSMENT & PLAN NOTE
Pt also c/o postnasal drip, frequent throat clearing in the morning culminating in vomiting  This has been going on for couple of weeks now  Denies fever, dyspnea, dysphagia  Suspect postnasal drip causing his symptoms  I found no signs of bacterial upper or lower respiratory infection  Could be duet to allergies  Try astelin nasal spray

## 2023-05-27 ENCOUNTER — APPOINTMENT (OUTPATIENT)
Dept: LAB | Facility: HOSPITAL | Age: 38
End: 2023-05-27

## 2023-05-27 ENCOUNTER — LAB (OUTPATIENT)
Dept: LAB | Facility: HOSPITAL | Age: 38
End: 2023-05-27

## 2023-05-27 DIAGNOSIS — I10 PRIMARY HYPERTENSION: ICD-10-CM

## 2023-05-27 DIAGNOSIS — R73.01 IMPAIRED FASTING GLUCOSE: ICD-10-CM

## 2023-05-27 DIAGNOSIS — Z00.8 HEALTH EXAMINATION IN POPULATION SURVEY: ICD-10-CM

## 2023-05-27 LAB
ALBUMIN SERPL BCP-MCNC: 4.3 G/DL (ref 3.5–5)
ALP SERPL-CCNC: 53 U/L (ref 34–104)
ALT SERPL W P-5'-P-CCNC: 15 U/L (ref 7–52)
ANION GAP SERPL CALCULATED.3IONS-SCNC: 12 MMOL/L (ref 4–13)
AST SERPL W P-5'-P-CCNC: 25 U/L (ref 13–39)
BILIRUB SERPL-MCNC: 0.84 MG/DL (ref 0.2–1)
BUN SERPL-MCNC: 10 MG/DL (ref 5–25)
CALCIUM SERPL-MCNC: 9.7 MG/DL (ref 8.4–10.2)
CHLORIDE SERPL-SCNC: 106 MMOL/L (ref 96–108)
CHOLEST SERPL-MCNC: 223 MG/DL
CO2 SERPL-SCNC: 18 MMOL/L (ref 21–32)
CREAT SERPL-MCNC: 1.15 MG/DL (ref 0.6–1.3)
EST. AVERAGE GLUCOSE BLD GHB EST-MCNC: 97 MG/DL
GFR SERPL CREATININE-BSD FRML MDRD: 79 ML/MIN/1.73SQ M
GLUCOSE P FAST SERPL-MCNC: 107 MG/DL (ref 65–99)
HBA1C MFR BLD: 5 %
HDLC SERPL-MCNC: 40 MG/DL
LDLC SERPL CALC-MCNC: 121 MG/DL (ref 0–100)
NONHDLC SERPL-MCNC: 183 MG/DL
POTASSIUM SERPL-SCNC: 3.3 MMOL/L (ref 3.5–5.3)
PROT SERPL-MCNC: 7.3 G/DL (ref 6.4–8.4)
SODIUM SERPL-SCNC: 136 MMOL/L (ref 135–147)
TRIGL SERPL-MCNC: 311 MG/DL

## 2023-05-29 DIAGNOSIS — I10 PRIMARY HYPERTENSION: Primary | ICD-10-CM

## 2023-05-29 RX ORDER — POTASSIUM CHLORIDE 20 MEQ/1
20 TABLET, EXTENDED RELEASE ORAL DAILY
Qty: 2 TABLET | Refills: 0 | Status: SHIPPED | OUTPATIENT
Start: 2023-05-29 | End: 2023-05-31

## 2023-05-29 NOTE — RESULT ENCOUNTER NOTE
Call patient: Bradford Jaime no diabetes but blood sugar is above normal and cholesterol is also mildly elevated  He doesn't need medication for these but should decrease carb/sweet/fatty food intake,decrease portion sizes to  try to lose weight  His potassium was 3 3 mildly decreased that could be from one of his hypertension medications  It was normal before so I sent KCL to this pharmacy for 2 days and I ask him to increase his fruits and vegetable intake

## 2023-06-09 ENCOUNTER — HOSPITAL ENCOUNTER (OUTPATIENT)
Dept: MRI IMAGING | Facility: HOSPITAL | Age: 38
End: 2023-06-09
Attending: INTERNAL MEDICINE
Payer: COMMERCIAL

## 2023-06-09 DIAGNOSIS — G89.29 CHRONIC RIGHT-SIDED LOW BACK PAIN WITH RIGHT-SIDED SCIATICA: ICD-10-CM

## 2023-06-09 DIAGNOSIS — M54.41 CHRONIC RIGHT-SIDED LOW BACK PAIN WITH RIGHT-SIDED SCIATICA: ICD-10-CM

## 2023-06-09 PROCEDURE — G1004 CDSM NDSC: HCPCS

## 2023-06-09 PROCEDURE — 72148 MRI LUMBAR SPINE W/O DYE: CPT

## 2023-06-21 ENCOUNTER — OFFICE VISIT (OUTPATIENT)
Dept: OBGYN CLINIC | Facility: MEDICAL CENTER | Age: 38
End: 2023-06-21
Payer: COMMERCIAL

## 2023-06-21 VITALS
DIASTOLIC BLOOD PRESSURE: 87 MMHG | HEIGHT: 75 IN | SYSTOLIC BLOOD PRESSURE: 128 MMHG | HEART RATE: 80 BPM | WEIGHT: 270 LBS | BODY MASS INDEX: 33.57 KG/M2

## 2023-06-21 DIAGNOSIS — M54.41 CHRONIC RIGHT-SIDED LOW BACK PAIN WITH RIGHT-SIDED SCIATICA: Primary | ICD-10-CM

## 2023-06-21 DIAGNOSIS — M51.36 BULGING LUMBAR DISC: ICD-10-CM

## 2023-06-21 DIAGNOSIS — G89.29 CHRONIC RIGHT-SIDED LOW BACK PAIN WITH RIGHT-SIDED SCIATICA: Primary | ICD-10-CM

## 2023-06-21 PROCEDURE — 99244 OFF/OP CNSLTJ NEW/EST MOD 40: CPT | Performed by: EMERGENCY MEDICINE

## 2023-06-21 RX ORDER — METHYLPREDNISOLONE 4 MG/1
TABLET ORAL
Qty: 1 EACH | Refills: 0 | Status: SHIPPED | OUTPATIENT
Start: 2023-06-21 | End: 2023-06-30

## 2023-06-21 RX ORDER — MELOXICAM 15 MG/1
15 TABLET ORAL DAILY
Qty: 30 TABLET | Refills: 0 | Status: SHIPPED | OUTPATIENT
Start: 2023-06-21

## 2023-06-21 NOTE — PROGRESS NOTES
Assessment/Plan:    Diagnoses and all orders for this visit:    Chronic right-sided low back pain with right-sided sciatica  -     Ambulatory referral to Orthopedic Surgery  -     methylPREDNISolone 4 MG tablet therapy pack; Use as directed on package  -     meloxicam (MOBIC) 15 mg tablet; Take 1 tablet (15 mg total) by mouth daily  -     Ambulatory Referral to Pain Management; Future    Bulging lumbar disc  -     methylPREDNISolone 4 MG tablet therapy pack; Use as directed on package  -     meloxicam (MOBIC) 15 mg tablet; Take 1 tablet (15 mg total) by mouth daily  -     Ambulatory Referral to Pain Management; Future    Patient with right-sided radicular symptoms corresponding to his MRI findings  He is neurologically intact on exam with no red flags  Patient will schedule with pain management to consider epidural injection as he has already participated in extensive formal physical therapy  We will try a Medrol Dosepak to help with inflammation and pain transition to meloxicam he was instructed to hold other NSAIDs  Return if symptoms worsen or fail to improve  Subjective:   Patient ID: Marvin Flores is a 34 y o  male  New patient presents referred by PCP Dr Melissa Lawson with wife for chronic right lower back pain which radiates down his right leg for which he has been experiencing for approximately 6 to 12 months worsening as of late  He has been participating in formal physical therapy with no appreciable improvement and has undergone an MRI of the lumbar spine  He has been referred to pain management by PCP which she has not scheduled as of yet because he was concerned about a conflict of interest possibly seeing his current pain management physician out of network for which she sees for bilateral chronic foot pain after a fall from the second story in 2010      Review of Systems    The following portions of the patient's chart were reviewed and updated as appropriate:    Allergy:  No Known "Allergies    Medications:    Current Outpatient Medications:   •  azelastine (ASTELIN) 0 1 % nasal spray, 1 spray into each nostril 2 (two) times a day Use in each nostril as directed, Disp: 30 mL, Rfl: 2  •  B-D HYPODERMIC NEEDLE 18GX1 5\" 18G X 1-1/2\" MISC, Use as directed---for Testosterone, Disp: , Rfl:   •  B-D SYRINGE LUER-MILAGROS 1CC 1 ML MISC, USE FOR BI WEEKLY TESTOSTERONE INJECTIONS, Disp: , Rfl:   •  B-D SYRINGE LUER-MILAGROS 3CC 3 ML MISC, USE AS DIRECTED FOR TESTOSTERONE INJECTION, Disp: , Rfl:   •  BD Hypodermic Needle 25G X 1-1/2\" MISC, USE FOR INJECTING TESTOSTERONE BIWEEKLY, Disp: , Rfl:   •  DEXILANT 60 MG capsule, Take 1 capsule by mouth daily, Disp: , Rfl: 3  •  gabapentin (NEURONTIN) 600 MG tablet, Take 600 mg by mouth 2 (two) times a day  , Disp: , Rfl: 0  •  hydrOXYzine HCL (ATARAX) 50 mg tablet, Take 50 mg by mouth 2 (two) times a day as needed, Disp: , Rfl: 1  •  losartan-hydrochlorothiazide (HYZAAR) 50-12 5 mg per tablet, Take 1 tablet by mouth daily, Disp: 90 tablet, Rfl: 3  •  meloxicam (MOBIC) 15 mg tablet, Take 1 tablet (15 mg total) by mouth daily, Disp: 30 tablet, Rfl: 0  •  methylphenidate (CONCERTA) 36 MG ER tablet, 1 twice daily , Disp: , Rfl:   •  methylPREDNISolone 4 MG tablet therapy pack, Use as directed on package, Disp: 1 each, Rfl: 0  •  nicotine polacrilex (COMMIT) 4 MG lozenge, Apply 1 lozenge (4 mg total) to the mouth or throat as needed for smoking cessation, Disp: 120 each, Rfl: 4  •  oxyCODONE-acetaminophen (PERCOCET) 5-325 mg per tablet, Take 1 tablet by mouth every 6 (six) hours as needed, Disp: , Rfl:   •  OXYCONTIN 10 MG 12 hr tablet, Take 10 mg by mouth 2 (two) times a day, Disp: , Rfl: 0  •  risperiDONE (RisperDAL) 0 5 mg tablet, 1/2 tab in the AM, Disp: , Rfl:   •  risperiDONE (RisperDAL) 4 mg tablet, 1 at hs, Disp: , Rfl:   •  sildenafil (VIAGRA) 100 mg tablet, Take 1 tablet by mouth one (1) hour prior to intercourse on an empty stomach  Limit to 3 encounters per week  , " "Disp: 30 tablet, Rfl: 0  •  testosterone cypionate (DEPO-TESTOSTERONE) 200 mg/mL SOLN, INJECT 1ML EVERY 2 WEEKS  DISCARD VIAL AFTER 30 DAYS FROM OPENING DATE, Disp: , Rfl:   •  zolpidem (AMBIEN) 10 mg tablet, Take 10 mg by mouth daily at bedtime as needed for sleep, Disp: , Rfl:   •  fluticasone (FLONASE) 50 mcg/act nasal spray, 1 SPRAY INTO EACH NOSTRIL IN THE MORNING AND 1 SPRAY IN THE EVENING  (Patient not taking: Reported on 3/28/2023), Disp: 48 mL, Rfl: 2  •  potassium chloride (K-DUR,KLOR-CON) 20 mEq tablet, Take 1 tablet (20 mEq total) by mouth daily for 2 days, Disp: 2 tablet, Rfl: 0    Patient Active Problem List   Diagnosis   • Bipolar disorder ([facility] [address] )   • Tremor   • ADD (attention deficit disorder)   • GERD (gastroesophageal reflux disease)   • Primary localized osteoarthrosis of ankle and foot   • PTSD (post-traumatic stress disorder)   • Scar tissue   • Iron deficiency anemia due to chronic blood loss   • Chronic right-sided low back pain with right-sided sciatica   • Tobacco dependence   • Narcotic dependency, continuous (HCC)   • Primary hypertension   • Daytime somnolence   • Impaired fasting glucose   • Pain of right thigh   • Seasonal allergic rhinitis due to pollen       Objective:  /87   Pulse 80   Ht 6' 3\" (1 905 m)   Wt 122 kg (270 lb)   BMI 33 75 kg/m²     Back Exam     Muscle Strength   The patient has normal back strength  Tests   Straight leg raise right: positive    Reflexes   Patellar: abnormal    Other   Toe walk: normal  Heel walk: normal    Comments:  Increased right patellar reflex compared to left            Physical Exam  Musculoskeletal:      Lumbar back: Positive right straight leg raise test            Neurologic Exam    Procedures    I have personally reviewed the written report of the pertinent studies  MRI L spine     IMPRESSION:     Right L4-5 foraminal protrusion with right inferior foraminal and subarticular recess stenosis   Correlate for right L4 and L5 " radiculitis  Past Medical History:   Diagnosis Date   • ADD (attention deficit disorder)    • Arthritis    • Chronic pain    • Heartburn    • Mood disorder (HCC)    • Tremor        Past Surgical History:   Procedure Laterality Date   • CLOSED REDUCTION CALCANEAL FRACTURE     • PERONEAL TENDON EXPLORATION         Social History     Socioeconomic History   • Marital status: /Civil Union     Spouse name: Not on file   • Number of children: Not on file   • Years of education: Not on file   • Highest education level: Not on file   Occupational History   • Not on file   Tobacco Use   • Smoking status: Every Day     Packs/day: 1 00     Years: 19 00     Total pack years: 19 00     Types: Cigarettes     Start date: 1999   • Smokeless tobacco: Never   Vaping Use   • Vaping Use: Never used   Substance and Sexual Activity   • Alcohol use:  Yes     Alcohol/week: 7 0 standard drinks of alcohol     Types: 7 Cans of beer per week     Comment: social   • Drug use: Never   • Sexual activity: Yes   Other Topics Concern   • Not on file   Social History Narrative    Lives with wife    Sees dentist occasionally    No living will     Social Determinants of Health     Financial Resource Strain: Not on file   Food Insecurity: Not on file   Transportation Needs: Not on file   Physical Activity: Not on file   Stress: Not on file   Social Connections: Not on file   Intimate Partner Violence: Not on file   Housing Stability: Not on file       Family History   Problem Relation Age of Onset   • Parkinsonism Paternal Grandfather    • Heart disease Father    • Hypertension Father    • Breast cancer Mother    • Intellectual disability Sister    • Heart disease Maternal Grandfather    • Substance Abuse Neg Hx    • Mental illness Neg Hx

## 2023-06-21 NOTE — PATIENT INSTRUCTIONS
While taking the oral steroid Medrol Dose Pack, do not take any NSAIDs such as Advil, Motrin, ibuprofen, Motrin, Aleve, naproxen, Celebrex or Meloxicam   You can restart the NSAIDs after you finish the steroids  However you may take Tylenol 500mg every 4-6 hours as needed OR max 1,000mg per dose up to 3 times per day for a total of 3,000mg per day  While taking oral steroids, you may experience mild side effects such as feeling jittery or flushing  Please call if your side effects are significant or you have any questions  While taking Mobic (meloxicam) do not take any other NSAIDs such as Advil, Motrin, ibuprofen, Celebrex, naproxen or Aleve  However you may take Tylenol (acetaminophen)    You may also take Tylenol 500mg every 4-6 hours as needed OR max 1,000mg per dose up to 3 times per day for a total of 3,000mg per day

## 2023-06-29 ENCOUNTER — APPOINTMENT (EMERGENCY)
Dept: CT IMAGING | Facility: HOSPITAL | Age: 38
DRG: 439 | End: 2023-06-29
Payer: COMMERCIAL

## 2023-06-29 ENCOUNTER — HOSPITAL ENCOUNTER (INPATIENT)
Facility: HOSPITAL | Age: 38
LOS: 1 days | Discharge: HOME | DRG: 439 | End: 2023-06-30
Attending: EMERGENCY MEDICINE | Admitting: HOSPITALIST
Payer: COMMERCIAL

## 2023-06-29 ENCOUNTER — APPOINTMENT (EMERGENCY)
Dept: RADIOLOGY | Facility: HOSPITAL | Age: 38
DRG: 439 | End: 2023-06-29
Payer: COMMERCIAL

## 2023-06-29 DIAGNOSIS — K85.20 PANCREATITIS, ALCOHOLIC, ACUTE: ICD-10-CM

## 2023-06-29 DIAGNOSIS — F11.20 NARCOTIC DEPENDENCY, CONTINUOUS (HCC): ICD-10-CM

## 2023-06-29 DIAGNOSIS — K85.20 ALCOHOL-INDUCED ACUTE PANCREATITIS WITHOUT INFECTION OR NECROSIS: Primary | ICD-10-CM

## 2023-06-29 DIAGNOSIS — F17.200 NICOTINE DEPENDENCE: ICD-10-CM

## 2023-06-29 PROBLEM — F10.939 ALCOHOL WITHDRAWAL (HCC): Status: ACTIVE | Noted: 2023-06-29

## 2023-06-29 LAB
ALBUMIN SERPL BCP-MCNC: 4.1 G/DL (ref 3.5–5)
ALP SERPL-CCNC: 55 U/L (ref 34–104)
ALT SERPL W P-5'-P-CCNC: 9 U/L (ref 7–52)
ANION GAP SERPL CALCULATED.3IONS-SCNC: 13 MMOL/L
AST SERPL W P-5'-P-CCNC: 11 U/L (ref 13–39)
BACTERIA UR QL AUTO: ABNORMAL /HPF
BASOPHILS # BLD AUTO: 0.05 THOUSANDS/ÂΜL (ref 0–0.1)
BASOPHILS NFR BLD AUTO: 1 % (ref 0–1)
BILIRUB SERPL-MCNC: 0.99 MG/DL (ref 0.2–1)
BILIRUB UR QL STRIP: NEGATIVE
BUN SERPL-MCNC: 16 MG/DL (ref 5–25)
CALCIUM SERPL-MCNC: 9.1 MG/DL (ref 8.4–10.2)
CHLORIDE SERPL-SCNC: 106 MMOL/L (ref 96–108)
CLARITY UR: CLEAR
CO2 SERPL-SCNC: 16 MMOL/L (ref 21–32)
COLOR UR: YELLOW
CREAT SERPL-MCNC: 1.23 MG/DL (ref 0.6–1.3)
EOSINOPHIL # BLD AUTO: 0.17 THOUSAND/ÂΜL (ref 0–0.61)
EOSINOPHIL NFR BLD AUTO: 2 % (ref 0–6)
ERYTHROCYTE [DISTWIDTH] IN BLOOD BY AUTOMATED COUNT: 14.2 % (ref 11.6–15.1)
GFR SERPL CREATININE-BSD FRML MDRD: 72 ML/MIN/1.73SQ M
GLUCOSE SERPL-MCNC: 92 MG/DL (ref 65–140)
GLUCOSE UR STRIP-MCNC: NEGATIVE MG/DL
HCT VFR BLD AUTO: 44.8 % (ref 36.5–49.3)
HGB BLD-MCNC: 15 G/DL (ref 12–17)
HGB UR QL STRIP.AUTO: NEGATIVE
IMM GRANULOCYTES # BLD AUTO: 0.03 THOUSAND/UL (ref 0–0.2)
IMM GRANULOCYTES NFR BLD AUTO: 0 % (ref 0–2)
KETONES UR STRIP-MCNC: NEGATIVE MG/DL
LEUKOCYTE ESTERASE UR QL STRIP: NEGATIVE
LIPASE SERPL-CCNC: 297 U/L (ref 11–82)
LYMPHOCYTES # BLD AUTO: 2.97 THOUSANDS/ÂΜL (ref 0.6–4.47)
LYMPHOCYTES NFR BLD AUTO: 29 % (ref 14–44)
MAGNESIUM SERPL-MCNC: 1.8 MG/DL (ref 1.9–2.7)
MCH RBC QN AUTO: 28.4 PG (ref 26.8–34.3)
MCHC RBC AUTO-ENTMCNC: 33.5 G/DL (ref 31.4–37.4)
MCV RBC AUTO: 85 FL (ref 82–98)
MONOCYTES # BLD AUTO: 0.77 THOUSAND/ÂΜL (ref 0.17–1.22)
MONOCYTES NFR BLD AUTO: 8 % (ref 4–12)
NEUTROPHILS # BLD AUTO: 6.31 THOUSANDS/ÂΜL (ref 1.85–7.62)
NEUTS SEG NFR BLD AUTO: 60 % (ref 43–75)
NITRITE UR QL STRIP: NEGATIVE
NON-SQ EPI CELLS URNS QL MICRO: ABNORMAL /HPF
NRBC BLD AUTO-RTO: 0 /100 WBCS
PH UR STRIP.AUTO: 6 [PH]
PLATELET # BLD AUTO: 186 THOUSANDS/UL (ref 149–390)
PMV BLD AUTO: 9.6 FL (ref 8.9–12.7)
POTASSIUM SERPL-SCNC: 3.3 MMOL/L (ref 3.5–5.3)
PROT SERPL-MCNC: 7.2 G/DL (ref 6.4–8.4)
PROT UR STRIP-MCNC: ABNORMAL MG/DL
RBC # BLD AUTO: 5.28 MILLION/UL (ref 3.88–5.62)
RBC #/AREA URNS AUTO: ABNORMAL /HPF
SODIUM SERPL-SCNC: 135 MMOL/L (ref 135–147)
SP GR UR STRIP.AUTO: >=1.03 (ref 1–1.03)
UROBILINOGEN UR STRIP-ACNC: 2 MG/DL
WBC # BLD AUTO: 10.3 THOUSAND/UL (ref 4.31–10.16)
WBC #/AREA URNS AUTO: ABNORMAL /HPF

## 2023-06-29 PROCEDURE — 83735 ASSAY OF MAGNESIUM: CPT | Performed by: PHYSICIAN ASSISTANT

## 2023-06-29 PROCEDURE — 85025 COMPLETE CBC W/AUTO DIFF WBC: CPT | Performed by: EMERGENCY MEDICINE

## 2023-06-29 PROCEDURE — 71045 X-RAY EXAM CHEST 1 VIEW: CPT

## 2023-06-29 PROCEDURE — G1004 CDSM NDSC: HCPCS

## 2023-06-29 PROCEDURE — 36415 COLL VENOUS BLD VENIPUNCTURE: CPT

## 2023-06-29 PROCEDURE — 99285 EMERGENCY DEPT VISIT HI MDM: CPT | Performed by: PHYSICIAN ASSISTANT

## 2023-06-29 PROCEDURE — 80053 COMPREHEN METABOLIC PANEL: CPT | Performed by: EMERGENCY MEDICINE

## 2023-06-29 PROCEDURE — 99222 1ST HOSP IP/OBS MODERATE 55: CPT | Performed by: INTERNAL MEDICINE

## 2023-06-29 PROCEDURE — 99222 1ST HOSP IP/OBS MODERATE 55: CPT | Performed by: HOSPITALIST

## 2023-06-29 PROCEDURE — 93005 ELECTROCARDIOGRAM TRACING: CPT

## 2023-06-29 PROCEDURE — 83690 ASSAY OF LIPASE: CPT | Performed by: EMERGENCY MEDICINE

## 2023-06-29 PROCEDURE — 96375 TX/PRO/DX INJ NEW DRUG ADDON: CPT

## 2023-06-29 PROCEDURE — 96365 THER/PROPH/DIAG IV INF INIT: CPT

## 2023-06-29 PROCEDURE — 81001 URINALYSIS AUTO W/SCOPE: CPT | Performed by: EMERGENCY MEDICINE

## 2023-06-29 PROCEDURE — 96368 THER/DIAG CONCURRENT INF: CPT

## 2023-06-29 PROCEDURE — 99285 EMERGENCY DEPT VISIT HI MDM: CPT

## 2023-06-29 PROCEDURE — 74177 CT ABD & PELVIS W/CONTRAST: CPT

## 2023-06-29 RX ORDER — LANOLIN ALCOHOL/MO/W.PET/CERES
6 CREAM (GRAM) TOPICAL
Status: DISCONTINUED | OUTPATIENT
Start: 2023-06-29 | End: 2023-06-30 | Stop reason: HOSPADM

## 2023-06-29 RX ORDER — NICOTINE 21 MG/24HR
1 PATCH, TRANSDERMAL 24 HOURS TRANSDERMAL DAILY
Status: DISCONTINUED | OUTPATIENT
Start: 2023-06-29 | End: 2023-06-30 | Stop reason: HOSPADM

## 2023-06-29 RX ORDER — METOPROLOL TARTRATE 5 MG/5ML
5 INJECTION INTRAVENOUS EVERY 6 HOURS PRN
Status: DISCONTINUED | OUTPATIENT
Start: 2023-06-29 | End: 2023-06-30 | Stop reason: HOSPADM

## 2023-06-29 RX ORDER — HYDROMORPHONE HCL/PF 1 MG/ML
0.5 SYRINGE (ML) INJECTION EVERY 4 HOURS PRN
Status: DISCONTINUED | OUTPATIENT
Start: 2023-06-29 | End: 2023-06-30 | Stop reason: HOSPADM

## 2023-06-29 RX ORDER — HYDROXYZINE HYDROCHLORIDE 25 MG/1
50 TABLET, FILM COATED ORAL 2 TIMES DAILY PRN
Status: DISCONTINUED | OUTPATIENT
Start: 2023-06-29 | End: 2023-06-30 | Stop reason: HOSPADM

## 2023-06-29 RX ORDER — HYDROMORPHONE HCL IN WATER/PF 6 MG/30 ML
0.2 PATIENT CONTROLLED ANALGESIA SYRINGE INTRAVENOUS EVERY 4 HOURS PRN
Status: DISCONTINUED | OUTPATIENT
Start: 2023-06-29 | End: 2023-06-30 | Stop reason: HOSPADM

## 2023-06-29 RX ORDER — ONDANSETRON 2 MG/ML
4 INJECTION INTRAMUSCULAR; INTRAVENOUS ONCE
Status: COMPLETED | OUTPATIENT
Start: 2023-06-29 | End: 2023-06-29

## 2023-06-29 RX ORDER — ONDANSETRON 2 MG/ML
4 INJECTION INTRAMUSCULAR; INTRAVENOUS EVERY 6 HOURS PRN
Status: DISCONTINUED | OUTPATIENT
Start: 2023-06-29 | End: 2023-06-30 | Stop reason: HOSPADM

## 2023-06-29 RX ORDER — OXYCODONE HCL 10 MG/1
10 TABLET, FILM COATED, EXTENDED RELEASE ORAL EVERY 12 HOURS SCHEDULED
Status: DISCONTINUED | OUTPATIENT
Start: 2023-06-29 | End: 2023-06-30 | Stop reason: HOSPADM

## 2023-06-29 RX ORDER — SODIUM CHLORIDE, SODIUM LACTATE, POTASSIUM CHLORIDE, CALCIUM CHLORIDE 600; 310; 30; 20 MG/100ML; MG/100ML; MG/100ML; MG/100ML
200 INJECTION, SOLUTION INTRAVENOUS CONTINUOUS
Status: DISCONTINUED | OUTPATIENT
Start: 2023-06-29 | End: 2023-06-30 | Stop reason: HOSPADM

## 2023-06-29 RX ORDER — SODIUM CHLORIDE 9 MG/ML
125 INJECTION, SOLUTION INTRAVENOUS CONTINUOUS
Status: DISCONTINUED | OUTPATIENT
Start: 2023-06-29 | End: 2023-06-29

## 2023-06-29 RX ORDER — ENOXAPARIN SODIUM 100 MG/ML
40 INJECTION SUBCUTANEOUS DAILY
Status: DISCONTINUED | OUTPATIENT
Start: 2023-06-29 | End: 2023-06-30 | Stop reason: HOSPADM

## 2023-06-29 RX ORDER — POTASSIUM CHLORIDE 14.9 MG/ML
20 INJECTION INTRAVENOUS
Status: COMPLETED | OUTPATIENT
Start: 2023-06-29 | End: 2023-06-29

## 2023-06-29 RX ORDER — KETOROLAC TROMETHAMINE 30 MG/ML
15 INJECTION, SOLUTION INTRAMUSCULAR; INTRAVENOUS EVERY 6 HOURS PRN
Status: DISCONTINUED | OUTPATIENT
Start: 2023-06-29 | End: 2023-06-30 | Stop reason: HOSPADM

## 2023-06-29 RX ORDER — RISPERIDONE 2 MG/1
4 TABLET ORAL
Status: DISCONTINUED | OUTPATIENT
Start: 2023-06-29 | End: 2023-06-30 | Stop reason: HOSPADM

## 2023-06-29 RX ADMIN — SODIUM CHLORIDE, SODIUM LACTATE, POTASSIUM CHLORIDE, AND CALCIUM CHLORIDE 1000 ML: .6; .31; .03; .02 INJECTION, SOLUTION INTRAVENOUS at 11:36

## 2023-06-29 RX ADMIN — HYDROXYZINE HYDROCHLORIDE 50 MG: 25 TABLET, FILM COATED ORAL at 22:30

## 2023-06-29 RX ADMIN — RISPERIDONE 4 MG: 2 TABLET ORAL at 22:28

## 2023-06-29 RX ADMIN — POTASSIUM CHLORIDE 20 MEQ: 14.9 INJECTION, SOLUTION INTRAVENOUS at 13:49

## 2023-06-29 RX ADMIN — IOHEXOL 100 ML: 350 INJECTION, SOLUTION INTRAVENOUS at 11:46

## 2023-06-29 RX ADMIN — FOLIC ACID: 5 INJECTION, SOLUTION INTRAMUSCULAR; INTRAVENOUS; SUBCUTANEOUS at 15:45

## 2023-06-29 RX ADMIN — OXYCODONE HYDROCHLORIDE 10 MG: 10 TABLET, FILM COATED, EXTENDED RELEASE ORAL at 22:30

## 2023-06-29 RX ADMIN — MELATONIN 6 MG: at 22:30

## 2023-06-29 RX ADMIN — SODIUM CHLORIDE, SODIUM LACTATE, POTASSIUM CHLORIDE, AND CALCIUM CHLORIDE 125 ML/HR: .6; .31; .03; .02 INJECTION, SOLUTION INTRAVENOUS at 13:49

## 2023-06-29 RX ADMIN — NICOTINE 1 PATCH: 14 PATCH, EXTENDED RELEASE TRANSDERMAL at 15:43

## 2023-06-29 RX ADMIN — SODIUM CHLORIDE, SODIUM LACTATE, POTASSIUM CHLORIDE, AND CALCIUM CHLORIDE 125 ML/HR: .6; .31; .03; .02 INJECTION, SOLUTION INTRAVENOUS at 23:36

## 2023-06-29 RX ADMIN — HYDROMORPHONE HYDROCHLORIDE 0.2 MG: 0.2 INJECTION, SOLUTION INTRAMUSCULAR; INTRAVENOUS; SUBCUTANEOUS at 20:29

## 2023-06-29 RX ADMIN — ONDANSETRON 4 MG: 2 INJECTION INTRAMUSCULAR; INTRAVENOUS at 11:35

## 2023-06-29 RX ADMIN — HYDROMORPHONE HYDROCHLORIDE 0.2 MG: 0.2 INJECTION, SOLUTION INTRAMUSCULAR; INTRAVENOUS; SUBCUTANEOUS at 16:05

## 2023-06-29 RX ADMIN — POTASSIUM CHLORIDE 20 MEQ: 14.9 INJECTION, SOLUTION INTRAVENOUS at 11:35

## 2023-06-29 RX ADMIN — ENOXAPARIN SODIUM 40 MG: 100 INJECTION SUBCUTANEOUS at 15:44

## 2023-06-29 NOTE — PLAN OF CARE
Problem: PAIN - ADULT  Goal: Verbalizes/displays adequate comfort level or baseline comfort level  Description: Interventions:  - Encourage patient to monitor pain and request assistance  - Assess pain using appropriate pain scale  - Administer analgesics based on type and severity of pain and evaluate response  - Implement non-pharmacological measures as appropriate and evaluate response  - Consider cultural and social influences on pain and pain management  - Notify physician/advanced practitioner if interventions unsuccessful or patient reports new pain  Outcome: Progressing     Problem: INFECTION - ADULT  Goal: Absence or prevention of progression during hospitalization  Description: INTERVENTIONS:  - Assess and monitor for signs and symptoms of infection  - Monitor lab/diagnostic results  - Monitor all insertion sites, i e  indwelling lines, tubes, and drains  - Monitor endotracheal if appropriate and nasal secretions for changes in amount and color  - Coram appropriate cooling/warming therapies per order  - Administer medications as ordered  - Instruct and encourage patient and family to use good hand hygiene technique  - Identify and instruct in appropriate isolation precautions for identified infection/condition  Outcome: Progressing  Goal: Absence of fever/infection during neutropenic period  Description: INTERVENTIONS:  - Monitor WBC    Outcome: Progressing     Problem: SAFETY ADULT  Goal: Patient will remain free of falls  Description: INTERVENTIONS:  - Educate patient/family on patient safety including physical limitations  - Instruct patient to call for assistance with activity   - Consult OT/PT to assist with strengthening/mobility   - Keep Call bell within reach  - Keep bed low and locked with side rails adjusted as appropriate  - Keep care items and personal belongings within reach  - Initiate and maintain comfort rounds  - Make Fall Risk Sign visible to staff  - Offer Toileting every 2 Hours, in advance of need  - Initiate/Maintain bed/ chair alarm  - Obtain necessary fall risk management equipment: n/a  - Apply yellow socks and bracelet for high fall risk patients  - Consider moving patient to room near nurses station  Outcome: Progressing  Goal: Maintain or return to baseline ADL function  Description: INTERVENTIONS:  -  Assess patient's ability to carry out ADLs; assess patient's baseline for ADL function and identify physical deficits which impact ability to perform ADLs (bathing, care of mouth/teeth, toileting, grooming, dressing, etc )  - Assess/evaluate cause of self-care deficits   - Assess range of motion  - Assess patient's mobility; develop plan if impaired  - Assess patient's need for assistive devices and provide as appropriate  - Encourage maximum independence but intervene and supervise when necessary  - Involve family in performance of ADLs  - Assess for home care needs following discharge   - Consider OT consult to assist with ADL evaluation and planning for discharge  - Provide patient education as appropriate  Outcome: Progressing  Goal: Maintains/Returns to pre admission functional level  Description: INTERVENTIONS:  - Perform BMAT or MOVE assessment daily    - Set and communicate daily mobility goal to care team and patient/family/caregiver  - Collaborate with rehabilitation services on mobility goals if consulted  - Perform Range of Motion 3 times a day  - Reposition patient every 2 hours    - Dangle patient 3 times a day  - Stand patient 3 times a day  - Ambulate patient 3 times a day  - Out of bed to chair 3 times a day   - Out of bed for meals 3 times a day  - Out of bed for toileting  - Record patient progress and toleration of activity level   Outcome: Progressing     Problem: DISCHARGE PLANNING  Goal: Discharge to home or other facility with appropriate resources  Description: INTERVENTIONS:  - Identify barriers to discharge w/patient and caregiver  - Arrange for needed discharge resources and transportation as appropriate  - Identify discharge learning needs (meds, wound care, etc )  - Arrange for interpretive services to assist at discharge as needed  - Refer to Case Management Department for coordinating discharge planning if the patient needs post-hospital services based on physician/advanced practitioner order or complex needs related to functional status, cognitive ability, or social support system  Outcome: Progressing     Problem: METABOLIC, FLUID AND ELECTROLYTES - ADULT  Goal: Electrolytes maintained within normal limits  Description: INTERVENTIONS:  - Monitor labs and assess patient for signs and symptoms of electrolyte imbalances  - Administer electrolyte replacement as ordered  - Monitor response to electrolyte replacements, including repeat lab results as appropriate  - Instruct patient on fluid and nutrition as appropriate  Outcome: Progressing  Goal: Fluid balance maintained  Description: INTERVENTIONS:  - Monitor labs   - Monitor I/O and WT  - Instruct patient on fluid and nutrition as appropriate  - Assess for signs & symptoms of volume excess or deficit  Outcome: Progressing  Goal: Glucose maintained within target range  Description: INTERVENTIONS:  - Monitor Blood Glucose as ordered  - Assess for signs and symptoms of hyperglycemia and hypoglycemia  - Administer ordered medications to maintain glucose within target range  - Assess nutritional intake and initiate nutrition service referral as needed  Outcome: Progressing     Problem: Nutrition/Hydration-ADULT  Goal: Nutrient/Hydration intake appropriate for improving, restoring or maintaining nutritional needs  Description: Monitor and assess patient's nutrition/hydration status for malnutrition  Collaborate with interdisciplinary team and initiate plan and interventions as ordered  Monitor patient's weight and dietary intake as ordered or per policy  Utilize nutrition screening tool and intervene as necessary  Determine patient's food preferences and provide high-protein, high-caloric foods as appropriate       INTERVENTIONS:  - Monitor oral intake, urinary output, labs, and treatment plans  - Assess nutrition and hydration status and recommend course of action  - Evaluate amount of meals eaten  - Assist patient with eating if necessary   - Allow adequate time for meals  - Recommend/ encourage appropriate diets, oral nutritional supplements, and vitamin/mineral supplements  - Order, calculate, and assess calorie counts as needed  - Recommend, monitor, and adjust tube feedings and TPN/PPN based on assessed needs  - Assess need for intravenous fluids  - Provide specific nutrition/hydration education as appropriate  - Include patient/family/caregiver in decisions related to nutrition  Outcome: Progressing

## 2023-06-29 NOTE — CONSULTS
Consultation - [id number]     Ferny Lakhani 34 y o  male MRN: [id number]  Unit/Bed#: ED 02 Encounter: [id number]    Inpatient consult to gastroenterology  Consult performed by: Chuyita Giraldo MD  Consult ordered by: Dmitri Hand PA-C          ASSESSMENT and PLAN    1  Acute pancreatitis  Relatively mild  Probably related to alcohol  Has been on steroids for back injury over the last week which can cause pancreatitis as well  No obvious gallstones and LFTs are normal   Acute viral pancreatitis? Lipids have not been excessively abnormal in the past   Taking aspirin and steroids for back pain so peptic ulcer disease could be in the differential  -Clear liquid diet tonight   -Advance as tolerated tomorrow  -Pantoprazole for now  -Stressed importance of alcohol abstinence    2  History of iron deficiency anemia  Previous work-up by Dr Helen Rowe  Hemoglobin normal    Chief Complaint   Patient presents with   • Abdominal Pain     abdomen pain, started last night with vomiting and diarrhea       Physician Requesting Consult: Eber Warren MD    HPI  Ferny Lakhani is a 34y o  year old male with a history of iron deficiency anemia who was having issues with severe back pain  He saw the pain specialist was given a short course of steroids which she completed yesterday  He was also taking significant aspirin as well  Yesterday began having epigastric abdominal pain without real radiation  He reports nausea without vomiting  He denies any dysphagia, odynophagia, early satiety  His bowels are normal   He denies any GI bleeding  The pain became bad enough that he presented to the emergency room in the ER his lipase was minimally increased  The rest of his blood work looked okay  A CAT scan showed some mild pancreatitis  He denies any previous history of pancreatitis the patient admits to drinking a sixpack of beer a day    He denies any new medications other than the steroid  He denies being exposed anybody sick or any recent travel  He denies any new supplements    His weight has been stable    Historical Information   Past Medical History:   Diagnosis Date   • ADD (attention deficit disorder)    • Arthritis    • Chronic pain    • Heartburn    • Mood disorder (HCC)    • Tremor      Past Surgical History:   Procedure Laterality Date   • CLOSED REDUCTION CALCANEAL FRACTURE     • PERONEAL TENDON EXPLORATION       Social History   Social History     Substance and Sexual Activity   Alcohol Use Yes   • Alcohol/week: 7 0 standard drinks of alcohol   • Types: 7 Cans of beer per week    Comment: social     Social History     Substance and Sexual Activity   Drug Use Never     Social History     Tobacco Use   Smoking Status Every Day   • Packs/day: 1 00   • Years: 19 00   • Total pack years: 19 00   • Types: Cigarettes   • Start date: 1999   Smokeless Tobacco Never     Family History   Problem Relation Age of Onset   • Parkinsonism Paternal Grandfather    • Heart disease Father    • Hypertension Father    • Breast cancer Mother    • Intellectual disability Sister    • Heart disease Maternal Grandfather    • Substance Abuse Neg Hx    • Mental illness Neg Hx        Meds/Allergies     Current Facility-Administered Medications   Medication Dose Route Frequency   • enoxaparin (LOVENOX) subcutaneous injection 40 mg  40 mg Subcutaneous Daily   • folic acid 1 mg, thiamine (VITAMIN B1) 100 mg in sodium chloride 0 9 % 100 mL IV piggyback   Intravenous Daily   • HYDROmorphone (DILAUDID) injection 0 5 mg  0 5 mg Intravenous Q4H PRN   • HYDROmorphone HCl (DILAUDID) injection 0 2 mg  0 2 mg Intravenous Q4H PRN   • ketorolac (TORADOL) injection 15 mg  15 mg Intravenous Q6H PRN   • lactated ringers infusion  125 mL/hr Intravenous Continuous   • metoprolol (LOPRESSOR) injection 5 mg  5 mg Intravenous Q6H PRN   • nicotine (NICODERM CQ) 14 mg/24hr TD 24 hr patch 1 patch  1 patch Transdermal Daily   • "ondansetron (ZOFRAN) injection 4 mg  4 mg Intravenous Q6H PRN   • potassium chloride 20 mEq IVPB (premix)  20 mEq Intravenous Q2H   • sodium chloride 0 9 % infusion  125 mL/hr Intravenous Continuous     (Not in a hospital admission)      No Known Allergies    PHYSICAL EXAM    Blood pressure 146/99, pulse 93, temperature 97 7 °F (36 5 °C), resp  rate 16, height 6' 3\" (1 905 m), weight 131 kg (289 lb), SpO2 97 %  Body mass index is 36 12 kg/m²  General Appearance: NAD, cooperative, alert  Eyes: Anicteric, PERRLA, EOMI  ENT:  Normocephalic, atraumatic, normal mucosa  Neck:  Supple, symmetrical, trachea midline  Resp:  Clear to auscultation bilaterally; no rales, rhonchi or wheezing; respirations unlabored   CV:  S1 S2, Regular rate and rhythm; no murmur, rub, or gallop  GI:  Soft, mild epigastric tenderness without rebound or guarding, non-distended; normal bowel sounds; no masses, no organomegaly   Rectal: Deferred  Musculoskeletal: No cyanosis, clubbing or edema  Normal ROM  Skin:  No jaundice, rashes, or lesions   Heme/Lymph: No palpable cervical lymphadenopathy  Psych: Normal affect, good eye contact  Neuro: No gross deficits, AAOx3    Lab Results   Component Value Date    CALCIUM 9 1 06/29/2023    K 3 3 (L) 06/29/2023    CO2 16 (L) 06/29/2023     06/29/2023    BUN 16 06/29/2023    CREATININE 1 23 06/29/2023     Lab Results   Component Value Date    WBC 10 30 (H) 06/29/2023    HGB 15 0 06/29/2023    HCT 44 8 06/29/2023    MCV 85 06/29/2023     06/29/2023     Lab Results   Component Value Date    ALT 9 06/29/2023    AST 11 (L) 06/29/2023    ALKPHOS 55 06/29/2023     Lab Results   Component Value Date    LIPASE 297 (H) 06/29/2023     Lab Results   Component Value Date    IRON 111 12/28/2021    TIBC 401 12/28/2021    FERRITIN 28 12/28/2021     Lab Results   Component Value Date    INR 1 11 05/28/2021       Imaging Studies: I have personally reviewed pertinent reports        EKG, Pathology, and Other " Studies: I have personally reviewed pertinent reports  REVIEW OF SYSTEMS:    CONSTITUTIONAL: Denies any fever, chills, rigors, and weight loss  HEENT: No earache or tinnitus  Denies hearing loss or visual disturbances  CARDIOVASCULAR: No chest pain or palpitations  RESPIRATORY: Denies any cough, hemoptysis, shortness of breath or dyspnea on exertion  GASTROINTESTINAL: As noted in the History of Present Illness  GENITOURINARY: No problems with urination  Denies any hematuria or dysuria  NEUROLOGIC: No dizziness or vertigo, denies headaches  MUSCULOSKELETAL: Significant issues with back pain  SKIN: Denies skin rashes or itching  ENDOCRINE: Denies excessive thirst  Denies intolerance to heat or cold  PSYCHOSOCIAL: Denies depression or anxiety  Denies any recent memory loss

## 2023-06-29 NOTE — ED PROVIDER NOTES
History  Chief Complaint   Patient presents with   • Abdominal Pain     abdomen pain, started last night with vomiting and diarrhea     Patient is a 49-year-old male with past medical history significant for current everyday drinking (6 beers daily, last drink last night), current smoker, narcotic dependence, essential tremor, ADHD, who presents for evaluation of epigastric abdominal pain, nausea and vomiting since last night  On exam patient is tachycardic  Labs are remarkable for anion gap acidemia, lipase is 3 6 times upper limit of normal   CT with evidence of acute pancreatitis  Patient was given IV fluids in the ED and admitted for further evaluation and treatment  Abdominal Pain  Pain location:  Epigastric  Pain quality: gnawing, sharp and stabbing    Pain radiation: Radiates to back  Pain severity:  Moderate  Onset quality:  Sudden  Duration:  1 day  Timing:  Constant  Progression:  Worsening  Chronicity:  New  Context: alcohol use    Context: not awakening from sleep, not diet changes, not eating, not laxative use, not medication withdrawal, not previous surgeries, not recent illness, not recent sexual activity, not recent travel, not retching, not sick contacts, not suspicious food intake and not trauma    Relieved by:  None tried  Worsened by:  Nothing  Ineffective treatments:  None tried  Associated symptoms: diarrhea, nausea and vomiting    Associated symptoms: no anorexia, no belching, no chest pain, no chills, no constipation, no cough, no dysuria, no fatigue, no fever, no flatus, no hematemesis, no hematochezia, no hematuria, no melena, no shortness of breath and no sore throat    Diarrhea:     Quality:  Watery    Number of occurrences:  5 episodes yesterday    1 episode today    Severity:  Moderate    Duration:  1 day    Timing:  Constant    Progression:  Unchanged  Nausea:     Severity:  Moderate    Onset quality:  Sudden    Duration:  2 days    Timing:  Constant    Progression: "Worsening  Vomiting:     Quality:  Bilious material and stomach contents    Number of occurrences:  5    Severity:  Moderate    Duration:  1 day    Timing:  Constant    Progression:  Worsening  Risk factors: alcohol abuse and obesity    Risk factors: no aspirin use, not elderly, has not had multiple surgeries, no NSAID use and no recent hospitalization        Prior to Admission Medications   Prescriptions Last Dose Informant Patient Reported? Taking? B-D HYPODERMIC NEEDLE 18GX1 5\" 18G X 1-/\" MISC   Yes No   Sig: Use as directed---for Testosterone   B-D SYRINGE LUER-MILAGROS 1CC 1 ML MISC   Yes No   Sig: USE FOR BI WEEKLY TESTOSTERONE INJECTIONS   B-D SYRINGE LUER-MILAGROS 3CC 3 ML MISC   Yes No   Sig: USE AS DIRECTED FOR TESTOSTERONE INJECTION   BD Hypodermic Needle 25G X -\" MISC   Yes No   Sig: USE FOR INJECTING TESTOSTERONE BIWEEKLY   DEXILANT 60 MG capsule  Self Yes No   Sig: Take 1 capsule by mouth daily   OXYCONTIN 10 MG 12 hr tablet  Self Yes No   Sig: Take 10 mg by mouth 2 (two) times a day   azelastine (ASTELIN) 0 1 % nasal spray   No No   Si spray into each nostril 2 (two) times a day Use in each nostril as directed   fluticasone (FLONASE) 50 mcg/act nasal spray   No No   Si SPRAY INTO EACH NOSTRIL IN THE MORNING AND 1 SPRAY IN THE EVENING     Patient not taking: Reported on 3/28/2023   gabapentin (NEURONTIN) 600 MG tablet  Self Yes No   Sig: Take 600 mg by mouth 2 (two) times a day     hydrOXYzine HCL (ATARAX) 50 mg tablet  Self Yes No   Sig: Take 50 mg by mouth 2 (two) times a day as needed   losartan-hydrochlorothiazide (HYZAAR) 50-12 5 mg per tablet   No No   Sig: Take 1 tablet by mouth daily   meloxicam (MOBIC) 15 mg tablet   No No   Sig: Take 1 tablet (15 mg total) by mouth daily   methylPREDNISolone 4 MG tablet therapy pack   No No   Sig: Use as directed on package   methylphenidate (CONCERTA) 36 MG ER tablet  Self Yes No   Si twice daily    nicotine polacrilex (COMMIT) 4 MG lozenge   No No " Sig: Apply 1 lozenge (4 mg total) to the mouth or throat as needed for smoking cessation   oxyCODONE-acetaminophen (PERCOCET) 5-325 mg per tablet   Yes No   Sig: Take 1 tablet by mouth every 6 (six) hours as needed   potassium chloride (K-DUR,KLOR-CON) 20 mEq tablet   No No   Sig: Take 1 tablet (20 mEq total) by mouth daily for 2 days   risperiDONE (RisperDAL) 0 5 mg tablet   Yes No   Si/2 tab in the AM   risperiDONE (RisperDAL) 4 mg tablet   Yes No   Si at hs   sildenafil (VIAGRA) 100 mg tablet   No No   Sig: Take 1 tablet by mouth one (1) hour prior to intercourse on an empty stomach  Limit to 3 encounters per week  testosterone cypionate (DEPO-TESTOSTERONE) 200 mg/mL SOLN   Yes No   Sig: INJECT 1ML EVERY 2 WEEKS  DISCARD VIAL AFTER 30 DAYS FROM OPENING DATE   zolpidem (AMBIEN) 10 mg tablet  Self Yes No   Sig: Take 10 mg by mouth daily at bedtime as needed for sleep      Facility-Administered Medications: None       Past Medical History:   Diagnosis Date   • ADD (attention deficit disorder)    • Arthritis    • Chronic pain    • Heartburn    • Mood disorder (HCC)    • Tremor        Past Surgical History:   Procedure Laterality Date   • CLOSED REDUCTION CALCANEAL FRACTURE     • PERONEAL TENDON EXPLORATION         Family History   Problem Relation Age of Onset   • Parkinsonism Paternal Grandfather    • Heart disease Father    • Hypertension Father    • Breast cancer Mother    • Intellectual disability Sister    • Heart disease Maternal Grandfather    • Substance Abuse Neg Hx    • Mental illness Neg Hx      I have reviewed and agree with the history as documented      E-Cigarette/Vaping   • E-Cigarette Use Never User      E-Cigarette/Vaping Substances   • Nicotine No    • THC No    • CBD No    • Flavoring No    • Other No    • Unknown No      Social History     Tobacco Use   • Smoking status: Every Day     Packs/day:  00     Years:      Total pack years:      Types: Cigarettes     Start date: 1999   • Smokeless tobacco: Never   Vaping Use   • Vaping Use: Never used   Substance Use Topics   • Alcohol use: Yes     Alcohol/week: 7 0 standard drinks of alcohol     Types: 7 Cans of beer per week     Comment: social   • Drug use: Never       Review of Systems   Constitutional: Negative for chills, fatigue and fever  HENT: Negative  Negative for ear pain and sore throat  Eyes: Negative for pain and visual disturbance  Respiratory: Negative  Negative for cough and shortness of breath  Cardiovascular: Negative for chest pain and palpitations  Gastrointestinal: Positive for abdominal pain, diarrhea, nausea and vomiting  Negative for anorexia, constipation, flatus, hematemesis, hematochezia and melena  Endocrine: Negative  Genitourinary: Negative  Negative for dysuria and hematuria  Musculoskeletal: Negative  Negative for arthralgias and back pain  Skin: Negative for color change and rash  Allergic/Immunologic: Negative  Neurological: Negative  Negative for seizures and syncope  Hematological: Negative  Psychiatric/Behavioral: Negative  All other systems reviewed and are negative  Physical Exam  Physical Exam  Vitals and nursing note reviewed  Constitutional:       General: He is not in acute distress  Appearance: He is well-developed  HENT:      Head: Normocephalic and atraumatic  Mouth/Throat:      Mouth: Mucous membranes are moist    Eyes:      Extraocular Movements: Extraocular movements intact  Conjunctiva/sclera: Conjunctivae normal       Pupils: Pupils are equal, round, and reactive to light  Cardiovascular:      Rate and Rhythm: Normal rate and regular rhythm  Heart sounds: Normal heart sounds  No murmur heard  Pulmonary:      Effort: Pulmonary effort is normal  No respiratory distress  Breath sounds: Normal breath sounds  Abdominal:      General: Abdomen is protuberant  There is no distension  Palpations: Abdomen is soft  Tenderness: There is abdominal tenderness in the epigastric area  There is no right CVA tenderness, left CVA tenderness or rebound  Hernia: No hernia is present  Musculoskeletal:         General: No swelling  Cervical back: Neck supple  Skin:     General: Skin is warm and dry  Capillary Refill: Capillary refill takes less than 2 seconds  Neurological:      General: No focal deficit present  Mental Status: He is alert and oriented to person, place, and time        Comments: Tremor noted   Psychiatric:         Mood and Affect: Mood normal          Vital Signs  ED Triage Vitals [06/29/23 1009]   Temperature Pulse Respirations Blood Pressure SpO2   97 7 °F (36 5 °C) (!) 110 18 (!) 137/101 98 %      Temp src Heart Rate Source Patient Position - Orthostatic VS BP Location FiO2 (%)   -- Monitor Sitting Right arm --      Pain Score       6           Vitals:    06/29/23 1430 06/29/23 1500 06/29/23 1600 06/29/23 1738   BP: 146/99 155/90 149/87 (!) 143/109   Pulse: 93 85 90 93   Patient Position - Orthostatic VS:  Sitting Sitting          Visual Acuity      ED Medications  Medications   lactated ringers infusion (125 mL/hr Intravenous New Bag 6/29/23 1349)   ondansetron (ZOFRAN) injection 4 mg (has no administration in time range)   nicotine (NICODERM CQ) 14 mg/24hr TD 24 hr patch 1 patch (1 patch Transdermal Medication Applied 6/29/23 1543)   enoxaparin (LOVENOX) subcutaneous injection 40 mg (40 mg Subcutaneous Given 6/29/23 1544)   HYDROmorphone HCl (DILAUDID) injection 0 2 mg (0 2 mg Intravenous Given 6/29/23 1605)   HYDROmorphone (DILAUDID) injection 0 5 mg (has no administration in time range)   ketorolac (TORADOL) injection 15 mg (15 mg Intravenous Not Given 6/29/23 1537)   metoprolol (LOPRESSOR) injection 5 mg (has no administration in time range)   folic acid 1 mg, thiamine (VITAMIN B1) 100 mg in sodium chloride 0 9 % 100 mL IV piggyback ( Intravenous Stopped 6/29/23 1649)   ondansetron [facility]) injection 4 mg (4 mg Intravenous Given 6/29/23 1135)   lactated ringers bolus 1,000 mL (0 mL Intravenous Stopped 6/29/23 1236)   potassium chloride 20 mEq IVPB (premix) (0 mEq Intravenous Stopped 6/29/23 1549)   iohexol (OMNIPAQUE) 350 MG/ML injection (SINGLE-DOSE) 100 mL (100 mL Intravenous Given 6/29/23 1146)       Diagnostic Studies  Results Reviewed     Procedure Component Value Units Date/Time    Urine Microscopic [[id number]]  (Abnormal) Collected: 06/29/23 1059    Lab Status: Final result Specimen: Urine, Clean Catch Updated: 06/29/23 1222     RBC, UA None Seen /hpf      WBC, UA 4-10 /hpf      Epithelial Cells Occasional /hpf      Bacteria, UA Occasional /hpf     Magnesium [[id number]]  (Abnormal) Collected: 06/29/23 1135    Lab Status: Final result Specimen: Blood from Arm, Right Updated: 06/29/23 1207     Magnesium 1 8 mg/dL     UA w Reflex to Microscopic w Reflex to Culture [[id number]]  (Abnormal) Collected: 06/29/23 1059    Lab Status: Final result Specimen: Urine, Clean Catch Updated: 06/29/23 1132     Color, UA Yellow     Clarity, UA Clear     Specific Gravity, UA >=1 030     pH, UA 6 0     Leukocytes, UA Negative     Nitrite, UA Negative     Protein, UA 30 (1+) mg/dl      Glucose, UA Negative mg/dl      Ketones, UA Negative mg/dl      Urobilinogen, UA 2 0 mg/dl      Bilirubin, UA Negative     Occult Blood, UA Negative    Comprehensive metabolic panel [[id number]]  (Abnormal) Collected: 06/29/23 1015    Lab Status: Final result Specimen: Blood from Arm, Right Updated: 06/29/23 1035     Sodium 135 mmol/L      Potassium 3 3 mmol/L      Chloride 106 mmol/L      CO2 16 mmol/L      ANION GAP 13 mmol/L      BUN 16 mg/dL      Creatinine 1 23 mg/dL      Glucose 92 mg/dL      Calcium 9 1 mg/dL      AST 11 U/L      ALT 9 U/L      Alkaline Phosphatase 55 U/L      Total Protein 7 2 g/dL      Albumin 4 1 g/dL      Total Bilirubin 0 99 mg/dL      eGFR 72 ml/min/1 73sq m     Narrative:      National Kid[employer] guidelines for Chronic Kidney Disease (CKD):   •  Stage 1 with normal or high GFR (GFR > 90 mL/min/1 73 square meters)  •  Stage 2 Mild CKD (GFR = 60-89 mL/min/1 73 square meters)  •  Stage 3A Moderate CKD (GFR = 45-59 mL/min/1 73 square meters)  •  Stage 3B Moderate CKD (GFR = 30-44 mL/min/1 73 square meters)  •  Stage 4 Severe CKD (GFR = 15-29 mL/min/1 73 square meters)  •  Stage 5 End Stage CKD (GFR <15 mL/min/1 73 square meters)  Note: GFR calculation is accurate only with a steady state creatinine    Lipase [[id number]]  (Abnormal) Collected: 06/29/23 1015    Lab Status: Final result Specimen: Blood from Arm, Right Updated: 06/29/23 1035     Lipase 297 u/L     CBC and differential [[id number]]  (Abnormal) Collected: 06/29/23 1015    Lab Status: Final result Specimen: Blood from Arm, Right Updated: 06/29/23 1021     WBC 10 30 Thousand/uL      RBC 5 28 Million/uL      Hemoglobin 15 0 g/dL      Hematocrit 44 8 %      MCV 85 fL      MCH 28 4 pg      MCHC 33 5 g/dL      RDW 14 2 %      MPV 9 6 fL      Platelets 587 Thousands/uL      nRBC 0 /100 WBCs      Neutrophils Relative 60 %      Immat GRANS % 0 %      Lymphocytes Relative 29 %      Monocytes Relative 8 %      Eosinophils Relative 2 %      Basophils Relative 1 %      Neutrophils Absolute 6 31 Thousands/µL      Immature Grans Absolute 0 03 Thousand/uL      Lymphocytes Absolute 2 97 Thousands/µL      Monocytes Absolute 0 77 Thousand/µL      Eosinophils Absolute 0 17 Thousand/µL      Basophils Absolute 0 05 Thousands/µL                  CT Abdomen pelvis with contrast   Final Result by Elio Shaffer MD (06/29 1224)      Abnormal findings compatible with acute interstitial edematous pancreatitis along the pancreaticoduodenal groove  Mild splenomegaly  This study demonstrates a significant  finding and was documented as such in [facility] for liaison and referring practitioner notification              Workstation performed: [id number]         XR chest 1 view portable   Final Result by Katelynn Cedillo MD (06/29 1257)      No acute cardiopulmonary disease  Workstation performed: [id number]                    Procedures  Procedures         ED Course  ED Course as of 06/29/23 1748   Thu Jun 29, 2023   1116 Lipase(!): 297   1116 CO2(!): 16  Anion gap acidemia     1138 Creatinine: 1 23   1150 Calcium: 9 1                               SBIRT 20yo+    Flowsheet Row Most Recent Value   Initial Alcohol Screen: US AUDIT-C     1  How often do you have a drink containing alcohol? 0 Filed at: 06/29/2023 1012   2  How many drinks containing alcohol do you have on a typical day you are drinking? 0 Filed at: 06/29/2023 1012   3a  Male UNDER 65: How often do you have five or more drinks on one occasion? 0 Filed at: 06/29/2023 1012   3b  FEMALE Any Age, or MALE 65+: How often do you have 4 or more drinks on one occassion? 0 Filed at: 06/29/2023 1012   Audit-C Score 0 Filed at: 06/29/2023 1012   TI: How many times in the past year have you    Used an illegal drug or used a prescription medication for non-medical reasons? Never Filed at: 06/29/2023 1012                    Medical Decision Making  Alcohol-induced acute pancreatitis without infection or necrosis: acute illness or injury     Details: Patient with alcohol induced acute pancreatitis without infection or necrosis  Patient tachycardic on exam multifactorial possibly related to pancreatitis-also consider alcohol withdrawal  Patient given IV fluids in the ED patient to be admitted for further evaluation and treatment  Amount and/or Complexity of Data Reviewed  External Data Reviewed: labs and notes  Labs: ordered  Decision-making details documented in ED Course  Radiology: ordered  Decision-making details documented in ED Course  Risk  Prescription drug management  Decision regarding hospitalization            Disposition  Final diagnoses:   Alcohol-induced "acute pancreatitis without infection or necrosis     Time reflects when diagnosis was documented in both MDM as applicable and the Disposition within this note     Time User Action Codes Description Comment    6/29/2023 12:54 PM Chinyere Holman Oscar [K85 20] Alcohol-induced acute pancreatitis without infection or necrosis       ED Disposition     ED Disposition   Admit    Condition   Stable    Date/Time   Thu Jun 29, 2023 12:54 PM    Comment   Case was discussed with TOM and the patient's admission status was agreed to be Admission Status: inpatient status to the service of Dr Rosa Funes   Follow-up Information    None         Current Discharge Medication List      CONTINUE these medications which have NOT CHANGED    Details   azelastine (ASTELIN) 0 1 % nasal spray 1 spray into each nostril 2 (two) times a day Use in each nostril as directed  Qty: 30 mL, Refills: 2    Associated Diagnoses: Seasonal allergic rhinitis due to pollen      B-D HYPODERMIC NEEDLE 18GX1 5\" 18G X 1-1/2\" MISC Use as directed---for Testosterone      !! B-D SYRINGE LUER-MILAGROS 1CC 1 ML MISC USE FOR BI WEEKLY TESTOSTERONE INJECTIONS      !! B-D SYRINGE LUER-MILAGROS 3CC 3 ML MISC USE AS DIRECTED FOR TESTOSTERONE INJECTION      BD Hypodermic Needle 25G X 1-1/2\" MISC USE FOR INJECTING TESTOSTERONE BIWEEKLY      DEXILANT 60 MG capsule Take 1 capsule by mouth daily  Refills: 3      fluticasone (FLONASE) 50 mcg/act nasal spray 1 SPRAY INTO EACH NOSTRIL IN THE MORNING AND 1 SPRAY IN THE EVENING    Qty: 48 mL, Refills: 2    Associated Diagnoses: Acute maxillary sinusitis, recurrence not specified      gabapentin (NEURONTIN) 600 MG tablet Take 600 mg by mouth 2 (two) times a day    Refills: 0      hydrOXYzine HCL (ATARAX) 50 mg tablet Take 50 mg by mouth 2 (two) times a day as needed  Refills: 1      losartan-hydrochlorothiazide (HYZAAR) 50-12 5 mg per tablet Take 1 tablet by mouth daily  Qty: 90 tablet, Refills: 3    Associated Diagnoses: Primary " hypertension      meloxicam (MOBIC) 15 mg tablet Take 1 tablet (15 mg total) by mouth daily  Qty: 30 tablet, Refills: 0    Associated Diagnoses: Chronic right-sided low back pain with right-sided sciatica; Bulging lumbar disc      methylphenidate (CONCERTA) 36 MG ER tablet 1 twice daily       methylPREDNISolone 4 MG tablet therapy pack Use as directed on package  Qty: 1 each, Refills: 0    Associated Diagnoses: Chronic right-sided low back pain with right-sided sciatica; Bulging lumbar disc      nicotine polacrilex (COMMIT) 4 MG lozenge Apply 1 lozenge (4 mg total) to the mouth or throat as needed for smoking cessation  Qty: 120 each, Refills: 4    Associated Diagnoses: Tobacco dependence      oxyCODONE-acetaminophen (PERCOCET) 5-325 mg per tablet Take 1 tablet by mouth every 6 (six) hours as needed      OXYCONTIN 10 MG 12 hr tablet Take 10 mg by mouth 2 (two) times a day  Refills: 0      potassium chloride (K-DUR,KLOR-CON) 20 mEq tablet Take 1 tablet (20 mEq total) by mouth daily for 2 days  Qty: 2 tablet, Refills: 0    Associated Diagnoses: Primary hypertension      !! risperiDONE (RisperDAL) 0 5 mg tablet 1/2 tab in the AM      !! risperiDONE (RisperDAL) 4 mg tablet 1 at hs      sildenafil (VIAGRA) 100 mg tablet Take 1 tablet by mouth one (1) hour prior to intercourse on an empty stomach  Limit to 3 encounters per week  Qty: 30 tablet, Refills: 0    Comments: Patient will be using [email or web address] and present their card at the time of purchase  Please process accordingly  Future refills can be obtained from the PCP  Thank you! (4/6/21)  Associated Diagnoses: Erectile dysfunction due to diseases classified elsewhere      testosterone cypionate (DEPO-TESTOSTERONE) 200 mg/mL SOLN INJECT 1ML EVERY 2 WEEKS  DISCARD VIAL AFTER 30 DAYS FROM OPENING DATE      zolpidem (AMBIEN) 10 mg tablet Take 10 mg by mouth daily at bedtime as needed for sleep       !! - Potential duplicate medications found  Please discuss with provider  No discharge procedures on file      PDMP Review       Value Time User    PDMP Reviewed  Yes 5/26/2023  2:36 PM Dianna Arias MD          ED Provider  Electronically Signed by           Princess Awan PA-C  06/29/23 6645

## 2023-06-29 NOTE — ASSESSMENT & PLAN NOTE
Patient reports for the past 2-1/2 days he has been having abdominal pain has been fluctuating in severity and location  He reports pain has been fluctuating with position as well but he is uncertain what position works best   He drinks approximately 6 alcoholic beverages daily    Last drink was last night    · Patient needs all 3 pancreatitis criteria with acute onset of persistent epigastric pain, elevated serum lipase 3 times the greater limit (297), and findings on CT imaging that correlate to pancreatitis  · Likely alcohol induced  · We will keep strict n p o   · Start generous IVF  · IV pain and nausea medication  · Consult GI

## 2023-06-29 NOTE — H&P
[facility]  H&P  Name: Celeste Donladson 34 y o  male I MRN: [id number]  Unit/Bed#: ED 02 I Date of Admission: 6/29/2023   Date of Service: 6/29/2023 I Hospital Day: 0      Assessment/Plan   * Pancreatitis, alcoholic, acute  Assessment & Plan  Patient reports for the past 2-1/2 days he has been having abdominal pain has been fluctuating in severity and location  He reports pain has been fluctuating with position as well but he is uncertain what position works best   He drinks approximately 6 alcoholic beverages daily  Last drink was last night    · Patient needs all 3 pancreatitis criteria with acute onset of persistent epigastric pain, elevated serum lipase 3 times the greater limit (297), and findings on CT imaging that correlate to pancreatitis  · Likely alcohol induced  · We will keep strict n p o   · Start generous IVF  · IV pain and nausea medication  · Consult GI    Alcohol withdrawal ([facility] )  Assessment & Plan  · Patient reports drinking 6 beers daily  · We will initiate CIWA protocol  · We will give IV folic acid and thiamine  · No reports of seizure history    Nicotine dependence  Assessment & Plan  · Smokes a pack a day, will like to stop smoking  · We will provide nicotine patch    Primary hypertension  Assessment & Plan  · Patient takes losartan-HCTZ combo medication daily, currently hold given n p o  status  · BP currently 150/100, will order as needed IV Lopressor for SBP over 170    GERD (gastroesophageal reflux disease)  Assessment & Plan  · Takes Dexilant daily reports not having acid reflux for many months  · We will continue to observe without PPI, will need IV PPI if patient reports having acid reflux       VTE Pharmacologic Prophylaxis: VTE Score: 3 Moderate Risk (Score 3-4) - Pharmacological DVT Prophylaxis Ordered: enoxaparin (Lovenox)  Code Status: Level 1 - Full Code   Discussion with family: Updated  (wife) at bedside      Anticipated Length of Stay: Patient will be admitted on an inpatient basis with an anticipated length of stay of greater than 2 midnights secondary to Acute alcohol pancreatitis  Total Time Spent on Date of Encounter in care of patient: 55 minutes This time was spent on one or more of the following: performing physical exam; counseling and coordination of care; obtaining or reviewing history; documenting in the medical record; reviewing/ordering tests, medications or procedures; communicating with other healthcare professionals and discussing with patient's family/caregivers  Chief Complaint: Abdominal pain    History of Present Illness:  Ambrose Triana is a 34 y o  male with a PMH of PTSD, alcoholism, ADHD, nicotine dependencey who presents with abdominal pain  The patient reports he has had abdominal pain for approximately the past 2 days  Pain is intermittent and changes in severity in both position and time  There is not a specific position that causes severity of pain to alleviate or worsen  The patient self reports he drinks 6 beers daily  The patient reports no nausea or vomiting  Review of Systems:  Review of Systems   Constitutional: Negative for appetite change, chills, fatigue and fever  HENT: Negative for rhinorrhea, sinus pressure and sinus pain  Eyes: Negative for photophobia and visual disturbance  Respiratory: Negative for cough, chest tightness, shortness of breath and wheezing  Cardiovascular: Negative for chest pain, palpitations and leg swelling  Gastrointestinal: Positive for abdominal pain and diarrhea  Negative for abdominal distention, blood in stool, constipation, nausea and vomiting  Genitourinary: Negative for dysuria and frequency  Musculoskeletal: Negative for arthralgias, back pain, joint swelling and myalgias  Skin: Negative for color change, pallor, rash and wound  Neurological: Negative for dizziness, weakness and light-headedness         Past Medical and Surgical History: "  Past Medical History:   Diagnosis Date   • ADD (attention deficit disorder)    • Arthritis    • Chronic pain    • Heartburn    • Mood disorder ([facility] [address] )    • Tremor        Past Surgical History:   Procedure Laterality Date   • CLOSED REDUCTION CALCANEAL FRACTURE     • PERONEAL TENDON EXPLORATION         Meds/Allergies:  Prior to Admission medications    Medication Sig Start Date End Date Taking? Authorizing Provider   azelastine (ASTELIN) 0 1 % nasal spray 1 spray into each nostril 2 (two) times a day Use in each nostril as directed 5/26/23   MD RAJENDRA Bustamante HYPODERMIC NEEDLE 18GX1 5\" 18G X 1-1/2\" MISC Use as directed---for Testosterone 5/17/23   Historical Provider, MD DREW SYRINGE LUER-MILAGROS 1CC 1 ML MISC USE FOR BI WEEKLY TESTOSTERONE INJECTIONS 12/14/22   Historical Provider, MD DREW SYRINGE LUER-MILAGROS 3CC 3 ML MISC USE AS DIRECTED FOR TESTOSTERONE INJECTION 5/17/23   Historical Provider, MD   BD Hypodermic Needle 25G X 1-1/2\" MISC USE FOR INJECTING TESTOSTERONE BIWEEKLY 12/15/22   Historical Provider, MD   DEXILANT 60 MG capsule Take 1 capsule by mouth daily 5/9/19   Historical Provider, MD   fluticasone (FLONASE) 50 mcg/act nasal spray 1 SPRAY INTO EACH NOSTRIL IN THE MORNING AND 1 SPRAY IN THE EVENING    Patient not taking: Reported on 3/28/2023 1/20/23   Luis Alberto Phillips MD   gabapentin (NEURONTIN) 600 MG tablet Take 600 mg by mouth 2 (two) times a day   4/18/19   Historical Provider, MD   hydrOXYzine HCL (ATARAX) 50 mg tablet Take 50 mg by mouth 2 (two) times a day as needed 4/25/19   Historical Provider, MD   losartan-hydrochlorothiazide [facility]) 50-12 5 mg per tablet Take 1 tablet by mouth daily 2/8/23   Luis Alberto Phillips MD   meloxicam (MOBIC) 15 mg tablet Take 1 tablet (15 mg total) by mouth daily 6/21/23   Marc Lares MD   methylphenidate (CONCERTA) 36 MG ER tablet 1 twice daily  7/27/20   Historical Provider, MD   methylPREDNISolone 4 MG tablet therapy pack Use as directed on package 6/21/23   Rashard Millan " Edwardo Chavira MD   nicotine polacrilex (COMMIT) 4 MG lozenge Apply 1 lozenge (4 mg total) to the mouth or throat as needed for smoking cessation 3/28/23   Luis Alberto Phillips MD   oxyCODONE-acetaminophen (PERCOCET) 5-325 mg per tablet Take 1 tablet by mouth every 6 (six) hours as needed 1/6/23   Historical Provider, MD   OXYCONTIN 10 MG 12 hr tablet Take 10 mg by mouth 2 (two) times a day 7/25/19   Historical Provider, MD   potassium chloride (K-DUR,KLOR-CON) 20 mEq tablet Take 1 tablet (20 mEq total) by mouth daily for 2 days 5/29/23 5/31/23  Luis Alberto Phillips MD   risperiDONE (RisperDAL) 0 5 mg tablet 1/2 tab in the AM 3/14/23   Historical Provider, MD   risperiDONE (RisperDAL) 4 mg tablet 1 at hs 3/10/23   Historical Provider, MD   sildenafil (VIAGRA) 100 mg tablet Take 1 tablet by mouth one (1) hour prior to intercourse on an empty stomach  Limit to 3 encounters per week  12/29/22   Calli AguayotingREECE Wheatley   testosterone cypionate (DEPO-TESTOSTERONE) 200 mg/mL SOLN INJECT 1ML EVERY 2 WEEKS  DISCARD VIAL AFTER 30 DAYS FROM OPENING DATE 1/13/23   Historical Provider, MD   zolpidem (AMBIEN) 10 mg tablet Take 10 mg by mouth daily at bedtime as needed for sleep    Historical Provider, MD     I have reviewed home medications with patient personally      Allergies: No Known Allergies    Social History:  Marital Status: /Civil Union   Occupation: N/a  Patient Pre-hospital Living Situation: Home  Patient Pre-hospital Level of Mobility: walks  Patient Pre-hospital Diet Restrictions: None  Substance Use History:   Social History     Substance and Sexual Activity   Alcohol Use Yes   • Alcohol/week: 7 0 standard drinks of alcohol   • Types: 7 Cans of beer per week    Comment: social     Social History     Tobacco Use   Smoking Status Every Day   • Packs/day: 1 00   • Years: 19 00   • Total pack years: 19 00   • Types: Cigarettes   • Start date: 1999   Smokeless Tobacco Never     Social History     Substance and Sexual Activity "  Drug Use Never       Family History:  Family History   Problem Relation Age of Onset   • Parkinsonism Paternal Grandfather    • Heart disease Father    • Hypertension Father    • Breast cancer Mother    • Intellectual disability Sister    • Heart disease Maternal Grandfather    • Substance Abuse Neg Hx    • Mental illness Neg Hx        Physical Exam:     Vitals:   Blood Pressure: 146/99 (06/29/23 1430)  Pulse: 93 (06/29/23 1430)  Temperature: 97 7 °F (36 5 °C) (06/29/23 1009)  Respirations: 16 (06/29/23 1400)  Height: 6' 3\" (190 5 cm) (06/29/23 1009)  Weight - Scale: 131 kg (289 lb) (06/29/23 1009)  SpO2: 97 % (06/29/23 1400)    Physical Exam  Vitals and nursing note reviewed  Constitutional:       Appearance: He is normal weight  Comments: Patient is restless sitting upright on edge of bed   HENT:      Head: Normocephalic  Nose: Nose normal       Mouth/Throat:      Mouth: Mucous membranes are moist       Pharynx: Oropharynx is clear  Eyes:      General: No scleral icterus  Conjunctiva/sclera: Conjunctivae normal       Pupils: Pupils are equal, round, and reactive to light  Cardiovascular:      Rate and Rhythm: Normal rate and regular rhythm  Heart sounds: No murmur heard  No friction rub  No gallop  Pulmonary:      Effort: Pulmonary effort is normal  No respiratory distress  Breath sounds: Normal breath sounds  No stridor  No wheezing, rhonchi or rales  Abdominal:      General: Abdomen is flat  There is distension  Palpations: Abdomen is soft  There is no mass  Tenderness: There is abdominal tenderness ( Nonfocal, generalized)  There is no guarding or rebound  Hernia: No hernia is present  Musculoskeletal:         General: Normal range of motion  Cervical back: Normal range of motion and neck supple  Right lower leg: No edema  Left lower leg: No edema  Lymphadenopathy:      Cervical: No cervical adenopathy  Skin:     General: Skin is warm   " Coloration: Skin is not jaundiced or pale  Findings: No bruising, erythema or lesion  Neurological:      General: No focal deficit present  Mental Status: He is alert and oriented to person, place, and time  Mental status is at baseline  Cranial Nerves: No cranial nerve deficit  Motor: No weakness  Psychiatric:         Mood and Affect: Mood normal          Behavior: Behavior normal          Thought Content: Thought content normal           Additional Data:     Lab Results:  Results from last 7 days   Lab Units 06/29/23  1015   WBC Thousand/uL 10 30*   HEMOGLOBIN g/dL 15 0   HEMATOCRIT % 44 8   PLATELETS Thousands/uL 186   NEUTROS PCT % 60   LYMPHS PCT % 29   MONOS PCT % 8   EOS PCT % 2     Results from last 7 days   Lab Units 06/29/23  1015   SODIUM mmol/L 135   POTASSIUM mmol/L 3 3*   CHLORIDE mmol/L 106   CO2 mmol/L 16*   BUN mg/dL 16   CREATININE mg/dL 1 23   ANION GAP mmol/L 13   CALCIUM mg/dL 9 1   ALBUMIN g/dL 4 1   TOTAL BILIRUBIN mg/dL 0 99   ALK PHOS U/L 55   ALT U/L 9   AST U/L 11*   GLUCOSE RANDOM mg/dL 92                       Lines/Drains:  Invasive Devices     Peripheral Intravenous Line  Duration           Peripheral IV 06/29/23 Right Antecubital <1 day                    Imaging: Reviewed radiology reports from this admission including: chest xray and chest CT scan  CT Abdomen pelvis with contrast   Final Result by Ozzy Méndez MD (06/29 1222)      Abnormal findings compatible with acute interstitial edematous pancreatitis along the pancreaticoduodenal groove  Mild splenomegaly  This study demonstrates a significant  finding and was documented as such in [facility] for liaison and referring practitioner notification  Workstation performed: [id number]         XR chest 1 view portable   Final Result by Ba Ch MD (06/29 1257)      No acute cardiopulmonary disease                    Workstation performed: [id number] EKG and Other Studies Reviewed on Admission:   · EKG: No EKG obtained  ** Please Note: This note has been constructed using a voice recognition system   **

## 2023-06-29 NOTE — ASSESSMENT & PLAN NOTE
· Takes Dexilant daily reports not having acid reflux for many months  · We will continue to observe without PPI, will need IV PPI if patient reports having acid reflux

## 2023-06-29 NOTE — ASSESSMENT & PLAN NOTE
· Patient reports drinking 6 beers daily  · We will initiate CIWA protocol  · We will give IV folic acid and thiamine  · No reports of seizure history

## 2023-06-29 NOTE — ASSESSMENT & PLAN NOTE
· Patient takes losartan-HCTZ combo medication daily, currently hold given n p o  status  · BP currently 150/100, will order as needed IV Lopressor for SBP over 170

## 2023-06-30 VITALS
WEIGHT: 289 LBS | HEIGHT: 75 IN | TEMPERATURE: 98.5 F | OXYGEN SATURATION: 97 % | BODY MASS INDEX: 35.93 KG/M2 | SYSTOLIC BLOOD PRESSURE: 157 MMHG | RESPIRATION RATE: 19 BRPM | HEART RATE: 100 BPM | DIASTOLIC BLOOD PRESSURE: 112 MMHG

## 2023-06-30 LAB
ALBUMIN SERPL BCP-MCNC: 3.8 G/DL (ref 3.5–5)
ALP SERPL-CCNC: 53 U/L (ref 34–104)
ALT SERPL W P-5'-P-CCNC: 10 U/L (ref 7–52)
ANION GAP SERPL CALCULATED.3IONS-SCNC: 8 MMOL/L
AST SERPL W P-5'-P-CCNC: 11 U/L (ref 13–39)
ATRIAL RATE: 89 BPM
BASOPHILS # BLD AUTO: 0.04 THOUSANDS/ÂΜL (ref 0–0.1)
BASOPHILS NFR BLD AUTO: 1 % (ref 0–1)
BILIRUB SERPL-MCNC: 1.24 MG/DL (ref 0.2–1)
BUN SERPL-MCNC: 12 MG/DL (ref 5–25)
CALCIUM SERPL-MCNC: 9.4 MG/DL (ref 8.4–10.2)
CHLORIDE SERPL-SCNC: 106 MMOL/L (ref 96–108)
CHOLEST SERPL-MCNC: 218 MG/DL
CO2 SERPL-SCNC: 22 MMOL/L (ref 21–32)
CREAT SERPL-MCNC: 0.9 MG/DL (ref 0.6–1.3)
EOSINOPHIL # BLD AUTO: 0.18 THOUSAND/ÂΜL (ref 0–0.61)
EOSINOPHIL NFR BLD AUTO: 2 % (ref 0–6)
ERYTHROCYTE [DISTWIDTH] IN BLOOD BY AUTOMATED COUNT: 14.1 % (ref 11.6–15.1)
GFR SERPL CREATININE-BSD FRML MDRD: 106 ML/MIN/1.73SQ M
GLUCOSE SERPL-MCNC: 105 MG/DL (ref 65–140)
HCT VFR BLD AUTO: 39.8 % (ref 36.5–49.3)
HDLC SERPL-MCNC: 43 MG/DL
HGB BLD-MCNC: 13.2 G/DL (ref 12–17)
IMM GRANULOCYTES # BLD AUTO: 0.02 THOUSAND/UL (ref 0–0.2)
IMM GRANULOCYTES NFR BLD AUTO: 0 % (ref 0–2)
LDLC SERPL CALC-MCNC: 118 MG/DL (ref 0–100)
LIPASE SERPL-CCNC: 350 U/L (ref 11–82)
LYMPHOCYTES # BLD AUTO: 2.6 THOUSANDS/ÂΜL (ref 0.6–4.47)
LYMPHOCYTES NFR BLD AUTO: 32 % (ref 14–44)
MCH RBC QN AUTO: 28.3 PG (ref 26.8–34.3)
MCHC RBC AUTO-ENTMCNC: 33.2 G/DL (ref 31.4–37.4)
MCV RBC AUTO: 85 FL (ref 82–98)
MONOCYTES # BLD AUTO: 0.6 THOUSAND/ÂΜL (ref 0.17–1.22)
MONOCYTES NFR BLD AUTO: 7 % (ref 4–12)
NEUTROPHILS # BLD AUTO: 4.71 THOUSANDS/ÂΜL (ref 1.85–7.62)
NEUTS SEG NFR BLD AUTO: 58 % (ref 43–75)
NRBC BLD AUTO-RTO: 0 /100 WBCS
P AXIS: 30 DEGREES
PLATELET # BLD AUTO: 164 THOUSANDS/UL (ref 149–390)
PMV BLD AUTO: 10.4 FL (ref 8.9–12.7)
POTASSIUM SERPL-SCNC: 3.6 MMOL/L (ref 3.5–5.3)
PR INTERVAL: 146 MS
PROT SERPL-MCNC: 6.6 G/DL (ref 6.4–8.4)
QRS AXIS: 31 DEGREES
QRSD INTERVAL: 90 MS
QT INTERVAL: 352 MS
QTC INTERVAL: 428 MS
RBC # BLD AUTO: 4.66 MILLION/UL (ref 3.88–5.62)
SODIUM SERPL-SCNC: 136 MMOL/L (ref 135–147)
T WAVE AXIS: 43 DEGREES
TRIGL SERPL-MCNC: 285 MG/DL
VENTRICULAR RATE: 89 BPM
WBC # BLD AUTO: 8.15 THOUSAND/UL (ref 4.31–10.16)

## 2023-06-30 PROCEDURE — 80053 COMPREHEN METABOLIC PANEL: CPT

## 2023-06-30 PROCEDURE — 93010 ELECTROCARDIOGRAM REPORT: CPT | Performed by: INTERNAL MEDICINE

## 2023-06-30 PROCEDURE — 85025 COMPLETE CBC W/AUTO DIFF WBC: CPT

## 2023-06-30 PROCEDURE — C9113 INJ PANTOPRAZOLE SODIUM, VIA: HCPCS | Performed by: PHYSICIAN ASSISTANT

## 2023-06-30 PROCEDURE — 80061 LIPID PANEL: CPT | Performed by: INTERNAL MEDICINE

## 2023-06-30 PROCEDURE — 99232 SBSQ HOSP IP/OBS MODERATE 35: CPT | Performed by: PHYSICIAN ASSISTANT

## 2023-06-30 PROCEDURE — 99239 HOSP IP/OBS DSCHRG MGMT >30: CPT | Performed by: PHYSICIAN ASSISTANT

## 2023-06-30 PROCEDURE — 99232 SBSQ HOSP IP/OBS MODERATE 35: CPT | Performed by: INTERNAL MEDICINE

## 2023-06-30 PROCEDURE — 83690 ASSAY OF LIPASE: CPT

## 2023-06-30 RX ORDER — ACETAMINOPHEN 325 MG/1
650 TABLET ORAL EVERY 6 HOURS PRN
Status: DISCONTINUED | OUTPATIENT
Start: 2023-06-30 | End: 2023-06-30 | Stop reason: HOSPADM

## 2023-06-30 RX ORDER — ACETAMINOPHEN 325 MG/1
650 TABLET ORAL EVERY 6 HOURS PRN
Start: 2023-06-30

## 2023-06-30 RX ORDER — PANTOPRAZOLE SODIUM 40 MG/10ML
40 INJECTION, POWDER, LYOPHILIZED, FOR SOLUTION INTRAVENOUS
Status: DISCONTINUED | OUTPATIENT
Start: 2023-06-30 | End: 2023-06-30 | Stop reason: HOSPADM

## 2023-06-30 RX ORDER — NICOTINE 21 MG/24HR
1 PATCH, TRANSDERMAL 24 HOURS TRANSDERMAL EVERY 24 HOURS
Qty: 28 PATCH | Refills: 0 | Status: SHIPPED | OUTPATIENT
Start: 2023-06-30 | End: 2023-07-12 | Stop reason: ALTCHOICE

## 2023-06-30 RX ORDER — LOPERAMIDE HYDROCHLORIDE 2 MG/1
2 CAPSULE ORAL 4 TIMES DAILY PRN
Status: DISCONTINUED | OUTPATIENT
Start: 2023-06-30 | End: 2023-06-30 | Stop reason: HOSPADM

## 2023-06-30 RX ORDER — ZOLPIDEM TARTRATE 5 MG/1
10 TABLET ORAL
Status: DISCONTINUED | OUTPATIENT
Start: 2023-06-30 | End: 2023-06-30 | Stop reason: HOSPADM

## 2023-06-30 RX ORDER — HYDROCHLOROTHIAZIDE 12.5 MG/1
12.5 TABLET ORAL DAILY
Status: DISCONTINUED | OUTPATIENT
Start: 2023-06-30 | End: 2023-06-30 | Stop reason: HOSPADM

## 2023-06-30 RX ORDER — LOSARTAN POTASSIUM 50 MG/1
50 TABLET ORAL DAILY
Status: DISCONTINUED | OUTPATIENT
Start: 2023-06-30 | End: 2023-06-30 | Stop reason: HOSPADM

## 2023-06-30 RX ADMIN — SODIUM CHLORIDE, SODIUM LACTATE, POTASSIUM CHLORIDE, AND CALCIUM CHLORIDE 125 ML/HR: .6; .31; .03; .02 INJECTION, SOLUTION INTRAVENOUS at 07:23

## 2023-06-30 RX ADMIN — HYDROMORPHONE HYDROCHLORIDE 0.2 MG: 0.2 INJECTION, SOLUTION INTRAMUSCULAR; INTRAVENOUS; SUBCUTANEOUS at 04:43

## 2023-06-30 RX ADMIN — HYDROCHLOROTHIAZIDE 12.5 MG: 12.5 TABLET ORAL at 09:50

## 2023-06-30 RX ADMIN — OXYCODONE HYDROCHLORIDE 10 MG: 10 TABLET, FILM COATED, EXTENDED RELEASE ORAL at 08:16

## 2023-06-30 RX ADMIN — ACETAMINOPHEN 650 MG: 325 TABLET ORAL at 13:43

## 2023-06-30 RX ADMIN — LOSARTAN POTASSIUM 50 MG: 50 TABLET, FILM COATED ORAL at 09:50

## 2023-06-30 RX ADMIN — PANTOPRAZOLE SODIUM 40 MG: 40 INJECTION, POWDER, FOR SOLUTION INTRAVENOUS at 15:23

## 2023-06-30 RX ADMIN — ENOXAPARIN SODIUM 40 MG: 100 INJECTION SUBCUTANEOUS at 08:14

## 2023-06-30 RX ADMIN — FOLIC ACID: 5 INJECTION, SOLUTION INTRAMUSCULAR; INTRAVENOUS; SUBCUTANEOUS at 08:27

## 2023-06-30 RX ADMIN — HYDROMORPHONE HYDROCHLORIDE 0.5 MG: 1 INJECTION, SOLUTION INTRAMUSCULAR; INTRAVENOUS; SUBCUTANEOUS at 09:27

## 2023-06-30 RX ADMIN — SODIUM CHLORIDE, SODIUM LACTATE, POTASSIUM CHLORIDE, AND CALCIUM CHLORIDE 200 ML/HR: .6; .31; .03; .02 INJECTION, SOLUTION INTRAVENOUS at 14:26

## 2023-06-30 RX ADMIN — NICOTINE 1 PATCH: 14 PATCH, EXTENDED RELEASE TRANSDERMAL at 08:25

## 2023-06-30 RX ADMIN — HYDROMORPHONE HYDROCHLORIDE 0.2 MG: 0.2 INJECTION, SOLUTION INTRAMUSCULAR; INTRAVENOUS; SUBCUTANEOUS at 00:38

## 2023-06-30 NOTE — DISCHARGE SUMMARY
[place]  Discharge- Bruce Dickens 1985, 34 y o  male MRN: [id number]  Unit/Bed#: -01 Encounter: [id number]  Primary Care Provider: Richa Dalton MD   Date and time admitted to hospital: 6/29/2023 10:53 AM    No new Assessment & Plan notes have been filed under this hospital service since the last note was generated  Service: Hospitalist    Medical Problems     Resolved Problems  Date Reviewed: 6/30/2023   None       Discharging Physician / Practitioner: Mary Mchugh PA-C  PCP: Richa Dalton MD  Admission Date:   Admission Orders (From admission, onward)     Ordered        06/29/23 1255  [facility] [place]  Once                      Discharge Date: 06/30/23    Consultations During Hospital Stay:  · Gastroenterology    Procedures Performed:   · None    Significant Findings / Test Results:   · CXR 6/29: No acute cardiopulmonary disease  · CT abdomen pelvis with contrast 6/29: Abnormal findings compatible with acute interstitial edematous pancreatitis along the pancreaticoduodenal groove  Mild splenomegaly  Incidental Findings:   · None     Test Results Pending at Discharge (will require follow up): · None     Outpatient Tests Requested:  · LFT and lipase in one week    Complications:  None    Reason for Admission: Pancreatitis    Hospital Course:   Bruce Dickens is a 34 y o  male patient with past medical history PTSD, alcoholism, ADHD, nicotine dependency who originally presented to the hospital on 6/29/2023 due to abdominal pain  Patient reports pain has been present for 2 days, intermittent, changes in severity with both position and time  Patient does have daily alcohol use of 6 beers daily  He denies nausea or vomiting  Diagnosed with acute alcoholic pancreatitis elevated serum lipase of 297, findings on CT that supported diagnosis, likely alcohol induced  Initially made n p o  with aggressive IV fluids  GI consulted    Patient responded appropriately to IV fluids and bowel rest   Ultimately, able to tolerate diet and was cleared by GI for discharge with outpatient follow-up  Repeat labs in 1 week  Hemodynamically stable at time of discharge and appropriate for outpatient follow-up  The patient, initially admitted to the hospital as inpatient, was discharged earlier than expected given the following: Cleared by GI for discharge  Please see above list of diagnoses and related plan for additional information  Condition at Discharge: stable    Discharge Day Visit / Exam:   * Please refer to separate progress note for these details *    Discussion with Family: Patient declined call to   Discharge instructions/Information to patient and family:   See after visit summary for information provided to patient and family  Provisions for Follow-Up Care:  See after visit summary for information related to follow-up care and any pertinent home health orders  Disposition:   Home    Planned Readmission: None     Discharge Statement:  I spent 65 minutes discharging the patient  This time was spent on the day of discharge  I had direct contact with the patient on the day of discharge  Greater than 50% of the total time was spent examining patient, answering all patient questions, arranging and discussing plan of care with patient as well as directly providing post-discharge instructions  Additional time then spent on discharge activities  Discharge Medications:  See after visit summary for reconciled discharge medications provided to patient and/or family        **Please Note: This note may have been constructed using a voice recognition system**

## 2023-06-30 NOTE — ASSESSMENT & PLAN NOTE
· Patient reports drinking 6 beers daily  · Continue CIWA protocol  · Continue folic acid and thiamine  · No reports of seizure history  · Current cessation, catch referral

## 2023-06-30 NOTE — PROGRESS NOTES
[place]  Progress Note  Name: Bernadette Jarvis  MRN: [id number]  Unit/Bed#: -[facility] I Date of Admission: 6/29/2023   Date of Service: 6/30/2023 I Hospital Day: 1    Assessment/Plan   * Pancreatitis, alcoholic, acute  Assessment & Plan  Patient reports for the past 2-1/2 days PTA he has been having abdominal pain has been fluctuating in severity and location  He reports pain has been fluctuating with position as well but he is uncertain what position works best   He drinks approximately 6 alcoholic beverages daily  · Patient needs all 3 pancreatitis criteria with acute onset of persistent epigastric pain, elevated serum lipase 3 times the greater limit (297), and findings on CT imaging that correlate to pancreatitis  · Likely alcohol induced  · Continue lactated Ringer's but will increase to 200 mL/h  · Continue IV analgesia and antiemetics  · GI following  · Start on low-fat diet this morning    Primary hypertension  Assessment & Plan  · Patient takes losartan-HCTZ combo medication daily, this was on hold due to n p o  status  · Blood pressure this morning 690-599 systolic, will restart home medications and monitor blood pressure trend    Nicotine dependence  Assessment & Plan  · Smokes a pack a day, will like to stop smoking  · Continue NRT    Alcohol withdrawal ([facility] [address] )  Assessment & Plan  · Patient reports drinking 6 beers daily  · Continue CIWA protocol  · Continue folic acid and thiamine  · No reports of seizure history  · Current cessation, catch referral    GERD (gastroesophageal reflux disease)  Assessment & Plan  · Takes Dexilant daily reports not having acid reflux for many months  · We will continue to observe without PPI, will need IV PPI if patient reports having acid reflux         VTE Pharmacologic Prophylaxis: VTE Score: 3 Moderate Risk (Score 3-4) - Pharmacological DVT Prophylaxis Ordered: enoxaparin (Lovenox)      Patient Centered Rounds: I performed bedside rounds with nursing staff today  Discussions with Specialists or Other Care Team Provider: elia, rn    Education and Discussions with Family / Patient: Patient declined call to   Time Spent for Care: 45 minutes  More than 50% of total time spent on counseling and coordination of care as described above  Current Length of Stay: 1 day(s)  Current Patient Status: Inpatient   Certification Statement: The patient will continue to require additional inpatient hospital stay due to Acute pancreatitis requiring IV hydration, pain control  Discharge Plan: Anticipate discharge in 24-48 hrs to home  Code Status: Level 1 - Full Code    Subjective:   Patient still having abdominal pain this morning  Did not try low-fat diet quite yet  Having some diarrhea this morning  Blood pressures running high but no headache, lightheaded, dizzy    Objective:     Vitals:   Temp (24hrs), Av 7 °F (37 1 °C), Min:97 7 °F (36 5 °C), Max:100 °F (37 8 °C)    Temp:  [97 7 °F (36 5 °C)-100 °F (37 8 °C)] 98 5 °F (36 9 °C)  HR:  [] 98  Resp:  [16-19] 19  BP: (137-170)/() 166/114  SpO2:  [96 %-99 %] 96 %  Body mass index is 36 12 kg/m²  Input and Output Summary (last 24 hours): Intake/Output Summary (Last 24 hours) at 2023 0929  Last data filed at 2023 0840  Gross per 24 hour   Intake 4009 81 ml   Output --   Net 4009 81 ml       Physical Exam:   Physical Exam  Vitals and nursing note reviewed  Constitutional:       General: He is not in acute distress  Appearance: Normal appearance  HENT:      Head: Normocephalic  Mouth/Throat:      Mouth: Mucous membranes are moist    Eyes:      Pupils: Pupils are equal, round, and reactive to light  Cardiovascular:      Rate and Rhythm: Normal rate and regular rhythm  Heart sounds: No murmur heard  Pulmonary:      Effort: Pulmonary effort is normal  No respiratory distress  Breath sounds: Normal breath sounds   No wheezing, rhonchi or rales    Abdominal:      General: Bowel sounds are normal  There is no distension  Palpations: Abdomen is soft  Tenderness: There is abdominal tenderness (epigastric)  There is no guarding  Musculoskeletal:         General: No deformity  Cervical back: Normal range of motion  Right lower leg: No edema  Left lower leg: No edema  Skin:     Capillary Refill: Capillary refill takes less than 2 seconds  Neurological:      General: No focal deficit present  Mental Status: He is alert and oriented to person, place, and time  Mental status is at baseline  Additional Data:     Labs:  Results from last 7 days   Lab Units 06/30/23  0451   WBC Thousand/uL 8 15   HEMOGLOBIN g/dL 13 2   HEMATOCRIT % 39 8   PLATELETS Thousands/uL 164   NEUTROS PCT % 58   LYMPHS PCT % 32   MONOS PCT % 7   EOS PCT % 2     Results from last 7 days   Lab Units 06/30/23  0451   SODIUM mmol/L 136   POTASSIUM mmol/L 3 6   CHLORIDE mmol/L 106   CO2 mmol/L 22   BUN mg/dL 12   CREATININE mg/dL 0 90   ANION GAP mmol/L 8   CALCIUM mg/dL 9 4   ALBUMIN g/dL 3 8   TOTAL BILIRUBIN mg/dL 1 24*   ALK PHOS U/L 53   ALT U/L 10   AST U/L 11*   GLUCOSE RANDOM mg/dL 105                       Lines/Drains:  Invasive Devices     Peripheral Intravenous Line  Duration           Peripheral IV 06/29/23 Right Antecubital <1 day                Imaging: No pertinent imaging reviewed      Recent Cultures (last 7 days):         Last 24 Hours Medication List:   Current Facility-Administered Medications   Medication Dose Route Frequency Provider Last Rate   • enoxaparin  40 mg Subcutaneous Daily Collins Hand PA-C     • folic acid 1 mg, thiamine (VITAMIN B1) 100 mg in sodium chloride 0 9 % 100 mL IV piggyback   Intravenous Daily Collins Hand PA-C 200 mL/hr at 06/30/23 0827   • losartan  50 mg Oral Daily Jennifer Norris PA-C      And   • hydrochlorothiazide  12 5 mg Oral Daily Jennifer Norris PA-C     • HYDROmorphone  0 5 mg Intravenous Q4H PRN Porsha Hand PA-C     • HYDROmorphone  0 2 mg Intravenous Q4H PRN Porsha Hand PA-C     • hydrOXYzine HCL  50 mg Oral BID PRN Kandy Carpenter PA-C     • ketorolac  15 mg Intravenous Q6H PRN Porsha Hand PA-C     • lactated ringers  200 mL/hr Intravenous Continuous Paris Haile PA-C Stopped (06/30/23 0840)   • loperamide  2 mg Oral 4x Daily PRN Paris Haile PA-C     • melatonin  6 mg Oral HS Kandy Carpenter PA-C     • metoprolol  5 mg Intravenous Q6H PRN Porsha Hand PA-C     • nicotine  1 patch Transdermal Daily Porsha Hand PA-C     • ondansetron  4 mg Intravenous Q6H PRN Porsha Hand PA-C     • oxyCODONE  10 mg Oral Q12H [address] Kandy Carpenter PA-C     • risperiDONE  4 mg Oral HS Kandy Carpenter PA-C          Today, Patient Was Seen By: Rory Casas PA-C    **Please Note: This note may have been constructed using a voice recognition system  **

## 2023-06-30 NOTE — ASSESSMENT & PLAN NOTE
Patient reports for the past 2-1/2 days PTA he has been having abdominal pain has been fluctuating in severity and location  He reports pain has been fluctuating with position as well but he is uncertain what position works best   He drinks approximately 6 alcoholic beverages daily       · Patient needs all 3 pancreatitis criteria with acute onset of persistent epigastric pain, elevated serum lipase 3 times the greater limit (297), and findings on CT imaging that correlate to pancreatitis  · Likely alcohol induced  · Continue lactated Ringer's but will increase to 200 mL/h  · Continue IV analgesia and antiemetics  · GI following  · Start on low-fat diet this morning

## 2023-06-30 NOTE — ASSESSMENT & PLAN NOTE
· Patient takes losartan-HCTZ combo medication daily, this was on hold due to n p o  status    · Blood pressure this morning 386-765 systolic, will restart home medications and monitor blood pressure trend

## 2023-06-30 NOTE — PLAN OF CARE
Problem: PAIN - ADULT  Goal: Verbalizes/displays adequate comfort level or baseline comfort level  Description: Interventions:  - Encourage patient to monitor pain and request assistance  - Assess pain using appropriate pain scale  - Administer analgesics based on type and severity of pain and evaluate response  - Implement non-pharmacological measures as appropriate and evaluate response  - Consider cultural and social influences on pain and pain management  - Notify physician/advanced practitioner if interventions unsuccessful or patient reports new pain  Outcome: Progressing     Problem: INFECTION - ADULT  Goal: Absence or prevention of progression during hospitalization  Description: INTERVENTIONS:  - Assess and monitor for signs and symptoms of infection  - Monitor lab/diagnostic results  - Monitor all insertion sites, i e  indwelling lines, tubes, and drains  - Monitor endotracheal if appropriate and nasal secretions for changes in amount and color  - [place] appropriate cooling/warming therapies per order  - Administer medications as ordered  - Instruct and encourage patient and family to use good hand hygiene technique  - Identify and instruct in appropriate isolation precautions for identified infection/condition  Outcome: Progressing  Goal: Absence of fever/infection during neutropenic period  Description: INTERVENTIONS:  - Monitor WBC    Outcome: Progressing     Problem: SAFETY ADULT  Goal: Patient will remain free of falls  Description: INTERVENTIONS:  - Educate patient/family on patient safety including physical limitations  - Instruct patient to call for assistance with activity   - Consult OT/PT to assist with strengthening/mobility   - Keep Call bell within reach  - Keep bed low and locked with side rails adjusted as appropriate  - Keep care items and personal belongings within reach  - Initiate and maintain comfort rounds  - Make Fall Risk Sign visible to staff  - Offer Toileting every 2 Hours, in advance of need  - Initiate/Maintain bed/ chair alarm  - Obtain necessary fall risk management equipment: n/a  - Apply yellow socks and bracelet for high fall risk patients  - Consider moving patient to room near nurses station  Outcome: Progressing  Goal: Maintain or return to baseline ADL function  Description: INTERVENTIONS:  -  Assess patient's ability to carry out ADLs; assess patient's baseline for ADL function and identify physical deficits which impact ability to perform ADLs (bathing, care of mouth/teeth, toileting, grooming, dressing, etc )  - Assess/evaluate cause of self-care deficits   - Assess range of motion  - Assess patient's mobility; develop plan if impaired  - Assess patient's need for assistive devices and provide as appropriate  - Encourage maximum independence but intervene and supervise when necessary  - Involve family in performance of ADLs  - Assess for home care needs following discharge   - Consider OT consult to assist with ADL evaluation and planning for discharge  - Provide patient education as appropriate  Outcome: Progressing  Goal: Maintains/Returns to pre admission functional level  Description: INTERVENTIONS:  - Perform BMAT or MOVE assessment daily    - Set and communicate daily mobility goal to care team and patient/family/caregiver  - Collaborate with rehabilitation services on mobility goals if consulted  - Perform Range of Motion 3 times a day  - Reposition patient every 2 hours    - Dangle patient 3 times a day  - Stand patient 3 times a day  - Ambulate patient 3 times a day  - Out of bed to chair 3 times a day   - Out of bed for meals 3 times a day  - Out of bed for toileting  - Record patient progress and toleration of activity level   Outcome: Progressing     Problem: DISCHARGE PLANNING  Goal: Discharge to home or other facility with appropriate resources  Description: INTERVENTIONS:  - Identify barriers to discharge w/patient and caregiver  - Arrange for needed discharge resources and transportation as appropriate  - Identify discharge learning needs (meds, wound care, etc )  - Arrange for interpretive services to assist at discharge as needed  - Refer to Case Management Department for coordinating discharge planning if the patient needs post-hospital services based on physician/advanced practitioner order or complex needs related to functional status, cognitive ability, or social support system  Outcome: Progressing     Problem: Knowledge Deficit  Goal: Patient/family/caregiver demonstrates understanding of disease process, treatment plan, medications, and discharge instructions  Description: Complete learning assessment and assess knowledge base    Interventions:  - Provide teaching at level of understanding  - Provide teaching via preferred learning methods  Outcome: Progressing     Problem: METABOLIC, FLUID AND ELECTROLYTES - ADULT  Goal: Electrolytes maintained within normal limits  Description: INTERVENTIONS:  - Monitor labs and assess patient for signs and symptoms of electrolyte imbalances  - Administer electrolyte replacement as ordered  - Monitor response to electrolyte replacements, including repeat lab results as appropriate  - Instruct patient on fluid and nutrition as appropriate  Outcome: Progressing  Goal: Fluid balance maintained  Description: INTERVENTIONS:  - Monitor labs   - Monitor I/O and WT  - Instruct patient on fluid and nutrition as appropriate  - Assess for signs & symptoms of volume excess or deficit  Outcome: Progressing  Goal: Glucose maintained within target range  Description: INTERVENTIONS:  - Monitor Blood Glucose as ordered  - Assess for signs and symptoms of hyperglycemia and hypoglycemia  - Administer ordered medications to maintain glucose within target range  - Assess nutritional intake and initiate nutrition service referral as needed  Outcome: Progressing     Problem: Nutrition/Hydration-ADULT  Goal: Nutrient/Hydration intake appropriate for improving, restoring or maintaining nutritional needs  Description: Monitor and assess patient's nutrition/hydration status for malnutrition  Collaborate with interdisciplinary team and initiate plan and interventions as ordered  Monitor patient's weight and dietary intake as ordered or per policy  Utilize nutrition screening tool and intervene as necessary  Determine patient's food preferences and provide high-protein, high-caloric foods as appropriate       INTERVENTIONS:  - Monitor oral intake, urinary output, labs, and treatment plans  - Assess nutrition and hydration status and recommend course of action  - Evaluate amount of meals eaten  - Assist patient with eating if necessary   - Allow adequate time for meals  - Recommend/ encourage appropriate diets, oral nutritional supplements, and vitamin/mineral supplements  - Order, calculate, and assess calorie counts as needed  - Recommend, monitor, and adjust tube feedings and TPN/PPN based on assessed needs  - Assess need for intravenous fluids  - Provide specific nutrition/hydration education as appropriate  - Include patient/family/caregiver in decisions related to nutrition  Outcome: Progressing

## 2023-06-30 NOTE — PROGRESS NOTES
Progress note - [facility]   Onelia Butts 34 y o  male MRN: [id number]  Unit/Bed#: -01 Encounter: [id number]    ASSESSMENT and PLAN  1  Acute pancreatitis  Relatively mild  Probably related to alcohol  Has been on steroids for back injury over the last week which can cause pancreatitis as well  No obvious gallstones and LFTs are normal  Acute viral pancreatitis? Lipids have not been excessively abnormal in the past  Taking aspirin and steroids for back pain so peptic ulcer disease could be in the differential     -Lipase mildly elevated to 350 today 6/30 from 297 yesterday  Tbili mildly elevated at 1 24 today from 0 99 yesterday  -Pain continues to improve, overall  -Continue lo-fat diet, should continue lo-fat diet upon discharge x1-2 weeks with lots of PO hydration  Discussed the importance of alcohol abstinence  -No need to continue pantoprazole at this time  -Will discuss plan with Dr Vargas Pathak regarding discharge status - I am concerned that he still required IV opiates for pain control overnight  There may have been some confusion re: his diet last night - should have been on clear liquids with lo-fi to start this AM  -Will need outpatient follow-up to evaluate - if his abdominal pain persists, he may need EGD to evaluate for PUD    2  History of iron deficiency anemia  -Previous workup by Dr Addison Evans  Hgb within expected range    Chief Complaint   Patient presents with   • Abdominal Pain     abdomen pain, started last night with vomiting and diarrhea       SUBJECTIVE  Accompanied by self and history is obtained from self  Spoke to wife Dasha King via pt's speakerphone with patient at bedside  Today, feeling a little better  Abdominal pain is 5-6 5 currently while seated in chair  Had some increased pain last night after eating grilled chicken breast for dinner, requested IV Dilaudid (given [address] [place] and 0443)  Is on Percocet chronically for pain   This morning, ate some scrambled eggs - no increase in pain since  Has had very soft, minimally formed brown bowel movements since he has been here  No difficulty urinating  Review of Systems   Constitutional: Negative for appetite change, fever and unexpected weight change  HENT: Negative for sore throat, trouble swallowing and voice change  Respiratory: Negative for cough and choking  Cardiovascular: Negative for chest pain and leg swelling  Gastrointestinal: Positive for abdominal pain (5-6 5, improved from yesterday)  Negative for abdominal distention, anal bleeding, blood in stool, constipation, diarrhea, nausea, rectal pain and vomiting  Skin: Negative for color change and rash  Neurological: Negative for weakness and light-headedness  Temp:  [98 °F (36 7 °C)-100 °F (37 8 °C)] 98 5 °F (36 9 °C)  HR:  [] 100  Resp:  [16-19] 19  BP: (143-170)/() 157/112     PHYSICAL EXAM  Physical Exam  Vitals and nursing note reviewed  Constitutional:       General: He is not in acute distress  Appearance: He is overweight  He is not toxic-appearing  HENT:      Head: Normocephalic and atraumatic  Eyes:      General: No scleral icterus  Extraocular Movements: Extraocular movements intact  Neck:      Trachea: Phonation normal    Cardiovascular:      Rate and Rhythm: Normal rate and regular rhythm  Heart sounds: No murmur heard  No friction rub  No gallop  Pulmonary:      Effort: Pulmonary effort is normal  No respiratory distress  Breath sounds: No wheezing, rhonchi or rales  Abdominal:      General: Abdomen is protuberant  Bowel sounds are normal  There is no distension or abdominal bruit  Palpations: Abdomen is soft  Tenderness: There is no abdominal tenderness  There is no guarding or rebound  Musculoskeletal:      Cervical back: Normal range of motion  Right lower leg: No edema  Left lower leg: No edema        Comments: Moving all 4 extremities spontaneously   Skin: "General: Skin is warm and dry  Capillary Refill: Capillary refill takes less than 2 seconds  Coloration: Skin is not jaundiced or pale  Neurological:      General: No focal deficit present  Mental Status: He is alert and oriented to person, place, and time  Comments: Resting tremor noted to bilateral upper extremities  Psychiatric:         Behavior: Behavior normal  Behavior is cooperative  Thought Content: Thought content normal            LABS & STUDIES  Lab Results   Component Value Date    CALCIUM 9 4 06/30/2023    K 3 6 06/30/2023    CO2 22 06/30/2023     06/30/2023    BUN 12 06/30/2023    CREATININE 0 90 06/30/2023    CREATININE 1 23 06/29/2023    CREATININE 1 15 05/27/2023     Lab Results   Component Value Date    WBC 8 15 06/30/2023    WBC 10 30 (H) 06/29/2023    WBC 6 21 02/09/2023    HGB 13 2 06/30/2023    HGB 15 0 06/29/2023    HGB 13 6 02/09/2023    MCV 85 06/30/2023     06/30/2023     06/29/2023     (L) 02/09/2023     Lab Results   Component Value Date    ALT 10 06/30/2023    ALT 9 06/29/2023    ALT 15 05/27/2023    AST 11 (L) 06/30/2023    AST 11 (L) 06/29/2023    AST 25 05/27/2023    ALKPHOS 53 06/30/2023    ALKPHOS 55 06/29/2023    ALKPHOS 53 05/27/2023    TBILI 1 24 (H) 06/30/2023    TBILI 0 99 06/29/2023    TBILI 0 84 05/27/2023     No results found for: \"AMYLASE\"  Lab Results   Component Value Date    LIPASE 350 (H) 06/30/2023     Lab Results   Component Value Date    IRON 111 12/28/2021    TIBC 401 12/28/2021    FERRITIN 28 12/28/2021     Lab Results   Component Value Date    INR 1 11 05/28/2021       XR chest 1 view portable    Result Date: 6/29/2023  Narrative: CHEST INDICATION:   cough  COMPARISON:  None EXAM PERFORMED/VIEWS:  XR CHEST PORTABLE FINDINGS: Cardiomediastinal silhouette appears unremarkable  The lungs are clear  No pneumothorax or pleural effusion  Osseous structures appear within normal limits for patient age   " Impression: No acute cardiopulmonary disease  Workstation performed: [id number]     CT Abdomen pelvis with contrast    Result Date: 6/29/2023  Narrative: CT ABDOMEN AND PELVIS WITH IV CONTRAST INDICATION:   LLQ abdominal pain LLQ pain  COMPARISON: CT abdomen and pelvis 5/28/2021 TECHNIQUE:  CT examination of the abdomen and pelvis was performed  Multiplanar 2D reformatted images were created from the source data  This examination, like all CT scans performed in the [facility], was performed utilizing techniques to minimize radiation dose exposure, including the use of iterative reconstruction and automated exposure control  Radiation dose length product (DLP) for this visit:  976 21 mGy-cm IV Contrast:  100 mL of iohexol (OMNIPAQUE) 350 Enteric Contrast:  Enteric contrast was not administered  FINDINGS: ABDOMEN LOWER CHEST:  No clinically significant abnormality identified in the visualized lower chest  LIVER/BILIARY TREE:  Unremarkable  GALLBLADDER:  No calcified gallstones  No pericholecystic inflammatory change  SPLEEN: Mild splenomegaly measuring 14 cm craniocaudal  Focal posterior superior scar and capsular retraction  PANCREAS: Mild fat stranding adjacent to the head of the pancreas along the pancreaticoduodenal groove  No evidence of parenchymal or peripancreatic necrosis  ADRENAL GLANDS:  Unremarkable  KIDNEYS/URETERS: One or more sharply circumscribed subcentimeter renal hypodensities are noted  These lesions are too small to accurately characterize, but are statistically most likely to represent benign cortical renal cyst(s)  According to the guidelines published in the [facility] Paper of the ACR Incidental Findings Committee (Radiology 2010), no further workup of these lesions is recommended    No perinephric collection  STOMACH AND BOWEL:  Unremarkable  APPENDIX: A normal appendix was visualized  ABDOMINOPELVIC CAVITY: Trace right posterior peritoneal streaky free fluid  No abscess  No pneumoperitoneum  No lymphadenopathy  VESSELS: Minimal aortoiliac calcification  No aneurysm  PELVIS REPRODUCTIVE ORGANS:  Unremarkable for patient's age  URINARY BLADDER: Unremarkable for degree of distention  ABDOMINAL WALL/INGUINAL REGIONS: Tiny fat-containing umbilical hernia  Tiny bilateral inguinal fat-containing hernias, right larger than left  OSSEOUS STRUCTURES: No acute fracture or osseous destructive lesion identified  Mild degenerative changes of the spine  Impression: Abnormal findings compatible with acute interstitial edematous pancreatitis along the pancreaticoduodenal groove  Mild splenomegaly  This study demonstrates a significant  finding and was documented as such in [facility] for liaison and referring practitioner notification  Workstation performed: [id number]     MRI lumbar spine wo contrast    Result Date: 6/9/2023  Narrative: MRI LUMBAR SPINE WITHOUT CONTRAST INDICATION: Low back pain with right-sided sciatica  COMPARISON:  None  TECHNIQUE:  Multiplanar, multisequence imaging of the lumbar spine was performed    Imaging performed on 1 5T MRI IMAGE QUALITY:  Diagnostic FINDINGS: VERTEBRAL BODIES:  There are 5 lumbar type vertebral bodies  Normal alignment of the lumbar spine  No spondylolysis or spondylolisthesis  No scoliosis  No compression fracture  Normal marrow signal is identified within the visualized bony structures  No discrete marrow lesion  SACRUM:  Normal signal within the sacrum  No evidence of insufficiency or stress fracture  DISTAL CORD AND CONUS:  Normal size and signal within the distal cord and conus  There is tapering of the thecal sac at L5-S1 secondary to prominent circumferential epidural lipomatosis  The sacral canal primarily demonstrates epidural fat  PARASPINAL SOFT TISSUES:  Paraspinal soft tissues are unremarkable  LOWER THORACIC DISC SPACES:  Normal disc height and signal   No disc herniation, canal stenosis or foraminal narrowing   Small Schmorl's node deformity at the superior T11 endplate level  LUMBAR DISC SPACES: Relative to the other discs, there is minimal disc desiccation at L4-L5  The disc height is maintained throughout the lumbar spine  L1-L2:  Normal  L2-L3:  Normal  L3-L4:  Normal  L4-L5: Right foraminal protrusion contacting the exited right L4 nerve root  There is mild right greater than left subarticular recess and right inferior foraminal encroachment  Correlate for right L4 and L5 radiculitis  Spinal canal and left neural foramina remains patent  L5-S1:  Normal  OTHER FINDINGS:  None  Impression: Right L4-5 foraminal protrusion with right inferior foraminal and subarticular recess stenosis  Correlate for right L4 and L5 radiculitis  Workstation performed: [id number]       Patient expressed understanding and had all questions and concerns addressed  Max Weems PA-C   06/30/23  10:51 AM    This chart was completed in part utilizing [facility] [employer] speech voice recognition software  Random word insertions, pronoun errors, and incomplete sentences are an occasional consequence of this system due to software limitations, and ambient noise  Any questions or concerns about the content, text, or information contained within the body of this dictation should be directly addressed to the provider for clarification

## 2023-06-30 NOTE — CASE MANAGEMENT
Case Management Assessment & Discharge Planning Note    Patient name Yuniel Agarwal  Location /-40 MRN [id number]  : 1985 Date 2023       Current Admission Date: 2023  Current Admission Diagnosis:Pancreatitis, alcoholic, acute   Patient Active Problem List    Diagnosis Date Noted   • Alcohol withdrawal ([facility] ) 2023   • Nicotine dependence 2023   • Pancreatitis, alcoholic, acute    • Seasonal allergic rhinitis due to pollen 2023   • Pain of right thigh 2023   • Impaired fasting glucose 2023   • Narcotic dependency, continuous ([facility] ) 2022   • Primary hypertension 2022   • Daytime somnolence 2022   • Chronic right-sided low back pain with right-sided sciatica    • Tobacco dependence    • Iron deficiency anemia due to chronic blood loss 2021   • GERD (gastroesophageal reflux disease) 2021   • PTSD (post-traumatic stress disorder) 2021   • Bipolar disorder ([facility] ) 2019   • Tremor 2019   • ADD (attention deficit disorder) 2019   • Scar tissue 2013   • Primary localized osteoarthrosis of ankle and foot 2012      LOS (days): 1  Geometric Mean LOS (GMLOS) (days): 3 10  Days to GMLOS:2     OBJECTIVE:    Risk of Unplanned Readmission Score: 11 33      Current admission status: Inpatient    Preferred Pharmacy:   [address] [place] [address] 38 210 [facility]  Phone: [phone] Fax: [phone]    [facility]acy #[id number] [address]  Phone: [phone] Fax: [phone]    Primary Care Provider: Rakesh Demarco MD    Primary Insurance: CAPITAL  Secondary Insurance:     ASSESSMENT:  Edgardo Shah Representative - Spouse   Primary Phone: [phone] (Home)               Advance Directives  Does patient have a Health Care POA?: No  Was patient offered paperwork?: Yes (declined)  Does patient currently have a Health Care decision maker?: Yes, please see Health Care Proxy section  Does patient have Advance Directives?: No  Was patient offered paperwork?: Yes (declined)  Primary Contact: Edis Perry    Readmission Root Cause  30 Day Readmission: No    Patient Information  Admitted from[de-identified] Home  Mental Status: Alert  During Assessment patient was accompanied by: Spouse  Assessment information provided by[de-identified] Patient, Spouse  Primary Caregiver: Self  Support Systems: Spouse/significant other  [place] of Residence: 1985 [facility] do you live in?: [address] entry access options   Select all that apply : No steps to enter home  Type of Current Residence: 2 story home  Upon entering residence, is there a bedroom on the main floor (no further steps)?: No  A bedroom is located on the following floor levels of residence (select all that apply):: 2nd Floor  Upon entering residence, is there a bathroom on the main floor (no further steps)?: No  Indicate which floors of current residence have a bathroom (select all the apply):: 2nd Floor  Number of steps to 2nd floor from main floor: One Flight  In the last 12 months, was there a time when you were not able to pay the mortgage or rent on time?: No  In the last 12 months, how many places have you lived?: 1  In the last 12 months, was there a time when you did not have a steady place to sleep or slept in a shelter (including now)?: No  Homeless/housing insecurity resource given?: N/A  Living Arrangements: Lives w/ Spouse/significant other, Lives w/ Daughter  Is patient a ?: No    Activities of Daily Living Prior to Admission  Functional Status: Independent  Completes ADLs independently?: Yes  Ambulates independently?: Yes  Does patient use assisted devices?: No  Does patient currently own DME?: No  Does patient have a history of Outpatient Therapy (PT/OT)?: Yes  Does the patient have a history of Short-Term Rehab?: Yes  Does patient have a history of HHC?: No  Does patient currently have [address]?: No    Patient Information Continued  Income Source: Employed  Does patient have prescription coverage?: Yes  Within the past 12 months, you worried that your food would run out before you got the money to buy more : Never true  Within the past 12 months, the food you bought just didn't last and you didn't have money to get more : Never true  Food insecurity resource given?: N/A  Does patient receive dialysis treatments?: No  Does patient have a history of substance abuse?: Yes  Historical substance use preference: Alcohol/ETOH  History of Withdrawal Symptoms: Denies past symptoms  Is patient currently in treatment for substance abuse?: No  Patient declined treatment information  Does patient have a history of Mental Health Diagnosis?: Yes  Is patient receiving treatment for mental health?: Yes  Has patient received inpatient treatment related to mental health in the last 2 years?: No    Means of Transportation  Means of Transport to Appts[de-identified] Drives Self  In the past 12 months, has lack of transportation kept you from medical appointments or from getting medications?: No  In the past 12 months, has lack of transportation kept you from meetings, work, or from getting things needed for daily living?: No  Was application for public transport provided?: N/A    DISCHARGE DETAILS:    Discharge planning discussed with[de-identified] pt  Freedom of Choice: Yes     CM contacted family/caregiver?: Yes  Were Treatment Team discharge recommendations reviewed with patient/caregiver?: Yes  Did patient/caregiver verbalize understanding of patient care needs?: Yes  Were patient/caregiver advised of the risks associated with not following Treatment Team discharge recommendations?: Yes    Contacts  Patient Contacts: Ronen Gutierrez  Relationship to Patient[de-identified] Family  Contact Method:  In Person  Reason/Outcome: Continuity of Care, Emergency Contact, Discharge [address]         Is the patient interested in [address] at discharge?: No    DME Referral Provided  Referral made for DME?: No    Other Referral/Resources/Interventions Provided:  Interventions: Declines resources    Treatment Team Recommendation: Home  Discharge Destination Plan[de-identified] Home  Transport at Discharge : Family     Additional Comments: Cm met with pt and reviewed cm role  Pt lives with his spouse and his dtr in a 2STH with no BONNIE  B/B on 2nd floor  Pt is ind at baseline  Reports no DME  He states he had a STR stay once and has had outpt PT  He had a bipolar diangosis but sees a psychiatrist for med management at [facility]  He has diagnosis of narcotic dependence and alcohol use  He reports he is cutting him off cold turkey and declined any resources or a BCARES referral  He uses [facility] [address] in [place]  Pt drives at baseline but spouse will transport home

## 2023-06-30 NOTE — UTILIZATION REVIEW
Initial Clinical Review    Admission: Date/Time/Statement:   Admission Orders (From admission, onward)     Ordered        06/29/23 1255  INPATIENT ADMISSION  Once                      Orders Placed This Encounter   Procedures   • INPATIENT ADMISSION     Standing Status:   Standing     Number of Occurrences:   1     Order Specific Question:   Level of Care     Answer:   Med Surg [16]     Order Specific Question:   Estimated length of stay     Answer:   More than 2 Midnights     Order Specific Question:   Certification     Answer:   I certify that inpatient services are medically necessary for this patient for a duration of greater than two midnights  See H&P and MD Progress Notes for additional information about the patient's course of treatment  ED Arrival Information     Expected   -    Arrival   6/29/2023 09:59    Acuity   Urgent            Means of arrival   Walk-In    Escorted by   Self    Service   Hospitalist    Admission type   Emergency            Arrival complaint   Abdominal pain            Chief Complaint   Patient presents with   • Abdominal Pain     abdomen pain, started last night with vomiting and diarrhea       Initial Presentation: 34 y o  male from home to ED admitted inpatient due to acute pancreatitis, suspected secondary to alcohol/alcohol abuse  Presented due to abdominal pain, nausea and vomiting x 5  starting night prior to arrival  Drinks about 6 beers daily  + diarrhea last 2 days  On exam: tachycardic  Abdominal tenderness in epigastric area  K 3 3  Lipase 297  WBC 10 30  Ct abdomen showed pancreatitis, splenomegaly  In the ED given Zofran, 1 liter IVF, KCL, IV Dilaudid  Plan is continued aggressive IVF, pain control and antiemetics as needed, CIWA, start folic acid, thiamine,  Consult GI   NPO  Start IV Lopressor for SBP > 170    6/29/23 per GI - patient with acute pancreatitis    Suspect due to alcohol but also could be related to steroids, taking for back injury vs peptic ulcer disease  Plan is clears today, advance tomorrow if tolerated  Pantoprazole  No alcohol  Date: 6/30/23    Day 2: still with abdominal pain   + diarrhea  BP high  On exam epigastric tenderness  Lipase 350  Continue IVF, low fat diet if tolerated  Analgesia and antiemetics as needed  ED Triage Vitals   Temperature Pulse Respirations Blood Pressure SpO2   06/29/23 1009 06/29/23 1009 06/29/23 1009 06/29/23 1009 06/29/23 1009   97 7 °F (36 5 °C) (!) 110 18 (!) 137/101 98 %      Temp Source Heart Rate Source Patient Position - Orthostatic VS BP Location FiO2 (%)   06/29/23 1738 06/29/23 1009 06/29/23 1009 06/29/23 1009 --   Oral Monitor Sitting Right arm       Pain Score       06/29/23 1009       6          Wt Readings from Last 1 Encounters:   06/29/23 131 kg (289 lb)     Additional Vital Signs:   06/30/23 09:53:56 -- 100 -- 157/112 Abnormal  127 97 % -- --   06/30/23 0816 98 5 °F (36 9 °C) -- -- -- -- 96 % None (Room air) --   06/30/23 07:56:42 98 5 °F (36 9 °C) -- 19 166/114 Abnormal  131 -- -- --   06/30/23 04:38:12 -- 98 -- 167/114 Abnormal  132 96 % -- --   06/29/23 21:19:57 98 °F (36 7 °C) 83 18 170/114 Abnormal  133 97 % None (Room air) --   06/29/23 17:38:28 100 °F (37 8 °C) 93 16 143/109 Abnormal  120 97 % -- --   06/29/23 1600 -- 90 18 149/87 -- 98 % None (Room air) Sitting   06/29/23 1500 -- 85 17 155/90 -- 97 % None (Room air) Sitting   06/29/23 1400 -- 93 16 146/99 116 97 % None (Room air) Sitting   06/29/23 1300 -- 87 16 151/100 117 98 % None (Room air) Sitting   06/29/23 1200 -- 98 18 154/81 108 99 % None (Room air) Sitting   06/29/23 1100 -- 106 Abnormal  18 150/105 Abnormal  120 98 % None (Room air)      CIWA  6/30/23  3 at 0953    3 at 0438      6/29/23;  4 at 2119    4 at 1738   3 at 1430        Pertinent Labs/Diagnostic Test Results:   CT Abdomen pelvis with contrast   Final Result by Serafin Mancuso MD (06/29 1224)      Abnormal findings compatible with acute interstitial edematous pancreatitis along the pancreaticoduodenal groove  Mild splenomegaly  This study demonstrates a significant  finding and was documented as such in [facility] for liaison and referring practitioner notification  Workstation performed: [id number]         XR chest 1 view portable   Final Result by Vu Owen MD (06/29 1257)      No acute cardiopulmonary disease                    Workstation performed: [id number]             Results from last 7 days   Lab Units 06/30/23  0451 06/29/23  1015   WBC Thousand/uL 8 15 10 30*   HEMOGLOBIN g/dL 13 2 15 0   HEMATOCRIT % 39 8 44 8   PLATELETS Thousands/uL 164 186   NEUTROS ABS Thousands/µL 4 71 6 31     Results from last 7 days   Lab Units 06/30/23  0451 06/29/23  1135 06/29/23  1015   SODIUM mmol/L 136  --  135   POTASSIUM mmol/L 3 6  --  3 3*   CHLORIDE mmol/L 106  --  106   CO2 mmol/L 22  --  16*   ANION GAP mmol/L 8  --  13   BUN mg/dL 12  --  16   CREATININE mg/dL 0 90  --  1 23   EGFR ml/min/1 73sq m 106  --  72   CALCIUM mg/dL 9 4  --  9 1   MAGNESIUM mg/dL  --  1 8*  --      Results from last 7 days   Lab Units 06/30/23  0451 06/29/23  1015   AST U/L 11* 11*   ALT U/L 10 9   ALK PHOS U/L 53 55   TOTAL PROTEIN g/dL 6 6 7 2   ALBUMIN g/dL 3 8 4 1   TOTAL BILIRUBIN mg/dL 1 24* 0 99     Results from last 7 days   Lab Units 06/30/23  0451 06/29/23  1015   GLUCOSE RANDOM mg/dL 105 92     Results from last 7 days   Lab Units 06/30/23  0451 06/29/23  1015   LIPASE u/L 350* 297*     Results from last 7 days   Lab Units 06/29/23  1059   CLARITY UA  Clear   COLOR UA  Yellow   SPEC GRAV UA  >=1 030   PH UA  6 0   GLUCOSE UA mg/dl Negative   KETONES UA mg/dl Negative   BLOOD UA  Negative   PROTEIN UA mg/dl 30 (1+)*   NITRITE UA  Negative   BILIRUBIN UA  Negative   UROBILINOGEN UA (BE) mg/dl 2 0*   LEUKOCYTES UA  Negative   WBC UA /hpf 4-10*   RBC UA /hpf None Seen   BACTERIA UA /hpf Occasional   EPITHELIAL CELLS WET PREP /hpf Occasional       ED Treatment:   Medication Administration from 06/29/2023 0959 to 06/29/2023 1727       Date/Time Order Dose Route Action Comments     06/29/2023 1135 EDT ondansetron (ZOFRAN) injection 4 mg 4 mg Intravenous Given --     06/29/2023 1136 EDT lactated ringers bolus 1,000 mL 1,000 mL Intravenous New Bag --     06/29/2023 1349 EDT potassium chloride 20 mEq IVPB (premix) 20 mEq Intravenous New Bag --     06/29/2023 1135 EDT potassium chloride 20 mEq IVPB (premix) 20 mEq Intravenous New Bag --     06/29/2023 1349 EDT lactated ringers infusion 125 mL/hr Intravenous New Bag --     06/29/2023 1543 EDT nicotine (NICODERM CQ) 14 mg/24hr TD 24 hr patch 1 patch 1 patch Transdermal Medication Applied --     06/29/2023 1544 EDT enoxaparin (LOVENOX) subcutaneous injection 40 mg 40 mg Subcutaneous Given --     06/29/2023 1605 EDT HYDROmorphone HCl (DILAUDID) injection 0 2 mg 0 2 mg Intravenous Given --     63/05/6043 7381 EDT folic acid 1 mg, thiamine (VITAMIN B1) 100 mg in sodium chloride 0 9 % 100 mL IV piggyback -- Intravenous New Bag --        Past Medical History:   Diagnosis Date   • ADD (attention deficit disorder)    • Arthritis    • Chronic pain    • Heartburn    • Mood disorder (HCC)    • Tremor      Present on Admission:  • Primary hypertension  • GERD (gastroesophageal reflux disease)      Admitting Diagnosis: Abdominal pain [R10 9]  Alcohol-induced acute pancreatitis without infection or necrosis [K85 20]  Age/Sex: 34 y o  male  Admission Orders:  06/29/23 1255  Inpatient   Scheduled Medications:  enoxaparin, 40 mg, Subcutaneous, Daily  folic acid 1 mg, thiamine (VITAMIN B1) 100 mg in sodium chloride 0 9 % 100 mL IV piggyback, , Intravenous, Daily  losartan, 50 mg, Oral, Daily   And  hydrochlorothiazide, 12 5 mg, Oral, Daily  melatonin, 6 mg, Oral, HS  nicotine, 1 patch, Transdermal, Daily  oxyCODONE, 10 mg, Oral, Q12H PREM  risperiDONE, 4 mg, Oral, HS      Continuous IV Infusions:  lactated ringers, 200 mL/hr, Intravenous, Continuous      PRN Meds:  HYDROmorphone, 0 5 mg, Intravenous, Q4H PRN x 1 6/30/23   HYDROmorphone, 0 2 mg, Intravenous, Q4H PRN x 2 6/29, x 2 6/30/23   hydrOXYzine HCL, 50 mg, Oral, BID PRN x 1 6/29  ketorolac, 15 mg, Intravenous, Q6H PRN  loperamide, 2 mg, Oral, 4x Daily PRN  metoprolol, 5 mg, Intravenous, Q6H PRN  ondansetron, 4 mg, Intravenous, Q6H PRN    CIWA  Continuous pulse oximetry    IP CONSULT TO GASTROENTEROLOGY    Network Utilization Review Department  ATTENTION: Please call with any questions or concerns to [phone] and carefully listen to the prompts so that you are directed to the right person  All voicemails are confidential   Maribel Query all requests for admission clinical reviews, approved or denied determinations and any other requests to dedicated fax number below belonging to the campus where the patient is receiving treatment   List of dedicated fax numbers for the Facilities:  [address] DENIALS (Administrative/Medical Necessity) [phone]   [address] (Maternity/NICU/Pediatrics) [phone]   9 Isabel Jones [phone]   [facility]  [phone]   [address] [place] [address] Willy Hernandez 28 [phone]   [address] Dylan Patel [facility] 134 815 [facility] [phone]

## 2023-06-30 NOTE — PLAN OF CARE
Problem: PAIN - ADULT  Goal: Verbalizes/displays adequate comfort level or baseline comfort level  Description: Interventions:  - Encourage patient to monitor pain and request assistance  - Assess pain using appropriate pain scale  - Administer analgesics based on type and severity of pain and evaluate response  - Implement non-pharmacological measures as appropriate and evaluate response  - Consider cultural and social influences on pain and pain management  - Notify physician/advanced practitioner if interventions unsuccessful or patient reports new pain  Outcome: Progressing     Problem: INFECTION - ADULT  Goal: Absence or prevention of progression during hospitalization  Description: INTERVENTIONS:  - Assess and monitor for signs and symptoms of infection  - Monitor lab/diagnostic results  - Monitor all insertion sites, i e  indwelling lines, tubes, and drains  - [place] appropriate cooling/warming therapies per order  - Administer medications as ordered  - Instruct and encourage patient and family to use good hand hygiene technique  - Identify and instruct in appropriate isolation precautions for identified infection/condition  Outcome: Progressing  Problem: SAFETY ADULT  Goal: Patient will remain free of falls  Description: INTERVENTIONS:  - Educate patient/family on patient safety including physical limitations  - Consult OT/PT to assist with strengthening/mobility   - Keep Call bell within reach  - Keep bed low and locked with side rails adjusted as appropriate  - Keep care items and personal belongings within reach  - Initiate and maintain comfort rounds    Outcome: Progressing  Goal: Maintain or return to baseline ADL function  Description: INTERVENTIONS:  -  Assess patient's ability to carry out ADLs; assess patient's baseline for ADL function and identify physical deficits which impact ability to perform ADLs (bathing, care of mouth/teeth, toileting, grooming, dressing, etc )  - Assess/evaluate cause of self-care deficits   - Assess range of motion  - Assess patient's mobility; develop plan if impaired  - Encourage maximum independence   - Involve family in performance of ADLs  - Assess for home care needs following discharge   - Provide patient education as appropriate  Outcome: Progressing  Goal: Maintains/Returns to pre admission functional level  Description: INTERVENTIONS:  - Perform BMAT or MOVE assessment daily    - Set and communicate daily mobility goal to care team and patient/family/caregiver  - Perform Range of Motion 3 times a day  - Ambulate patient 3 times a day  - Out of bed to chair 3 times a day   - Out of bed for meals 3 times a day  - Out of bed for toileting  - Record patient progress and toleration of activity level   Outcome: Progressing     Problem: DISCHARGE PLANNING  Goal: Discharge to home or other facility with appropriate resources  Description: INTERVENTIONS:  - Identify barriers to discharge w/patient and caregiver  - Arrange for needed discharge resources and transportation as appropriate  - Identify discharge learning needs (meds, wound care, etc )  - Arrange for interpretive services to assist at discharge as needed  - Refer to Case Management Department for coordinating discharge planning if the patient needs post-hospital services based on physician/advanced practitioner order or complex needs related to functional status, cognitive ability, or social support system  Outcome: Progressing     Problem: Knowledge Deficit  Goal: Patient/family/caregiver demonstrates understanding of disease process, treatment plan, medications, and discharge instructions  Description: Complete learning assessment and assess knowledge base    Interventions:  - Provide teaching at level of understanding  - Provide teaching via preferred learning methods  Outcome: Progressing     Problem: METABOLIC, FLUID AND ELECTROLYTES - ADULT  Goal: Electrolytes maintained within normal limits  Description: INTERVENTIONS:  - Monitor labs and assess patient for signs and symptoms of electrolyte imbalances  - Administer electrolyte replacement as ordered  - Monitor response to electrolyte replacements, including repeat lab results as appropriate  - Instruct patient on fluid and nutrition as appropriate  Outcome: Progressing  Goal: Fluid balance maintained  Description: INTERVENTIONS:  - Monitor labs   - Monitor I/O and WT  - Instruct patient on fluid and nutrition as appropriate  - Assess for signs & symptoms of volume excess or deficit  Outcome: Progressing     Problem: Nutrition/Hydration-ADULT  Goal: Nutrient/Hydration intake appropriate for improving, restoring or maintaining nutritional needs  Description: Monitor and assess patient's nutrition/hydration status for malnutrition  Collaborate with interdisciplinary team and initiate plan and interventions as ordered  Monitor patient's weight and dietary intake as ordered or per policy  Utilize nutrition screening tool and intervene as necessary  Determine patient's food preferences and provide high-protein, high-caloric foods as appropriate       INTERVENTIONS:  - Monitor oral intake, urinary output, labs, and treatment plans  - Assess nutrition and hydration status and recommend course of action  - Evaluate amount of meals eaten  - Assist patient with eating if necessary   - Allow adequate time for meals  - Recommend/ encourage appropriate diets, oral nutritional supplements, and vitamin/mineral supplements  - Order, calculate, and assess calorie counts as needed  - Recommend, monitor, and adjust tube feedings and TPN/PPN based on assessed needs  - Assess need for intravenous fluids  - Provide specific nutrition/hydration education as appropriate  - Include patient/family/caregiver in decisions related to nutrition  Outcome: Progressing

## 2023-06-30 NOTE — NURSING NOTE
Patient discharged to home, no needs  Reviewed discharge instructions, including low fat diet and importance of smoking cessation with patient and wife - all questions/concerns addressed prior to d/c

## 2023-06-30 NOTE — UTILIZATION REVIEW
NOTIFICATION OF INPATIENT ADMISSION   AUTHORIZATION REQUEST   SERVICING FACILITY:   [place] [address]  Tax ID: [id number]  NPI: [id number] ATTENDING PROVIDER:  Attending Name and NPI#: Maddy Mccartney Md [[id number]]  Address: [address] [place] [address]  Phone: [phone]   ADMISSION INFORMATION:  Place of Service: [place] [address]  Place of Service Code: 21  Inpatient Admission Date/Time: 6/29/23 12:55 PM  Discharge Date/Time: No discharge date for patient encounter  Admitting Diagnosis Code/Description:  Abdominal pain [R10 9]  Alcohol-induced acute pancreatitis without infection or necrosis [K85 20]     UTILIZATION REVIEW CONTACT:  Katrina Carter Utilization   Network Utilization Review Department  Phone: [phone]  Fax [phone]  Email: Jannette Lara@"[email or web address] [place]"  org  Contact for approvals/pending authorizations, clinical reviews, and discharge  PHYSICIAN ADVISORY SERVICES:  Medical Necessity Denial & Uqge-wq-Seac Review  Phone: [phone]  Fax: [phone]  Email: Gayathri@"[email or web address] [place]"  org

## 2023-07-01 ENCOUNTER — LAB (OUTPATIENT)
Dept: LAB | Facility: HOSPITAL | Age: 38
End: 2023-07-01
Payer: COMMERCIAL

## 2023-07-01 ENCOUNTER — TELEPHONE (OUTPATIENT)
Dept: GASTROENTEROLOGY | Facility: CLINIC | Age: 38
End: 2023-07-01

## 2023-07-01 DIAGNOSIS — K85.20 ALCOHOL-INDUCED ACUTE PANCREATITIS WITHOUT INFECTION OR NECROSIS: Primary | ICD-10-CM

## 2023-07-01 DIAGNOSIS — K85.20 ALCOHOL-INDUCED ACUTE PANCREATITIS WITHOUT INFECTION OR NECROSIS: ICD-10-CM

## 2023-07-01 LAB
ALBUMIN SERPL BCP-MCNC: 3.8 G/DL (ref 3.5–5)
ALP SERPL-CCNC: 52 U/L (ref 34–104)
ALT SERPL W P-5'-P-CCNC: 14 U/L (ref 7–52)
AST SERPL W P-5'-P-CCNC: 17 U/L (ref 13–39)
BILIRUB DIRECT SERPL-MCNC: 0.28 MG/DL (ref 0–0.2)
BILIRUB SERPL-MCNC: 0.96 MG/DL (ref 0.2–1)
LIPASE SERPL-CCNC: 308 U/L (ref 11–82)
PROT SERPL-MCNC: 6.6 G/DL (ref 6.4–8.4)

## 2023-07-01 PROCEDURE — 36415 COLL VENOUS BLD VENIPUNCTURE: CPT

## 2023-07-01 PROCEDURE — 80076 HEPATIC FUNCTION PANEL: CPT

## 2023-07-01 PROCEDURE — 83690 ASSAY OF LIPASE: CPT

## 2023-07-01 NOTE — RESULT ENCOUNTER NOTE
Reviewed results w pt. Labs were for one week from discharge (DC'ed yesterday). LFTs better and lipase better but advised pt to repeat in ONE WEEK.

## 2023-07-03 ENCOUNTER — TELEPHONE (OUTPATIENT)
Dept: GASTROENTEROLOGY | Facility: CLINIC | Age: 38
End: 2023-07-03

## 2023-07-03 ENCOUNTER — TRANSITIONAL CARE MANAGEMENT (OUTPATIENT)
Dept: FAMILY MEDICINE CLINIC | Facility: HOSPITAL | Age: 38
End: 2023-07-03

## 2023-07-03 NOTE — TELEPHONE ENCOUNTER
Pt reached out to physician on call -     1. When can he go back to work? He is a [employer] employee. Advised he can return to work if he is feeling better with no restrictions. 2. Advised staying hydrated and repeating blood work as discussed. 3. Pt also had green loose stools. Likely 2/2 being on clear liq diet and limited eating. Advised if stools continue to gets worse, he can let us know and we can check stool studies/cdiff.

## 2023-07-03 NOTE — TELEPHONE ENCOUNTER
Moraima Franklin MD  P Gastroenterology [facility] Clinical  Pt needs f/u w any available GI provider in 2-4 weeks. Thank you. Prefers Dr Chhaya Jules, or Marcela Mello at [facility], [facility] office.

## 2023-07-05 ENCOUNTER — APPOINTMENT (OUTPATIENT)
Dept: LAB | Facility: HOSPITAL | Age: 38
End: 2023-07-05
Payer: COMMERCIAL

## 2023-07-05 ENCOUNTER — OFFICE VISIT (OUTPATIENT)
Dept: GASTROENTEROLOGY | Facility: CLINIC | Age: 38
End: 2023-07-05
Payer: COMMERCIAL

## 2023-07-05 VITALS
BODY MASS INDEX: 32.08 KG/M2 | SYSTOLIC BLOOD PRESSURE: 120 MMHG | DIASTOLIC BLOOD PRESSURE: 82 MMHG | HEIGHT: 75 IN | WEIGHT: 258 LBS

## 2023-07-05 DIAGNOSIS — F17.200 TOBACCO DEPENDENCE: ICD-10-CM

## 2023-07-05 DIAGNOSIS — Z86.010 PERSONAL HISTORY OF COLONIC POLYPS: ICD-10-CM

## 2023-07-05 DIAGNOSIS — K85.20 ALCOHOL-INDUCED ACUTE PANCREATITIS WITHOUT INFECTION OR NECROSIS: ICD-10-CM

## 2023-07-05 DIAGNOSIS — K85.20 ALCOHOL-INDUCED ACUTE PANCREATITIS WITHOUT INFECTION OR NECROSIS: Primary | ICD-10-CM

## 2023-07-05 PROBLEM — Z86.0100 PERSONAL HISTORY OF COLONIC POLYPS: Status: ACTIVE | Noted: 2023-07-05

## 2023-07-05 LAB
ALBUMIN SERPL BCP-MCNC: 4.3 G/DL (ref 3.5–5)
ALP SERPL-CCNC: 49 U/L (ref 34–104)
ALT SERPL W P-5'-P-CCNC: 17 U/L (ref 7–52)
AST SERPL W P-5'-P-CCNC: 16 U/L (ref 13–39)
BILIRUB DIRECT SERPL-MCNC: 0.19 MG/DL (ref 0–0.2)
BILIRUB SERPL-MCNC: 0.79 MG/DL (ref 0.2–1)
LIPASE SERPL-CCNC: 172 U/L (ref 11–82)
PROT SERPL-MCNC: 7.1 G/DL (ref 6.4–8.4)

## 2023-07-05 PROCEDURE — 99214 OFFICE O/P EST MOD 30 MIN: CPT | Performed by: INTERNAL MEDICINE

## 2023-07-05 PROCEDURE — 36415 COLL VENOUS BLD VENIPUNCTURE: CPT

## 2023-07-05 PROCEDURE — 83690 ASSAY OF LIPASE: CPT

## 2023-07-05 PROCEDURE — 80076 HEPATIC FUNCTION PANEL: CPT

## 2023-07-05 NOTE — PROGRESS NOTES
[facility] - Outpatient Follow-up Note  Manuel Gandhi 34 y.o. male MRN: [id number]  Encounter: [id number]    ASSESSMENT AND PLAN:      1. Alcohol-induced acute pancreatitis without infection or necrosis  38M here today for f/u. Admitted last week at [address] [place] [address] w ETOH pancreatitis - doing better. Tolerating diet, staying hydrated and no ETOH.    - Recheck labs once more in 2 weeks to assure lipase completely normalizes  - Cont strict ETOH cessation  - Low fat diet and gentle weight loss  - Hepatic function panel; Future  - Lipase; Future    2. Personal history of colonic polyps  Due 2024    3. Tobacco dependence  Smoking cessation advised      Followup Appointment: 1 year  ______________________________________________________________________    Chief Complaint   Patient presents with   • Hospital Follow-up     HPI: This is a 20-year-old male who presents today for follow-up after recent hospitalization for alcoholic pancreatitis. Hospitalization unremarkable and was discharged quickly, outpatient labs showing resolved elevated LFTs. Lipase still mildly elevated. Tolerating p.o. No pain or nausea.     Historical Information   Past Medical History:   Diagnosis Date   • ADD (attention deficit disorder)    • Arthritis    • Chronic pain    • Heartburn    • Mood disorder (HCC)    • Tremor      Past Surgical History:   Procedure Laterality Date   • CLOSED REDUCTION CALCANEAL FRACTURE     • PERONEAL TENDON EXPLORATION       Social History     Substance and Sexual Activity   Alcohol Use Not Currently   • Alcohol/week: 10.0 standard drinks of alcohol   • Types: 10 Cans of beer per week    Comment: social     Social History     Substance and Sexual Activity   Drug Use Never     Social History     Tobacco Use   Smoking Status Former   • Packs/day: 0.25   • Years: 15.00   • Total pack years: 3.75   • Types: Cigarettes, Pipe   • Start date: 1999   Smokeless Tobacco Never     Family History Problem Relation Age of Onset   • Parkinsonism Paternal Grandfather    • Heart disease Father    • Hypertension Father    • Breast cancer Mother    • Intellectual disability Sister    • Heart disease Maternal Grandfather    • Substance Abuse Neg Hx    • Mental illness Neg Hx          Current Outpatient Medications:   •  acetaminophen (TYLENOL) 325 mg tablet  •  azelastine (ASTELIN) 0.1 % nasal spray  •  B-D HYPODERMIC NEEDLE 18GX1.5" 18G X 1-1/2" MISC  •  B-D SYRINGE LUER-MILAGROS 1CC 1 ML MISC  •  B-D SYRINGE LUER-MILAGROS 3CC 3 ML MISC  •  BD Hypodermic Needle 25G X 1-1/2" MISC  •  DEXILANT 60 MG capsule  •  gabapentin (NEURONTIN) 600 MG tablet  •  hydrOXYzine HCL (ATARAX) 50 mg tablet  •  losartan-hydrochlorothiazide (HYZAAR) 50-12.5 mg per tablet  •  meloxicam (MOBIC) 15 mg tablet  •  methylphenidate (CONCERTA) 36 MG ER tablet  •  nicotine (NICODERM CQ) 14 mg/24hr TD 24 hr patch  •  nicotine polacrilex (COMMIT) 4 MG lozenge  •  oxyCODONE-acetaminophen (PERCOCET) 5-325 mg per tablet  •  OXYCONTIN 10 MG 12 hr tablet  •  risperiDONE (RisperDAL) 0.5 mg tablet  •  risperiDONE (RisperDAL) 4 mg tablet  •  sildenafil (VIAGRA) 100 mg tablet  •  testosterone cypionate (DEPO-TESTOSTERONE) 200 mg/mL SOLN  •  zolpidem (AMBIEN) 10 mg tablet  No Known Allergies  Reviewed medications and allergies and updated as indicated    PHYSICAL EXAM:    Blood pressure 120/82, height 6' 3" (1.905 m), weight 117 kg (258 lb). Body mass index is 32.25 kg/m². General Appearance: NAD, cooperative, alert  Eyes: Anicteric, PERRLA, EOMI  ENT:  Normocephalic, atraumatic, normal mucosa. Neck:  Supple, symmetrical, trachea midline  Resp:  Clear to auscultation bilaterally; no rales, rhonchi or wheezing; respirations unlabored   CV:  S1 S2, Regular rate and rhythm; no murmur, rub, or gallop. GI:  Soft, non-tender, non-distended; normal bowel sounds; no masses, no organomegaly   Rectal: Deferred  Musculoskeletal: No cyanosis, clubbing or edema.  Normal ROM.  Skin:  No jaundice, rashes, or lesions   Heme/Lymph: No palpable cervical lymphadenopathy  Psych: Normal affect, good eye contact  Neuro: No gross deficits, AAOx3    Lab Results:   Lab Results   Component Value Date    WBC 8.15 06/30/2023    HGB 13.2 06/30/2023    HCT 39.8 06/30/2023    MCV 85 06/30/2023     06/30/2023     Lab Results   Component Value Date    K 3.6 06/30/2023     06/30/2023    CO2 22 06/30/2023    BUN 12 06/30/2023    CREATININE 0.90 06/30/2023    GLUF 107 (H) 05/27/2023    CALCIUM 9.4 06/30/2023    CORRECTEDCA 9.6 02/09/2023    AST 16 07/05/2023    ALT 17 07/05/2023    ALKPHOS 49 07/05/2023    EGFR 106 06/30/2023     Lab Results   Component Value Date    IRON 111 12/28/2021    TIBC 401 12/28/2021    FERRITIN 28 12/28/2021     Lab Results   Component Value Date    LIPASE 172 (H) 07/05/2023       Radiology Results:   XR chest 1 view portable    Result Date: 6/29/2023  Narrative: CHEST INDICATION:   cough. COMPARISON:  None EXAM PERFORMED/VIEWS:  XR CHEST PORTABLE FINDINGS: Cardiomediastinal silhouette appears unremarkable. The lungs are clear. No pneumothorax or pleural effusion. Osseous structures appear within normal limits for patient age. Impression: No acute cardiopulmonary disease. Workstation performed: [id number]     CT Abdomen pelvis with contrast    Result Date: 6/29/2023  Narrative: CT ABDOMEN AND PELVIS WITH IV CONTRAST INDICATION:   LLQ abdominal pain LLQ pain. COMPARISON: CT abdomen and pelvis 5/28/2021 TECHNIQUE:  CT examination of the abdomen and pelvis was performed. Multiplanar 2D reformatted images were created from the source data. This examination, like all CT scans performed in the [facility], was performed utilizing techniques to minimize radiation dose exposure, including the use of iterative reconstruction and automated exposure control.  Radiation dose length product (DLP) for this visit:  976.21 mGy-cm IV Contrast:  100 mL of iohexol (OMNIPAQUE) 350 Enteric Contrast:  Enteric contrast was not administered. FINDINGS: ABDOMEN LOWER CHEST:  No clinically significant abnormality identified in the visualized lower chest. LIVER/BILIARY TREE:  Unremarkable. GALLBLADDER:  No calcified gallstones. No pericholecystic inflammatory change. SPLEEN: Mild splenomegaly measuring 14 cm craniocaudal. Focal posterior superior scar and capsular retraction. PANCREAS: Mild fat stranding adjacent to the head of the pancreas along the pancreaticoduodenal groove. No evidence of parenchymal or peripancreatic necrosis. ADRENAL GLANDS:  Unremarkable. KIDNEYS/URETERS: One or more sharply circumscribed subcentimeter renal hypodensities are noted. These lesions are too small to accurately characterize, but are statistically most likely to represent benign cortical renal cyst(s). According to the guidelines published in the [facility] Paper of the ACR Incidental Findings Committee (Radiology 2010), no further workup of these lesions is recommended. . No perinephric collection. STOMACH AND BOWEL:  Unremarkable. APPENDIX: A normal appendix was visualized. ABDOMINOPELVIC CAVITY: Trace right posterior peritoneal streaky free fluid. No abscess. No pneumoperitoneum. No lymphadenopathy. VESSELS: Minimal aortoiliac calcification. No aneurysm. PELVIS REPRODUCTIVE ORGANS:  Unremarkable for patient's age. URINARY BLADDER: Unremarkable for degree of distention. ABDOMINAL WALL/INGUINAL REGIONS: Tiny fat-containing umbilical hernia. Tiny bilateral inguinal fat-containing hernias, right larger than left. OSSEOUS STRUCTURES: No acute fracture or osseous destructive lesion identified. Mild degenerative changes of the spine. Impression: Abnormal findings compatible with acute interstitial edematous pancreatitis along the pancreaticoduodenal groove. Mild splenomegaly.  This study demonstrates a significant  finding and was documented as such in [facility] for liaison and referring practitioner notification. Workstation performed: [id number]     MRI lumbar spine wo contrast    Result Date: 6/9/2023  Narrative: MRI LUMBAR SPINE WITHOUT CONTRAST INDICATION: Low back pain with right-sided sciatica. COMPARISON:  None. TECHNIQUE:  Multiplanar, multisequence imaging of the lumbar spine was performed. . Imaging performed on 1.5T MRI IMAGE QUALITY:  Diagnostic FINDINGS: VERTEBRAL BODIES:  There are 5 lumbar type vertebral bodies. Normal alignment of the lumbar spine. No spondylolysis or spondylolisthesis. No scoliosis. No compression fracture. Normal marrow signal is identified within the visualized bony structures. No discrete marrow lesion. SACRUM:  Normal signal within the sacrum. No evidence of insufficiency or stress fracture. DISTAL CORD AND CONUS:  Normal size and signal within the distal cord and conus. There is tapering of the thecal sac at L5-S1 secondary to prominent circumferential epidural lipomatosis. The sacral canal primarily demonstrates epidural fat. PARASPINAL SOFT TISSUES:  Paraspinal soft tissues are unremarkable. LOWER THORACIC DISC SPACES:  Normal disc height and signal.  No disc herniation, canal stenosis or foraminal narrowing. Small Schmorl's node deformity at the superior T11 endplate level. LUMBAR DISC SPACES: Relative to the other discs, there is minimal disc desiccation at L4-L5. The disc height is maintained throughout the lumbar spine. L1-L2:  Normal. L2-L3:  Normal. L3-L4:  Normal. L4-L5: Right foraminal protrusion contacting the exited right L4 nerve root. There is mild right greater than left subarticular recess and right inferior foraminal encroachment. Correlate for right L4 and L5 radiculitis. Spinal canal and left neural foramina remains patent. L5-S1:  Normal. OTHER FINDINGS:  None. Impression: Right L4-5 foraminal protrusion with right inferior foraminal and subarticular recess stenosis. Correlate for right L4 and L5 radiculitis.  Workstation performed: [id number] Answers for HPI/ROS submitted by the patient on 7/5/2023  Chronicity: recurrent  Onset: 1 to 4 weeks ago  Onset quality: gradual  Frequency: intermittently  Episode duration: 5 Days  Progression since onset: rapidly improving  Pain location: epigastric region  Pain - numeric: 2/10  Pain quality: a sensation of fullness  Radiates to: epigastric region  anorexia: Yes  arthralgias: No  belching: Yes  constipation: No  diarrhea: Yes  dysuria: No  fever: No  flatus: No  frequency: Yes  headaches: No  hematochezia: No  hematuria: No  melena: No  myalgias: No  nausea:  No  weight loss: Yes  vomiting: No  Aggravated by: eating  Relieved by: recumbency  Diagnostic workup: CT scan

## 2023-07-06 ENCOUNTER — CONSULT (OUTPATIENT)
Dept: PAIN MEDICINE | Facility: CLINIC | Age: 38
End: 2023-07-06
Payer: COMMERCIAL

## 2023-07-06 VITALS
BODY MASS INDEX: 31.95 KG/M2 | DIASTOLIC BLOOD PRESSURE: 86 MMHG | TEMPERATURE: 98.6 F | WEIGHT: 257 LBS | HEIGHT: 75 IN | SYSTOLIC BLOOD PRESSURE: 120 MMHG | HEART RATE: 104 BPM

## 2023-07-06 DIAGNOSIS — M51.26 LUMBAR DISC HERNIATION: Primary | ICD-10-CM

## 2023-07-06 DIAGNOSIS — M54.16 LUMBAR RADICULOPATHY: ICD-10-CM

## 2023-07-06 DIAGNOSIS — M48.061 LUMBAR FORAMINAL STENOSIS: ICD-10-CM

## 2023-07-06 PROBLEM — E29.1 HYPOGONADISM IN MALE: Status: ACTIVE | Noted: 2017-03-21

## 2023-07-06 PROCEDURE — 99244 OFF/OP CNSLTJ NEW/EST MOD 40: CPT | Performed by: ANESTHESIOLOGY

## 2023-07-06 NOTE — UTILIZATION REVIEW
NOTIFICATION OF ADMISSION DISCHARGE   This is a Notification of Discharge from [facility]. Please be advised that this patient has been discharge from our facility. Below you will find the admission and discharge date and time including the patient’s disposition. UTILIZATION REVIEW CONTACT:  Peg Velez  Utilization   Network Utilization Review Department  Phone: [phone] x carefully listen to the prompts. All voicemails are confidential.  Email: [email or web address]     ADMISSION INFORMATION  PRESENTATION DATE: 6/29/2023 10:53 AM  OBERVATION ADMISSION DATE:   INPATIENT ADMISSION DATE: 6/29/23 12:55 PM   DISCHARGE DATE: 6/30/2023  7:23 PM   DISPOSITION:Home/Self Care    IMPORTANT INFORMATION:  Send all requests for admission clinical reviews, approved or denied determinations and any other requests to dedicated fax number below belonging to the campus where the patient is receiving treatment.  List of dedicated fax numbers:  [place] DENIALS (Administrative/Medical Necessity) [phone] [address] [facility] (Maternity/NICU/Pediatrics) [phone]   [facility] [phone]   [facility] [phone] [address] [facility] [phone]   [address] [place] [address] [phone]   [facility] [phone]   [address] [place] [address] [address] [place] [address] [phone]   [address] [place] [address] [phone]   [address] [facility] [phone] [address] [facility] [address] [phone]

## 2023-07-06 NOTE — PROGRESS NOTES
Assessment  1. Lumbar disc herniation    2. Lumbar foraminal stenosis    3. Lumbar radiculopathy - Right        Plan    The patient's pain persists despite time, relative rest, activity modification and therapy. Reveals disc herniation producing foraminal stenosis. I believe that he may benefit from a lumbar epidural steroid injection to diminish any inflammatory component of his pain. I will initially use a transforaminal approach to better concentrate the steroid along the affected nerve root. If the patient does not receive significant pain relief following the initial injection, the procedure may need to be repeated. He does not obtain significant relief surgical evaluation may be warranted. He is currently receiving prescriptions for pain medicine from previous pain medicine physician regarding a Worker's Comp. claim unrelated. In the office today, we reviewed the nature of the patient's pathology in depth using diagrams and models. I discussed the approach I would use for the epidural steroid injection and provided literature for home review. The patient understands the risks associated with the procedure including but not limited to bleeding, infection, tissue injury, decreased immunity secondary to steroid injection, exacerbation of symptoms, allergic reaction, spinal headache, and paralysis and provided verbal consent. My impressions and treatment recommendations were discussed in detail with the patient who verbalized understanding and had no further questions. Discharge instructions were provided. I personally saw and examined the patient and I agree with the above discussed plan of care. This note is created using dictation transcription. It may contain typographical errors, grammatical errors, improperly dictated words, background noise and other errors.     Orders Placed This Encounter   Procedures   • FL spine and pain procedure     Standing Status:   Future     Standing Expiration Date:   7/6/2027     Order Specific Question:   Reason for Exam:     Answer:   Right L4 and right L5 TFESI #1     Order Specific Question:   Anticoagulant hold needed? Answer:   no   • FL spine and pain procedure     Standing Status:   Future     Standing Expiration Date:   7/6/2027     Order Specific Question:   Reason for Exam:     Answer:   Right L4 and right L5 TFESI #2     Order Specific Question:   Anticoagulant hold needed? Answer:   no     No orders of the defined types were placed in this encounter. Referred By: Surendra Molina MD  History of Present Illness    Bertin Suggs is a 34 y.o. male with over 1 year history of right-sided lower extremity pain. He is unaware of any clear present event denies any trauma or injury. His pain is moderate to severe and interferes with his daily living activities. His pain is nearly constant worse in the morning and evening described as shooting sharp and achy. He has subjective weakness of his lower limbs. Nothing seems to increase or decrease his symptoms. This cotherapy and chiropractic treatment provided no significant relief as did oral steroids. Denies any loss of bowel or bladder function. Is recently hospitalized for pancreatitis. He also is seeing [facility] and spine for unrelated work related injury. I have personally reviewed and/or updated the patient's past medical history, past surgical history, family history, social history, current medications, allergies, and vital signs today. Review of Systems   Constitutional: Negative for fever and unexpected weight change. HENT: Negative for trouble swallowing. Eyes: Negative for visual disturbance. Respiratory: Negative for shortness of breath and wheezing. Cardiovascular: Negative for chest pain and palpitations. Gastrointestinal: Negative for constipation, diarrhea, nausea and vomiting. Endocrine: Negative for cold intolerance, heat intolerance and polydipsia. Genitourinary: Negative for difficulty urinating and frequency. Musculoskeletal: Negative for arthralgias, gait problem, joint swelling and myalgias. Skin: Negative for rash. Neurological: Negative for dizziness, seizures, syncope, weakness and headaches. Hematological: Does not bruise/bleed easily. Psychiatric/Behavioral: Negative for dysphoric mood. All other systems reviewed and are negative.       Patient Active Problem List   Diagnosis   • Bipolar disorder ([facility])   • Tremor   • ADD (attention deficit disorder)   • GERD (gastroesophageal reflux disease)   • Primary localized osteoarthrosis of ankle and foot   • PTSD (post-traumatic stress disorder)   • Scar tissue   • Iron deficiency anemia due to chronic blood loss   • Chronic right-sided low back pain with right-sided sciatica   • Tobacco dependence   • Narcotic dependency, continuous ([facility])   • Primary hypertension   • Daytime somnolence   • Impaired fasting glucose   • Pain of right thigh   • Seasonal allergic rhinitis due to pollen   • Alcohol withdrawal ([facility])   • Nicotine dependence   • Pancreatitis, alcoholic, acute   • Personal history of colonic polyps   • Hypogonadism in male       Past Medical History:   Diagnosis Date   • ADD (attention deficit disorder)    • Arthritis    • Chronic pain    • Depression    • GERD (gastroesophageal reflux disease)    • Heartburn    • Mood disorder ([facility])    • Tremor        Past Surgical History:   Procedure Laterality Date   • CLOSED REDUCTION CALCANEAL FRACTURE     • PERONEAL TENDON EXPLORATION         Family History   Problem Relation Age of Onset   • Parkinsonism Paternal Grandfather    • Heart disease Father    • Hypertension Father    • Breast cancer Mother    • Intellectual disability Sister    • Heart disease Maternal Grandfather    • Substance Abuse Neg Hx    • Mental illness Neg Hx        Social History     Occupational History   • Not on file   Tobacco Use   • Smoking status: Former     Packs/day: 0.25     Years: 15.00     Total pack years: 3.75     Types: Cigarettes, Pipe     Start date: 1999   • Smokeless tobacco: Never   Vaping Use   • Vaping Use: Never used   Substance and Sexual Activity   • Alcohol use: Not Currently     Alcohol/week: 10.0 standard drinks of alcohol     Types: 10 Cans of beer per week     Comment: social   • Drug use: Never   • Sexual activity: Yes     Partners: Female     Birth control/protection: I.U.D.        Current Outpatient Medications on File Prior to Visit   Medication Sig   • acetaminophen (TYLENOL) 325 mg tablet Take 2 tablets (650 mg total) by mouth every 6 (six) hours as needed for mild pain, headaches or fever   • azelastine (ASTELIN) 0.1 % nasal spray 1 spray into each nostril 2 (two) times a day Use in each nostril as directed   • B-D HYPODERMIC NEEDLE 18GX1.5" 18G X 1-1/2" MISC Use as directed---for Testosterone   • B-D SYRINGE LUER-MILAGROS 1CC 1 ML MISC USE FOR BI WEEKLY TESTOSTERONE INJECTIONS   • B-D SYRINGE LUER-MILAGROS 3CC 3 ML MISC USE AS DIRECTED FOR TESTOSTERONE INJECTION   • BD Hypodermic Needle 25G X 1-1/2" MISC USE FOR INJECTING TESTOSTERONE BIWEEKLY   • DEXILANT 60 MG capsule Take 1 capsule by mouth daily   • gabapentin (NEURONTIN) 600 MG tablet Take 600 mg by mouth 2 (two) times a day     • hydrOXYzine HCL (ATARAX) 50 mg tablet Take 50 mg by mouth 2 (two) times a day as needed   • losartan-hydrochlorothiazide (HYZAAR) 50-12.5 mg per tablet Take 1 tablet by mouth daily   • meloxicam (MOBIC) 15 mg tablet Take 1 tablet (15 mg total) by mouth daily   • methylphenidate (CONCERTA) 36 MG ER tablet 1 twice daily    • nicotine (NICODERM CQ) 14 mg/24hr TD 24 hr patch Place 1 patch on the skin over 24 hours every 24 hours   • nicotine polacrilex (COMMIT) 4 MG lozenge Apply 1 lozenge (4 mg total) to the mouth or throat as needed for smoking cessation   • oxyCODONE-acetaminophen (PERCOCET) 5-325 mg per tablet Take 1 tablet by mouth every 6 (six) hours as needed   • OXYCONTIN 10 MG 12 hr tablet Take 10 mg by mouth 2 (two) times a day   • risperiDONE (RisperDAL) 0.5 mg tablet 1/2 tab in the AM   • risperiDONE (RisperDAL) 4 mg tablet 1 at hs   • sildenafil (VIAGRA) 100 mg tablet Take 1 tablet by mouth one (1) hour prior to intercourse on an empty stomach. Limit to 3 encounters per week. • testosterone cypionate (DEPO-TESTOSTERONE) 200 mg/mL SOLN INJECT 1ML EVERY 2 WEEKS. DISCARD VIAL AFTER 30 DAYS FROM OPENING DATE   • zolpidem (AMBIEN) 10 mg tablet Take 10 mg by mouth daily at bedtime as needed for sleep     No current facility-administered medications on file prior to visit. No Known Allergies    Physical Exam    /86 (BP Location: Left arm, Patient Position: Sitting, Cuff Size: Standard)   Pulse 104   Temp 98.6 °F (37 °C)   Ht 6' 3" (1.905 m)   Wt 117 kg (257 lb)   BMI 32.12 kg/m²     Constitutional: normal, well developed, well nourished, alert, in no distress and non-toxic and no overt pain behavior. and obese  Eyes: anicteric  HEENT: grossly intact  Neck: supple, symmetric, trachea midline and no masses   Pulmonary:even and unlabored  Cardiovascular:No edema or pitting edema present  Skin:Normal without rashes or lesions and well hydrated  Psychiatric:Mood and affect appropriate  Neurologic:Cranial Nerves II-XII grossly intact  Musculoskeletal:normal, slight difficulty going from sitting to standing sitting position, no obvious skin lesion within the lumbar sacral spine, no tenderness palpation lumbosacral spine spinous process sacroiliac joint or greater trochanter bilateral, deep tendon reflexes are diminished but symmetrical patella and Achilles, no focal motor deficit appreciated lower limbs, positive straight leg raising on the right negative on the left.     Imaging  MRI LUMBAR SPINE WITHOUT CONTRAST @  6-9-23     INDICATION: Low back pain with right-sided sciatica.     COMPARISON:  None.     TECHNIQUE:  Multiplanar, multisequence imaging of the lumbar spine was performed. .  Imaging performed on 1.5T MRI     IMAGE QUALITY:  Diagnostic     FINDINGS:     VERTEBRAL BODIES:  There are 5 lumbar type vertebral bodies. Normal alignment of the lumbar spine. No spondylolysis or spondylolisthesis. No scoliosis. No compression fracture. Normal marrow signal is identified within the visualized bony   structures. No discrete marrow lesion.     SACRUM:  Normal signal within the sacrum. No evidence of insufficiency or stress fracture.     DISTAL CORD AND CONUS:  Normal size and signal within the distal cord and conus. There is tapering of the thecal sac at L5-S1 secondary to prominent circumferential epidural lipomatosis. The sacral canal primarily demonstrates epidural fat.     PARASPINAL SOFT TISSUES:  Paraspinal soft tissues are unremarkable.     LOWER THORACIC DISC SPACES:  Normal disc height and signal.  No disc herniation, canal stenosis or foraminal narrowing. Small Schmorl's node deformity at the superior T11 endplate level.     LUMBAR DISC SPACES: Relative to the other discs, there is minimal disc desiccation at L4-L5. The disc height is maintained throughout the lumbar spine.     L1-L2:  Normal.     L2-L3:  Normal.     L3-L4:  Normal.     L4-L5: Right foraminal protrusion contacting the exited right L4 nerve root. There is mild right greater than left subarticular recess and right inferior foraminal encroachment. Correlate for right L4 and L5 radiculitis. Spinal canal and left neural   foramina remains patent.     L5-S1:  Normal.     OTHER FINDINGS:  None.     IMPRESSION:     Right L4-5 foraminal protrusion with right inferior foraminal and subarticular recess stenosis. Correlate for right L4 and L5 radiculitis.        I have personally reviewed pertinent films in PACS and my interpretation is Right foraminal disc herniation at L4-5.

## 2023-07-10 ENCOUNTER — HOSPITAL ENCOUNTER (OUTPATIENT)
Dept: RADIOLOGY | Facility: CLINIC | Age: 38
Discharge: HOME | End: 2023-07-10
Admitting: ANESTHESIOLOGY
Payer: COMMERCIAL

## 2023-07-10 VITALS
RESPIRATION RATE: 18 BRPM | TEMPERATURE: 97.7 F | OXYGEN SATURATION: 92 % | SYSTOLIC BLOOD PRESSURE: 101 MMHG | HEART RATE: 61 BPM | DIASTOLIC BLOOD PRESSURE: 63 MMHG

## 2023-07-10 DIAGNOSIS — M51.26 LUMBAR DISC HERNIATION: ICD-10-CM

## 2023-07-10 DIAGNOSIS — M54.16 LUMBAR RADICULOPATHY: ICD-10-CM

## 2023-07-10 DIAGNOSIS — M48.061 LUMBAR FORAMINAL STENOSIS: ICD-10-CM

## 2023-07-10 PROCEDURE — 64484 NJX AA&/STRD TFRM EPI L/S EA: CPT | Performed by: ANESTHESIOLOGY

## 2023-07-10 PROCEDURE — 64483 NJX AA&/STRD TFRM EPI L/S 1: CPT | Performed by: ANESTHESIOLOGY

## 2023-07-10 RX ORDER — PAPAVERINE HCL 150 MG
15 CAPSULE, EXTENDED RELEASE ORAL ONCE
Status: COMPLETED | OUTPATIENT
Start: 2023-07-10 | End: 2023-07-10

## 2023-07-10 RX ADMIN — DEXAMETHASONE SODIUM PHOSPHATE 15 MG: 10 INJECTION, SOLUTION INTRAMUSCULAR; INTRAVENOUS at 14:11

## 2023-07-10 RX ADMIN — IOHEXOL 2 ML: 300 INJECTION, SOLUTION INTRAVENOUS at 14:11

## 2023-07-10 NOTE — DISCHARGE INSTRUCTIONS
Epidural Steroid Injection   WHAT YOU NEED TO KNOW:   An epidural steroid injection (JIMMY) is a procedure to inject steroid medicine into the epidural space. The epidural space is between your spinal cord and vertebrae. Steroids reduce inflammation and fluid buildup in your spine that may be causing pain. You may be given pain medicine along with the steroids. ACTIVITY  Do not drive or operate machinery today. No strenuous activity today - bending, lifting, etc.  You may resume normal activites starting tomorrow - start slowly and as tolerated. You may shower today, but no tub baths or hot tubs. You may have numbness for several hours from the local anesthetic. Please use caution and common sense, especially with weight-bearing activities. CARE OF THE INJECTION SITE  If you have soreness or pain, apply ice to the area today (20 minutes on/20 minutes off). Starting tomorrow, you may use warm, moist heat or ice if needed. You may have an increase or change in your discomfort for 36-48 hours after your treatment. Apply ice and continue with any pain medication you have been prescribed. Notify the [facility] if you have any of the following: redness, drainage, swelling, headache, stiff neck or fever above 100°F.    SPECIAL INSTRUCTIONS  Our office will contact you in approximately 7 days for a progress report. MEDICATIONS  Continue to take all routine medications. Our office may have instructed you to hold some medications. As no general anesthesia was used in today's procedure, you should not experience any side effects related to anesthesia. If you are diabetic, the steroids used in today's injection may temporarily increase your blood sugar levels after the first few days after your injection. Please keep a close eye on your sugars and alert the doctor who manages your diabetes if your sugars are significantly high from your baseline or you are symptomatic.      If you have a problem specifically related to your procedure, please call our office at [phone]. Problems not related to your procedure should be directed to your primary care physician.

## 2023-07-10 NOTE — H&P
History of Present Illness: The patient is a 34 y.o. male who presents with complaints of low back and leg pain.     Past Medical History:   Diagnosis Date   • ADD (attention deficit disorder)    • Arthritis    • Chronic pain    • Depression    • GERD (gastroesophageal reflux disease)    • Heartburn    • Mood disorder (HCC)    • Tremor        Past Surgical History:   Procedure Laterality Date   • CLOSED REDUCTION CALCANEAL FRACTURE     • PERONEAL TENDON EXPLORATION           Current Outpatient Medications:   •  acetaminophen (TYLENOL) 325 mg tablet, Take 2 tablets (650 mg total) by mouth every 6 (six) hours as needed for mild pain, headaches or fever, Disp: , Rfl:   •  azelastine (ASTELIN) 0.1 % nasal spray, 1 spray into each nostril 2 (two) times a day Use in each nostril as directed, Disp: 30 mL, Rfl: 2  •  B-D HYPODERMIC NEEDLE 18GX1.5" 18G X 1-1/2" MISC, Use as directed---for Testosterone, Disp: , Rfl:   •  B-D SYRINGE LUER-MILAGROS 1CC 1 ML MISC, USE FOR BI WEEKLY TESTOSTERONE INJECTIONS, Disp: , Rfl:   •  B-D SYRINGE LUER-MILAGROS 3CC 3 ML MISC, USE AS DIRECTED FOR TESTOSTERONE INJECTION, Disp: , Rfl:   •  BD Hypodermic Needle 25G X 1-1/2" MISC, USE FOR INJECTING TESTOSTERONE BIWEEKLY, Disp: , Rfl:   •  DEXILANT 60 MG capsule, Take 1 capsule by mouth daily, Disp: , Rfl: 3  •  gabapentin (NEURONTIN) 600 MG tablet, Take 600 mg by mouth 2 (two) times a day  , Disp: , Rfl: 0  •  hydrOXYzine HCL (ATARAX) 50 mg tablet, Take 50 mg by mouth 2 (two) times a day as needed, Disp: , Rfl: 1  •  losartan-hydrochlorothiazide (HYZAAR) 50-12.5 mg per tablet, Take 1 tablet by mouth daily, Disp: 90 tablet, Rfl: 3  •  meloxicam (MOBIC) 15 mg tablet, Take 1 tablet (15 mg total) by mouth daily, Disp: 30 tablet, Rfl: 0  •  methylphenidate (CONCERTA) 36 MG ER tablet, 1 twice daily , Disp: , Rfl:   •  nicotine (NICODERM CQ) 14 mg/24hr TD 24 hr patch, Place 1 patch on the skin over 24 hours every 24 hours, Disp: 28 patch, Rfl: 0  •  nicotine polacrilex (COMMIT) 4 MG lozenge, Apply 1 lozenge (4 mg total) to the mouth or throat as needed for smoking cessation, Disp: 120 each, Rfl: 4  •  oxyCODONE-acetaminophen (PERCOCET) 5-325 mg per tablet, Take 1 tablet by mouth every 6 (six) hours as needed, Disp: , Rfl:   •  OXYCONTIN 10 MG 12 hr tablet, Take 10 mg by mouth 2 (two) times a day, Disp: , Rfl: 0  •  risperiDONE (RisperDAL) 0.5 mg tablet, 1/2 tab in the AM, Disp: , Rfl:   •  risperiDONE (RisperDAL) 4 mg tablet, 1 at hs, Disp: , Rfl:   •  sildenafil (VIAGRA) 100 mg tablet, Take 1 tablet by mouth one (1) hour prior to intercourse on an empty stomach. Limit to 3 encounters per week., Disp: 30 tablet, Rfl: 0  •  testosterone cypionate (DEPO-TESTOSTERONE) 200 mg/mL SOLN, INJECT 1ML EVERY 2 WEEKS. DISCARD VIAL AFTER 30 DAYS FROM OPENING DATE, Disp: , Rfl:   •  zolpidem (AMBIEN) 10 mg tablet, Take 10 mg by mouth daily at bedtime as needed for sleep, Disp: , Rfl:     Current Facility-Administered Medications:   •  dexamethasone (PF) (DECADRON) injection 15 mg, 15 mg, Epidural, Once, El Parikh DO  •  iohexol (OMNIPAQUE) 300 mg/mL injection 2 mL, 2 mL, Epidural, Once, El Parikh DO    No Known Allergies    Physical Exam:   Vitals:    07/10/23 1401   BP: 101/66   Pulse: 85   Resp: 18   Temp: 97.7 °F (36.5 °C)   SpO2: 94%     General: Awake, Alert, Oriented x 3, Mood and affect appropriate  Respiratory: Respirations even and unlabored  Cardiovascular: Peripheral pulses intact; no edema  Musculoskeletal Exam: Decreased range of motion lumbar spine    ASA Score: II    Patient/Chart Verification  Patient ID Verified: Verbal  ID Band Applied: No  Consents Confirmed: Procedural  Interval H&P(within 24 hr) Complete (required for Outpatients and Surgery Admit only): To be obtained in the Pre-Procedure area  Allergies Reviewed: Yes  Anticoag/NSAID held?: NA  Currently on antibiotics?: No    Assessment:   1. Lumbar disc herniation    2. Lumbar foraminal stenosis    3. Lumbar radiculopathy - Right        Plan: Right L4 and right L5 TFESI #1

## 2023-07-12 ENCOUNTER — OFFICE VISIT (OUTPATIENT)
Dept: FAMILY MEDICINE CLINIC | Facility: HOSPITAL | Age: 38
End: 2023-07-12
Payer: COMMERCIAL

## 2023-07-12 VITALS
RESPIRATION RATE: 16 BRPM | HEIGHT: 75 IN | BODY MASS INDEX: 33.69 KG/M2 | SYSTOLIC BLOOD PRESSURE: 118 MMHG | WEIGHT: 271 LBS | DIASTOLIC BLOOD PRESSURE: 68 MMHG | HEART RATE: 92 BPM

## 2023-07-12 DIAGNOSIS — K85.20 ALCOHOL-INDUCED ACUTE PANCREATITIS WITHOUT INFECTION OR NECROSIS: Primary | ICD-10-CM

## 2023-07-12 DIAGNOSIS — F17.200 TOBACCO DEPENDENCE: ICD-10-CM

## 2023-07-12 DIAGNOSIS — F98.8 ATTENTION DEFICIT DISORDER, UNSPECIFIED HYPERACTIVITY PRESENCE: ICD-10-CM

## 2023-07-12 DIAGNOSIS — M54.16 LUMBAR RADICULOPATHY: ICD-10-CM

## 2023-07-12 PROCEDURE — 99495 TRANSJ CARE MGMT MOD F2F 14D: CPT | Performed by: INTERNAL MEDICINE

## 2023-07-12 NOTE — PROGRESS NOTES
Assessment/Plan:     Alcohol-induced acute pancreatitis without infection or necrosis  Acute pancreatitis resolved  Advised pt to continue to abstain from alcohol    Lumbar radiculopathy  S/p epidural injection on 7/10  Doesn't feel improved yet  LE strength, gait are normal    ADD (attention deficit disorder)  Pt has ADD is on concerta  He asked me refill his concerta but after reviewing [place] PDMP I saw that pt's psychiatrist at [facility] has been managing concerta and I asked pt to have them refill it    Tobacco dependence  Pt would like to quit smoking  Nicotine patch 14mg made him dizzy  Will try the 7 mg patch       Diagnoses and all orders for this visit:    Alcohol-induced acute pancreatitis without infection or necrosis    Tobacco dependence  -     nicotine (NICODERM CQ) 7 mg/24hr TD 24 hr patch; Place 1 patch on the skin over 24 hours every 24 hours    Lumbar radiculopathy    Attention deficit disorder, unspecified hyperactivity presence         Subjective:     Patient ID: Abe Menjivar is a 34 y.o. male. Pt present for laura management, was d/c-d on 6/30 after hospital stay for acute pancreatitis due to alcohol. Ct abd showed no gallstones, triglicerides were <066    Pt's has abstained from alcohol since d/c, denies abdominal pain, nausea          Review of Systems   Constitutional: Negative for fever. Respiratory: Negative for cough and shortness of breath. Cardiovascular: Negative for chest pain. Gastrointestinal: Negative for abdominal pain and blood in stool. Genitourinary: Negative for difficulty urinating. Psychiatric/Behavioral: Negative for dysphoric mood. Objective:     Physical Exam  Constitutional:       General: He is not in acute distress. Appearance: He is not toxic-appearing. HENT:      Head: Normocephalic. Cardiovascular:      Rate and Rhythm: Normal rate and regular rhythm. Heart sounds: No murmur heard. Pulmonary:      Effort: No respiratory distress. Breath sounds: No wheezing or rales. Abdominal:      General: Bowel sounds are normal. There is no distension. Palpations: Abdomen is soft. Tenderness: There is no abdominal tenderness. Skin:     General: Skin is warm and dry. Comments: Has no LE edema     Neurological:      Mental Status: He is alert and oriented to person, place, and time. Cranial Nerves: No cranial nerve deficit. Sensory: No sensory deficit (LE sensation to light touch is normal). Motor: No weakness (LE strength 5/5 =). Gait: Gait normal.   Psychiatric:         Mood and Affect: Mood normal.         Thought Content: Thought content normal.           Vitals:    07/12/23 1500   BP: 118/68   Pulse: 92   Resp: 16   Weight: 123 kg (271 lb)   Height: 6' 3" (1.905 m)       Transitional Care Management Review:  Miguel Deshpande is a 34 y.o. male here for TCM follow up. During the TCM phone call patient stated:    TCM Call     Date and time call was made  7/3/2023 11:56 AM    Patient was hospitialized at  [facility]    Date of Admission  06/29/23    Date of discharge  06/30/23    Diagnosis  admitting dx--Pancreatitis, alcoholic, acute,  d/c dx--active problems. Disposition  Home    Were the patients medications reviewed and updated  Yes      TCM Call     I have advised the patient to call PCP with any new or worsening symptoms  Marcus Clark MA [facility], [address]    Comments  Spoke to pt. Doing good, but did admit to some issues with his b/p in the hosp. He has cuff at home and will monitor it. i told him to call us if he is having any issues before he comes in for f/u. Sent to front to schedule TCM.   Alejandro Martell MD

## 2023-07-12 NOTE — ASSESSMENT & PLAN NOTE
Pt has ADD is on concerta  He asked me refill his concerta but after [place] I saw that pt's psychiatrist at [facility] has been managing concerta and I asked pt to have them refill it

## 2023-07-17 ENCOUNTER — TELEPHONE (OUTPATIENT)
Dept: PAIN MEDICINE | Facility: CLINIC | Age: 38
End: 2023-07-17

## 2023-07-22 ENCOUNTER — APPOINTMENT (OUTPATIENT)
Dept: LAB | Facility: HOSPITAL | Age: 38
End: 2023-07-22
Payer: COMMERCIAL

## 2023-07-22 DIAGNOSIS — K85.20 ALCOHOL-INDUCED ACUTE PANCREATITIS WITHOUT INFECTION OR NECROSIS: ICD-10-CM

## 2023-07-22 LAB
ALBUMIN SERPL BCP-MCNC: 4.1 G/DL (ref 3.5–5)
ALP SERPL-CCNC: 41 U/L (ref 34–104)
ALT SERPL W P-5'-P-CCNC: 13 U/L (ref 7–52)
AST SERPL W P-5'-P-CCNC: 11 U/L (ref 13–39)
BILIRUB DIRECT SERPL-MCNC: 0.06 MG/DL (ref 0–0.2)
BILIRUB SERPL-MCNC: 0.57 MG/DL (ref 0.2–1)
LIPASE SERPL-CCNC: 41 U/L (ref 11–82)
PROT SERPL-MCNC: 7 G/DL (ref 6.4–8.4)

## 2023-07-22 PROCEDURE — 80076 HEPATIC FUNCTION PANEL: CPT

## 2023-07-22 PROCEDURE — 36415 COLL VENOUS BLD VENIPUNCTURE: CPT

## 2023-07-22 PROCEDURE — 83690 ASSAY OF LIPASE: CPT

## 2023-07-27 ENCOUNTER — TELEPHONE (OUTPATIENT)
Dept: PAIN MEDICINE | Facility: CLINIC | Age: 38
End: 2023-07-27

## 2023-07-28 ENCOUNTER — HOSPITAL ENCOUNTER (OUTPATIENT)
Dept: RADIOLOGY | Facility: CLINIC | Age: 38
Discharge: HOME | End: 2023-07-28
Payer: COMMERCIAL

## 2023-07-28 VITALS
OXYGEN SATURATION: 93 % | HEART RATE: 88 BPM | DIASTOLIC BLOOD PRESSURE: 70 MMHG | RESPIRATION RATE: 18 BRPM | TEMPERATURE: 97.9 F | SYSTOLIC BLOOD PRESSURE: 102 MMHG

## 2023-07-28 DIAGNOSIS — M51.26 LUMBAR DISC HERNIATION: ICD-10-CM

## 2023-07-28 DIAGNOSIS — M48.061 LUMBAR FORAMINAL STENOSIS: ICD-10-CM

## 2023-07-28 DIAGNOSIS — M54.16 LUMBAR RADICULOPATHY: ICD-10-CM

## 2023-07-28 PROCEDURE — 64483 NJX AA&/STRD TFRM EPI L/S 1: CPT | Performed by: ANESTHESIOLOGY

## 2023-07-28 PROCEDURE — 64484 NJX AA&/STRD TFRM EPI L/S EA: CPT | Performed by: ANESTHESIOLOGY

## 2023-07-28 RX ORDER — PAPAVERINE HCL 150 MG
15 CAPSULE, EXTENDED RELEASE ORAL ONCE
Status: COMPLETED | OUTPATIENT
Start: 2023-07-28 | End: 2023-07-28

## 2023-07-28 RX ADMIN — DEXAMETHASONE SODIUM PHOSPHATE 15 MG: 10 INJECTION, SOLUTION INTRAMUSCULAR; INTRAVENOUS at 10:54

## 2023-07-28 RX ADMIN — IOHEXOL 2 ML: 300 INJECTION, SOLUTION INTRAVENOUS at 10:53

## 2023-07-28 NOTE — DISCHARGE INSTRUCTIONS
Epidural Steroid Injection   WHAT YOU NEED TO KNOW:   An epidural steroid injection (JIMMY) is a procedure to inject steroid medicine into the epidural space. The epidural space is between your spinal cord and vertebrae. Steroids reduce inflammation and fluid buildup in your spine that may be causing pain. You may be given pain medicine along with the steroids. ACTIVITY  Do not drive or operate machinery today. No strenuous activity today - bending, lifting, etc.  You may resume normal activites starting tomorrow - start slowly and as tolerated. You may shower today, but no tub baths or hot tubs. You may have numbness for several hours from the local anesthetic. Please use caution and common sense, especially with weight-bearing activities. CARE OF THE INJECTION SITE  If you have soreness or pain, apply ice to the area today (20 minutes on/20 minutes off). Starting tomorrow, you may use warm, moist heat or ice if needed. You may have an increase or change in your discomfort for 36-48 hours after your treatment. Apply ice and continue with any pain medication you have been prescribed. Notify the [facility] if you have any of the following: redness, drainage, swelling, headache, stiff neck or fever above 100°F.    SPECIAL INSTRUCTIONS  Our office will contact you in approximately 7 days for a progress report. MEDICATIONS  Continue to take all routine medications. Our office may have instructed you to hold some medications. As no general anesthesia was used in today's procedure, you should not experience any side effects related to anesthesia. If you are diabetic, the steroids used in today's injection may temporarily increase your blood sugar levels after the first few days after your injection. Please keep a close eye on your sugars and alert the doctor who manages your diabetes if your sugars are significantly high from your baseline or you are symptomatic.      If you have a problem specifically related to your procedure, please call our office at [phone]. Problems not related to your procedure should be directed to your primary care physician.

## 2023-07-28 NOTE — H&P
History of Present Illness: The patient is a 34 y.o. male who presents with complaints of decreased range of motion lumbar spine with low back and leg pain.     Past Medical History:   Diagnosis Date   • ADD (attention deficit disorder)    • Arthritis    • Chronic pain    • Depression    • GERD (gastroesophageal reflux disease)    • Heartburn    • Mood disorder (HCC)    • Tremor        Past Surgical History:   Procedure Laterality Date   • CLOSED REDUCTION CALCANEAL FRACTURE     • PERONEAL TENDON EXPLORATION           Current Outpatient Medications:   •  acetaminophen (TYLENOL) 325 mg tablet, Take 2 tablets (650 mg total) by mouth every 6 (six) hours as needed for mild pain, headaches or fever, Disp: , Rfl:   •  azelastine (ASTELIN) 0.1 % nasal spray, 1 spray into each nostril 2 (two) times a day Use in each nostril as directed, Disp: 30 mL, Rfl: 2  •  B-D HYPODERMIC NEEDLE 18GX1.5" 18G X 1-1/2" MISC, Use as directed---for Testosterone, Disp: , Rfl:   •  B-D SYRINGE LUER-MILAGROS 1CC 1 ML MISC, USE FOR BI WEEKLY TESTOSTERONE INJECTIONS, Disp: , Rfl:   •  B-D SYRINGE LUER-MILAGROS 3CC 3 ML MISC, USE AS DIRECTED FOR TESTOSTERONE INJECTION, Disp: , Rfl:   •  BD Hypodermic Needle 25G X 1-1/2" MISC, USE FOR INJECTING TESTOSTERONE BIWEEKLY, Disp: , Rfl:   •  DEXILANT 60 MG capsule, Take 1 capsule by mouth daily, Disp: , Rfl: 3  •  gabapentin (NEURONTIN) 600 MG tablet, Take 600 mg by mouth 2 (two) times a day  , Disp: , Rfl: 0  •  hydrOXYzine HCL (ATARAX) 50 mg tablet, Take 50 mg by mouth 2 (two) times a day as needed, Disp: , Rfl: 1  •  losartan-hydrochlorothiazide (HYZAAR) 50-12.5 mg per tablet, Take 1 tablet by mouth daily, Disp: 90 tablet, Rfl: 3  •  methylphenidate (CONCERTA) 36 MG ER tablet, 1 twice daily , Disp: , Rfl:   •  nicotine (NICODERM CQ) 7 mg/24hr TD 24 hr patch, Place 1 patch on the skin over 24 hours every 24 hours, Disp: 90 patch, Rfl: 1  •  oxyCODONE-acetaminophen (PERCOCET) 5-325 mg per tablet, Take 1 tablet by mouth every 6 (six) hours as needed, Disp: , Rfl:   •  OXYCONTIN 10 MG 12 hr tablet, Take 10 mg by mouth 2 (two) times a day, Disp: , Rfl: 0  •  risperiDONE (RisperDAL) 0.5 mg tablet, 1/2 tab in the AM, Disp: , Rfl:   •  risperiDONE (RisperDAL) 4 mg tablet, 1 at hs, Disp: , Rfl:   •  sildenafil (VIAGRA) 100 mg tablet, Take 1 tablet by mouth one (1) hour prior to intercourse on an empty stomach. Limit to 3 encounters per week., Disp: 30 tablet, Rfl: 0  •  testosterone cypionate (DEPO-TESTOSTERONE) 200 mg/mL SOLN, INJECT 1ML EVERY 2 WEEKS. DISCARD VIAL AFTER 30 DAYS FROM OPENING DATE, Disp: , Rfl:   •  zolpidem (AMBIEN) 10 mg tablet, Take 10 mg by mouth daily at bedtime as needed for sleep, Disp: , Rfl:     Current Facility-Administered Medications:   •  dexamethasone (PF) (DECADRON) injection 15 mg, 15 mg, Epidural, Once, El Parikh DO  •  iohexol (OMNIPAQUE) 300 mg/mL injection 2 mL, 2 mL, Epidural, Once, El Parikh DO    No Known Allergies    Physical Exam:   Vitals:    07/28/23 1040   BP: 108/70   Pulse: (!) 118   Resp: 18   Temp: 97.9 °F (36.6 °C)   SpO2: 94%     General: Awake, Alert, Oriented x 3, Mood and affect appropriate  Respiratory: Respirations even and unlabored  Cardiovascular: Peripheral pulses intact; no edema  Musculoskeletal Exam: Normal gait    ASA Score: III         Assessment:   1. Lumbar disc herniation    2. Lumbar foraminal stenosis    3.  Lumbar radiculopathy - Right        Plan: Right L4 and right L5 TFESI #2

## 2023-08-04 ENCOUNTER — TELEPHONE (OUTPATIENT)
Dept: PAIN MEDICINE | Facility: CLINIC | Age: 38
End: 2023-08-04

## 2023-08-04 NOTE — TELEPHONE ENCOUNTER
Pt reports 10% improvement post inj   Pain level 3/10  Pt aware I will call next week for an update    RT L4 and L5 TFESI 7/28 , F/u 8/21

## 2023-08-21 ENCOUNTER — OFFICE VISIT (OUTPATIENT)
Dept: PAIN MEDICINE | Facility: CLINIC | Age: 38
End: 2023-08-21
Payer: COMMERCIAL

## 2023-08-21 VITALS
SYSTOLIC BLOOD PRESSURE: 128 MMHG | BODY MASS INDEX: 34.32 KG/M2 | HEIGHT: 75 IN | HEART RATE: 89 BPM | DIASTOLIC BLOOD PRESSURE: 82 MMHG | WEIGHT: 276 LBS | TEMPERATURE: 98.2 F

## 2023-08-21 DIAGNOSIS — M51.26 LUMBAR DISC HERNIATION: ICD-10-CM

## 2023-08-21 DIAGNOSIS — M54.16 RIGHT LUMBAR RADICULITIS: Primary | ICD-10-CM

## 2023-08-21 PROCEDURE — 99214 OFFICE O/P EST MOD 30 MIN: CPT | Performed by: ANESTHESIOLOGY

## 2023-08-21 NOTE — PROGRESS NOTES
Assessment  1. Right lumbar radiculitis    2. Lumbar disc herniation        Plan    Emi Smith is a pleasant 25-year-old gentleman who is undergone physical therapy 2 epidural steroid injections as well as been on neuropathic agents. Pain still significant interferes with daily living activities and thus I believe referral to neurosurgery would be warranted. Emi Smith is in agreement with the plan. We will order lumbar flexion-extension radiographs check for stability. He will continue with his current medication management per outside physician. My impressions and treatment recommendations were discussed in detail with the patient who verbalized understanding and had no further questions. Discharge instructions were provided. I personally saw and examined the patient and I agree with the above discussed plan of care. This note is created using dictation transcription. It may contain typographical errors, grammatical errors, improperly dictated words, background noise and other errors. Orders Placed This Encounter   Procedures   • XR spine lumbar complete w bending minimum 6 views     Standing Status:   Future     Standing Expiration Date:   8/21/2027     Scheduling Instructions:      Bring along any outside films relating to this procedure. • Ambulatory referral to Neurosurgery     Standing Status:   Future     Standing Expiration Date:   8/21/2024     Referral Priority:   ASAP     Referral Type:   Consult - AMB     Referral Reason:   Specialty Services Required     Referred to Provider:   Stephanie Clements MD     Requested Specialty:   Neurosurgery     Number of Visits Requested:   1     Expiration Date:   8/21/2024     No orders of the defined types were placed in this encounter. History of Present Illness    Jeb Cintron is a 34 y.o. male presents in follow-up from lumbar transforaminal epidural steroid injection from July 28, 2023.   His pain is persistent down the posterior aspect of his right leg. He had 2 weeks of relief but his pain returned. He sees an outside physician and is prescribed oxycodone and gabapentin but his pain is still significant interfering with his daily living activities worse with standing and walking somewhat relieved with rest relaxation. The pain is constant described as burning and shooting. He denies any loss of bowel or bladder function. I have personally reviewed and/or updated the patient's past medical history, past surgical history, family history, social history, current medications, allergies, and vital signs today. Review of Systems   Constitutional: Negative. HENT: Negative. Eyes: Negative. Respiratory: Negative. Cardiovascular: Negative. Gastrointestinal: Negative. Endocrine: Negative. Genitourinary: Negative. Musculoskeletal: Positive for back pain. Skin: Negative. Allergic/Immunologic: Negative. Neurological: Positive for numbness. Hematological: Negative. All other systems reviewed and are negative.       Patient Active Problem List   Diagnosis   • Bipolar disorder ([address])   • Tremor   • ADD (attention deficit disorder)   • GERD (gastroesophageal reflux disease)   • Primary localized osteoarthrosis of ankle and foot   • PTSD (post-traumatic stress disorder)   • Scar tissue   • Iron deficiency anemia due to chronic blood loss   • Chronic right-sided low back pain with right-sided sciatica   • Tobacco dependence   • Narcotic dependency, continuous (HCC)   • Primary hypertension   • Daytime somnolence   • Impaired fasting glucose   • Pain of right thigh   • Seasonal allergic rhinitis due to pollen   • Alcohol withdrawal (HCC)   • Nicotine dependence   • Alcohol-induced acute pancreatitis without infection or necrosis   • Personal history of colonic polyps   • Hypogonadism in male   • Lumbar disc herniation   • Lumbar foraminal stenosis   • Lumbar radiculopathy       Past Medical History:   Diagnosis Date   • ADD (attention deficit disorder)    • Arthritis    • Chronic pain    • Depression    • GERD (gastroesophageal reflux disease)    • Heartburn    • Mood disorder (HCC)    • Tremor        Past Surgical History:   Procedure Laterality Date   • CLOSED REDUCTION CALCANEAL FRACTURE     • PERONEAL TENDON EXPLORATION         Family History   Problem Relation Age of Onset   • Parkinsonism Paternal Grandfather    • Heart disease Father    • Hypertension Father    • Breast cancer Mother    • Intellectual disability Sister    • Heart disease Maternal Grandfather    • Substance Abuse Neg Hx    • Mental illness Neg Hx        Social History     Occupational History   • Not on file   Tobacco Use   • Smoking status: Former     Packs/day: 0.25     Years: 15.00     Total pack years: 3.75     Types: Cigarettes, Pipe     Start date: 1999   • Smokeless tobacco: Never   Vaping Use   • Vaping Use: Never used   Substance and Sexual Activity   • Alcohol use: Not Currently     Alcohol/week: 10.0 standard drinks of alcohol     Types: 10 Cans of beer per week     Comment: social   • Drug use: Never   • Sexual activity: Yes     Partners: Female     Birth control/protection: I.U.D.        Current Outpatient Medications on File Prior to Visit   Medication Sig   • acetaminophen (TYLENOL) 325 mg tablet Take 2 tablets (650 mg total) by mouth every 6 (six) hours as needed for mild pain, headaches or fever   • azelastine (ASTELIN) 0.1 % nasal spray 1 spray into each nostril 2 (two) times a day Use in each nostril as directed   • B-D HYPODERMIC NEEDLE 18GX1.5" 18G X 1-1/2" MISC Use as directed---for Testosterone   • B-D SYRINGE LUER-MILAGROS 1CC 1 ML MISC USE FOR BI WEEKLY TESTOSTERONE INJECTIONS   • B-D SYRINGE LUER-MILAGROS 3CC 3 ML MISC USE AS DIRECTED FOR TESTOSTERONE INJECTION   • BD Hypodermic Needle 25G X 1-1/2" MISC USE FOR INJECTING TESTOSTERONE BIWEEKLY   • DEXILANT 60 MG capsule Take 1 capsule by mouth daily   • gabapentin (NEURONTIN) 600 MG tablet Take 600 mg by mouth 2 (two) times a day     • hydrOXYzine HCL (ATARAX) 50 mg tablet Take 50 mg by mouth 2 (two) times a day as needed   • losartan-hydrochlorothiazide (HYZAAR) 50-12.5 mg per tablet Take 1 tablet by mouth daily   • methylphenidate (CONCERTA) 36 MG ER tablet 1 twice daily    • nicotine (NICODERM CQ) 7 mg/24hr TD 24 hr patch Place 1 patch on the skin over 24 hours every 24 hours   • oxyCODONE-acetaminophen (PERCOCET) 5-325 mg per tablet Take 1 tablet by mouth every 6 (six) hours as needed   • OXYCONTIN 10 MG 12 hr tablet Take 10 mg by mouth 2 (two) times a day   • risperiDONE (RisperDAL) 0.5 mg tablet 1/2 tab in the AM   • risperiDONE (RisperDAL) 4 mg tablet 1 at hs   • sildenafil (VIAGRA) 100 mg tablet Take 1 tablet by mouth one (1) hour prior to intercourse on an empty stomach. Limit to 3 encounters per week. • testosterone cypionate (DEPO-TESTOSTERONE) 200 mg/mL SOLN INJECT 1ML EVERY 2 WEEKS. DISCARD VIAL AFTER 30 DAYS FROM OPENING DATE   • zolpidem (AMBIEN) 10 mg tablet Take 10 mg by mouth daily at bedtime as needed for sleep     No current facility-administered medications on file prior to visit. No Known Allergies    Physical Exam    /82 (BP Location: Left arm, Patient Position: Sitting, Cuff Size: Standard)   Pulse 89   Temp 98.2 °F (36.8 °C)   Ht 6' 3" (1.905 m)   Wt 125 kg (276 lb)   BMI 34.50 kg/m²     Constitutional: normal, well developed, well nourished, alert, in no distress and non-toxic and no overt pain behavior.  and obese  Eyes: anicteric  HEENT: grossly intact  Neck: supple, symmetric, trachea midline and no masses   Pulmonary:even and unlabored  Cardiovascular:No edema or pitting edema present  Skin:Normal without rashes or lesions and well hydrated  Psychiatric:Mood and affect appropriate  Neurologic:Cranial Nerves II-XII grossly intact  Musculoskeletal:normal, difficulty going from sitting to standing sitting position; no obvious skin lesions or erythema lumbar sacral spine; mild tenderness in lumbar paravertebrals, no sacroiliac or greater trochanteric tenderness bilateral; deep tendon reflexes are diminished but symmetrical bilateral patellar and achilles; no focal motor deficit appreciated lower limbs; positive straight leg raising on the right. Imaging  MRI LUMBAR SPINE WITHOUT CONTRAST @  6-9-23      INDICATION: Low back pain with right-sided sciatica.     COMPARISON:  None.     TECHNIQUE:  Multiplanar, multisequence imaging of the lumbar spine was performed. .  Imaging performed on 1.5T MRI     IMAGE QUALITY:  Diagnostic     FINDINGS:     VERTEBRAL BODIES:  There are 5 lumbar type vertebral bodies. Normal alignment of the lumbar spine. No spondylolysis or spondylolisthesis. No scoliosis. No compression fracture. Normal marrow signal is identified within the visualized bony   structures. No discrete marrow lesion.     SACRUM:  Normal signal within the sacrum. No evidence of insufficiency or stress fracture.     DISTAL CORD AND CONUS:  Normal size and signal within the distal cord and conus. There is tapering of the thecal sac at L5-S1 secondary to prominent circumferential epidural lipomatosis. The sacral canal primarily demonstrates epidural fat.     PARASPINAL SOFT TISSUES:  Paraspinal soft tissues are unremarkable.     LOWER THORACIC DISC SPACES:  Normal disc height and signal.  No disc herniation, canal stenosis or foraminal narrowing. Small Schmorl's node deformity at the superior T11 endplate level.     LUMBAR DISC SPACES: Relative to the other discs, there is minimal disc desiccation at L4-L5. The disc height is maintained throughout the lumbar spine.     L1-L2:  Normal.     L2-L3:  Normal.     L3-L4:  Normal.     L4-L5: Right foraminal protrusion contacting the exited right L4 nerve root. There is mild right greater than left subarticular recess and right inferior foraminal encroachment. Correlate for right L4 and L5 radiculitis.  Spinal canal and left neural   foramina remains patent.     L5-S1:  Normal.     OTHER FINDINGS:  None.     IMPRESSION:     Right L4-5 foraminal protrusion with right inferior foraminal and subarticular recess stenosis. Correlate for right L4 and L5 radiculitis. I have personally reviewed pertinent films in PACS and my interpretation is Right foraminal disc protrusion.

## 2023-08-22 ENCOUNTER — TELEPHONE (OUTPATIENT)
Dept: NEUROSURGERY | Facility: CLINIC | Age: 38
End: 2023-08-22

## 2023-08-22 NOTE — TELEPHONE ENCOUNTER
NP referred to Dr Slade, intake completed and pt appointment made for Wed 9/6 at 10 am location [place] , info confirmed with pt via phone

## 2023-08-23 ENCOUNTER — HOSPITAL ENCOUNTER (OUTPATIENT)
Dept: RADIOLOGY | Facility: HOSPITAL | Age: 38
Discharge: HOME | End: 2023-08-23
Payer: COMMERCIAL

## 2023-08-23 DIAGNOSIS — M54.16 RIGHT LUMBAR RADICULITIS: ICD-10-CM

## 2023-08-23 PROCEDURE — 72114 X-RAY EXAM L-S SPINE BENDING: CPT

## 2023-09-06 ENCOUNTER — CONSULT (OUTPATIENT)
Dept: NEUROSURGERY | Facility: CLINIC | Age: 38
End: 2023-09-06
Payer: COMMERCIAL

## 2023-09-06 VITALS
SYSTOLIC BLOOD PRESSURE: 163 MMHG | TEMPERATURE: 98.3 F | HEART RATE: 92 BPM | BODY MASS INDEX: 34.07 KG/M2 | WEIGHT: 274 LBS | DIASTOLIC BLOOD PRESSURE: 75 MMHG | HEIGHT: 75 IN

## 2023-09-06 DIAGNOSIS — M51.26 LUMBAR DISC HERNIATION: ICD-10-CM

## 2023-09-06 DIAGNOSIS — M54.16 RIGHT LUMBAR RADICULITIS: ICD-10-CM

## 2023-09-06 PROCEDURE — 99245 OFF/OP CONSLTJ NEW/EST HI 55: CPT | Performed by: STUDENT IN AN ORGANIZED HEALTH CARE EDUCATION/TRAINING PROGRAM

## 2023-09-06 RX ORDER — METHYLPREDNISOLONE 4 MG/1
TABLET ORAL
Qty: 1 EACH | Refills: 0 | Status: SHIPPED | OUTPATIENT
Start: 2023-09-06

## 2023-09-06 NOTE — PROGRESS NOTES
Assessment/Plan:    Mr. Giovani Marley is a 67-year-old male with a history of hypertension, smoking, history of bilateral foot trauma requiring surgery for repair, who presents to clinic for evaluation of low back pain and right lower extremity pain in approximately the L5 dermatome for 1.5 years. He has tried prescription pain medication, gabapentin, physical therapy, and 2 epidural steroid injections and has failed to find adequate relief. I offered him a right-sided L4-5 microdiscectomy for treatment. He stated he would like to talk more with his wife regarding the surgery. I gave him another prescription for Medrol Dosepak to try to see if it would help with pain relief, and he will follow-up with us in the future if he would like to proceed with surgery. I spent 60 minutes with face-to-face time with the patient, obtaining HPI, physical exam, reviewing the imaging, and discussing the risks and benefits of surgery. Subjective:     HPI  Mr. Giovani Marley is a 67-year-old male with a history of hypertension, smoking, history of bilateral foot trauma requiring surgery for repair, who presents to clinic for evaluation of low back pain and right lower extremity pain. The pain begins in the right lower back, and radiates to the right buttock, right posterior lateral thigh, right lateral and anterior leg. The pain has been ongoing for 1.5 years. It has been getting progressively worse. The pain is currently a 4-5 out of 10 in severity, however it is 10 out of 10 at its worst.  Sitting for prolonged periods of time and activity make it worse. He has tried Percocet and gabapentin, physical therapy, and 2 epidural steroid injections. His last JIMMY gave him approximately 1 to 2 hours of relief. He had an MRI lumbar spine that was performed which revealed a right-sided L4-5 herniated disc. He presents to clinic for further evaluation and possible surgical management.   He denies any numbness, weakness, urinary or bowel dysfunction. Review of Systems   Constitutional: Negative. HENT: Negative. Eyes: Negative. Respiratory: Negative. Cardiovascular: Negative. Gastrointestinal: Negative. Endocrine: Negative. Genitourinary: Negative. Musculoskeletal: Positive for back pain (right low back into the leg thigh area). Negative for gait problem. PT: yes, without relief. JIMMY: yes, 1-2 day relief. No prior back sx. Skin: Negative. Allergic/Immunologic: Negative. Neurological: Negative for weakness and numbness. Hematological: Negative. Psychiatric/Behavioral: Negative. All other systems reviewed and are negative. Objective:    Imaging:  I reviewed his MRI lumbar spine which showed a right-sided L4-5 herniated disc causing right L4-5 foraminal stenosis as well as compression of the traversing right L5 nerve. I reviewed his x-ray lumbar spine flexion-extension films which did not reveal any evidence of instability.     /75 (BP Location: Right arm, Patient Position: Sitting, Cuff Size: Standard)   Pulse 92   Temp 98.3 °F (36.8 °C) (Temporal)   Ht 6' 3" (1.905 m)   Wt 124 kg (274 lb)   BMI 34.25 kg/m²     Past Medical History:   Diagnosis Date   • ADD (attention deficit disorder)    • Arthritis    • Chronic pain    • Depression    • GERD (gastroesophageal reflux disease)    • Heartburn    • Mood disorder (HCC)    • Tremor      Past Surgical History:   Procedure Laterality Date   • CLOSED REDUCTION CALCANEAL FRACTURE     • PERONEAL TENDON EXPLORATION         Current Outpatient Medications:   •  acetaminophen (TYLENOL) 325 mg tablet, Take 2 tablets (650 mg total) by mouth every 6 (six) hours as needed for mild pain, headaches or fever, Disp: , Rfl:   •  azelastine (ASTELIN) 0.1 % nasal spray, 1 spray into each nostril 2 (two) times a day Use in each nostril as directed, Disp: 30 mL, Rfl: 2  •  B-D HYPODERMIC NEEDLE 18GX1.5" 18G X 1-1/2" MISC, Use as directed---for Testosterone, Disp: , Rfl:   •  B-D SYRINGE LUER-MILAGROS 1CC 1 ML MISC, USE FOR BI WEEKLY TESTOSTERONE INJECTIONS, Disp: , Rfl:   •  B-D SYRINGE LUER-MILAGROS 3CC 3 ML MISC, USE AS DIRECTED FOR TESTOSTERONE INJECTION, Disp: , Rfl:   •  BD Hypodermic Needle 25G X 1-1/2" MISC, USE FOR INJECTING TESTOSTERONE BIWEEKLY, Disp: , Rfl:   •  DEXILANT 60 MG capsule, Take 1 capsule by mouth daily, Disp: , Rfl: 3  •  gabapentin (NEURONTIN) 600 MG tablet, Take 600 mg by mouth 2 (two) times a day  , Disp: , Rfl: 0  •  hydrOXYzine HCL (ATARAX) 50 mg tablet, Take 50 mg by mouth 2 (two) times a day as needed, Disp: , Rfl: 1  •  losartan-hydrochlorothiazide (HYZAAR) 50-12.5 mg per tablet, Take 1 tablet by mouth daily, Disp: 90 tablet, Rfl: 3  •  methylphenidate (CONCERTA) 36 MG ER tablet, 1 twice daily , Disp: , Rfl:   •  nicotine (NICODERM CQ) 7 mg/24hr TD 24 hr patch, Place 1 patch on the skin over 24 hours every 24 hours, Disp: 90 patch, Rfl: 1  •  oxyCODONE-acetaminophen (PERCOCET) 5-325 mg per tablet, Take 1 tablet by mouth every 6 (six) hours as needed, Disp: , Rfl:   •  OXYCONTIN 10 MG 12 hr tablet, Take 10 mg by mouth 2 (two) times a day, Disp: , Rfl: 0  •  risperiDONE (RisperDAL) 0.5 mg tablet, 1/2 tab in the AM, Disp: , Rfl:   •  risperiDONE (RisperDAL) 4 mg tablet, 1 at hs, Disp: , Rfl:   •  sildenafil (VIAGRA) 100 mg tablet, Take 1 tablet by mouth one (1) hour prior to intercourse on an empty stomach. Limit to 3 encounters per week., Disp: 30 tablet, Rfl: 0  •  testosterone cypionate (DEPO-TESTOSTERONE) 200 mg/mL SOLN, INJECT 1ML EVERY 2 WEEKS.  DISCARD VIAL AFTER 30 DAYS FROM OPENING DATE, Disp: , Rfl:   •  zolpidem (AMBIEN) 10 mg tablet, Take 10 mg by mouth daily at bedtime as needed for sleep, Disp: , Rfl:        Physical Exam    A&OX3  Gaze conjugate  EOMI  FS  TM  Fcx4  5/5 BUE  5/5 BLE  SILT

## 2023-10-06 ENCOUNTER — TELEPHONE (OUTPATIENT)
Dept: PAIN MEDICINE | Facility: CLINIC | Age: 38
End: 2023-10-06

## 2023-10-06 NOTE — TELEPHONE ENCOUNTER
----- Message from Edith Robb DO sent at 10/6/2023  1:42 PM EDT -----  Returning My chart message, ok to call    Unfortunately I almost do not believe that he would any additional benefit from injections as it appears he is obtained no long-term relief. We could try a third 1 but I believe there is a low chance of long-term benefit.   Just want to be honest.

## 2023-10-12 ENCOUNTER — AMB VIDEO VISIT (OUTPATIENT)
Dept: OTHER | Facility: HOSPITAL | Age: 38
End: 2023-10-12
Payer: COMMERCIAL

## 2023-10-12 DIAGNOSIS — J06.9 UPPER RESPIRATORY TRACT INFECTION, UNSPECIFIED TYPE: Primary | ICD-10-CM

## 2023-10-12 PROCEDURE — 99212 OFFICE O/P EST SF 10 MIN: CPT | Performed by: NURSE PRACTITIONER

## 2023-10-12 RX ORDER — AZITHROMYCIN 250 MG/1
TABLET, FILM COATED ORAL
Qty: 6 TABLET | Refills: 0 | Status: SHIPPED | OUTPATIENT
Start: 2023-10-12 | End: 2023-10-16

## 2023-10-12 NOTE — PROGRESS NOTES
Video Visit - Sharmin [place]ing 34 y.o. male MRN: [id number]    REQUIRED DOCUMENTATION:         1. This service was provided via [employer]. 2. Provider located at [address] [place] [address] Road  [phone].  3. [facility] provider: Lukasz Lopez, [address] [facility]. 4. Identify all parties in room with patient during [facility] visit:  Patient   5. After connecting through televideo, patient was identified by name and date of birth. Patient was then informed that this was a Telemedicine visit and that the exam was being conducted confidentially over secure lines. My office door was closed. No one else was in the room. Patient acknowledged consent and understanding of privacy and security of the Telemedicine visit. I informed the patient that I have reviewed their record in Epic and presented the opportunity for them to ask any questions regarding the visit today. The patient agreed to participate. This is a 34year old male here today for video visit. He started with cough and congestion for 4 days ago. He denies any fevers. He denies any body aches or chills. He did not test for covid. He is vaccinated for covid. He states the he is coughing up phelgm. He states he is having greenish yellow mucous. He denies and sob or chest.  He has not been taking any thing OCT. He is concerned as he is leaving for a work conference tomorrow. Review of Systems   Constitutional:  Negative for activity change, chills and fatigue. HENT:  Positive for congestion and rhinorrhea. Respiratory:  Positive for cough. Negative for shortness of breath and wheezing. Cardiovascular:  Negative for chest pain. Neurological: Negative. Psychiatric/Behavioral: Negative. There were no vitals filed for this visit. Physical Exam  Constitutional:       General: He is not in acute distress. Appearance: Normal appearance. He is not ill-appearing or toxic-appearing.    HENT: Head: Normocephalic and atraumatic. Nose: Congestion present. Eyes:      Conjunctiva/sclera: Conjunctivae normal.   Pulmonary:      Effort: Pulmonary effort is normal. No respiratory distress. Comments: Slight dry cough during video visit. Neurological:      Mental Status: He is alert. Psychiatric:         Mood and Affect: Mood normal.         Behavior: Behavior normal.         Thought Content: Thought content normal.         Judgment: Judgment normal.       Diagnoses and all orders for this visit:    Upper respiratory tract infection, unspecified type  -     azithromycin (ZITHROMAX) 250 mg tablet; 2 tablets on day 1, 1 tablet day 2-5      Patient Instructions   As we discussed your illness is viral.  No antibiotics are indicated at this time. Will send in antibiotic since you are going away only take if no improvement over next 3 days, fevers. Start mucinex max. Rest and drink extra fluids. OTC cough and cold medications as needed. Tylenol or Motrin as needed for pain or fever. Salt water gargles and throat lozenges for sore throat. Follow up with PCP if no improvement. Go to ER with worsening symptoms. Cold Symptoms   WHAT YOU NEED TO KNOW:   A cold is an infection caused by a virus. The infection causes your upper respiratory system to become inflamed. Common symptoms of a cold include sneezing, dry throat, a stuffy nose, headache, watery eyes, and a cough. Your cough may be dry, or you may cough up mucus. You may also have muscle aches, joint pain, and tiredness. Rarely, you may have a fever. Most colds go away without treatment. DISCHARGE INSTRUCTIONS:   Return to the emergency department if:   You have increased tiredness and weakness. You are unable to eat. Your heart is beating much faster than usual for you. You see white spots in the back of your throat and your neck is swollen and sore to the touch.      You see pinpoint or larger reddish-purple dots on your skin.  Contact your healthcare provider if:   You have a fever higher than 102°F (38.9°C). You have new or worsening shortness of breath. You have thick nasal drainage for more than 2 days. Your symptoms do not improve or get worse within 5 days. You have questions or concerns about your condition or care. Medicines: The following medicines may be suggested by your healthcare provider to decrease your cold symptoms. These medicines are available without a doctor's order. Ask which medicines to take and when to take them. Follow directions. NSAIDs or acetaminophen  help to bring down a fever or decrease pain. Decongestants  help decrease nasal stuffiness. Antihistamines  help decrease sneezing and a runny nose. Cough suppressants  help decrease how much you cough. Expectorants  help loosen mucus so you can cough it up. Take your medicine as directed. Contact your healthcare provider if you think your medicine is not helping or if you have side effects. Tell him of her if you are allergic to any medicine. Keep a list of the medicines, vitamins, and herbs you take. Include the amounts, and when and why you take them. Bring the list or the pill bottles to follow-up visits. Carry your medicine list with you in case of an emergency. Symptom relief: The following may help relieve cold symptoms, such as a dry throat and congestion:  Gargle with mouthwash or warm salt water as directed. Suck on throat lozenges or hard candy. Use a cold or warm vaporizer or humidifier to ease your breathing. Rest for at least 2 days and then as needed to decrease tiredness and weakness. Use petroleum based jelly around your nostrils to decrease irritation from blowing your nose. Drink liquids:  Liquids will help thin and loosen thick mucus so you can cough it up. Liquids will also keep you hydrated.  Ask your healthcare provider which liquids are best for you and how much to drink each day.  Prevent the spread of germs: You can spread your cold germs to others for at least 3 days after your symptoms start. Wash your hands often. Do not share items, such as eating utensils. Cover your nose and mouth when you cough or sneeze using the crook of your elbow instead of your hands. Throw used tissues in the garbage. Do not smoke:  Smoking may worsen your symptoms and increase the length of time you feel sick. Talk with your healthcare provider if you need help to stop smoking. Follow up with your healthcare provider as directed:  Write down your questions so you remember to ask them during your visits. © 2017 [address] [place] [address] [place] Information is for End User's use only and may not be sold, redistributed or otherwise used for commercial purposes. All illustrations and images included in CareNotes® are the copyrighted property of [employer] or [employer]. The above information is an  only. It is not intended as medical advice for individual conditions or treatments. Talk to your doctor, nurse or pharmacist before following any medical regimen to see if it is safe and effective for you. Follow up with PCP if not improved, if symptoms are worse, go to the ER.

## 2023-10-12 NOTE — CARE ANYWHERE EVISITS
Visit Summary for NAYA Kaufman - Gender: Male - Date of Birth: [id number]  Date: [id number] - Duration: 4 minutes  Patient: NAYA Kaufman  Provider: Akilah NEWELL    Patient Contact Information  [facility] [place] [phone]      Visit Topics  Flu-Like Symptoms [Added By: Self - 2023-10-12]  Cold [Added By: Self - 2023-10-12]    Triage Questions   What is your current physical address in the event of a medical emergency? Answer []  Are you allergic to any medications? Answer []  Are you now or could you be pregnant? Answer []  Do you have any immune system compromise or chronic lung   disease? Answer []  Do you have any vulnerable family members in the home (infant, pregnant, cancer, elderly)? Answer []     Conversation Transcripts  [0A][0A] [Notification] You are connected with Ania Shultz, Urgent Care Specialist.[0A][Notification] Luis Felipe Sepulveda is located in [place]. [0A][Notification] Luis Felipe Sepulveda has shared health history. Cathleen Disla .[0A]    Diagnosis  Acute upper respiratory infection, unspecified    Procedures  Value: 28202 Code: CPT-4 UNLISTED E&M SERVICE    Medications Prescribed    No prescriptions ordered    Electronically signed by: Ania Otto(NPI [id number])

## 2023-10-12 NOTE — PATIENT INSTRUCTIONS
As we discussed your illness is viral.  No antibiotics are indicated at this time. Will send in antibiotic since you are going away only take if no improvement over next 3 days, fevers. Start mucinex max. Rest and drink extra fluids. OTC cough and cold medications as needed. Tylenol or Motrin as needed for pain or fever. Salt water gargles and throat lozenges for sore throat. Follow up with PCP if no improvement. Go to ER with worsening symptoms. Cold Symptoms   WHAT YOU NEED TO KNOW:   A cold is an infection caused by a virus. The infection causes your upper respiratory system to become inflamed. Common symptoms of a cold include sneezing, dry throat, a stuffy nose, headache, watery eyes, and a cough. Your cough may be dry, or you may cough up mucus. You may also have muscle aches, joint pain, and tiredness. Rarely, you may have a fever. Most colds go away without treatment. DISCHARGE INSTRUCTIONS:   Return to the emergency department if:   You have increased tiredness and weakness. You are unable to eat. Your heart is beating much faster than usual for you. You see white spots in the back of your throat and your neck is swollen and sore to the touch. You see pinpoint or larger reddish-purple dots on your skin. Contact your healthcare provider if:   You have a fever higher than 102°F (38.9°C). You have new or worsening shortness of breath. You have thick nasal drainage for more than 2 days. Your symptoms do not improve or get worse within 5 days. You have questions or concerns about your condition or care. Medicines: The following medicines may be suggested by your healthcare provider to decrease your cold symptoms. These medicines are available without a doctor's order. Ask which medicines to take and when to take them. Follow directions. NSAIDs or acetaminophen  help to bring down a fever or decrease pain. Decongestants  help decrease nasal stuffiness. Antihistamines  help decrease sneezing and a runny nose. Cough suppressants  help decrease how much you cough. Expectorants  help loosen mucus so you can cough it up. Take your medicine as directed. Contact your healthcare provider if you think your medicine is not helping or if you have side effects. Tell him of her if you are allergic to any medicine. Keep a list of the medicines, vitamins, and herbs you take. Include the amounts, and when and why you take them. Bring the list or the pill bottles to follow-up visits. Carry your medicine list with you in case of an emergency. Symptom relief: The following may help relieve cold symptoms, such as a dry throat and congestion:  Gargle with mouthwash or warm salt water as directed. Suck on throat lozenges or hard candy. Use a cold or warm vaporizer or humidifier to ease your breathing. Rest for at least 2 days and then as needed to decrease tiredness and weakness. Use petroleum based jelly around your nostrils to decrease irritation from blowing your nose. Drink liquids:  Liquids will help thin and loosen thick mucus so you can cough it up. Liquids will also keep you hydrated. Ask your healthcare provider which liquids are best for you and how much to drink each day. Prevent the spread of germs: You can spread your cold germs to others for at least 3 days after your symptoms start. Wash your hands often. Do not share items, such as eating utensils. Cover your nose and mouth when you cough or sneeze using the crook of your elbow instead of your hands. Throw used tissues in the garbage. Do not smoke:  Smoking may worsen your symptoms and increase the length of time you feel sick. Talk with your healthcare provider if you need help to stop smoking. Follow up with your healthcare provider as directed:  Write down your questions so you remember to ask them during your visits.    © 2017 [address] [place] [address] [place] Information is for End User's use only and may not be sold, redistributed or otherwise used for commercial purposes. All illustrations and images included in CareNotes® are the copyrighted property of [employer] Inc. or [employer]. The above information is an  only. It is not intended as medical advice for individual conditions or treatments. Talk to your doctor, nurse or pharmacist before following any medical regimen to see if it is safe and effective for you.

## 2023-12-05 ENCOUNTER — HOSPITAL ENCOUNTER (OUTPATIENT)
Dept: RADIOLOGY | Facility: HOSPITAL | Age: 38
Discharge: HOME | End: 2023-12-05
Payer: OTHER MISCELLANEOUS

## 2023-12-05 DIAGNOSIS — M67.88 PERONEAL TENDINOSIS, LEFT: ICD-10-CM

## 2023-12-05 DIAGNOSIS — M67.88 PERONEAL TENDINOSIS, RIGHT: ICD-10-CM

## 2023-12-05 PROCEDURE — 76882 US LMTD JT/FCL EVL NVASC XTR: CPT

## 2023-12-14 ENCOUNTER — TELEPHONE (OUTPATIENT)
Dept: NEUROSURGERY | Facility: CLINIC | Age: 38
End: 2023-12-14

## 2023-12-14 ENCOUNTER — OFFICE VISIT (OUTPATIENT)
Dept: NEUROSURGERY | Facility: CLINIC | Age: 38
End: 2023-12-14
Payer: COMMERCIAL

## 2023-12-14 VITALS
DIASTOLIC BLOOD PRESSURE: 99 MMHG | HEIGHT: 75 IN | BODY MASS INDEX: 32.08 KG/M2 | WEIGHT: 258 LBS | HEART RATE: 107 BPM | OXYGEN SATURATION: 97 % | SYSTOLIC BLOOD PRESSURE: 146 MMHG | TEMPERATURE: 99 F

## 2023-12-14 DIAGNOSIS — M54.16 LUMBAR RADICULOPATHY: Primary | ICD-10-CM

## 2023-12-14 PROCEDURE — 99214 OFFICE O/P EST MOD 30 MIN: CPT | Performed by: STUDENT IN AN ORGANIZED HEALTH CARE EDUCATION/TRAINING PROGRAM

## 2023-12-14 RX ORDER — DEXTROAMPHETAMINE SULFATE, DEXTROAMPHETAMINE SACCHARATE, AMPHETAMINE SULFATE AND AMPHETAMINE ASPARTATE 5; 5; 5; 5 MG/1; MG/1; MG/1; MG/1
CAPSULE, EXTENDED RELEASE ORAL
COMMUNITY
Start: 2023-11-24

## 2023-12-14 RX ORDER — IBUPROFEN 800 MG/1
800 TABLET ORAL EVERY 8 HOURS PRN
COMMUNITY
Start: 2023-12-11

## 2023-12-14 NOTE — PROGRESS NOTES
Assessment/Plan:  70-year-old male with a history of low back pain and right lower extremity radiculopathy for approximately 1.5 years that is progressively getting worse over the last 3 to 6 months. MRI lumbar spine shows a right-sided L4-5 herniated disc however he does have a medial component that appears to be compressing the traversing L5 nerve root as well as a lateral component that is abutting the exiting L4 nerve root. He stated that he has thought about surgery some more and has not found adequate relief with conservative measures and would like to proceed with surgery. I discussed that the approach for the far lateral component at L4-5 and the approach for the medial component L4-5 for 2 separate surgeries. Given that his symptoms seem to fit either an L4 or L5 distribution I ordered an EMG to further look for acute radiculopathy that would help delineate 1 approach over the other. He did have epidural steroid injections in the past that did help with his pain however both times they were injected at the L4 and L5 nerve roots simultaneously. The findings on the MRI appear to be worst in regards to an L5 radiculopathy if the EMG is not more diagnostic than I discussed a traditional L4-5 microdiscectomy, however I discussed the possibility of needing a L4-5 far lateral microdiscectomy in the future if his symptoms are not adequately treated with the traditional L4-5 discectomy. Return to clinic after EMG to discuss further surgical treatment options. I spent 30 minutes obtaining history and physical exam, reviewing imaging, discussing treatment options, and coordinating care. Subjective:      Patient ID: Flakito Germain is a 34 y.o. male. HPI  70-year-old male with a history of low back pain and right lower extremity radiculopathy presents to clinic for discussion of treatment.   He has approximately 1.5-year history of chronic low back pain and radiation down the right lower extremity buttock, posterior lateral thigh, and anterior shin. States that the pain does not extend to his ankle or foot. He has tried physical therapy in the past and did not give him adequate relief. He has tried 2 epidural steroid injections which did give him relief for several days but did not last long enough. The pain is restricting his daily activities.     The following portions of the patient's history were reviewed and updated as appropriate: allergies, current medications, past family history, past medical history, past social history, past surgical history, and problem list.    Review of Systems      Objective:      /99 (BP Location: Left arm, Patient Position: Sitting, Cuff Size: Standard)   Pulse (!) 107   Temp 99 °F (37.2 °C) (Temporal)   Ht 6' 3" (1.905 m)   Wt 117 kg (258 lb)   SpO2 97%   BMI 32.25 kg/m²          Physical Exam    A&OX3  Gaze conjugate  EOMI  FS  TM  Fcx4  5/5 BUE  5/5 BLE  SILT  No clonus  No garsia  No babinski

## 2023-12-14 NOTE — TELEPHONE ENCOUNTER
Sent email  to dept pt aware to call us if he gets a sooner apt -on cancellation list as well - currentkly booked next avail @ [facility] for emg legs 1/17/24 _BA

## 2023-12-27 ENCOUNTER — HOSPITAL ENCOUNTER (OUTPATIENT)
Dept: NEUROLOGY | Facility: CLINIC | Age: 38
Discharge: HOME | End: 2023-12-27
Payer: COMMERCIAL

## 2023-12-27 DIAGNOSIS — M54.16 LUMBAR RADICULOPATHY: ICD-10-CM

## 2023-12-27 PROBLEM — M54.17 LUMBOSACRAL RADICULOPATHY: Status: ACTIVE | Noted: 2023-12-27

## 2023-12-27 PROCEDURE — 95886 MUSC TEST DONE W/N TEST COMP: CPT | Performed by: PSYCHIATRY & NEUROLOGY

## 2023-12-27 PROCEDURE — 95911 NRV CNDJ TEST 9-10 STUDIES: CPT | Performed by: PSYCHIATRY & NEUROLOGY

## 2023-12-29 DIAGNOSIS — F98.8 ATTENTION DEFICIT DISORDER, UNSPECIFIED HYPERACTIVITY PRESENCE: Primary | ICD-10-CM

## 2023-12-29 DIAGNOSIS — F17.200 TOBACCO DEPENDENCE: ICD-10-CM

## 2023-12-29 RX ORDER — DEXTROAMPHETAMINE SULFATE, DEXTROAMPHETAMINE SACCHARATE, AMPHETAMINE SULFATE AND AMPHETAMINE ASPARTATE 5; 5; 5; 5 MG/1; MG/1; MG/1; MG/1
20 CAPSULE, EXTENDED RELEASE ORAL EVERY MORNING
Qty: 90 CAPSULE | Refills: 0 | Status: SHIPPED | OUTPATIENT
Start: 2024-01-19

## 2023-12-29 NOTE — PROGRESS NOTES
Telephone Call Documentation    Benjamin Muñiz       1985            Request sent for adderal xl 20 mg- he filled 30 day rx from prior physician on 12/21- I have placed an order for 90 day supply from [facility] to be filled on 1/18   I renewed rx for nicotine patch

## 2024-01-03 ENCOUNTER — OFFICE VISIT (OUTPATIENT)
Dept: NEUROSURGERY | Facility: CLINIC | Age: 39
End: 2024-01-03
Payer: COMMERCIAL

## 2024-01-03 ENCOUNTER — AMB VIDEO VISIT (OUTPATIENT)
Dept: OTHER | Facility: HOSPITAL | Age: 39
End: 2024-01-03
Payer: COMMERCIAL

## 2024-01-03 VITALS
DIASTOLIC BLOOD PRESSURE: 84 MMHG | WEIGHT: 289 LBS | HEIGHT: 75 IN | SYSTOLIC BLOOD PRESSURE: 129 MMHG | TEMPERATURE: 98.4 F | HEART RATE: 93 BPM | OXYGEN SATURATION: 97 % | BODY MASS INDEX: 35.93 KG/M2

## 2024-01-03 DIAGNOSIS — M48.061 STENOSIS OF LATERAL RECESS OF LUMBAR SPINE: Primary | ICD-10-CM

## 2024-01-03 DIAGNOSIS — Z01.818 PRE-PROCEDURAL EXAMINATION: ICD-10-CM

## 2024-01-03 DIAGNOSIS — J01.00 ACUTE NON-RECURRENT MAXILLARY SINUSITIS: Primary | ICD-10-CM

## 2024-01-03 PROCEDURE — 99214 OFFICE O/P EST MOD 30 MIN: CPT | Performed by: STUDENT IN AN ORGANIZED HEALTH CARE EDUCATION/TRAINING PROGRAM

## 2024-01-03 PROCEDURE — 99212 OFFICE O/P EST SF 10 MIN: CPT | Performed by: PHYSICIAN ASSISTANT

## 2024-01-03 RX ORDER — CHLORHEXIDINE GLUCONATE ORAL RINSE 1.2 MG/ML
15 SOLUTION DENTAL ONCE
OUTPATIENT
Start: 2024-01-03 | End: 2024-01-03

## 2024-01-03 RX ORDER — AMOXICILLIN AND CLAVULANATE POTASSIUM 875; 125 MG/1; MG/1
1 TABLET, FILM COATED ORAL EVERY 12 HOURS SCHEDULED
Qty: 20 TABLET | Refills: 0 | Status: SHIPPED | OUTPATIENT
Start: 2024-01-03 | End: 2024-01-13

## 2024-01-03 NOTE — PROGRESS NOTES
Assessment/Plan:    38-year-old male with history of low back pain and right lower extremity radiculopathy for approximately 1.5 years that has been progressively getting worse over the last 3 to 6 months.  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  His EMG did not show any L4 radiculopathy from the L4-5 far lateral disc. His symptoms do seem to fit a L5 radiculopathy on the right and it is likely primarily due to the lateral gutter stenosis at L4-5 compressing the traversing L5 nerve root. I recommended a right-sided L4-L5 foraminotomy and decompression of the lateral gutter stenosis to alleviate the compression of the traversing L5 nerve root.  I discussed risk and benefits along with alternatives, all questions were answered, he agreed to proceed and consent was obtained.    I spent 30 minutes obtaining history and physical exam, reviewing imaging, discussing treatment options, and coordinating care.      Subjective:      Patient ID: Benjamin Muñiz is a 38 y.o. male.    HPI    38-year-old male with history of low back pain and right lower extremity radiculopathy for approximately 1.5 years that has been progressively getting worse over the last 3 to 6 months.  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  He presents to clinic to discuss results of his EMG.  The EMG did not show any acute radiculopathy.  Primarily, it did not show any right-sided L4 radiculopathy due to his right L4-5 far lateral disc.  His symptoms do seem to fit a L5 radiculopathy on the right and it is likely primarily due to the lateral gutter stenosis at L4-5 compressing the traversing L5 nerve root.    The following portions of the patient's history were reviewed and updated as appropriate: allergies, current medications, past family history, past  "medical history, past social history, past surgical history, and problem list.    Review of Systems      Objective:      /84 (BP Location: Left arm, Patient Position: Sitting, Cuff Size: Standard)   Pulse 93   Temp 98.4 °F (36.9 °C) (Temporal)   Ht 6' 3\" (1.905 m)   Wt 131 kg (289 lb)   SpO2 97%   BMI 36.12 kg/m²          Physical Exam    A&OX3  Gaze conjugate  EOMI  FS  TM  Fcx4  5/5 BUE  5/5 BLE  SILT  No clonus  No garsia  No babinski  "

## 2024-01-03 NOTE — PROGRESS NOTES
Required Documentation:  Encounter provider Sharlene Huerta PA-C    Provider located at [facility]  VIRTUAL CARE   [address] [facility] [id number]    Identify all parties in room with patient during virtual visit:  No one else    The patient was identified by name and date of birth. Benjamin Muñiz was informed that this is a telemedicine visit and that the visit is being conducted through the [employer] platform. He agrees to proceed..  My office door was closed. No one else was in the room.  He acknowledged consent and understanding of privacy and security of the video platform. The patient has agreed to participate and understands they can discontinue the visit at any time.    Verification of patient location:    Patient is located at home in the following state in which I hold an active license [place]    Patient is aware this is a billable service.     Reason for visit is No chief complaint on file.       Subjective  HPI   Patient states that he has sinus pain and pressure, grenn/yelllow mucus.  He has been sick since 12/22. He denies any fevers, chills. He is taking advil cold and sinus.  He denies SOB, CP.      Past Medical History:   Diagnosis Date    ADD (attention deficit disorder)     Arthritis     Chronic pain     Depression     GERD (gastroesophageal reflux disease)     Heartburn     Mood disorder (HCC)     Tremor        Past Surgical History:   Procedure Laterality Date    CLOSED REDUCTION CALCANEAL FRACTURE      PERONEAL TENDON EXPLORATION          No Known Allergies    Review of Systems   Constitutional: Negative.    HENT:  Positive for congestion, sinus pressure and sinus pain.    Eyes: Negative.    Respiratory:  Positive for cough.    Cardiovascular: Negative.    Gastrointestinal: Negative.    Musculoskeletal: Negative.    Neurological: Negative.    Psychiatric/Behavioral: Negative.         Video Exam    There were no vitals filed for this  visit.    Physical Exam  Constitutional:       General: He is not in acute distress.     Appearance: Normal appearance. He is not ill-appearing, toxic-appearing or diaphoretic.   HENT:      Head: Normocephalic and atraumatic.      Nose: Congestion present.   Pulmonary:      Effort: Pulmonary effort is normal. No respiratory distress.   Lymphadenopathy:      Cervical: Cervical adenopathy present.   Skin:     General: Skin is dry.   Neurological:      General: No focal deficit present.      Mental Status: He is alert and oriented to person, place, and time.   Psychiatric:         Mood and Affect: Mood normal.         Behavior: Behavior normal.         Visit Time  Total Visit Duration: 5 minutes    Assessment/Plan:    Diagnoses and all orders for this visit:    Acute non-recurrent maxillary sinusitis  -     amoxicillin-clavulanate (AUGMENTIN) 875-125 mg per tablet; Take 1 tablet by mouth every 12 (twelve) hours for 10 days        Patient Instructions   Sinusitis   WHAT YOU NEED TO KNOW:   Sinusitis is inflammation or infection of your sinuses. Sinusitis is most often caused by a virus. Acute sinusitis may last up to 12 weeks. Chronic sinusitis lasts longer than 12 weeks. Recurrent sinusitis means you have 4 or more infections in 1 year.        DISCHARGE INSTRUCTIONS:   Return to the emergency department if:   You have trouble breathing or wheezing that is getting worse.    You have a stiff neck, a fever, or a bad headache.     You cannot open your eye.     Your eyeball bulges out or you cannot move your eye.     You are more sleepy than normal, or you notice changes in your ability to think, move, or talk.    You have swelling of your forehead or scalp.    Call your doctor if:   You have vision changes, such as double vision.    Your eye and eyelid are red, swollen, and painful.     Your symptoms do not improve or go away after 10 days.    You have nausea and are vomiting.    Your nose is bleeding.    You have  questions or concerns about your condition or care.    Medicines:  Your symptoms may go away on their own. Your healthcare provider may recommend watchful waiting for up to 10 days before starting antibiotics. You may need any of the following:  Acetaminophen  decreases pain and fever. It is available without a doctor's order. Ask how much to take and how often to take it. Follow directions. Read the labels of all other medicines you are using to see if they also contain acetaminophen, or ask your doctor or pharmacist. Acetaminophen can cause liver damage if not taken correctly.    NSAIDs , such as ibuprofen, help decrease swelling, pain, and fever. This medicine is available with or without a doctor's order. NSAIDs can cause stomach bleeding or kidney problems in certain people. If you take blood thinner medicine, always ask your healthcare provider if NSAIDs are safe for you. Always read the medicine label and follow directions.    Nasal steroid sprays  may help decrease inflammation in your nose and sinuses.    Decongestants  help reduce swelling and drain mucus in the nose and sinuses. They may help you breathe easier.     Antihistamines  help dry mucus in the nose and relieve sneezing.     Antibiotics  help treat or prevent a bacterial infection.    Take your medicine as directed.  Contact your healthcare provider if you think your medicine is not helping or if you have side effects. Tell your provider if you are allergic to any medicine. Keep a list of the medicines, vitamins, and herbs you take. Include the amounts, and when and why you take them. Bring the list or the pill bottles to follow-up visits. Carry your medicine list with you in case of an emergency.    Self-care:   Rinse your sinuses as directed.  Use a sinus rinse device to rinse your nasal passages with a saline (salt water) solution or distilled water. Do not use tap water. This will help thin the mucus in your nose and rinse away pollen and  dirt. It will also help reduce swelling so you can breathe normally.    Use a humidifier  to increase air moisture in your home. This may make it easier for you to breathe and help decrease your cough.     Sleep with your head elevated.  Place an extra pillow under your head before you go to sleep to help your sinuses drain.     Drink liquids as directed.  Ask your healthcare provider how much liquid to drink each day and which liquids are best for you. Liquids will thin the mucus in your nose and help it drain. Avoid drinks that contain alcohol or caffeine.     Do not smoke, and avoid secondhand smoke.  Nicotine and other chemicals in cigarettes and cigars can make your symptoms worse. Ask your healthcare provider for information if you currently smoke and need help to quit. E-cigarettes or smokeless tobacco still contain nicotine. Talk to your healthcare provider before you use these products.    Prevent the spread of germs:   Wash your hands often with soap and water.  Wash your hands after you use the bathroom, change a child's diaper, or sneeze. Wash your hands before you prepare or eat food.         Stay away from people who are sick.  Some germs spread easily and quickly through contact.    Follow up with your doctor as directed:  You may be referred to an ear, nose, and throat specialist. Write down your questions so you remember to ask them during your visits.   © Copyright [employer] 2023 Information is for End User's use only and may not be sold, redistributed or otherwise used for commercial purposes.  The above information is an  only. It is not intended as medical advice for individual conditions or treatments. Talk to your doctor, nurse or pharmacist before following any medical regimen to see if it is safe and effective for you.

## 2024-01-03 NOTE — CARE ANYWHERE EVISITS
Visit Summary for NAYA MTZ - Gender: Male - Date of Birth: 1985  Date: [id number] - Duration: 2 minutes  Patient: NAYA MTZ  Provider: Sharlene Huerta PA-C    Patient Contact Information  Address  [address]  [place]; [place] [address]      Visit Topics    Triage Questions   What is your current physical address in the event of a medical emergency? Answer []  Are you allergic to any medications? Answer []  Are you now or could you be pregnant? Answer []  Do you have any immune system compromise or chronic lung   disease? Answer []  Do you have any vulnerable family members in the home (infant, pregnant, cancer, elderly)? Answer []     Conversation Transcripts  [0A][0A] [Notification] You are connected with Sharlene Huerta PA-C, Urgent Care Specialist.[0A][Notification] NAYA MTZ is located in [place].[0A][Notification] NAYA MTZ has shared health history...[0A]    Diagnosis  Acute maxillary sinusitis, unspecified    Procedures  Value: [id number] Code: CPT-4 UNLISTED E&M SERVICE    Medications Prescribed    No prescriptions ordered    Electronically signed by: Sharlene Huerta PA-C(NPI [id number])

## 2024-01-09 ENCOUNTER — OFFICE VISIT (OUTPATIENT)
Dept: FAMILY MEDICINE CLINIC | Facility: HOSPITAL | Age: 39
End: 2024-01-09
Payer: COMMERCIAL

## 2024-01-09 VITALS
HEIGHT: 75 IN | SYSTOLIC BLOOD PRESSURE: 128 MMHG | HEART RATE: 80 BPM | DIASTOLIC BLOOD PRESSURE: 82 MMHG | WEIGHT: 282 LBS | RESPIRATION RATE: 16 BRPM | TEMPERATURE: 98.2 F | BODY MASS INDEX: 35.06 KG/M2 | OXYGEN SATURATION: 96 %

## 2024-01-09 DIAGNOSIS — F11.20 NARCOTIC DEPENDENCY, CONTINUOUS (HCC): ICD-10-CM

## 2024-01-09 DIAGNOSIS — M54.41 CHRONIC RIGHT-SIDED LOW BACK PAIN WITH RIGHT-SIDED SCIATICA: Primary | ICD-10-CM

## 2024-01-09 DIAGNOSIS — I10 PRIMARY HYPERTENSION: ICD-10-CM

## 2024-01-09 DIAGNOSIS — K21.9 GASTROESOPHAGEAL REFLUX DISEASE WITHOUT ESOPHAGITIS: ICD-10-CM

## 2024-01-09 DIAGNOSIS — F31.70 BIPOLAR DISORDER IN FULL REMISSION, MOST RECENT EPISODE UNSPECIFIED TYPE (HCC): ICD-10-CM

## 2024-01-09 DIAGNOSIS — G89.29 CHRONIC RIGHT-SIDED LOW BACK PAIN WITH RIGHT-SIDED SCIATICA: Primary | ICD-10-CM

## 2024-01-09 DIAGNOSIS — F10.20 UNCOMPLICATED ALCOHOL DEPENDENCE (HCC): ICD-10-CM

## 2024-01-09 PROBLEM — R40.0 DAYTIME SOMNOLENCE: Status: RESOLVED | Noted: 2022-04-12 | Resolved: 2024-01-09

## 2024-01-09 PROBLEM — K85.20 ALCOHOL-INDUCED ACUTE PANCREATITIS WITHOUT INFECTION OR NECROSIS: Status: RESOLVED | Noted: 2023-06-29 | Resolved: 2024-01-09

## 2024-01-09 PROBLEM — R25.1 TREMOR: Status: RESOLVED | Noted: 2019-07-12 | Resolved: 2024-01-09

## 2024-01-09 PROBLEM — F10.939 ALCOHOL WITHDRAWAL (HCC): Status: RESOLVED | Noted: 2023-06-29 | Resolved: 2024-01-09

## 2024-01-09 PROBLEM — M79.651 PAIN OF RIGHT THIGH: Status: RESOLVED | Noted: 2023-03-28 | Resolved: 2024-01-09

## 2024-01-09 PROBLEM — D50.0 IRON DEFICIENCY ANEMIA DUE TO CHRONIC BLOOD LOSS: Status: RESOLVED | Noted: 2021-06-11 | Resolved: 2024-01-09

## 2024-01-09 PROCEDURE — 99214 OFFICE O/P EST MOD 30 MIN: CPT | Performed by: INTERNAL MEDICINE

## 2024-01-09 RX ORDER — OXYCODONE HYDROCHLORIDE 15 MG/1
TABLET, FILM COATED, EXTENDED RELEASE ORAL
COMMUNITY
Start: 2023-12-18

## 2024-01-09 NOTE — ASSESSMENT & PLAN NOTE
Patient is a stable condition today to undergo the planned surgery today    I urged him to get the EKG and the blood work done that was ordered by Dr. Al Slade  I will review those test results once they come in

## 2024-01-09 NOTE — PROGRESS NOTES
Assessment/Plan:    Functional capacity: Good: He is able to walk up and down a flight of stairs several times a day in his home without chest pain or shortness of breath    Medical conditions that increase cardiac risk: None.   Denies history of coronary artery disease,, myocardial infarction, CHF, arrhythmia, valvular heart disease, TIA, CVA, diabetes mellitus, DVT, CKD    Cardiac Risk Estimation: Low cardiovascular risk    Patient may undergo the planned L4-L5 foraminotomy and decompression of the lateral gutter stenosis on February 6, 2024 by Al Slade MD!        Chronic right-sided low back pain with right-sided sciatica  Patient is a stable condition today to undergo the planned surgery today    I urged him to get the EKG and the blood work done that was ordered by Dr. Al Slade  I will review those test results once they come in      Primary hypertension  Blood pressure is controlled.  Continue losartan/HCTZ  Electrolytes and renal function were ordered    Gastroesophageal reflux disease without esophagitis  GERD is stable.  Denies heartburn, dysphagia, signs of GI bleeding.  Continue Dexilant    Bipolar disorder (HCC)  Moods are stable.  Patient is in search of a new psychiatrist.  Denies depression today.  Continue Risperdal, hydroxyzine    Narcotic dependency, continuous (HCC)  Patient is chronic opioid dependency due to right-sided low back pain he is taking oxycodone and OxyContin.  I reviewed [place] PDMP    Uncomplicated alcohol dependence (HCC)  Patient still consumes a 3-4 beers a day, he no longer consumes hard liquor.  He has alcohol dependence with history of acute pancreatitis.  I urged the patient to decrease alcohol intake to 2 beers a day max.     Diagnoses and all orders for this visit:    Chronic right-sided low back pain with right-sided sciatica    Primary hypertension    Gastroesophageal reflux disease without esophagitis    Bipolar disorder in full remission, most recent episode  "unspecified type (HCC)    Uncomplicated alcohol dependence (HCC)    Narcotic dependency, continuous (HCC)    Other orders  -     OxyCONTIN 15 MG 12 hr tablet; TAKE 1 TABLET BY MOUTH TWICE A DAY FOR PAIN              Subjective:     Benjamin Muñiz is a 38 y.o. male who presents to the office today for a preoperative consultation at the request of surgeon Dr. Al Slade who plans on performing L4-L5 foraminotomy and decompression of the lateral gutter stenosis on February 6. This consultation is requested for preoperative evaluation       Past Medical History:   Diagnosis Date    ADD (attention deficit disorder)     Arthritis     Chronic pain     Depression     GERD (gastroesophageal reflux disease)     Heartburn     Mood disorder (HCC)     Tremor      Past Surgical History:   Procedure Laterality Date    CLOSED REDUCTION CALCANEAL FRACTURE      PERONEAL TENDON EXPLORATION       Social History   Family History   Problem Relation Age of Onset    Parkinsonism Paternal Grandfather     Heart disease Father     Hypertension Father     Breast cancer Mother     Intellectual disability Sister     Heart disease Maternal Grandfather     Substance Abuse Neg Hx     Mental illness Neg Hx      HPI    No Known Allergies    Current Outpatient Medications:     acetaminophen (TYLENOL) 325 mg tablet, Take 2 tablets (650 mg total) by mouth every 6 (six) hours as needed for mild pain, headaches or fever, Disp: , Rfl:     [START ON 1/19/2024] ADDERALL XR, 20MG, 20 MG 24 hr capsule, Take 1 capsule (20 mg total) by mouth every morning Max Daily Amount: 20 mg Do not start before January 19, 2024., Disp: 90 capsule, Rfl: 0    amoxicillin-clavulanate (AUGMENTIN) 875-125 mg per tablet, Take 1 tablet by mouth every 12 (twelve) hours for 10 days, Disp: 20 tablet, Rfl: 0    azelastine (ASTELIN) 0.1 % nasal spray, 1 spray into each nostril 2 (two) times a day Use in each nostril as directed, Disp: 30 mL, Rfl: 2    B-D HYPODERMIC NEEDLE 18GX1.5\" 18G " "X 1-1/2\" MISC, Use as directed---for Testosterone, Disp: , Rfl:     B-D SYRINGE LUER-MILAGROS 1CC 1 ML MISC, USE FOR BI WEEKLY TESTOSTERONE INJECTIONS, Disp: , Rfl:     B-D SYRINGE LUER-MILAGROS 3CC 3 ML MISC, USE AS DIRECTED FOR TESTOSTERONE INJECTION, Disp: , Rfl:     BD Hypodermic Needle 25G X 1-1/2\" MISC, USE FOR INJECTING TESTOSTERONE BIWEEKLY, Disp: , Rfl:     DEXILANT 60 MG capsule, Take 1 capsule by mouth daily, Disp: , Rfl: 3    gabapentin (NEURONTIN) 600 MG tablet, Take 600 mg by mouth 2 (two) times a day  , Disp: , Rfl: 0    hydrOXYzine HCL (ATARAX) 50 mg tablet, Take 50 mg by mouth 2 (two) times a day as needed, Disp: , Rfl: 1    ibuprofen (MOTRIN) 800 mg tablet, Take 800 mg by mouth every 8 (eight) hours as needed, Disp: , Rfl:     losartan-hydrochlorothiazide (HYZAAR) 50-12.5 mg per tablet, Take 1 tablet by mouth daily, Disp: 90 tablet, Rfl: 3    methylphenidate (CONCERTA) 36 MG ER tablet, 1 twice daily , Disp: , Rfl:     nicotine (NICODERM CQ) 7 mg/24hr TD 24 hr patch, Place 1 patch on the skin over 24 hours every 24 hours, Disp: 90 patch, Rfl: 0    oxyCODONE-acetaminophen (PERCOCET) 5-325 mg per tablet, Take 1 tablet by mouth every 6 (six) hours as needed, Disp: , Rfl:     OxyCONTIN 15 MG 12 hr tablet, TAKE 1 TABLET BY MOUTH TWICE A DAY FOR PAIN, Disp: , Rfl:     risperiDONE (RisperDAL) 0.5 mg tablet, 1/2 tab in the AM, Disp: , Rfl:     risperiDONE (RisperDAL) 4 mg tablet, 1 at hs, Disp: , Rfl:     sildenafil (VIAGRA) 100 mg tablet, Take 1 tablet by mouth one (1) hour prior to intercourse on an empty stomach.  Limit to 3 encounters per week., Disp: 30 tablet, Rfl: 0    testosterone cypionate (DEPO-TESTOSTERONE) 200 mg/mL SOLN, INJECT 1ML EVERY 2 WEEKS. DISCARD VIAL AFTER 30 DAYS FROM OPENING DATE, Disp: , Rfl:     zolpidem (AMBIEN) 10 mg tablet, Take 10 mg by mouth daily at bedtime as needed for sleep, Disp: , Rfl:     OXYCONTIN 10 MG 12 hr tablet, Take 10 mg by mouth 2 (two) times a day (Patient not taking: " "Reported on 1/9/2024), Disp: , Rfl: 0       The following portions of the patient's history were reviewed and updated as appropriate: current medications, past family history, past medical history, past social history, past surgical history and problem list.    Review of Systems   Constitutional:  Negative for chills and fever.   HENT:  Positive for congestion (Improving on Augmentin).    Eyes:  Negative for visual disturbance.   Respiratory:  Negative for cough and shortness of breath.    Cardiovascular:  Negative for chest pain, palpitations and leg swelling.   Gastrointestinal:  Negative for abdominal pain, blood in stool, constipation and diarrhea.   Endocrine: Negative for polydipsia and polyphagia.   Genitourinary:  Negative for difficulty urinating, dysuria and hematuria.   Musculoskeletal:  Positive for back pain.   Skin:  Negative for rash.   Neurological:  Negative for headaches.   Psychiatric/Behavioral:  Negative for dysphoric mood.    All other systems reviewed and are negative.        Objective:    /82   Pulse 80   Temp 98.2 °F (36.8 °C)   Resp 16   Ht 6' 3\" (1.905 m)   Wt 128 kg (282 lb)   SpO2 96%   BMI 35.25 kg/m²      Physical Exam  HENT:      Head: Normocephalic.      Nose: Congestion and rhinorrhea (I saw dried mucus in both nostrils) present.      Mouth/Throat:      Pharynx: No oropharyngeal exudate or posterior oropharyngeal erythema.   Eyes:      Conjunctiva/sclera: Conjunctivae normal.   Cardiovascular:      Rate and Rhythm: Normal rate and regular rhythm.      Heart sounds: No murmur heard.     No gallop.   Pulmonary:      Effort: No respiratory distress.      Breath sounds: No wheezing, rhonchi or rales.   Abdominal:      General: Bowel sounds are normal.      Palpations: Abdomen is soft. There is no mass.      Tenderness: There is no abdominal tenderness.   Musculoskeletal:      Cervical back: Neck supple.   Skin:     General: Skin is warm and dry.   Neurological:      Mental " Status: He is alert and oriented to person, place, and time.      Cranial Nerves: No cranial nerve deficit.      Motor: No weakness.      Comments: Right lower extremity muscle strength, muscle tone are normal.  Right foot dorsiflexion and plantarflexion were normal.  Knee jerks were 2/4 equal, I could not elicit ankle jerks   Psychiatric:         Mood and Affect: Mood normal.         Thought Content: Thought content normal.                      Ever Tolliver MD

## 2024-01-09 NOTE — ASSESSMENT & PLAN NOTE
Patient still consumes a 3-4 beers a day, he no longer consumes hard liquor.  He has alcohol dependence with history of acute pancreatitis.  I urged the patient to decrease alcohol intake to 2 beers a day max.

## 2024-01-09 NOTE — ASSESSMENT & PLAN NOTE
Blood pressure is controlled.  Continue losartan/HCTZ  Electrolytes and renal function were ordered

## 2024-01-09 NOTE — ASSESSMENT & PLAN NOTE
Moods are stable.  Patient is in search of a new psychiatrist.  Denies depression today.  Continue Risperdal, hydroxyzine

## 2024-01-09 NOTE — ASSESSMENT & PLAN NOTE
Patient is chronic opioid dependency due to right-sided low back pain he is taking oxycodone and OxyContin.  I reviewed [place]MP

## 2024-01-16 DIAGNOSIS — I10 PRIMARY HYPERTENSION: ICD-10-CM

## 2024-01-16 RX ORDER — LOSARTAN POTASSIUM AND HYDROCHLOROTHIAZIDE 12.5; 5 MG/1; MG/1
1 TABLET ORAL DAILY
Qty: 90 TABLET | Refills: 3 | Status: SHIPPED | OUTPATIENT
Start: 2024-01-16

## 2024-01-22 ENCOUNTER — APPOINTMENT (OUTPATIENT)
Dept: LAB | Facility: HOSPITAL | Age: 39
End: 2024-01-22
Payer: COMMERCIAL

## 2024-01-22 DIAGNOSIS — Z01.818 PRE-PROCEDURAL EXAMINATION: ICD-10-CM

## 2024-01-22 DIAGNOSIS — M48.061 STENOSIS OF LATERAL RECESS OF LUMBAR SPINE: ICD-10-CM

## 2024-01-22 LAB
ALBUMIN SERPL BCP-MCNC: 3.7 G/DL (ref 3.5–5)
ALP SERPL-CCNC: 52 U/L (ref 34–104)
ALT SERPL W P-5'-P-CCNC: 18 U/L (ref 7–52)
ANION GAP SERPL CALCULATED.3IONS-SCNC: 5 MMOL/L
APTT PPP: 33 SECONDS (ref 23–37)
AST SERPL W P-5'-P-CCNC: 17 U/L (ref 13–39)
BASOPHILS # BLD AUTO: 0.06 THOUSANDS/ÂΜL (ref 0–0.1)
BASOPHILS NFR BLD AUTO: 1 % (ref 0–1)
BILIRUB SERPL-MCNC: 0.5 MG/DL (ref 0.2–1)
BILIRUB UR QL STRIP: NEGATIVE
BUN SERPL-MCNC: 9 MG/DL (ref 5–25)
CALCIUM SERPL-MCNC: 8.8 MG/DL (ref 8.4–10.2)
CHLORIDE SERPL-SCNC: 105 MMOL/L (ref 96–108)
CLARITY UR: CLEAR
CO2 SERPL-SCNC: 26 MMOL/L (ref 21–32)
COLOR UR: YELLOW
CREAT SERPL-MCNC: 0.97 MG/DL (ref 0.6–1.3)
EOSINOPHIL # BLD AUTO: 0.31 THOUSAND/ÂΜL (ref 0–0.61)
EOSINOPHIL NFR BLD AUTO: 4 % (ref 0–6)
ERYTHROCYTE [DISTWIDTH] IN BLOOD BY AUTOMATED COUNT: 16 % (ref 11.6–15.1)
EST. AVERAGE GLUCOSE BLD GHB EST-MCNC: 120 MG/DL
GFR SERPL CREATININE-BSD FRML MDRD: 97 ML/MIN/1.73SQ M
GLUCOSE P FAST SERPL-MCNC: 93 MG/DL (ref 65–99)
GLUCOSE UR STRIP-MCNC: NEGATIVE MG/DL
HBA1C MFR BLD: 5.8 %
HCT VFR BLD AUTO: 40.7 % (ref 36.5–49.3)
HGB BLD-MCNC: 12 G/DL (ref 12–17)
HGB UR QL STRIP.AUTO: NEGATIVE
IMM GRANULOCYTES # BLD AUTO: 0.02 THOUSAND/UL (ref 0–0.2)
IMM GRANULOCYTES NFR BLD AUTO: 0 % (ref 0–2)
INR PPP: 1.02 (ref 0.84–1.19)
KETONES UR STRIP-MCNC: NEGATIVE MG/DL
LEUKOCYTE ESTERASE UR QL STRIP: NEGATIVE
LYMPHOCYTES # BLD AUTO: 2.68 THOUSANDS/ÂΜL (ref 0.6–4.47)
LYMPHOCYTES NFR BLD AUTO: 35 % (ref 14–44)
MCH RBC QN AUTO: 23.8 PG (ref 26.8–34.3)
MCHC RBC AUTO-ENTMCNC: 29.5 G/DL (ref 31.4–37.4)
MCV RBC AUTO: 81 FL (ref 82–98)
MONOCYTES # BLD AUTO: 0.69 THOUSAND/ÂΜL (ref 0.17–1.22)
MONOCYTES NFR BLD AUTO: 9 % (ref 4–12)
NEUTROPHILS # BLD AUTO: 3.96 THOUSANDS/ÂΜL (ref 1.85–7.62)
NEUTS SEG NFR BLD AUTO: 51 % (ref 43–75)
NITRITE UR QL STRIP: NEGATIVE
NRBC BLD AUTO-RTO: 0 /100 WBCS
PH UR STRIP.AUTO: 6.5 [PH]
PLATELET # BLD AUTO: 185 THOUSANDS/UL (ref 149–390)
PMV BLD AUTO: 11.6 FL (ref 8.9–12.7)
POTASSIUM SERPL-SCNC: 4 MMOL/L (ref 3.5–5.3)
PROT SERPL-MCNC: 6.5 G/DL (ref 6.4–8.4)
PROT UR STRIP-MCNC: NEGATIVE MG/DL
PROTHROMBIN TIME: 13.8 SECONDS (ref 11.6–14.5)
RBC # BLD AUTO: 5.05 MILLION/UL (ref 3.88–5.62)
SODIUM SERPL-SCNC: 136 MMOL/L (ref 135–147)
SP GR UR STRIP.AUTO: 1.01 (ref 1–1.03)
UROBILINOGEN UR STRIP-ACNC: <2 MG/DL
WBC # BLD AUTO: 7.72 THOUSAND/UL (ref 4.31–10.16)

## 2024-01-22 PROCEDURE — 85025 COMPLETE CBC W/AUTO DIFF WBC: CPT

## 2024-01-22 PROCEDURE — 83036 HEMOGLOBIN GLYCOSYLATED A1C: CPT

## 2024-01-22 PROCEDURE — 85610 PROTHROMBIN TIME: CPT

## 2024-01-22 PROCEDURE — 36415 COLL VENOUS BLD VENIPUNCTURE: CPT

## 2024-01-22 PROCEDURE — 85730 THROMBOPLASTIN TIME PARTIAL: CPT

## 2024-01-22 PROCEDURE — 80053 COMPREHEN METABOLIC PANEL: CPT

## 2024-01-23 LAB
ATRIAL RATE: 88 BPM
P AXIS: 39 DEGREES
PR INTERVAL: 146 MS
QRS AXIS: 33 DEGREES
QRSD INTERVAL: 96 MS
QT INTERVAL: 364 MS
QTC INTERVAL: 440 MS
T WAVE AXIS: 36 DEGREES
VENTRICULAR RATE: 88 BPM

## 2024-01-25 ENCOUNTER — TELEPHONE (OUTPATIENT)
Dept: FAMILY MEDICINE CLINIC | Facility: HOSPITAL | Age: 39
End: 2024-01-25

## 2024-01-25 NOTE — TELEPHONE ENCOUNTER
Elizabeth from  Neurosurgery called asking if cb reviewed labs/EKG done on pt for clearance for surgery.  He did and addended his 1/9/24 ov note.  I called Elizabeth back and gave her this info.

## 2024-01-26 NOTE — PRE-PROCEDURE INSTRUCTIONS
Pre-Surgery Instructions:   Medication Instructions    acetaminophen (TYLENOL) 325 mg tablet Uses PRN- OK to take day of surgery    ADDERALL XR, 20MG, 20 MG 24 hr capsule Hold day of surgery.    azelastine (ASTELIN) 0.1 % nasal spray Take day of surgery.    DEXILANT 60 MG capsule Take day of surgery.    gabapentin (NEURONTIN) 600 MG tablet Take day of surgery.    hydrOXYzine HCL (ATARAX) 50 mg tablet Uses PRN- OK to take day of surgery    ibuprofen (MOTRIN) 800 mg tablet Stop taking 3 days prior to surgery.    losartan-hydrochlorothiazide (HYZAAR) 50-12.5 mg per tablet Hold day of surgery.    methylphenidate (CONCERTA) 36 MG ER tablet Hold day of surgery.    nicotine (NICODERM CQ) 7 mg/24hr TD 24 hr patch Take day of surgery.    oxyCODONE-acetaminophen (PERCOCET) 5-325 mg per tablet Uses PRN- OK to take day of surgery    OxyCONTIN 15 MG 12 hr tablet Take day of surgery.    risperiDONE (RisperDAL) 0.5 mg tablet Take day of surgery.    risperiDONE (RisperDAL) 4 mg tablet Take night before surgery    testosterone cypionate (DEPO-TESTOSTERONE) 200 mg/mL SOLN Continue normal schedule    zolpidem (AMBIEN) 10 mg tablet Take night before surgery   Medication instructions for day surgery reviewed. Please use only a sip of water to take your instructed medications. Avoid all over the counter vitamins, supplements and NSAIDS for one week prior to surgery per anesthesia guidelines. Tylenol is ok to take as needed.     You will receive a call one business day prior to surgery with an arrival time and hospital directions. If your surgery is scheduled on a Monday, the hospital will be calling you on the Friday prior to your surgery. If you have not heard from anyone by 8pm, please call the hospital supervisor through the hospital  at [phone]. (Ranjith [phone]).    Do not eat or drink anything after midnight the night before your surgery, including candy, mints, lifesavers, or chewing gum. Do not drink alcohol  24hrs before your surgery. Try not to smoke at least 24hrs before your surgery.       Follow the pre surgery showering instructions as listed in the “My Surgical Experience Booklet” or otherwise provided by your surgeon's office. Do not use a blade to shave the surgical area 1 week before surgery. It is okay to use a clean electric clippers up to 24 hours before surgery. Do not apply any lotions, creams, including makeup, cologne, deodorant, or perfumes after showering on the day of your surgery. Do not use dry shampoo, hair spray, hair gel, or any type of hair products.     No contact lenses, eye make-up, or artificial eyelashes. Remove nail polish, including gel polish, and any artificial, gel, or acrylic nails if possible. Remove all jewelry including rings and body piercing jewelry.     Wear causal clothing that is easy to take on and off. Consider your type of surgery.    Keep any valuables, jewelry, piercings at home. Please bring any specially ordered equipment (sling, braces) if indicated.    Arrange for a responsible person to drive you to and from the hospital on the day of your surgery. Visitor Guidelines discussed.     Call the surgeon's office with any new illnesses, exposures, or additional questions prior to surgery.    Please reference your “My Surgical Experience Booklet” for additional information to prepare for your upcoming surgery.

## 2024-01-27 ENCOUNTER — AMB VIDEO VISIT (OUTPATIENT)
Dept: OTHER | Facility: HOSPITAL | Age: 39
End: 2024-01-27
Payer: COMMERCIAL

## 2024-01-27 DIAGNOSIS — J01.00 ACUTE NON-RECURRENT MAXILLARY SINUSITIS: Primary | ICD-10-CM

## 2024-01-27 PROCEDURE — 99212 OFFICE O/P EST SF 10 MIN: CPT | Performed by: PHYSICIAN ASSISTANT

## 2024-01-27 RX ORDER — CEFDINIR 300 MG/1
300 CAPSULE ORAL EVERY 12 HOURS SCHEDULED
Qty: 14 CAPSULE | Refills: 0 | Status: SHIPPED | OUTPATIENT
Start: 2024-01-27 | End: 2024-02-03

## 2024-01-27 NOTE — PROGRESS NOTES
"Required Documentation:  Encounter provider Sharlene Huerta PA-C    Provider located at [facility]  VIRTUAL CARE   [address] [facility] [address]    Identify all parties in room with patient during virtual visit:  No one else    The patient was identified by name and date of birth. Benjamin Muñiz was informed that this is a telemedicine visit and that the visit is being conducted through the [employer] platform. He agrees to proceed..  My office door was closed. No one else was in the room.  He acknowledged consent and understanding of privacy and security of the video platform. The patient has agreed to participate and understands they can discontinue the visit at any time.    Verification of patient location:    Patient is located at home in the following state in which I hold an active license [place]    Patient is aware this is a billable service.     Reason for visit is No chief complaint on file.       Subjective  HPI   Patient states he got his sinus congestion, headache, cougihng up green mucus.  He was good for a week and then symptoms returns.  He denies fevers, SOB, CP.  He did not do a covid test.     Past Medical History:   Diagnosis Date    ADD (attention deficit disorder)     Anesthesia complication     \"Takes a lot to get me down\"    Arthritis     Chronic pain     Chronic pain disorder     Depression     GERD (gastroesophageal reflux disease)     Heartburn     Hypertension     Mood disorder (HCC)     Tremor        Past Surgical History:   Procedure Laterality Date    CLOSED REDUCTION CALCANEAL FRACTURE      COLONOSCOPY      PERONEAL TENDON EXPLORATION          No Known Allergies    Review of Systems   Constitutional: Negative.    HENT:  Positive for congestion, sinus pressure, sinus pain and sore throat.    Respiratory:  Positive for cough. Negative for shortness of breath.    Cardiovascular: Negative.    Gastrointestinal: Negative.  "   Musculoskeletal: Negative.    Neurological:  Positive for headaches.   Psychiatric/Behavioral: Negative.         Video Exam    There were no vitals filed for this visit.    Physical Exam  Constitutional:       General: He is not in acute distress.     Appearance: Normal appearance. He is not ill-appearing, toxic-appearing or diaphoretic.   HENT:      Head: Normocephalic and atraumatic.      Nose: Congestion present.   Pulmonary:      Effort: Pulmonary effort is normal. No respiratory distress.   Skin:     General: Skin is dry.   Neurological:      General: No focal deficit present.      Mental Status: He is alert and oriented to person, place, and time.   Psychiatric:         Mood and Affect: Mood normal.         Behavior: Behavior normal.         Visit Time  Total Visit Duration: 5 minutes    Assessment/Plan:    Diagnoses and all orders for this visit:    Acute non-recurrent maxillary sinusitis  -     cefdinir (OMNICEF) 300 mg capsule; Take 1 capsule (300 mg total) by mouth every 12 (twelve) hours for 7 days        Patient Instructions   Sinusitis   WHAT YOU NEED TO KNOW:   Sinusitis is inflammation or infection of your sinuses. Sinusitis is most often caused by a virus. Acute sinusitis may last up to 12 weeks. Chronic sinusitis lasts longer than 12 weeks. Recurrent sinusitis means you have 4 or more infections in 1 year.        DISCHARGE INSTRUCTIONS:   Return to the emergency department if:   You have trouble breathing or wheezing that is getting worse.    You have a stiff neck, a fever, or a bad headache.     You cannot open your eye.     Your eyeball bulges out or you cannot move your eye.     You are more sleepy than normal, or you notice changes in your ability to think, move, or talk.    You have swelling of your forehead or scalp.    Call your doctor if:   You have vision changes, such as double vision.    Your eye and eyelid are red, swollen, and painful.     Your symptoms do not improve or go away after  10 days.    You have nausea and are vomiting.    Your nose is bleeding.    You have questions or concerns about your condition or care.    Medicines:  Your symptoms may go away on their own. Your healthcare provider may recommend watchful waiting for up to 10 days before starting antibiotics. You may need any of the following:  Acetaminophen  decreases pain and fever. It is available without a doctor's order. Ask how much to take and how often to take it. Follow directions. Read the labels of all other medicines you are using to see if they also contain acetaminophen, or ask your doctor or pharmacist. Acetaminophen can cause liver damage if not taken correctly.    NSAIDs , such as ibuprofen, help decrease swelling, pain, and fever. This medicine is available with or without a doctor's order. NSAIDs can cause stomach bleeding or kidney problems in certain people. If you take blood thinner medicine, always ask your healthcare provider if NSAIDs are safe for you. Always read the medicine label and follow directions.    Nasal steroid sprays  may help decrease inflammation in your nose and sinuses.    Decongestants  help reduce swelling and drain mucus in the nose and sinuses. They may help you breathe easier.     Antihistamines  help dry mucus in the nose and relieve sneezing.     Antibiotics  help treat or prevent a bacterial infection.    Take your medicine as directed.  Contact your healthcare provider if you think your medicine is not helping or if you have side effects. Tell your provider if you are allergic to any medicine. Keep a list of the medicines, vitamins, and herbs you take. Include the amounts, and when and why you take them. Bring the list or the pill bottles to follow-up visits. Carry your medicine list with you in case of an emergency.    Self-care:   Rinse your sinuses as directed.  Use a sinus rinse device to rinse your nasal passages with a saline (salt water) solution or distilled water. Do not use  tap water. This will help thin the mucus in your nose and rinse away pollen and dirt. It will also help reduce swelling so you can breathe normally.    Use a humidifier  to increase air moisture in your home. This may make it easier for you to breathe and help decrease your cough.     Sleep with your head elevated.  Place an extra pillow under your head before you go to sleep to help your sinuses drain.     Drink liquids as directed.  Ask your healthcare provider how much liquid to drink each day and which liquids are best for you. Liquids will thin the mucus in your nose and help it drain. Avoid drinks that contain alcohol or caffeine.     Do not smoke, and avoid secondhand smoke.  Nicotine and other chemicals in cigarettes and cigars can make your symptoms worse. Ask your healthcare provider for information if you currently smoke and need help to quit. E-cigarettes or smokeless tobacco still contain nicotine. Talk to your healthcare provider before you use these products.    Prevent the spread of germs:   Wash your hands often with soap and water.  Wash your hands after you use the bathroom, change a child's diaper, or sneeze. Wash your hands before you prepare or eat food.         Stay away from people who are sick.  Some germs spread easily and quickly through contact.    Follow up with your doctor as directed:  You may be referred to an ear, nose, and throat specialist. Write down your questions so you remember to ask them during your visits.   © Copyright [employer] 2023 Information is for End User's use only and may not be sold, redistributed or otherwise used for commercial purposes.  The above information is an  only. It is not intended as medical advice for individual conditions or treatments. Talk to your doctor, nurse or pharmacist before following any medical regimen to see if it is safe and effective for you.

## 2024-01-27 NOTE — CARE ANYWHERE EVISITS
Visit Summary for NAYA MTZ - Gender: Male - Date of Birth: 1985  Date: [id number] - Duration: 2 minutes  Patient: NAYA MTZ  Provider: Sharlene Huerta PA-C    Patient Contact Information  Address  [address] [place]; [place] [address]      Visit Topics  Headache [Added By: Self - 2024-01-27]  Flu-Like Symptoms [Added By: Self - 2024-01-27]  Cold [Added By: Self - 2024-01-27]    Triage Questions   What is your current physical address in the event of a medical emergency? Answer []  Are you allergic to any medications? Answer []  Are you now or could you be pregnant? Answer []  Do you have any immune system compromise or chronic lung   disease? Answer []  Do you have any vulnerable family members in the home (infant, pregnant, cancer, elderly)? Answer []     Conversation Transcripts  [0A][0A] [Notification] You are connected with Sharlene Huerta PA-C, Urgent Care Specialist.[0A][Notification] NAYA MTZ is located in [place].[0A][Notification] NAYA MTZ has shared health history...[0A]    Diagnosis  Acute maxillary sinusitis, unspecified    Procedures  Value: [id number] Code: CPT-4 UNLISTED E&M SERVICE    Medications Prescribed    No prescriptions ordered    Electronically signed by: Sharlene Huerta PA-C(NPI [id number])

## 2024-01-31 DIAGNOSIS — M54.16 LUMBAR RADICULOPATHY: Primary | ICD-10-CM

## 2024-01-31 RX ORDER — METHYLPREDNISOLONE 4 MG/1
TABLET ORAL
Qty: 1 EACH | Refills: 0 | Status: SHIPPED | OUTPATIENT
Start: 2024-01-31 | End: 2024-01-31

## 2024-01-31 RX ORDER — CYCLOBENZAPRINE HCL 10 MG
10 TABLET ORAL 3 TIMES DAILY PRN
Qty: 21 TABLET | Refills: 0 | Status: SHIPPED | OUTPATIENT
Start: 2024-01-31 | End: 2024-01-31

## 2024-01-31 RX ORDER — CYCLOBENZAPRINE HCL 10 MG
10 TABLET ORAL 3 TIMES DAILY PRN
Qty: 21 TABLET | Refills: 0 | Status: SHIPPED | OUTPATIENT
Start: 2024-01-31 | End: 2024-02-06

## 2024-01-31 RX ORDER — METHYLPREDNISOLONE 4 MG/1
TABLET ORAL
Qty: 1 EACH | Refills: 0 | Status: SHIPPED | OUTPATIENT
Start: 2024-01-31

## 2024-02-05 ENCOUNTER — ANESTHESIA EVENT (OUTPATIENT)
Dept: PERIOP | Facility: HOSPITAL | Age: 39
End: 2024-02-05
Payer: COMMERCIAL

## 2024-02-05 PROBLEM — E66.9 OBESITY (BMI 35.0-39.9 WITHOUT COMORBIDITY): Status: ACTIVE | Noted: 2024-02-05

## 2024-02-06 ENCOUNTER — HOSPITAL ENCOUNTER (OUTPATIENT)
Facility: HOSPITAL | Age: 39
Setting detail: OUTPATIENT SURGERY
Discharge: HOME | End: 2024-02-06
Attending: STUDENT IN AN ORGANIZED HEALTH CARE EDUCATION/TRAINING PROGRAM | Admitting: STUDENT IN AN ORGANIZED HEALTH CARE EDUCATION/TRAINING PROGRAM
Payer: COMMERCIAL

## 2024-02-06 ENCOUNTER — APPOINTMENT (OUTPATIENT)
Dept: RADIOLOGY | Facility: HOSPITAL | Age: 39
End: 2024-02-06
Payer: COMMERCIAL

## 2024-02-06 ENCOUNTER — ANESTHESIA (OUTPATIENT)
Dept: PERIOP | Facility: HOSPITAL | Age: 39
End: 2024-02-06
Payer: COMMERCIAL

## 2024-02-06 VITALS
RESPIRATION RATE: 14 BRPM | HEART RATE: 84 BPM | SYSTOLIC BLOOD PRESSURE: 149 MMHG | WEIGHT: 282.19 LBS | TEMPERATURE: 97.1 F | BODY MASS INDEX: 35.09 KG/M2 | HEIGHT: 75 IN | OXYGEN SATURATION: 95 % | DIASTOLIC BLOOD PRESSURE: 86 MMHG

## 2024-02-06 DIAGNOSIS — M54.17 LUMBOSACRAL RADICULOPATHY: Primary | ICD-10-CM

## 2024-02-06 PROCEDURE — 72100 X-RAY EXAM L-S SPINE 2/3 VWS: CPT

## 2024-02-06 PROCEDURE — 63047 LAM FACETEC & FORAMOT LUMBAR: CPT | Performed by: STUDENT IN AN ORGANIZED HEALTH CARE EDUCATION/TRAINING PROGRAM

## 2024-02-06 PROCEDURE — 99024 POSTOP FOLLOW-UP VISIT: CPT | Performed by: STUDENT IN AN ORGANIZED HEALTH CARE EDUCATION/TRAINING PROGRAM

## 2024-02-06 RX ORDER — HYDROMORPHONE HCL/PF 1 MG/ML
0.5 SYRINGE (ML) INJECTION
Status: DISCONTINUED | OUTPATIENT
Start: 2024-02-06 | End: 2024-02-06 | Stop reason: HOSPADM

## 2024-02-06 RX ORDER — CHLORHEXIDINE GLUCONATE ORAL RINSE 1.2 MG/ML
15 SOLUTION DENTAL ONCE
Status: COMPLETED | OUTPATIENT
Start: 2024-02-06 | End: 2024-02-06

## 2024-02-06 RX ORDER — SODIUM CHLORIDE, SODIUM LACTATE, POTASSIUM CHLORIDE, CALCIUM CHLORIDE 600; 310; 30; 20 MG/100ML; MG/100ML; MG/100ML; MG/100ML
INJECTION, SOLUTION INTRAVENOUS CONTINUOUS PRN
Status: DISCONTINUED | OUTPATIENT
Start: 2024-02-06 | End: 2024-02-06

## 2024-02-06 RX ORDER — PROPOFOL 10 MG/ML
INJECTION, EMULSION INTRAVENOUS CONTINUOUS PRN
Status: DISCONTINUED | OUTPATIENT
Start: 2024-02-06 | End: 2024-02-06

## 2024-02-06 RX ORDER — KETAMINE HCL IN NACL, ISO-OSM 100MG/10ML
SYRINGE (ML) INJECTION AS NEEDED
Status: DISCONTINUED | OUTPATIENT
Start: 2024-02-06 | End: 2024-02-06

## 2024-02-06 RX ORDER — METHYLPREDNISOLONE ACETATE 40 MG/ML
INJECTION, SUSPENSION INTRA-ARTICULAR; INTRALESIONAL; INTRAMUSCULAR; SOFT TISSUE AS NEEDED
Status: DISCONTINUED | OUTPATIENT
Start: 2024-02-06 | End: 2024-02-06 | Stop reason: HOSPADM

## 2024-02-06 RX ORDER — LIDOCAINE HYDROCHLORIDE 10 MG/ML
INJECTION, SOLUTION EPIDURAL; INFILTRATION; INTRACAUDAL; PERINEURAL AS NEEDED
Status: DISCONTINUED | OUTPATIENT
Start: 2024-02-06 | End: 2024-02-06

## 2024-02-06 RX ORDER — PROPOFOL 10 MG/ML
INJECTION, EMULSION INTRAVENOUS AS NEEDED
Status: DISCONTINUED | OUTPATIENT
Start: 2024-02-06 | End: 2024-02-06

## 2024-02-06 RX ORDER — ROCURONIUM BROMIDE 10 MG/ML
INJECTION, SOLUTION INTRAVENOUS AS NEEDED
Status: DISCONTINUED | OUTPATIENT
Start: 2024-02-06 | End: 2024-02-06

## 2024-02-06 RX ORDER — DEXAMETHASONE SODIUM PHOSPHATE 10 MG/ML
INJECTION, SOLUTION INTRAMUSCULAR; INTRAVENOUS AS NEEDED
Status: DISCONTINUED | OUTPATIENT
Start: 2024-02-06 | End: 2024-02-06

## 2024-02-06 RX ORDER — FENTANYL CITRATE/PF 50 MCG/ML
50 SYRINGE (ML) INJECTION
Status: DISCONTINUED | OUTPATIENT
Start: 2024-02-06 | End: 2024-02-06 | Stop reason: HOSPADM

## 2024-02-06 RX ORDER — MIDAZOLAM HYDROCHLORIDE 2 MG/2ML
INJECTION, SOLUTION INTRAMUSCULAR; INTRAVENOUS AS NEEDED
Status: DISCONTINUED | OUTPATIENT
Start: 2024-02-06 | End: 2024-02-06

## 2024-02-06 RX ORDER — ONDANSETRON 2 MG/ML
4 INJECTION INTRAMUSCULAR; INTRAVENOUS ONCE AS NEEDED
Status: DISCONTINUED | OUTPATIENT
Start: 2024-02-06 | End: 2024-02-06 | Stop reason: HOSPADM

## 2024-02-06 RX ORDER — CYCLOBENZAPRINE HCL 10 MG
10 TABLET ORAL 3 TIMES DAILY PRN
Qty: 30 TABLET | Refills: 0 | Status: SHIPPED | OUTPATIENT
Start: 2024-02-06

## 2024-02-06 RX ORDER — LIDOCAINE HYDROCHLORIDE AND EPINEPHRINE 10; 10 MG/ML; UG/ML
INJECTION, SOLUTION INFILTRATION; PERINEURAL AS NEEDED
Status: DISCONTINUED | OUTPATIENT
Start: 2024-02-06 | End: 2024-02-06 | Stop reason: HOSPADM

## 2024-02-06 RX ORDER — SODIUM CHLORIDE, SODIUM LACTATE, POTASSIUM CHLORIDE, CALCIUM CHLORIDE 600; 310; 30; 20 MG/100ML; MG/100ML; MG/100ML; MG/100ML
50 INJECTION, SOLUTION INTRAVENOUS CONTINUOUS
Status: DISCONTINUED | OUTPATIENT
Start: 2024-02-06 | End: 2024-02-06 | Stop reason: HOSPADM

## 2024-02-06 RX ORDER — ONDANSETRON 2 MG/ML
INJECTION INTRAMUSCULAR; INTRAVENOUS AS NEEDED
Status: DISCONTINUED | OUTPATIENT
Start: 2024-02-06 | End: 2024-02-06

## 2024-02-06 RX ORDER — LABETALOL HYDROCHLORIDE 5 MG/ML
INJECTION, SOLUTION INTRAVENOUS AS NEEDED
Status: DISCONTINUED | OUTPATIENT
Start: 2024-02-06 | End: 2024-02-06

## 2024-02-06 RX ORDER — CEFAZOLIN SODIUM 1 G/50ML
1000 SOLUTION INTRAVENOUS ONCE
Status: COMPLETED | OUTPATIENT
Start: 2024-02-06 | End: 2024-02-06

## 2024-02-06 RX ORDER — HYDROMORPHONE HCL/PF 1 MG/ML
SYRINGE (ML) INJECTION AS NEEDED
Status: DISCONTINUED | OUTPATIENT
Start: 2024-02-06 | End: 2024-02-06

## 2024-02-06 RX ORDER — FENTANYL CITRATE 50 UG/ML
INJECTION, SOLUTION INTRAMUSCULAR; INTRAVENOUS AS NEEDED
Status: DISCONTINUED | OUTPATIENT
Start: 2024-02-06 | End: 2024-02-06

## 2024-02-06 RX ORDER — GLYCOPYRROLATE 0.2 MG/ML
INJECTION INTRAMUSCULAR; INTRAVENOUS AS NEEDED
Status: DISCONTINUED | OUTPATIENT
Start: 2024-02-06 | End: 2024-02-06

## 2024-02-06 RX ORDER — CEFAZOLIN SODIUM 2 G/50ML
2000 SOLUTION INTRAVENOUS ONCE
Status: COMPLETED | OUTPATIENT
Start: 2024-02-06 | End: 2024-02-06

## 2024-02-06 RX ORDER — OXYCODONE HYDROCHLORIDE AND ACETAMINOPHEN 5; 325 MG/1; MG/1
1 TABLET ORAL EVERY 4 HOURS PRN
Qty: 30 TABLET | Refills: 0 | Status: SHIPPED | OUTPATIENT
Start: 2024-02-06 | End: 2024-02-16

## 2024-02-06 RX ADMIN — LIDOCAINE HYDROCHLORIDE 100 MG: 10 INJECTION, SOLUTION EPIDURAL; INFILTRATION; INTRACAUDAL; PERINEURAL at 07:35

## 2024-02-06 RX ADMIN — LABETALOL HYDROCHLORIDE 5 MG: 5 INJECTION, SOLUTION INTRAVENOUS at 09:36

## 2024-02-06 RX ADMIN — SUGAMMADEX 200 MG: 100 INJECTION, SOLUTION INTRAVENOUS at 09:55

## 2024-02-06 RX ADMIN — Medication 20 MG: at 07:35

## 2024-02-06 RX ADMIN — ROCURONIUM BROMIDE 10 MG: 10 INJECTION, SOLUTION INTRAVENOUS at 09:10

## 2024-02-06 RX ADMIN — MIDAZOLAM 2 MG: 1 INJECTION INTRAMUSCULAR; INTRAVENOUS at 07:26

## 2024-02-06 RX ADMIN — DEXMEDETOMIDINE 4 MCG: 100 INJECTION, SOLUTION, CONCENTRATE INTRAVENOUS at 08:02

## 2024-02-06 RX ADMIN — ONDANSETRON 4 MG: 2 INJECTION INTRAMUSCULAR; INTRAVENOUS at 07:30

## 2024-02-06 RX ADMIN — DEXAMETHASONE SODIUM PHOSPHATE 10 MG: 10 INJECTION, SOLUTION INTRAMUSCULAR; INTRAVENOUS at 07:54

## 2024-02-06 RX ADMIN — ROCURONIUM BROMIDE 20 MG: 10 INJECTION, SOLUTION INTRAVENOUS at 08:42

## 2024-02-06 RX ADMIN — Medication 20 MG: at 07:55

## 2024-02-06 RX ADMIN — DEXMEDETOMIDINE 4 MCG: 100 INJECTION, SOLUTION, CONCENTRATE INTRAVENOUS at 08:31

## 2024-02-06 RX ADMIN — DEXMEDETOMIDINE 4 MCG: 100 INJECTION, SOLUTION, CONCENTRATE INTRAVENOUS at 08:21

## 2024-02-06 RX ADMIN — DEXMEDETOMIDINE 4 MCG: 100 INJECTION, SOLUTION, CONCENTRATE INTRAVENOUS at 08:08

## 2024-02-06 RX ADMIN — HYDROMORPHONE HYDROCHLORIDE 0.25 MG: 1 INJECTION, SOLUTION INTRAMUSCULAR; INTRAVENOUS; SUBCUTANEOUS at 08:39

## 2024-02-06 RX ADMIN — DEXMEDETOMIDINE 4 MCG: 100 INJECTION, SOLUTION, CONCENTRATE INTRAVENOUS at 09:35

## 2024-02-06 RX ADMIN — FENTANYL CITRATE 100 MCG: 50 INJECTION, SOLUTION INTRAMUSCULAR; INTRAVENOUS at 07:30

## 2024-02-06 RX ADMIN — PROPOFOL 50 MG: 10 INJECTION, EMULSION INTRAVENOUS at 07:42

## 2024-02-06 RX ADMIN — CEFAZOLIN SODIUM 2000 MG: 2 SOLUTION INTRAVENOUS at 07:48

## 2024-02-06 RX ADMIN — DEXMEDETOMIDINE 4 MCG: 100 INJECTION, SOLUTION, CONCENTRATE INTRAVENOUS at 09:10

## 2024-02-06 RX ADMIN — CHLORHEXIDINE GLUCONATE 15 ML: 1.2 SOLUTION ORAL at 06:39

## 2024-02-06 RX ADMIN — LABETALOL HYDROCHLORIDE 5 MG: 5 INJECTION, SOLUTION INTRAVENOUS at 09:53

## 2024-02-06 RX ADMIN — HYDROMORPHONE HYDROCHLORIDE 0.5 MG: 1 INJECTION, SOLUTION INTRAMUSCULAR; INTRAVENOUS; SUBCUTANEOUS at 09:04

## 2024-02-06 RX ADMIN — LABETALOL HYDROCHLORIDE 10 MG: 5 INJECTION, SOLUTION INTRAVENOUS at 07:42

## 2024-02-06 RX ADMIN — PROPOFOL 200 MG: 10 INJECTION, EMULSION INTRAVENOUS at 07:35

## 2024-02-06 RX ADMIN — ROCURONIUM BROMIDE 100 MG: 10 INJECTION, SOLUTION INTRAVENOUS at 07:35

## 2024-02-06 RX ADMIN — SODIUM CHLORIDE, SODIUM LACTATE, POTASSIUM CHLORIDE, AND CALCIUM CHLORIDE: .6; .31; .03; .02 INJECTION, SOLUTION INTRAVENOUS at 07:17

## 2024-02-06 RX ADMIN — GLYCOPYRROLATE 0.1 MG: 0.2 INJECTION INTRAMUSCULAR; INTRAVENOUS at 07:26

## 2024-02-06 RX ADMIN — CEFAZOLIN SODIUM 1000 MG: 1 SOLUTION INTRAVENOUS at 07:48

## 2024-02-06 RX ADMIN — HYDROMORPHONE HYDROCHLORIDE 0.25 MG: 1 INJECTION, SOLUTION INTRAMUSCULAR; INTRAVENOUS; SUBCUTANEOUS at 08:42

## 2024-02-06 RX ADMIN — Medication 10 MG: at 08:09

## 2024-02-06 RX ADMIN — DEXMEDETOMIDINE 4 MCG: 100 INJECTION, SOLUTION, CONCENTRATE INTRAVENOUS at 08:14

## 2024-02-06 RX ADMIN — SODIUM CHLORIDE, SODIUM LACTATE, POTASSIUM CHLORIDE, AND CALCIUM CHLORIDE: .6; .31; .03; .02 INJECTION, SOLUTION INTRAVENOUS at 10:07

## 2024-02-06 RX ADMIN — HYDROMORPHONE HYDROCHLORIDE 1 MG: 1 INJECTION, SOLUTION INTRAMUSCULAR; INTRAVENOUS; SUBCUTANEOUS at 07:43

## 2024-02-06 RX ADMIN — PROPOFOL 50 MCG/KG/MIN: 10 INJECTION, EMULSION INTRAVENOUS at 07:53

## 2024-02-06 RX ADMIN — PROPOFOL 50 MG: 10 INJECTION, EMULSION INTRAVENOUS at 07:38

## 2024-02-06 RX ADMIN — ROCURONIUM BROMIDE 10 MG: 10 INJECTION, SOLUTION INTRAVENOUS at 09:34

## 2024-02-06 NOTE — ANESTHESIA PREPROCEDURE EVALUATION
Procedure:  RIGHT L4-5 MIS FORAMINOTOMY (Right: Spine Lumbar)    Relevant Problems   CARDIO   (+) Primary hypertension      GI/HEPATIC   (+) Gastroesophageal reflux disease without esophagitis      MUSCULOSKELETAL   (+) Chronic right-sided low back pain with right-sided sciatica   (+) Primary localized osteoarthrosis of ankle and foot      NEURO/PSYCH   (+) Chronic right-sided low back pain with right-sided sciatica   (+) PTSD (post-traumatic stress disorder)      Other   (+) ADD (attention deficit disorder)   (+) Bipolar disorder (HCC)   (+) Narcotic dependency, continuous (HCC)   (+) Nicotine dependence   (+) Obesity (BMI 35.0-39.9 without comorbidity)   (+) Uncomplicated alcohol dependence (HCC)        Physical Exam    Airway    Mallampati score: II  TM Distance: >3 FB  Neck ROM: full     Dental        Cardiovascular      Pulmonary      Other Findings        Anesthesia Plan  ASA Score- 2     Anesthesia Type- general with ASA Monitors.         Additional Monitors:     Airway Plan: ETT.           Plan Factors-    Chart reviewed. EKG reviewed.  Existing labs reviewed. Patient summary reviewed.    Patient is a current smoker.              Induction- intravenous.    Postoperative Plan- Plan for postoperative opioid use.     Informed Consent- Anesthetic plan and risks discussed with patient.  I personally reviewed this patient with the CRNA. Discussed and agreed on the Anesthesia Plan with the CRNA..

## 2024-02-06 NOTE — ANESTHESIA POSTPROCEDURE EVALUATION
Post-Op Assessment Note    CV Status:  Stable    Pain management: adequate       Mental Status:  Sleepy and lethargic   Hydration Status:  Stable   PONV Controlled:  None   Airway Patency:  Patent     Post Op Vitals Reviewed: Yes    No anethesia notable event occurred.    Staff: CRNA               BP      Temp      Pulse     Resp      SpO2

## 2024-02-06 NOTE — DISCHARGE INSTR - AVS FIRST PAGE
Discharge Instructions  Lumbar decompression  (laminectomy/laminotomy/foraminotomy/discectomy)      Surgical incisional care:  Maintain dressing in place for 3 days. On postoperative day 3, dressing may be removed and incision may be left open to air.  Keep incision clean and dry. Avoid applying creams, lotion or antiseptic to incision area.  Check the wound daily. If the incision becomes red, swollen, tender, warm, or has increased drainage please notify physician immediately.  If steri-strips are in place, allow them to fall off. If still in place at two week postoperative visit, we will remove them.  May shower 3 days after surgery, but do not soak in a tub and no swimming.  Incision may be cleaned with water and a mild antimicrobial soap using a clean washcloth. Incision is to be gently patted dry with a clean towel.   Continue to change bed linens and pajamas more frequently. Wear clean clothes daily.     Activity Restrictions:  No heavy lifting greater than 10lbs and no strenuous activities until cleared.   No bending or twisting. No BLTs (bending, lifting, twisting). Avoid pushing/pulling movements.  May walk as tolerated. Encourage at least 4 short walks per day.   No driving for at least 2 weeks or until cleared by physician.    Postoperative medication:  Take pain medications to relieve incision pain, and muscle relaxants to prevent spasms as directed. Please see after visit summary (AVS) for details.   Do not operate heavy machinery or vehicles while taking sedating medications.  Use a bowel regimen while on opioids as they induce constipation. Ie. Senokot-S, Miralax, Colace, etc. Increase fiber and water intake.   Do not take ibuprofen, Naproxen/Aleve or any NSAID until cleared by surgeon. May take Tylenol instead.  If taking Coumadin, Aspirin, or Plavix, you may resume these medications when cleared by Neurosurgery.    Follow-up as scheduled for a 2 week incision check. Follow-up as scheduled for 6 week  postoperative visit.     **Please notify MD if you experience a fever 101F, chills or have increased pain, numbness, tingling, or weakness in your legs and/or bowel/bladder

## 2024-02-06 NOTE — H&P
"H&P reviewed. After examining the patient I find no changes in the patients condition since the H&P had been written.    Patient personally seen and examined.  Neurological examination unchanged compared to last office/progress note, with the following exceptions:    BP (!) 160/109   Pulse (!) 112   Temp 97.7 °F (36.5 °C) (Temporal)   Resp 18   Ht 6' 3\" (1.905 m)   Wt 128 kg (282 lb 3 oz)   SpO2 97%   BMI 35.27 kg/m²      A&OX3  Gaze conjugate  EOMI  FS  TM  Fcx4  5/5 BUE  5/5 BLE  SILT  No clonus  No garsia  No babinski.  Regular cardiac rate and rhythm.  No respiratory distress.  Abdomen nontender.  Normocephalic.    Post operative instructions and medications have been reviewed with the patient.    Assessment and Plan:    All questions have been answered to the patient satisfaction.  Plan to proceed with Right L4-5 foraminotomy. They are in agreement with proceeding.  "

## 2024-02-06 NOTE — OP NOTE
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  He had MRI lumbar spine that showed overall mild degenerative findings however he did have stenosis of the lateral gutter on the right side at L4-5 that appears to be compressing the traversing L5 nerve root.  He also had a small disc herniation far laterally at L4-5 that was abutting the exiting L4 nerve root however the disc herniation here was very small.  His symptoms fit more of an L5 distribution and given the findings of the lateral gutter at L4-5 I recommended a right-sided L4-5 laminotomy/foraminotomies to remove the lateral gutter stenosis of the traversing L5 nerve root.  I discussed risk and benefits along with alternatives, all questions were answered, he agreed to proceed and consent was obtained.    Patient was brought back to main operating room general endotracheal esthesia was induced.  Appropriate lines were placed.  Preoperative antibiotics given were Ancef.  He is placed prone on the operating room table and all pressure points were appropriately padded.  His lumbar area was prepped and draped in usual sterile  fashion and timeout was performed.  Intraoperative fluoroscopy was used to confirm the L4-5 level.  A 1 cm incision approximately 1 cm lateral to the right of midline was made over the L4-5 level using a 10 blade.  Dissection was carried down to the fascia using monopolar electrocautery.  Fascia was divided using monopolar electrocautery.  The minimally invasive tube dilators were inserted over the L4-5 level on the right side.  Once the tubes were dilated they were secured to the bed using the table mount.  Intraoperative fluoroscopy was used to confirm the correct level at L4-5 on the right side.  Upgoing curette was used to dissect the underlying ligamentum flavum away from the overlying L4 lamina.  M8 drill bit was used to complete a right-sided hemilaminotomy.  The M8 drill bit was used to complete a medial facetectomy as well.  The ligamentum was divided using upgoing curette and then resected using Kerrison rongeurs.  Upon resection of the ligamentum flavum and the medial portion of the L5 superior articular process, the traversing L5 nerve root was encountered.  Once the bone and ligament over the traversing L5 nerve root was removed I could see where the nerve root was indented and mildly erythematous where the previous compression was.  The ligament and medial facetectomy continued to be resected with Kerrison rongeurs superiorly and inferiorly.  I used a blunt nerve hook to track the descending L5 nerve root superiorly and inferiorly to ensure that it was well decompressed.  After the decompression the nerve root appeared flat, relaxed, and well decompressed and did not appear to be indented anymore.  The epidural bleeders were coagulated using bipolar electrocautery.  Floseal was used for more hemostasis.  The wound was irrigated with large amount sterile saline.  I then closed fascia with 0 Vicryl sutures.  Dermis was closed with 2-0 Vicryl sutures.  Skin was closed with running 4 oh strata  fix.    After procedure count was performed and all sponge instrument needle counts verified to be correct.  Patient awoke from general anesthesia and was transported to PACU in stable condition.   I was present for the entire procedure.    Patient Disposition:  PACU         SIGNATURE: Al Slade MD  DATE: February 6, 2024  TIME: 10:42 AM

## 2024-02-07 ENCOUNTER — TELEPHONE (OUTPATIENT)
Dept: NEUROSURGERY | Facility: CLINIC | Age: 39
End: 2024-02-07

## 2024-02-08 NOTE — TELEPHONE ENCOUNTER
Called patient to see how he is doing after surgery. Patient reports he is doing well overall and denies any incisional issues or fevers. Patient is able to ambulate around the house and complete ADLs. Educated the patient about the importance of preventing blood clots and provided measures how to prevent them.     Patient has moved his bowels since the surgery. Encouraged patient to take an over the counter stool softener, if he is taking narcotic pain medication. Encouraged fiber intake and fluids.    Reviewed incision care with the patient. Advised that after three days he may take a shower and gently wash the surgical site with soap and water. Use clean wash cloth, towels, and clothing. Do not submerge in water until cleared by the surgeon. Do not apply any creams, ointments, or lotions to the site.  Patient is aware to call the office if any redness, swelling, drainage, dehiscence of incision, or fever >100 F occurs.    Patient is aware to call the office if any concerns or questions may arise. Reminded patient of his upcoming appointments with the date/time/location. Patient was appreciative for the call.

## 2024-02-18 ENCOUNTER — TELEPHONE (OUTPATIENT)
Dept: OTHER | Facility: OTHER | Age: 39
End: 2024-02-18

## 2024-02-18 ENCOUNTER — AMB VIDEO VISIT (OUTPATIENT)
Dept: OTHER | Facility: HOSPITAL | Age: 39
End: 2024-02-18
Payer: COMMERCIAL

## 2024-02-18 VITALS — TEMPERATURE: 101.7 F

## 2024-02-18 DIAGNOSIS — U07.1 COVID: Primary | ICD-10-CM

## 2024-02-18 PROCEDURE — 99212 OFFICE O/P EST SF 10 MIN: CPT | Performed by: NURSE PRACTITIONER

## 2024-02-18 NOTE — CARE ANYWHERE EVISITS
OUTPATIENT PROGRESS NOTE  TRANSITIONAL CARE MANAGEMENT - HOSPITAL DISCHARGE FOLLOW-UP      CHIEF COMPLAINT  Transitional Care Management (Hospital Discharge Follow-Up)    The patient is is accompanied today by his spouse.    SUBJECTIVE   The patient was discharged from the hospital on 1/14/2022 with post discharge phone contact on 1/17/2022.  The Discharge Summary was reviewed. It documents that the patient was hospitalized for :    \"presented on 1/11/2022 with complaints of Foot Swelling and found to have cellulitis and acute gout flare      Patient with past medical history including essential hypertension, asthma with COPD, Gout as well as benign prostatic hypertrophy who presents to the emergency room with a four-day history of left ankle and foot pain with swelling and fever up to 103  He had scratch in his left foot few days prior but denies any trauma      Today   Significant improvement in left foot and ankle pain, less redness and less swelling  Was able to walk today and put weight on his left foot   No more fever    No chest pain or SOB  Has mild chronic cough   No abdominal pain, nausea or vomiting      Left foot and ankle cellulitis, improving    MRI negative for Osteomyelitis, no abscess collection, old chronic injuries and mild joint effusions (suspect secondary to gout flare)  Blood cultures negative to date   Discontinued iv fluids  CRP trended down  Started on Rocephin and Vancomycin iv, discontinued Rocephin, continued on iv Vancomycin, will switch to oral Doxycycline planning for 6 days         Acute gouty arthritis flare in Left ankle and foot, resolving    Will give another dose of steroids before discharge    Started on Colchicine, will continue for 2 weeks   CRP trended down  Significant improvement clinically on steroids and colchicine      Difficult ambulation secondary to the above PT/OT     Asthma/COPD -  Currently no acute flare.  continue on home inhalers      Obstructive sleep  Visit Summary for NAYA MTZ - Gender: Male - Date of Birth: 1985  Date: [id number] - Duration: 7 minutes  Patient: NAYA MTZ  Provider: Ania NEWELL    Patient Contact Information  Address  [address] [place]; [place] [address]      Visit Topics  Cold [Added By: Self - 2024-02-18]  Headache [Added By: Self - 2024-02-18]    Triage Questions   What is your current physical address in the event of a medical emergency? Answer []  Are you allergic to any medications? Answer []  Are you now or could you be pregnant? Answer []  Do you have any immune system compromise or chronic lung   disease? Answer []  Do you have any vulnerable family members in the home (infant, pregnant, cancer, elderly)? Answer []     Conversation Transcripts  [0A][0A] [Notification] You are connected with Ania NEWELL, Urgent Care Specialist.[0A][Notification] NAYA MTZ is located in [place].[0A][Notification] NAYA MTZ has shared health history...[0A]    Diagnosis  COVID-19    Procedures  Value: [id number] Code: CPT-4 UNLISTED E&M SERVICE    Medications Prescribed    No prescriptions ordered    Electronically signed by: Ania Abreu(NPI [id number])   apnea -  nightly CPAP     Morbid obesity - Counseled on weight loss.     Depression- continue on Prozac \".    Pertinent un-finalized hospital performed diagnostic tests - were reviewed..    Pertinent un-finalized hospital lab tests - were reviewed.    Advance Directives:  Power of  for healthcare is on file    Durable Medical Equipment/Assistive devices prescribed: None     MEDICATIONS  The discharge medication list was reviewed. Outpatient medications were updated today. He is fully compliant with the medication regimen prescribed at the time of discharge.    HISTORIES  I have personally reviewed and updated the following electronic medical record sections: Problem List and Past Medical History.    PHYSICAL EXAM  Visit Vitals  /70   Pulse 87   Wt (!) 151.5 kg (334 lb)   SpO2 95%   BMI 45.30 kg/m²     Physical Exam  Constitutional:       Appearance: Normal appearance. He is obese.   Musculoskeletal:         General: No swelling (bilateral ankles no erythema or swelling), tenderness (bilaterally non tender), deformity or signs of injury. Normal range of motion.   Neurological:      General: No focal deficit present.      Mental Status: He is alert and oriented to person, place, and time.          ASSESSMENT/PLAN  1. Hospital discharge follow-up    2. Hx of acute gouty arthritis    3. Chronic gout of left ankle, unspecified cause    4. Mild major depression (CMS/HCC)    5. Asthma with COPD (CMS/HCC)    6. Chronic pain of both knees    7. Sleep apnea, unspecified type    8. Morbid obesity due to excess calories (CMS/HCC)      • Obtained and reviewed the discharge information (e.g., discharge summary, as available, or continuity of care documents)   • We reviewed need for or follow-up on pending diagnostic tests and treatments  o repeat uric acid ordered  • Interacted with other qualified health care professionals who will assume or reassume care of the patient's system-specific problems  • Education  provided for patient, family, guardian, and/or caregiver  o Reviewed old records patient had been on allopurinol sometime for history of gout.  Dose [id number] mg p.o. daily.  In 2017 patient moved to the area and he thinks that this medication fell off in transition of cares.  o RESTART allopurinol after completion of colchicine.  o Repeat uric acid level ordered.  • Established referrals and arranging for needed community resources   • Assisted in scheduling any required follow-up with community clinicians and services.    Orders Placed This Encounter   • allopurinol (ZYLOPRIM) 100 MG tablet     Patient adherence to his treatment plan was assessed. He is   fully compliant with the entire discharge treatment plan.     Return in about 8 weeks (around 3/16/2022) for LAB only appointment for blood draw in March.    I wore procedure mask, protective eye wear, and gloves for the entirety of the exam.   Patient wore a mask.

## 2024-02-18 NOTE — TELEPHONE ENCOUNTER
Pt called to advise he has Covid, he doesn't know if he should still come in for his post-op appt tomorrow or if it can be virtual. Pt is about 30 mins drive from office. Please call asap to advise.

## 2024-02-18 NOTE — PATIENT INSTRUCTIONS
"     Home covid test is positive.  Some of your medications may interfer with paxlovid, call and discuss with PCP.   Rest and drink extra fluids.  Tylenol as needed for pain.  Go to ER with any worsening symptoms, chest pain, sob, difficulty breathing, lethargy, confusion, dehyrdration, persistent fever 103 or higher.       Please Be Courteous:  You are being tested for novel coronavirus (COVID-19) your test is pending at this time.  You need to self quarantine at least until you have the results back.  This means go home and stay home.  Have someone else  your medications for you and bring them to you and drop them off at your door.  Tests typically come back in 1-3 days. Results can be found in the \"COVID-19\" section of your [employer] account.     In Your Home:  If you live with other people, trying to avoid common spaces and disinfect areas that you come into contact with.  Per the CDC's recommendations: persons who suspect that they might have COVID-19 should isolate, stay at home, and use a separate bedroom and bathroom if feasible. Isolation should begin even before seeking testing and before test results become available. All household members should start wearing a mask in the home, particularly in shared spaces where appropriate distancing is not possible.      Take Care of Yourself:  Try to sleep on your stomach as much as possible.  If you have the ability to, take vitamin D3 2000 IU by mouth daily, vitamin-C 1000 mg by mouth twice a day, a multivitamin daily to help boost your immune system.  You should check your temperature twice day.  Return to the emergency department for any severe shortness of breath or pulse ox less than 90%.     If You Test Positive for COVID-19 (Isolate)     Everyone, regardless of vaccination status:     Stay home for 5 days.  If you have no symptoms or your symptoms are resolving after 5 days, you can leave your house.  Continue to wear a mask around others for 5 " additional days.     If you have a fever, continue to stay home until your fever resolves.     If You Were Exposed to Someone with COVID-19 (Quarantine)  If you:  Have been boosted  OR  Completed the primary series of Pfizer or Moderna vaccine within the last 6 months  OR  Completed the primary series of J&J vaccine within the last 2 months     Wear a mask around others for 10 days.  Test on day 5, if possible.     If you develop symptoms get a test and stay home.           If you:  Completed the primary series of Pfizer or Moderna vaccine over 6 months ago and are not boosted  OR  Completed the primary series of J&J over 2 months ago and are not boosted  OR  Are unvaccinated     Stay home for 5 days. After that continue to wear a mask around others for 5 additional days.  If you can’t quarantine you must wear a mask for 10 days.  Test on day 5 if possible.     If you develop symptoms get a test and stay home

## 2024-02-18 NOTE — PROGRESS NOTES
"Required Documentation:  Encounter provider REECE Claros    Provider located at [facility]  VIRTUAL CARE   [address] [place] [id number]    Identify all parties in room with patient during virtual visit:  No one else    The patient was identified by name and date of birth. Benjamin Muñiz was informed that this is a telemedicine visit and that the visit is being conducted through the Care Anywhere [employer] platform. He agrees to proceed..  My office door was closed. No one else was in the room.  He acknowledged consent and understanding of privacy and security of the video platform. The patient has agreed to participate and understands they can discontinue the visit at any time.    Verification of patient location:    Patient is located at Home in the following state in which I hold an active license [place]    Patient is aware this is a billable service.     Reason for visit is No chief complaint on file.       Subjective  This is a 38 year old male here today for video visit.  He is having fever, cough, headache.  He tested positive for covid today and symptoms started yesterday.  Wife did test positive.  No chest pain or sob.  He is eating and drinking okay.  He did back surgery about 1.5 weeks ago.  He is doing pretty well recovering surgery.             Past Medical History:   Diagnosis Date    ADD (attention deficit disorder)     Anesthesia complication     \"Takes a lot to get me down\"    Arthritis     Chronic pain     Chronic pain disorder     Depression     GERD (gastroesophageal reflux disease)     Heartburn     Hypertension     Mood disorder (HCC)     Tremor        Past Surgical History:   Procedure Laterality Date    CLOSED REDUCTION CALCANEAL FRACTURE      COLONOSCOPY      PERONEAL TENDON EXPLORATION      KS PADILLA FACETECTOMY & FORAMOTOMY 1 VRT SGM LUMBAR Right 2/6/2024    Procedure: RIGHT L4-5 MIS FORAMINOTOMY;  Surgeon: Al Slade MD;  Location: [facility] " MAIN OR;  Service: Neurosurgery        No Known Allergies    Review of Systems   Constitutional:  Positive for activity change, chills and fatigue.   HENT:  Positive for congestion.    Respiratory:  Positive for cough.    Gastrointestinal: Negative.    Psychiatric/Behavioral: Negative.         Video Exam    Vitals:    02/18/24 1034   Temp: (!) 101.7 °F (38.7 °C)       Physical Exam  Constitutional:       General: He is not in acute distress.     Appearance: Normal appearance. He is not ill-appearing or toxic-appearing.   HENT:      Head: Normocephalic.      Mouth/Throat:      Pharynx: No posterior oropharyngeal erythema.   Pulmonary:      Effort: Pulmonary effort is normal. No respiratory distress.      Comments: No audible wheezing, able to speak in full sentences.   Skin:     Comments: No rash on head or neck.    Neurological:      Mental Status: He is alert and oriented to person, place, and time.   Psychiatric:         Mood and Affect: Mood normal.         Behavior: Behavior normal.         Thought Content: Thought content normal.         Judgment: Judgment normal.         Visit Time  Total Visit Duration: 8 minutes    Assessment/Plan:    Diagnoses and all orders for this visit:    COVID        Patient Instructions          Home covid test is positive.  Some of your medications may interfer with paxlovid, call and discuss with PCP.   Rest and drink extra fluids.  Tylenol as needed for pain.  Go to ER with any worsening symptoms, chest pain, sob, difficulty breathing, lethargy, confusion, dehyrdration, persistent fever 103 or higher.       Please Be Courteous:  You are being tested for novel coronavirus (COVID-19) your test is pending at this time.  You need to self quarantine at least until you have the results back.  This means go home and stay home.  Have someone else  your medications for you and bring them to you and drop them off at your door.  Tests typically come back in 1-3 days. Results can be  "found in the \"COVID-19\" section of your [employer] account.     In Your Home:  If you live with other people, trying to avoid common spaces and disinfect areas that you come into contact with.  Per the CDC's recommendations: persons who suspect that they might have COVID-19 should isolate, stay at home, and use a separate bedroom and bathroom if feasible. Isolation should begin even before seeking testing and before test results become available. All household members should start wearing a mask in the home, particularly in shared spaces where appropriate distancing is not possible.      Take Care of Yourself:  Try to sleep on your stomach as much as possible.  If you have the ability to, take vitamin D3 2000 IU by mouth daily, vitamin-C 1000 mg by mouth twice a day, a multivitamin daily to help boost your immune system.  You should check your temperature twice day.  Return to the emergency department for any severe shortness of breath or pulse ox less than 90%.     If You Test Positive for COVID-19 (Isolate)     Everyone, regardless of vaccination status:     Stay home for 5 days.  If you have no symptoms or your symptoms are resolving after 5 days, you can leave your house.  Continue to wear a mask around others for 5 additional days.     If you have a fever, continue to stay home until your fever resolves.     If You Were Exposed to Someone with COVID-19 (Quarantine)  If you:  Have been boosted  OR  Completed the primary series of Pfizer or Moderna vaccine within the last 6 months  OR  Completed the primary series of J&J vaccine within the last 2 months     Wear a mask around others for 10 days.  Test on day 5, if possible.     If you develop symptoms get a test and stay home.           If you:  Completed the primary series of Pfizer or Moderna vaccine over 6 months ago and are not boosted  OR  Completed the primary series of J&J over 2 months ago and are not boosted  OR  Are unvaccinated     Stay home for 5 days. " After that continue to wear a mask around others for 5 additional days.  If you can’t quarantine you must wear a mask for 10 days.  Test on day 5 if possible.     If you develop symptoms get a test and stay home

## 2024-02-19 ENCOUNTER — TELEMEDICINE (OUTPATIENT)
Dept: NEUROSURGERY | Facility: CLINIC | Age: 39
End: 2024-02-19

## 2024-02-19 VITALS — BODY MASS INDEX: 36.12 KG/M2 | WEIGHT: 289 LBS

## 2024-02-19 DIAGNOSIS — M54.16 LUMBAR RADICULOPATHY: Primary | ICD-10-CM

## 2024-02-19 PROCEDURE — 99024 POSTOP FOLLOW-UP VISIT: CPT | Performed by: STUDENT IN AN ORGANIZED HEALTH CARE EDUCATION/TRAINING PROGRAM

## 2024-02-19 RX ORDER — CYCLOBENZAPRINE HCL 10 MG
10 TABLET ORAL 3 TIMES DAILY PRN
Qty: 21 TABLET | Refills: 0 | Status: SHIPPED | OUTPATIENT
Start: 2024-02-19

## 2024-02-19 NOTE — PROGRESS NOTES
Virtual Regular Visit    Verification of patient location:    Patient is located at Home in the following state in which I hold an active license [place]      Assessment/Plan:    Benjamin Muñiz is a 38 y.o. male status post right L4-5 laminotomy foraminotomy on 2/6/2024.  He presents for his 2-week follow-up.  He is doing very well.  He recently got COVID and this visit is via video chat.  Incisions clean dry and intact.  Right leg pain has improved compared to preop.  Return to clinic at 6-week postop visit.    Problem List Items Addressed This Visit    None           Reason for visit is   Chief Complaint   Patient presents with    Virtual Regular Visit          Encounter provider Al Slade MD    Provider located at [facility] NEUROSURGICAL [address] [address] [phone]      Recent Visits  No visits were found meeting these conditions.  Showing recent visits within past 7 days and meeting all other requirements  Today's Visits  Date Type Provider Dept   02/19/24 Telemedicine [facility] Slade [facility]   Showing today's visits and meeting all other requirements  Future Appointments  No visits were found meeting these conditions.  Showing future appointments within next 150 days and meeting all other requirements       The patient was identified by name and date of birth. Benjamin Muñiz was informed that this is a telemedicine visit and that the visit is being conducted through the Epic Embedded platform. He agrees to proceed..  My office door was closed. No one else was in the room.  He acknowledged consent and understanding of privacy and security of the video platform. The patient has agreed to participate and understands they can discontinue the visit at any time.    Patient is aware this is a billable service.     Subjective  Benjamin Muñiz is a 38 y.o. male status post right L4-5 laminotomy foraminotomy on 2/6/2024.  He presents for  "his 2-week follow-up.  He is doing very well.  He recently got COVID and this follow-up is via video visit.  His pain is dramatically improved since surgery in his right leg.  He still has flareups of right leg pain depending on what he does but this is dramatically less compared to preop.  He no longer has pain when he wakes up in his right leg.  He is ambulating independently.  No bowel or bladder issues.  Incision is clean dry and intact.        HPI     Past Medical History:   Diagnosis Date    ADD (attention deficit disorder)     Anesthesia complication     \"Takes a lot to get me down\"    Arthritis     Chronic pain     Chronic pain disorder     Depression     GERD (gastroesophageal reflux disease)     Heartburn     Hypertension     Mood disorder (HCC)     Tremor        Past Surgical History:   Procedure Laterality Date    CLOSED REDUCTION CALCANEAL FRACTURE      COLONOSCOPY      PERONEAL TENDON EXPLORATION      IN PADILLA FACETECTOMY & FORAMOTOMY 1 VRT SGM LUMBAR Right 2/6/2024    Procedure: RIGHT L4-5 MIS FORAMINOTOMY;  Surgeon: Al Slade MD;  Location:  MAIN OR;  Service: Neurosurgery       Current Outpatient Medications   Medication Sig Dispense Refill    ADDERALL XR, 20MG, 20 MG 24 hr capsule Take 1 capsule (20 mg total) by mouth every morning Max Daily Amount: 20 mg Do not start before January 19, 2024. 90 capsule 0    azelastine (ASTELIN) 0.1 % nasal spray 1 spray into each nostril 2 (two) times a day Use in each nostril as directed 30 mL 2    B-D HYPODERMIC NEEDLE 18GX1.5\" 18G X 1-1/2\" MISC Use as directed---for Testosterone      B-D SYRINGE LUER-MILAGROS 1CC 1 ML MISC USE FOR BI WEEKLY TESTOSTERONE INJECTIONS      B-D SYRINGE LUER-MILAGROS 3CC 3 ML MISC USE AS DIRECTED FOR TESTOSTERONE INJECTION      BD Hypodermic Needle 25G X 1-1/2\" MISC USE FOR INJECTING TESTOSTERONE BIWEEKLY      cyclobenzaprine (FLEXERIL) 10 mg tablet Take 1 tablet (10 mg total) by mouth 3 (three) times a day as needed for muscle spasms 30 " tablet 0    DEXILANT 60 MG capsule Take 1 capsule by mouth daily  3    gabapentin (NEURONTIN) 600 MG tablet Take 600 mg by mouth 2 (two) times a day    0    hydrOXYzine HCL (ATARAX) 50 mg tablet Take 50 mg by mouth 2 (two) times a day as needed  1    losartan-hydrochlorothiazide (HYZAAR) 50-12.5 mg per tablet Take 1 tablet by mouth daily 90 tablet 3    methylphenidate (CONCERTA) 36 MG ER tablet 1 twice daily       OxyCONTIN 15 MG 12 hr tablet TAKE 1 TABLET BY MOUTH TWICE A DAY FOR PAIN      risperiDONE (RisperDAL) 0.5 mg tablet 1/2 tab in the AM      risperiDONE (RisperDAL) 4 mg tablet 1 at hs      sildenafil (VIAGRA) 100 mg tablet Take 1 tablet by mouth one (1) hour prior to intercourse on an empty stomach.  Limit to 3 encounters per week. 30 tablet 0    testosterone cypionate (DEPO-TESTOSTERONE) 200 mg/mL SOLN INJECT 1ML EVERY 2 WEEKS. DISCARD VIAL AFTER 30 DAYS FROM OPENING DATE      zolpidem (AMBIEN) 10 mg tablet Take 10 mg by mouth daily at bedtime as needed for sleep       No current facility-administered medications for this visit.        No Known Allergies    Review of Systems    Video Exam    There were no vitals filed for this visit.    Physical Exam   A&OX3  Gaze conjugate  EOMI  FS  TM  Fcx4  5/5 BUE  5/5 BLE  SILT

## 2024-03-11 ENCOUNTER — TELEPHONE (OUTPATIENT)
Dept: FAMILY MEDICINE CLINIC | Facility: HOSPITAL | Age: 39
End: 2024-03-11

## 2024-03-11 DIAGNOSIS — F31.70 BIPOLAR DISORDER IN FULL REMISSION, MOST RECENT EPISODE UNSPECIFIED TYPE (HCC): ICD-10-CM

## 2024-03-11 DIAGNOSIS — F43.10 PTSD (POST-TRAUMATIC STRESS DISORDER): Primary | ICD-10-CM

## 2024-03-11 DIAGNOSIS — F31.70 BIPOLAR DISORDER IN FULL REMISSION, MOST RECENT EPISODE UNSPECIFIED TYPE (HCC): Primary | ICD-10-CM

## 2024-03-11 RX ORDER — RISPERIDONE 4 MG/1
4 TABLET ORAL
Qty: 30 TABLET | Refills: 0 | Status: SHIPPED | OUTPATIENT
Start: 2024-03-11

## 2024-03-11 RX ORDER — RISPERIDONE 0.5 MG/1
0.5 TABLET ORAL EVERY MORNING
Qty: 30 TABLET | Refills: 0 | Status: SHIPPED | OUTPATIENT
Start: 2024-03-11 | End: 2024-03-11 | Stop reason: SDUPTHER

## 2024-03-11 RX ORDER — RISPERIDONE 0.5 MG/1
0.25 TABLET ORAL EVERY MORNING
Qty: 30 TABLET | Refills: 0 | Status: SHIPPED | OUTPATIENT
Start: 2024-03-11

## 2024-03-11 RX ORDER — HYDROXYZINE 50 MG/1
50 TABLET, FILM COATED ORAL 2 TIMES DAILY PRN
Qty: 180 TABLET | Refills: 0 | Status: SHIPPED | OUTPATIENT
Start: 2024-03-11 | End: 2024-06-09

## 2024-03-11 NOTE — TELEPHONE ENCOUNTER
They need a corrected script sent for his Risperidone 0.5mg.  Should it be 1/2 tablet in the morning or a whole tablet?  Please send a new script.

## 2024-03-13 ENCOUNTER — OFFICE VISIT (OUTPATIENT)
Dept: FAMILY MEDICINE CLINIC | Facility: HOSPITAL | Age: 39
End: 2024-03-13
Payer: COMMERCIAL

## 2024-03-13 VITALS
BODY MASS INDEX: 34.07 KG/M2 | WEIGHT: 274 LBS | RESPIRATION RATE: 16 BRPM | OXYGEN SATURATION: 97 % | HEIGHT: 75 IN | SYSTOLIC BLOOD PRESSURE: 132 MMHG | DIASTOLIC BLOOD PRESSURE: 86 MMHG | HEART RATE: 103 BPM

## 2024-03-13 DIAGNOSIS — Z13.1 SCREENING FOR DIABETES MELLITUS: ICD-10-CM

## 2024-03-13 DIAGNOSIS — J01.00 ACUTE MAXILLARY SINUSITIS, RECURRENCE NOT SPECIFIED: Primary | ICD-10-CM

## 2024-03-13 DIAGNOSIS — I10 PRIMARY HYPERTENSION: ICD-10-CM

## 2024-03-13 PROCEDURE — 99213 OFFICE O/P EST LOW 20 MIN: CPT | Performed by: INTERNAL MEDICINE

## 2024-03-13 RX ORDER — FLUTICASONE PROPIONATE 50 MCG
2 SPRAY, SUSPENSION (ML) NASAL DAILY
Qty: 16 G | Refills: 0 | Status: SHIPPED | OUTPATIENT
Start: 2024-03-13 | End: 2024-03-27

## 2024-03-13 RX ORDER — AMOXICILLIN AND CLAVULANATE POTASSIUM 875; 125 MG/1; MG/1
1 TABLET, FILM COATED ORAL EVERY 12 HOURS SCHEDULED
Qty: 14 TABLET | Refills: 0 | Status: SHIPPED | OUTPATIENT
Start: 2024-03-13 | End: 2024-03-20

## 2024-03-13 NOTE — ASSESSMENT & PLAN NOTE
Blood pressure is acceptable.  Continue losartan/HCTZ.  I will recheck his electrolytes, renal function, cholesterol before next visit in 3 months

## 2024-03-13 NOTE — PROGRESS NOTES
"Assessment/Plan:    Primary hypertension  Blood pressure is acceptable.  Continue losartan/HCTZ.  I will recheck his electrolytes, renal function, cholesterol before next visit in 3 months       Diagnoses and all orders for this visit:    Acute maxillary sinusitis, recurrence not specified  -     amoxicillin-clavulanate (AUGMENTIN) 875-125 mg per tablet; Take 1 tablet by mouth every 12 (twelve) hours for 7 days  -     fluticasone (FLONASE) 50 mcg/act nasal spray; 2 sprays into each nostril daily for 14 days    Primary hypertension  -     Comprehensive metabolic panel; Future  -     Lipid panel; Future    Screening for diabetes mellitus  -     Hemoglobin A1C; Future        Patient's persistent symptoms over 4 weeks are suggestive of persistent rhinosinusitis which could be bacterial or still due to persistent inflammation due to COVID infection.    Will try Augmentin for 7 days and Flonase nasal spray for 2 weeks      Subjective:      Patient ID: Benjamin Muñiz is a 39 y.o. male.      Patient had COVID infection about a month ago and still has persistent postnasal dripping, runny nose, cough with greenish sputum production.  Denies fevers, chills, sinus pain, shortness of breath, chest pain, pleurisy        Patient presents for follow-up of chronic conditions as detailed in the assessment and plan.      The following portions of the patient's history were reviewed and updated as appropriate: current medications, past family history, past medical history, past social history, past surgical history and problem list.    Review of Systems      Objective:    /86   Pulse 103   Resp 16   Ht 6' 3\" (1.905 m)   Wt 124 kg (274 lb)   SpO2 97%   BMI 34.25 kg/m²      Physical Exam  Constitutional:       General: He is not in acute distress.     Appearance: He is not ill-appearing or toxic-appearing.   HENT:      Right Ear: Tympanic membrane normal. There is no impacted cerumen.      Left Ear: Tympanic membrane normal. " There is no impacted cerumen.      Nose: Rhinorrhea (Patient had congestion and nasal discharge on the left side) present.   Eyes:      Conjunctiva/sclera: Conjunctivae normal.   Cardiovascular:      Rate and Rhythm: Normal rate and regular rhythm.      Heart sounds: No murmur heard.  Pulmonary:      Effort: No respiratory distress.      Breath sounds: No wheezing, rhonchi or rales.   Neurological:      Mental Status: He is alert.             Ever Tolliver MD

## 2024-03-14 ENCOUNTER — TELEPHONE (OUTPATIENT)
Dept: NEUROSURGERY | Facility: CLINIC | Age: 39
End: 2024-03-14

## 2024-03-14 ENCOUNTER — OFFICE VISIT (OUTPATIENT)
Dept: NEUROSURGERY | Facility: CLINIC | Age: 39
End: 2024-03-14

## 2024-03-14 VITALS
SYSTOLIC BLOOD PRESSURE: 128 MMHG | DIASTOLIC BLOOD PRESSURE: 84 MMHG | HEIGHT: 75 IN | BODY MASS INDEX: 34.07 KG/M2 | HEART RATE: 110 BPM | WEIGHT: 274 LBS | OXYGEN SATURATION: 97 % | TEMPERATURE: 97.7 F

## 2024-03-14 DIAGNOSIS — M79.606 LEG PAIN: Primary | ICD-10-CM

## 2024-03-14 PROCEDURE — 99024 POSTOP FOLLOW-UP VISIT: CPT | Performed by: STUDENT IN AN ORGANIZED HEALTH CARE EDUCATION/TRAINING PROGRAM

## 2024-03-14 RX ORDER — CYCLOBENZAPRINE HCL 10 MG
10 TABLET ORAL 3 TIMES DAILY PRN
Qty: 21 TABLET | Refills: 0 | Status: SHIPPED | OUTPATIENT
Start: 2024-03-14

## 2024-03-14 RX ORDER — METHYLPREDNISOLONE 4 MG/1
TABLET ORAL
Qty: 1 EACH | Refills: 0 | Status: SHIPPED | OUTPATIENT
Start: 2024-03-14

## 2024-03-14 NOTE — TELEPHONE ENCOUNTER
3/14/24 Patient was checked out orders for pain management and physical therapy will be scheduled by patient for injections as per Dr. Slade patient should follow up in 2 weeks and may not return to work for 8 weeks  until May 13, 2024. As per Dr. Slade ok to place on schedule for 2 week follow up although he is the hospital [facility] placed on schedule for follow up on 3/28/24.

## 2024-03-14 NOTE — PROGRESS NOTES
Assessment/Plan:  39-year-old male with a history of right-sided leg pain who is status post right L4-5 laminotomy foraminotomies on 2/6/2024.  He presents today for 6-week postop visit.  Overall he was doing well postoperatively with a resolution of his right leg pain however approximately 2 weeks ago he developed a return of his right leg pain in a similar distribution as before.  I reassured him that it is not uncommon to have flareups postoperatively in the acute recovery period in the first 3 to 4 months or so and this will likely get better with time.  I ordered a Medrol Dosepak as well as Flexeril, placed an order for physical therapy, and placed an order for pain management referral for a JIMMY to target the L5 nerve root on the right side, either a L4-5 interlaminar or right L5-S1 transforaminal JIMMY.  Return to clinic in 1 to 2 weeks after the above measures to see if his pain has improved.  I stated there is a chance that something could have changed such as a new herniated disc and discussed with him of ordering an MRI lumbar spine to evaluate if the pain does not improve when he returns to clinic in 1 to 2 weeks or if he gets worse in the interim.      Subjective:      Patient ID: Benjamin Muñiz is a 39 y.o. male.    HPI    39-year-old male with a history of right-sided leg pain who is status post right L4-5 laminotomy foraminotomies on 2/6/2024.  He presents today for 6-week postop visit.  Overall he was doing well postoperatively with a resolution of his right leg pain however approximately 2 weeks ago he developed a return of his right leg pain in a similar distribution as before.  The pain seem to return after he began to be more active.  The pain goes down his posterior lateral thigh and anterior shin.  At some parts of the day it is as severe as before.  He has tried over-the-counter ibuprofen and has not helped the pain.    The following portions of the patient's history were reviewed and updated as  "appropriate: allergies, current medications, past family history, past medical history, past social history, past surgical history, and problem list.    Review of Systems      Objective:      /84 (BP Location: Left arm, Patient Position: Sitting, Cuff Size: Standard)   Pulse (!) 110   Temp 97.7 °F (36.5 °C) (Temporal)   Ht 6' 3\" (1.905 m)   Wt 124 kg (274 lb)   SpO2 97%   BMI 34.25 kg/m²          Physical Exam    A&OX3  Gaze conjugate  EOMI  FS  TM  Fcx4  5/5 BUE  5/5 BLE  SILT  "

## 2024-03-15 ENCOUNTER — OFFICE VISIT (OUTPATIENT)
Dept: PAIN MEDICINE | Facility: CLINIC | Age: 39
End: 2024-03-15
Payer: COMMERCIAL

## 2024-03-15 VITALS
SYSTOLIC BLOOD PRESSURE: 142 MMHG | DIASTOLIC BLOOD PRESSURE: 82 MMHG | WEIGHT: 279 LBS | HEIGHT: 75 IN | TEMPERATURE: 98.2 F | BODY MASS INDEX: 34.69 KG/M2 | HEART RATE: 105 BPM

## 2024-03-15 DIAGNOSIS — M79.606 LEG PAIN: ICD-10-CM

## 2024-03-15 DIAGNOSIS — M48.061 LUMBAR FORAMINAL STENOSIS: ICD-10-CM

## 2024-03-15 DIAGNOSIS — M96.1 LUMBAR POSTLAMINECTOMY SYNDROME: ICD-10-CM

## 2024-03-15 DIAGNOSIS — M54.16 LUMBAR RADICULOPATHY: Primary | ICD-10-CM

## 2024-03-15 PROCEDURE — 99214 OFFICE O/P EST MOD 30 MIN: CPT | Performed by: PHYSICIAN ASSISTANT

## 2024-03-22 ENCOUNTER — TELEPHONE (OUTPATIENT)
Dept: NEUROSURGERY | Facility: CLINIC | Age: 39
End: 2024-03-22

## 2024-03-22 NOTE — TELEPHONE ENCOUNTER
Spoke w/ pt to reschedule 3/28/24 appt w/ Dr. Slade to 4/26/24 at [facility]. Per last visit's note (3/14/24) we will see pt after he completes physical therapy and JIMMY. Pt has upcoming JIMMY on 4/15/24, and PT on 4/1/24. Pt understood and was appreciative.

## 2024-04-01 ENCOUNTER — EVALUATION (OUTPATIENT)
Dept: PHYSICAL THERAPY | Facility: CLINIC | Age: 39
End: 2024-04-01
Payer: COMMERCIAL

## 2024-04-01 DIAGNOSIS — M79.604 PAIN OF RIGHT LOWER EXTREMITY: Primary | ICD-10-CM

## 2024-04-01 PROCEDURE — 97110 THERAPEUTIC EXERCISES: CPT | Performed by: PHYSICAL THERAPIST

## 2024-04-01 PROCEDURE — 97161 PT EVAL LOW COMPLEX 20 MIN: CPT | Performed by: PHYSICAL THERAPIST

## 2024-04-01 NOTE — PROGRESS NOTES
PT Evaluation     Today's date: 2024  Patient name: Benjamin Muñiz  : 1985  MRN: [id number]  Referring provider: Al Slade MD  Dx:   Encounter Diagnosis     ICD-10-CM    1. Right leg pain M79.604 Ambulatory Referral to Physical Therapy        Assessment  Assessment details: Benjamin Muñiz is a 38 y.o. male presenting to outpatient physical therapy at [facility] with complaints of RLE pain, stiffness and weakness.  He presents with decreased RLE range of motion, decreased strength, limited flexibility, poor postural awareness, poor body mechanics, altered gait pattern, poor balance, decreased tolerance to activity and decreased functional mobility due to Right thigh pain  (primary encounter diagnosis).  He would benefit from skilled PT services in order to address these deficits and reach maximum level of function.  Thank you for the referral!    Impairments: abnormal or restricted ROM, activity intolerance and pain with function    Symptom irritability: moderateUnderstanding of Dx/Px/POC: excellent  Goals  STG  1. Independent with HEP in 2 weeks  2. Decrease pain at worst by 50% in 2 weeks    LTG  1. Increase RLE mobility in all planes to 5/5 in 4 weeks  2. Return to full, unrestricted sitting for work in 4 weeks    Plan  Patient would benefit from: skilled physical therapy  Planned modality interventions: thermotherapy: hydrocollator packs  Planned therapy interventions: neuromuscular re-education, manual therapy, therapeutic exercise, patient education, therapeutic activities and home exercise program  Frequency: 2x week  Duration in weeks: 4  Treatment plan discussed with: patient        Subjective Evaluation    History of Present Illness  Date of surgery: 2024 - R L4-5 laminectomy  Quality of life: good    Pain  Current pain ratin  At best pain rating: 3  At worst pain ratin  Location: R LE pain radiating posteriorly to hamstring  Quality: burning and dull ache  Aggravating factors:  "sitting  Progression: worsening      Diagnostic Tests  No diagnostic tests performed  Treatments  Previous treatment: chiropractic and medication  Patient Goals  Patient goals for therapy: decreased pain          Objective     B/L Hip MMT:  Flexion:  4  Extension: 4-  Abduction: 4+  Adduction: 4+    Active Range of Motion     Additional Active Range of Motion Details  + R hamstring tightness > L  + R piriformis tightness > L    Passive Range of Motion     Lumbar   Flexion: WFL and with pain  Extension: WFL and with pain  Left lateral flexion: WFL  Right lateral flexion: WFL and with pain  Left rotation: WFL  Right rotation: WFL and with pain    Tests     Lumbar     Left   Negative crossed SLR, passive SLR and slump test.     Right   Positive slump test.   Negative passive SLR.     Left Hip   Negative SYDNIE.     Right Hip   Positive SYDNIE.     Additional Tests Details  STANDING:   Repeated flexion: no worse  Repeated extension: increased sxs  R side bending & R rotation: increased sxs    Bridge isometric Test: 15 sec       EPOC: 6/1/2024      Manuals 4/1           R sciatic nerve glide            R hamstring stretch            R piriformis stretch              R lateral hamstring (cross body) stretch            Neuro Re-Ed            Bridge iso            SLR 4 way                                                                        Ther Ex            SKTC stretch 10x 5\"           Sciatic nerve glide 20x 5\"           Crossed trunk rotation 10x5\"            Seated piriformis stretch 10x10\" RLE           Seated HS stretch HELD - per surgeon order           Prone lying with heat 6'                                    Pt education: pathoanatomy, nature of sxs, POC, HEP NS           Ther Activity            Bike for improved RLE endurance                        Gait Training                                    Modalities                                       "

## 2024-04-03 ENCOUNTER — APPOINTMENT (OUTPATIENT)
Dept: LAB | Facility: HOSPITAL | Age: 39
End: 2024-04-03
Payer: COMMERCIAL

## 2024-04-03 DIAGNOSIS — I10 PRIMARY HYPERTENSION: ICD-10-CM

## 2024-04-03 DIAGNOSIS — Z00.8 HEALTH EXAMINATION IN POPULATION SURVEY: ICD-10-CM

## 2024-04-03 LAB
CHOLEST SERPL-MCNC: 256 MG/DL
EST. AVERAGE GLUCOSE BLD GHB EST-MCNC: 126 MG/DL
EST. AVERAGE GLUCOSE BLD GHB EST-MCNC: 126 MG/DL
HBA1C MFR BLD: 6 %
HBA1C MFR BLD: 6 %
HDLC SERPL-MCNC: 45 MG/DL
NONHDLC SERPL-MCNC: 211 MG/DL
TRIGL SERPL-MCNC: 527 MG/DL

## 2024-04-03 PROCEDURE — 80061 LIPID PANEL: CPT

## 2024-04-03 PROCEDURE — 36415 COLL VENOUS BLD VENIPUNCTURE: CPT

## 2024-04-03 PROCEDURE — 83036 HEMOGLOBIN GLYCOSYLATED A1C: CPT

## 2024-04-04 DIAGNOSIS — E78.2 MIXED HYPERLIPIDEMIA: Primary | ICD-10-CM

## 2024-04-04 RX ORDER — ROSUVASTATIN CALCIUM 10 MG/1
10 TABLET, COATED ORAL DAILY
Qty: 90 TABLET | Refills: 3 | Status: SHIPPED | OUTPATIENT
Start: 2024-04-04

## 2024-04-12 ENCOUNTER — OFFICE VISIT (OUTPATIENT)
Dept: PHYSICAL THERAPY | Facility: CLINIC | Age: 39
End: 2024-04-12
Payer: COMMERCIAL

## 2024-04-12 DIAGNOSIS — M79.604 PAIN OF RIGHT LOWER EXTREMITY: Primary | ICD-10-CM

## 2024-04-12 PROCEDURE — 97110 THERAPEUTIC EXERCISES: CPT | Performed by: PHYSICAL THERAPIST

## 2024-04-12 PROCEDURE — 97530 THERAPEUTIC ACTIVITIES: CPT | Performed by: PHYSICAL THERAPIST

## 2024-04-12 NOTE — PROGRESS NOTES
"Daily Note     Today's date: 2024  Patient name: Benjamin Muñiz  : 1985  MRN: [id number]  Referring provider: Al Slade MD  Dx:   Encounter Diagnosis     ICD-10-CM    1. Pain of right lower extremity  M79.604           Subjective: Compliant with HEP, no questions regarding POC, motivated to continue PT    Objective: See treatment diary below    Assessment: Tolerated treatment well with initiation of full treatment program as noted below requiring verbal and tactile cues from PT for safe execution of therapeutic exercise. Patient demonstrated fatigue post treatment, exhibited good technique with therapeutic exercises, and would benefit from continued PT    Plan: Progress treatment as tolerated.       Precautions:         EPOC: 2024      Manuals            R sciatic nerve glide            R hamstring stretch            R piriformis stretch              R lateral hamstring (cross body) stretch            Neuro Re-Ed            Bridge iso 20x           SLR 4 way            Prone Glut set iso 10x10\"            Prone knee curl 20x5\"                                               Ther Ex            SKTC stretch 10x 5\"           Sciatic nerve glide HELD            Crossed trunk rotation 10x5\"            Seated piriformis stretch 10x10\" RLE           Seated HS stretch HELD - per surgeon order           Prone lying with heat 6'                                                Ther Activity            Bike for improved RLE endurance            BWD walking for hip ext endurance 5'           Pt education: pathoanatomy, nature of sxs, POC, HEP NS 3'           Gait Training                                    Modalities                                              "

## 2024-04-15 ENCOUNTER — HOSPITAL ENCOUNTER (OUTPATIENT)
Dept: RADIOLOGY | Facility: CLINIC | Age: 39
Discharge: HOME | End: 2024-04-15
Payer: COMMERCIAL

## 2024-04-15 ENCOUNTER — APPOINTMENT (OUTPATIENT)
Dept: PHYSICAL THERAPY | Facility: CLINIC | Age: 39
End: 2024-04-15
Payer: COMMERCIAL

## 2024-04-15 VITALS
TEMPERATURE: 98 F | DIASTOLIC BLOOD PRESSURE: 97 MMHG | HEART RATE: 98 BPM | SYSTOLIC BLOOD PRESSURE: 156 MMHG | RESPIRATION RATE: 18 BRPM | OXYGEN SATURATION: 95 %

## 2024-04-15 DIAGNOSIS — M54.16 LUMBAR RADICULOPATHY: ICD-10-CM

## 2024-04-15 PROCEDURE — 64484 NJX AA&/STRD TFRM EPI L/S EA: CPT | Performed by: ANESTHESIOLOGY

## 2024-04-15 PROCEDURE — 64483 NJX AA&/STRD TFRM EPI L/S 1: CPT | Performed by: ANESTHESIOLOGY

## 2024-04-15 RX ORDER — METHYLPREDNISOLONE ACETATE 80 MG/ML
160 INJECTION, SUSPENSION INTRA-ARTICULAR; INTRALESIONAL; INTRAMUSCULAR; PARENTERAL; SOFT TISSUE ONCE
Status: COMPLETED | OUTPATIENT
Start: 2024-04-15 | End: 2024-04-15

## 2024-04-15 RX ADMIN — IOHEXOL 2 ML: 300 INJECTION, SOLUTION INTRAVENOUS at 11:05

## 2024-04-15 RX ADMIN — METHYLPREDNISOLONE ACETATE 160 MG: 80 INJECTION, SUSPENSION INTRA-ARTICULAR; INTRALESIONAL; INTRAMUSCULAR; SOFT TISSUE at 11:05

## 2024-04-15 NOTE — DISCHARGE INSTRUCTIONS
Epidural Steroid Injection   WHAT YOU NEED TO KNOW:   An epidural steroid injection (JIMMY) is a procedure to inject steroid medicine into the epidural space. The epidural space is between your spinal cord and vertebrae. Steroids reduce inflammation and fluid buildup in your spine that may be causing pain. You may be given pain medicine along with the steroids.          ACTIVITY  Do not drive or operate machinery today.  No strenuous activity today - bending, lifting, etc.  You may resume normal activites starting tomorrow - start slowly and as tolerated.  You may shower today, but no tub baths or hot tubs.  You may have numbness for several hours from the local anesthetic. Please use caution and common sense, especially with weight-bearing activities.    CARE OF THE INJECTION SITE  If you have soreness or pain, apply ice to the area today (20 minutes on/20 minutes off).  Starting tomorrow, you may use warm, moist heat or ice if needed.  You may have an increase or change in your discomfort for 36-48 hours after your treatment.  Apply ice and continue with any pain medication you have been prescribed.  Notify the [facility] if you have any of the following: redness, drainage, swelling, headache, stiff neck or fever above 100°F.    SPECIAL INSTRUCTIONS  Our office will contact you in approximately 7 days for a progress report.    MEDICATIONS  Continue to take all routine medications.  Our office may have instructed you to hold some medications.    As no general anesthesia was used in today's procedure, you should not experience any side effects related to anesthesia.     If you are diabetic, the steroids used in today's injection may temporarily increase your blood sugar levels after the first few days after your injection. Please keep a close eye on your sugars and alert the doctor who manages your diabetes if your sugars are significantly high from your baseline or you are symptomatic.     If you have a  problem specifically related to your procedure, please call our office at [phone].  Problems not related to your procedure should be directed to your primary care physician.

## 2024-04-15 NOTE — H&P
"History of Present Illness: The patient is a 39 y.o. male who presents with complaints of low back and leg pain.    Past Medical History:   Diagnosis Date    ADD (attention deficit disorder)     Anesthesia complication     \"Takes a lot to get me down\"    Arthritis     Chronic pain     Chronic pain disorder     Depression     GERD (gastroesophageal reflux disease)     Heartburn     Hypertension     Mood disorder (HCC)     Tremor        Past Surgical History:   Procedure Laterality Date    CLOSED REDUCTION CALCANEAL FRACTURE      COLONOSCOPY      PERONEAL TENDON EXPLORATION      [place] PADILLA FACETECTOMY & FORAMOTOMY 1 VRT SGM LUMBAR Right 2/6/2024    Procedure: RIGHT L4-5 MIS FORAMINOTOMY;  Surgeon: Al Slade MD;  Location:  MAIN OR;  Service: Neurosurgery         Current Outpatient Medications:     ADDERALL XR, 20MG, 20 MG 24 hr capsule, Take 1 capsule (20 mg total) by mouth every morning Max Daily Amount: 20 mg Do not start before January 19, 2024., Disp: 90 capsule, Rfl: 0    B-D HYPODERMIC NEEDLE 18GX1.5\" 18G X 1-1/2\" MISC, Use as directed---for Testosterone, Disp: , Rfl:     B-D SYRINGE LUER-MILAGROS 1CC 1 ML MISC, USE FOR BI WEEKLY TESTOSTERONE INJECTIONS, Disp: , Rfl:     B-D SYRINGE LUER-MILAGROS 3CC 3 ML MISC, USE AS DIRECTED FOR TESTOSTERONE INJECTION, Disp: , Rfl:     BD Hypodermic Needle 25G X 1-1/2\" MISC, USE FOR INJECTING TESTOSTERONE BIWEEKLY, Disp: , Rfl:     cyclobenzaprine (FLEXERIL) 10 mg tablet, Take 1 tablet (10 mg total) by mouth 3 (three) times a day as needed for muscle spasms, Disp: 30 tablet, Rfl: 0    cyclobenzaprine (FLEXERIL) 10 mg tablet, Take 1 tablet (10 mg total) by mouth 3 (three) times a day as needed for muscle spasms, Disp: 21 tablet, Rfl: 0    cyclobenzaprine (FLEXERIL) 10 mg tablet, Take 1 tablet (10 mg total) by mouth 3 (three) times a day as needed for muscle spasms, Disp: 21 tablet, Rfl: 0    DEXILANT 60 MG capsule, Take 1 capsule by mouth daily, Disp: , Rfl: 3    fluticasone (FLONASE) " 50 mcg/act nasal spray, 2 sprays into each nostril daily for 14 days, Disp: 16 g, Rfl: 0    gabapentin (NEURONTIN) 600 MG tablet, Take 600 mg by mouth 2 (two) times a day  , Disp: , Rfl: 0    hydrOXYzine HCL (ATARAX) 50 mg tablet, Take 1 tablet (50 mg total) by mouth 2 (two) times a day as needed for anxiety, Disp: 180 tablet, Rfl: 0    losartan-hydrochlorothiazide (HYZAAR) 50-12.5 mg per tablet, Take 1 tablet by mouth daily, Disp: 90 tablet, Rfl: 3    methylphenidate (CONCERTA) 36 MG ER tablet, 1 twice daily  (Patient not taking: Reported on 2/19/2024), Disp: , Rfl:     methylPREDNISolone 4 MG tablet therapy pack, Use as directed on package, Disp: 1 each, Rfl: 0    OxyCONTIN 15 MG 12 hr tablet, TAKE 1 TABLET BY MOUTH TWICE A DAY FOR PAIN, Disp: , Rfl:     risperiDONE (RisperDAL) 0.5 mg tablet, Take 0.5 tablets (0.25 mg total) by mouth every morning 1/2 tab in the AM, Disp: 30 tablet, Rfl: 0    risperiDONE (RisperDAL) 4 mg tablet, Take 1 tablet (4 mg total) by mouth daily at bedtime 1 at hs, Disp: 30 tablet, Rfl: 0    rosuvastatin (CRESTOR) 10 MG tablet, Take 1 tablet (10 mg total) by mouth daily, Disp: 90 tablet, Rfl: 3    sildenafil (VIAGRA) 100 mg tablet, Take 1 tablet by mouth one (1) hour prior to intercourse on an empty stomach.  Limit to 3 encounters per week., Disp: 30 tablet, Rfl: 0    testosterone cypionate (DEPO-TESTOSTERONE) 200 mg/mL SOLN, INJECT 1ML EVERY 2 WEEKS. DISCARD VIAL AFTER 30 DAYS FROM OPENING DATE, Disp: , Rfl:     zolpidem (AMBIEN) 10 mg tablet, Take 10 mg by mouth daily at bedtime as needed for sleep, Disp: , Rfl:     Current Facility-Administered Medications:     iohexol (OMNIPAQUE) 300 mg/mL injection 2 mL, 2 mL, Epidural, Once, El Parikh DO    methylPREDNISolone acetate (DEPO-MEDROL) injection 160 mg, 160 mg, Epidural, Once, El Parikh DO    No Known Allergies    Physical Exam:   Vitals:    04/15/24 1053   BP: 123/89   Pulse: (!) 110   Resp: 18   Temp: 98 °F (36.7 °C)   SpO2: 95%      General: Awake, Alert, Oriented x 3, Mood and affect appropriate  Respiratory: Respirations even and unlabored  Cardiovascular: Peripheral pulses intact; no edema  Musculoskeletal Exam: decreased range of motion lumbar spine    ASA Score: III    Patient/Chart Verification  Patient ID Verified: Verbal  ID Band Applied: No  Consents Confirmed: Procedural, To be obtained in the Pre-Procedure area  Interval H&P(within 24 hr) Complete (required for Outpatients and Surgery Admit only): To be obtained in the Pre-Procedure area  Allergies Reviewed: Yes  Anticoag/NSAID held?: NA  Currently on antibiotics?: No    Assessment:   1. Lumbar radiculopathy        Plan: RT L5, S1 TFESI

## 2024-04-22 ENCOUNTER — TELEPHONE (OUTPATIENT)
Dept: PAIN MEDICINE | Facility: CLINIC | Age: 39
End: 2024-04-22

## 2024-04-22 ENCOUNTER — APPOINTMENT (OUTPATIENT)
Dept: PHYSICAL THERAPY | Facility: CLINIC | Age: 39
End: 2024-04-22
Payer: COMMERCIAL

## 2024-04-26 ENCOUNTER — OFFICE VISIT (OUTPATIENT)
Age: 39
End: 2024-04-26

## 2024-04-26 VITALS
RESPIRATION RATE: 16 BRPM | HEIGHT: 75 IN | OXYGEN SATURATION: 99 % | DIASTOLIC BLOOD PRESSURE: 92 MMHG | WEIGHT: 286 LBS | BODY MASS INDEX: 35.56 KG/M2 | SYSTOLIC BLOOD PRESSURE: 132 MMHG | HEART RATE: 84 BPM | TEMPERATURE: 99.9 F

## 2024-04-26 DIAGNOSIS — M79.604 RIGHT LEG PAIN: Primary | ICD-10-CM

## 2024-04-26 PROCEDURE — 99024 POSTOP FOLLOW-UP VISIT: CPT | Performed by: STUDENT IN AN ORGANIZED HEALTH CARE EDUCATION/TRAINING PROGRAM

## 2024-04-26 NOTE — PROGRESS NOTES
Discussion Summary:   39-year-old male with a history of right-sided leg pain who is status post right L4-5 laminotomy foraminotomies on 2/6/2024.  He is 11 weeks postop.  He has had a return of his right leg pain in the posterior lateral thigh for approximately 6 weeks.  He had an epidural steroid injection which helped for a day and he is currently undergoing physical therapy.  The pain has not alleviated.  He has also tried steroids oral.  I discussed with them that it is reasonable to proceed with imaging now for further workup as the pain is not alleviating.  Will proceed with an MRI lumbar spine to evaluate for new herniated disc or new compression, EMG to evaluate for radiculopathy, and x-ray lumbar flex ex to evaluate for instability given the previous laminotomy and foraminotomies.  Return to clinic after above imaging completed to discuss results.  Given his severity of pain, should not return to work until next clinic follow-up.    Assessment/Plan:      39-year-old male with a history of right-sided leg pain who is status post right L4-5 laminotomy foraminotomies on 2/6/2024.  He is 11 weeks postop.  Approximately 6 weeks ago he began to have a return of his right leg pain.  The pain now is primarily down the buttock, posterior lateral thigh.  It occasionally will extend to the posterior calf as well but largely is in the buttock and the thigh.  He states that it feels similar to before in terms of the pain characteristics.  He states he was doing well after surgery but then approximately 4 weeks to 5 weeks after as he was advancing his activity he noticed more pain and it progressively got worse.  He is still ambulating independently.  No numbness.  No bowel or bladder changes.  Incisions clean dry and intact.  He tried a epidural steroid injection on the right at L5-S1 and it helped for about a day and then the pain returned.  He is currently doing physical therapy.    HPI         Review of Systems  "    There were no vitals taken for this visit.       Current Outpatient Medications:     ADDERALL XR, 20MG, 20 MG 24 hr capsule, Take 1 capsule (20 mg total) by mouth every morning Max Daily Amount: 20 mg Do not start before January 19, 2024., Disp: 90 capsule, Rfl: 0    B-D HYPODERMIC NEEDLE 18GX1.5\" 18G X 1-1/2\" MISC, Use as directed---for Testosterone, Disp: , Rfl:     B-D SYRINGE LUER-MILAGROS 1CC 1 ML MISC, USE FOR BI WEEKLY TESTOSTERONE INJECTIONS, Disp: , Rfl:     B-D SYRINGE LUER-MILAGROS 3CC 3 ML MISC, USE AS DIRECTED FOR TESTOSTERONE INJECTION, Disp: , Rfl:     BD Hypodermic Needle 25G X 1-1/2\" MISC, USE FOR INJECTING TESTOSTERONE BIWEEKLY, Disp: , Rfl:     cyclobenzaprine (FLEXERIL) 10 mg tablet, Take 1 tablet (10 mg total) by mouth 3 (three) times a day as needed for muscle spasms, Disp: 30 tablet, Rfl: 0    cyclobenzaprine (FLEXERIL) 10 mg tablet, Take 1 tablet (10 mg total) by mouth 3 (three) times a day as needed for muscle spasms, Disp: 21 tablet, Rfl: 0    cyclobenzaprine (FLEXERIL) 10 mg tablet, Take 1 tablet (10 mg total) by mouth 3 (three) times a day as needed for muscle spasms, Disp: 21 tablet, Rfl: 0    DEXILANT 60 MG capsule, Take 1 capsule by mouth daily, Disp: , Rfl: 3    fluticasone (FLONASE) 50 mcg/act nasal spray, 2 sprays into each nostril daily for 14 days, Disp: 16 g, Rfl: 0    gabapentin (NEURONTIN) 600 MG tablet, Take 600 mg by mouth 2 (two) times a day  , Disp: , Rfl: 0    hydrOXYzine HCL (ATARAX) 50 mg tablet, Take 1 tablet (50 mg total) by mouth 2 (two) times a day as needed for anxiety, Disp: 180 tablet, Rfl: 0    losartan-hydrochlorothiazide (HYZAAR) 50-12.5 mg per tablet, Take 1 tablet by mouth daily, Disp: 90 tablet, Rfl: 3    methylphenidate (CONCERTA) 36 MG ER tablet, 1 twice daily  (Patient not taking: Reported on 2/19/2024), Disp: , Rfl:     methylPREDNISolone 4 MG tablet therapy pack, Use as directed on package, Disp: 1 each, Rfl: 0    OxyCONTIN 15 MG 12 hr tablet, TAKE 1 TABLET " "BY MOUTH TWICE A DAY FOR PAIN, Disp: , Rfl:     risperiDONE (RisperDAL) 0.5 mg tablet, Take 0.5 tablets (0.25 mg total) by mouth every morning 1/2 tab in the AM, Disp: 30 tablet, Rfl: 0    risperiDONE (RisperDAL) 4 mg tablet, Take 1 tablet (4 mg total) by mouth daily at bedtime 1 at hs, Disp: 30 tablet, Rfl: 0    rosuvastatin (CRESTOR) 10 MG tablet, Take 1 tablet (10 mg total) by mouth daily, Disp: 90 tablet, Rfl: 3    sildenafil (VIAGRA) 100 mg tablet, Take 1 tablet by mouth one (1) hour prior to intercourse on an empty stomach.  Limit to 3 encounters per week., Disp: 30 tablet, Rfl: 0    testosterone cypionate (DEPO-TESTOSTERONE) 200 mg/mL SOLN, INJECT 1ML EVERY 2 WEEKS. DISCARD VIAL AFTER 30 DAYS FROM OPENING DATE, Disp: , Rfl:     zolpidem (AMBIEN) 10 mg tablet, Take 10 mg by mouth daily at bedtime as needed for sleep, Disp: , Rfl:     Past Medical History:   Diagnosis Date    ADD (attention deficit disorder)     Anesthesia complication     \"Takes a lot to get me down\"    Arthritis     Chronic pain     Chronic pain disorder     Depression     GERD (gastroesophageal reflux disease)     Heartburn     Hypertension     Mood disorder (HCC)     Tremor       Past Surgical History:   Procedure Laterality Date    CLOSED REDUCTION CALCANEAL FRACTURE      COLONOSCOPY      PERONEAL TENDON EXPLORATION      [place] PADILLA FACETECTOMY & FORAMOTOMY 1 VRT SGM LUMBAR Right 2/6/2024    Procedure: RIGHT L4-5 MIS FORAMINOTOMY;  Surgeon: Al Slade MD;  Location:  MAIN OR;  Service: Neurosurgery     Social History     Socioeconomic History    Marital status: /Civil Union     Spouse name: Not on file    Number of children: Not on file    Years of education: Not on file    Highest education level: Not on file   Occupational History    Not on file   Tobacco Use    Smoking status: Former     Current packs/day: 0.25     Average packs/day: 0.3 packs/day for 25.3 years (6.3 ttl pk-yrs)     Types: Cigarettes, Pipe     Start date: 1999    " Smokeless tobacco: Never   Vaping Use    Vaping status: Never Used   Substance and Sexual Activity    Alcohol use: Not Currently     Alcohol/week: 10.0 standard drinks of alcohol     Types: 10 Cans of beer per week     Comment: social    Drug use: Never    Sexual activity: Yes     Partners: Female     Birth control/protection: I.U.D.   Other Topics Concern    Not on file   Social History Narrative    Lives with wife    Sees dentist occasionally    No living will     Social Determinants of Health     Financial Resource Strain: Not on file   Food Insecurity: No Food Insecurity (6/30/2023)    Hunger Vital Sign     Worried About Running Out of Food in the Last Year: Never true     Ran Out of Food in the Last Year: Never true   Transportation Needs: No Transportation Needs (6/30/2023)    PRAPARE - Transportation     Lack of Transportation (Medical): No     Lack of Transportation (Non-Medical): No   Physical Activity: Not on file   Stress: Not on file   Social Connections: Not on file   Intimate Partner Violence: Not on file   Housing Stability: Low Risk  (6/30/2023)    Housing Stability Vital Sign     Unable to Pay for Housing in the Last Year: No     Number of Places Lived in the Last Year: 1     Unstable Housing in the Last Year: No       The following portions of the patient's history were reviewed and updated as appropriate: allergies, current medications, past family history, past medical history, past social history, past surgical history, and problem list.    Physical Exam      A&OX3  Gaze conjugate  EOMI  FS  TM  Fcx4  5/5 BUE  5/5 BLE  SILT  Incision c/d/i

## 2024-04-29 ENCOUNTER — APPOINTMENT (OUTPATIENT)
Dept: PHYSICAL THERAPY | Facility: CLINIC | Age: 39
End: 2024-04-29
Payer: COMMERCIAL

## 2024-04-30 ENCOUNTER — HOSPITAL ENCOUNTER (OUTPATIENT)
Dept: MRI IMAGING | Facility: HOSPITAL | Age: 39
Discharge: HOME | End: 2024-04-30
Attending: STUDENT IN AN ORGANIZED HEALTH CARE EDUCATION/TRAINING PROGRAM
Payer: COMMERCIAL

## 2024-04-30 DIAGNOSIS — M79.604 RIGHT LEG PAIN: ICD-10-CM

## 2024-04-30 PROCEDURE — 72148 MRI LUMBAR SPINE W/O DYE: CPT

## 2024-05-08 ENCOUNTER — OFFICE VISIT (OUTPATIENT)
Dept: PAIN MEDICINE | Facility: CLINIC | Age: 39
End: 2024-05-08
Payer: COMMERCIAL

## 2024-05-08 VITALS
TEMPERATURE: 98.1 F | BODY MASS INDEX: 34.82 KG/M2 | HEART RATE: 99 BPM | WEIGHT: 280 LBS | HEIGHT: 75 IN | DIASTOLIC BLOOD PRESSURE: 86 MMHG | SYSTOLIC BLOOD PRESSURE: 132 MMHG

## 2024-05-08 DIAGNOSIS — M54.16 LUMBAR RADICULOPATHY: ICD-10-CM

## 2024-05-08 DIAGNOSIS — M79.18 MYOFASCIAL PAIN SYNDROME: ICD-10-CM

## 2024-05-08 DIAGNOSIS — M51.26 PROTRUSION OF LUMBAR INTERVERTEBRAL DISC: Primary | ICD-10-CM

## 2024-05-08 PROCEDURE — 99214 OFFICE O/P EST MOD 30 MIN: CPT | Performed by: PHYSICIAN ASSISTANT

## 2024-05-08 RX ORDER — TIZANIDINE 4 MG/1
4 TABLET ORAL
Qty: 30 TABLET | Refills: 2 | Status: SHIPPED | OUTPATIENT
Start: 2024-05-08

## 2024-05-08 NOTE — PROGRESS NOTES
Assessment:  1. Protrusion of lumbar intervertebral disc    2. Lumbar radiculopathy    3. Myofascial pain syndrome        Plan:  While the patient was in the office today, I did have a thorough conversation regarding their chronic pain syndrome, medication management, and treatment plan options.    Unfortunately the patient did not respond to the most recent right L5 and S1 transforaminal epidural steroid injection.  He continues with significant radicular pain.  Patient had a new lumbar spine MRI done which revealed lumbar spondylosis most pronounced at L4-5 where there is a right-sided disc protrusion with mild to moderate right narrowing.  He has an EMG scheduled on 5/18/2024 and a follow-up with neurosurgery after that.    At this time I am not recommending any further injection therapy.      Possibly consider spinal cord stimulator trial in the future.    I have recommended tizanidine 4 mg nightly as need and have sent refills to his pharmacy.    The patient was advised to contact the office should their symptoms worsen in the interim. The patient was agreeable and verbalized an understanding.        History of Present Illness:    The patient is a 39 y.o. male last seen on 4/15/2024 who presents for a follow up office visit in regards to right lower extremity radicular pain.  The patient currently reports chronic right lower extremity pain that he presently rates a 5 out of 10 and describes it as an intermittent burning, sharp and shooting pain that primarily radiates posterior laterally down the leg to the level of the ankle.  His pain is made worse with bending, prolonged standing and walking at times.  04/15/2024 the patient underwent a right L5 and S1 transforaminal epidural steroid injections with 1 day of improvement.  He has since been reevaluated by neurosurgery who has ordered a new MRI of the lumbar spine.  He also has an EMG scheduled on 5/18/2024.  Patient is frustrated regarding his current pain  "state and is wondering if further surgical intervention will be discussed.    I have personally reviewed and/or updated the patient's past medical history, past surgical history, family history, social history, current medications, allergies, and vital signs today.       Review of Systems:    Review of Systems   Respiratory:  Negative for shortness of breath.    Cardiovascular:  Negative for chest pain.   Gastrointestinal:  Negative for constipation, diarrhea, nausea and vomiting.   Musculoskeletal:  Negative for arthralgias, gait problem, joint swelling and myalgias.   Skin:  Negative for rash.   Neurological:  Negative for dizziness, seizures and weakness.   All other systems reviewed and are negative.        Past Medical History:   Diagnosis Date   • ADD (attention deficit disorder)    • Anesthesia complication     \"Takes a lot to get me down\"   • Arthritis    • Chronic pain    • Chronic pain disorder    • Depression    • GERD (gastroesophageal reflux disease)    • Heartburn    • Hypertension    • Mood disorder (HCC)    • Tremor        Past Surgical History:   Procedure Laterality Date   • CLOSED REDUCTION CALCANEAL FRACTURE     • COLONOSCOPY     • PERONEAL TENDON EXPLORATION     • ME PADILLA FACETECTOMY & FORAMOTOMY 1 VRT SGM LUMBAR Right 2/6/2024    Procedure: RIGHT L4-5 MIS FORAMINOTOMY;  Surgeon: Al Slade MD;  Location:  MAIN OR;  Service: Neurosurgery       Family History   Problem Relation Age of Onset   • Parkinsonism Paternal Grandfather    • Heart disease Father    • Hypertension Father    • Breast cancer Mother    • Intellectual disability Sister    • Heart disease Maternal Grandfather    • Substance Abuse Neg Hx    • Mental illness Neg Hx        Social History     Occupational History   • Not on file   Tobacco Use   • Smoking status: Former     Current packs/day: 0.25     Average packs/day: 0.3 packs/day for 25.3 years (6.3 ttl pk-yrs)     Types: Cigarettes, Pipe     Start date: 1999   • Smokeless " tobacco: Never   Vaping Use   • Vaping status: Never Used   Substance and Sexual Activity   • Alcohol use: Not Currently     Alcohol/week: 10.0 standard drinks of alcohol     Types: 10 Cans of beer per week     Comment: social   • Drug use: Never   • Sexual activity: Yes     Partners: Female     Birth control/protection: I.U.D.         Current Outpatient Medications:   •  ADDERALL XR, 20MG, 20 MG 24 hr capsule, Take 1 capsule (20 mg total) by mouth every morning Max Daily Amount: 20 mg Do not start before January 19, 2024., Disp: 90 capsule, Rfl: 0  •  DEXILANT 60 MG capsule, Take 1 capsule by mouth daily, Disp: , Rfl: 3  •  fluticasone (FLONASE) 50 mcg/act nasal spray, 2 sprays into each nostril daily for 14 days, Disp: 16 g, Rfl: 0  •  gabapentin (NEURONTIN) 600 MG tablet, Take 600 mg by mouth 2 (two) times a day  , Disp: , Rfl: 0  •  hydrOXYzine HCL (ATARAX) 50 mg tablet, Take 1 tablet (50 mg total) by mouth 2 (two) times a day as needed for anxiety, Disp: 180 tablet, Rfl: 0  •  losartan-hydrochlorothiazide (HYZAAR) 50-12.5 mg per tablet, Take 1 tablet by mouth daily, Disp: 90 tablet, Rfl: 3  •  OxyCONTIN 15 MG 12 hr tablet, TAKE 1 TABLET BY MOUTH TWICE A DAY FOR PAIN, Disp: , Rfl:   •  risperiDONE (RisperDAL) 0.5 mg tablet, Take 0.5 tablets (0.25 mg total) by mouth every morning 1/2 tab in the AM, Disp: 30 tablet, Rfl: 0  •  risperiDONE (RisperDAL) 4 mg tablet, Take 1 tablet (4 mg total) by mouth daily at bedtime 1 at hs, Disp: 30 tablet, Rfl: 0  •  rosuvastatin (CRESTOR) 10 MG tablet, Take 1 tablet (10 mg total) by mouth daily, Disp: 90 tablet, Rfl: 3  •  sildenafil (VIAGRA) 100 mg tablet, Take 1 tablet by mouth one (1) hour prior to intercourse on an empty stomach.  Limit to 3 encounters per week., Disp: 30 tablet, Rfl: 0  •  testosterone cypionate (DEPO-TESTOSTERONE) 200 mg/mL SOLN, INJECT 1ML EVERY 2 WEEKS. DISCARD VIAL AFTER 30 DAYS FROM OPENING DATE, Disp: , Rfl:   •  tiZANidine (ZANAFLEX) 4 mg tablet,  "Take 1 tablet (4 mg total) by mouth daily at bedtime, Disp: 30 tablet, Rfl: 2  •  zolpidem (AMBIEN) 10 mg tablet, Take 10 mg by mouth daily at bedtime as needed for sleep, Disp: , Rfl:   •  B-D HYPODERMIC NEEDLE 18GX1.5\" 18G X 1-1/2\" MISC, Use as directed---for Testosterone, Disp: , Rfl:   •  B-D SYRINGE LUER-MILAGROS 1CC 1 ML MISC, USE FOR BI WEEKLY TESTOSTERONE INJECTIONS, Disp: , Rfl:   •  B-D SYRINGE LUER-MILAGROS 3CC 3 ML MISC, USE AS DIRECTED FOR TESTOSTERONE INJECTION, Disp: , Rfl:   •  BD Hypodermic Needle 25G X 1-1/2\" MISC, USE FOR INJECTING TESTOSTERONE BIWEEKLY, Disp: , Rfl:     No Known Allergies    Physical Exam:    /86 (BP Location: Left arm, Patient Position: Sitting, Cuff Size: Large)   Pulse 99   Temp 98.1 °F (36.7 °C)   Ht 6' 3\" (1.905 m)   Wt 127 kg (280 lb)   BMI 35.00 kg/m²     Constitutional:normal, well developed, well nourished, alert, in no distress and non-toxic and no overt pain behavior.  Eyes:anicteric  HEENT:grossly intact  Neck:supple, symmetric, trachea midline and no masses   Pulmonary:even and unlabored  Cardiovascular:No edema or pitting edema present  Skin:Normal without rashes or lesions and well hydrated  Psychiatric:Mood and affect appropriate  Neurologic:Cranial Nerves II-XII grossly intact  Musculoskeletal:normal      Imaging  MRI LUMBAR SPINE WITHOUT CONTRAST     INDICATION: M79.604: Pain in right leg.     COMPARISON: 6/9/2023     TECHNIQUE:  Multiplanar, multisequence imaging of the lumbar spine was performed. .        IMAGE QUALITY:  Diagnostic     FINDINGS:     VERTEBRAL BODIES:  There are 5 lumbar type vertebral bodies.  Normal alignment of the lumbar spine.  No spondylolysis or spondylolisthesis. No scoliosis.  No compression fracture.    Normal marrow signal is identified within the visualized bony   structures.  No discrete marrow lesion.     SACRUM:  Normal signal within the sacrum. No evidence of insufficiency or stress fracture.     DISTAL CORD AND CONUS:  Normal " size and signal within the distal cord and conus. The conus medullaris terminates at the L1 level.     PARASPINAL SOFT TISSUES:  Paraspinal soft tissues are unremarkable.     LOWER THORACIC DISC SPACES:  Normal disc height and signal.  No disc herniation, canal stenosis or foraminal narrowing.     LUMBAR DISC SPACES:     L1-L2:  Normal.     L2-L3:  Normal.     L3-L4:  Normal.     L4-L5: There is bilateral facet hypertrophy. There is a right subarticular zone/neural foraminal disc protrusion with mild to moderate right subarticular zone narrowing. Central canal and left neural foramen patent. This level is similar to the prior   study.     L5-S1: There is a diffuse disk bulge.  No significant central canal or neural foraminal narrowing.     OTHER FINDINGS:  None.     IMPRESSION:     Lumbar spondylosis most pronounced on the right at L4-5 is similar to the prior study.     No orders to display         No orders of the defined types were placed in this encounter.

## 2024-05-09 ENCOUNTER — TELEPHONE (OUTPATIENT)
Dept: NEUROSURGERY | Facility: CLINIC | Age: 39
End: 2024-05-09

## 2024-05-16 ENCOUNTER — HOSPITAL ENCOUNTER (OUTPATIENT)
Dept: NEUROLOGY | Facility: CLINIC | Age: 39
End: 2024-05-16
Payer: COMMERCIAL

## 2024-05-16 ENCOUNTER — HOSPITAL ENCOUNTER (OUTPATIENT)
Dept: RADIOLOGY | Facility: HOSPITAL | Age: 39
End: 2024-05-16
Payer: COMMERCIAL

## 2024-05-16 DIAGNOSIS — M79.604 RIGHT LEG PAIN: ICD-10-CM

## 2024-05-16 PROCEDURE — 72114 X-RAY EXAM L-S SPINE BENDING: CPT

## 2024-05-16 PROCEDURE — 95886 MUSC TEST DONE W/N TEST COMP: CPT | Performed by: PSYCHIATRY & NEUROLOGY

## 2024-05-16 PROCEDURE — 95910 NRV CNDJ TEST 7-8 STUDIES: CPT | Performed by: PSYCHIATRY & NEUROLOGY

## 2024-05-29 ENCOUNTER — OFFICE VISIT (OUTPATIENT)
Age: 39
End: 2024-05-29
Payer: COMMERCIAL

## 2024-05-29 ENCOUNTER — TELEPHONE (OUTPATIENT)
Age: 39
End: 2024-05-29

## 2024-05-29 VITALS
BODY MASS INDEX: 34.57 KG/M2 | TEMPERATURE: 98.2 F | HEIGHT: 75 IN | HEART RATE: 101 BPM | WEIGHT: 278 LBS | DIASTOLIC BLOOD PRESSURE: 90 MMHG | OXYGEN SATURATION: 98 % | SYSTOLIC BLOOD PRESSURE: 138 MMHG

## 2024-05-29 DIAGNOSIS — Z00.6 ENCOUNTER FOR EXAMINATION FOR NORMAL COMPARISON OR CONTROL IN CLINICAL RESEARCH PROGRAM: ICD-10-CM

## 2024-05-29 DIAGNOSIS — M79.604 RIGHT LEG PAIN: Primary | ICD-10-CM

## 2024-05-29 PROCEDURE — 99214 OFFICE O/P EST MOD 30 MIN: CPT | Performed by: STUDENT IN AN ORGANIZED HEALTH CARE EDUCATION/TRAINING PROGRAM

## 2024-05-29 NOTE — TELEPHONE ENCOUNTER
I would recommend patient schedule OVL so that I can discuss the spinal cord stimulator trial process with him and all that is involved.

## 2024-05-29 NOTE — PROGRESS NOTES
Ambulatory Visit  Name: Benjamin Muñiz      : 1985      MRN: [id number]  Encounter Provider: Al Slade MD  Encounter Date: 2024   [facility]    Assessment & Plan   Benjamin Muñiz is a 39-year-old male with a history of right-sided leg pain who is status post right L4-5 laminotomy foraminotomies on 2024.  He initially did well after surgery but had a return of the right-sided leg pain that has been uncontrolled with physical therapy and injections.  He presents to clinic to discuss results of the repeat MRI lumbar spine, x-ray lumbar flex ex, and EMG.  MRI lumbar spine showed good decompression of the right lateral gutter at L4-5 and decompression of the traversing L5 nerve root.  There does not appear to be any new evidence of disc herniation or stenosis that explain his symptoms.  X-ray lumbar flex ex does not show any evidence of instability.  EMG did not show any evidence of right-sided radiculopathy.  I reviewed the imaging with him and his wife.  Ultimately I discussed I do not feel that further surgery such as a redo decompression or a decompression and fusion surgery would be of benefit to him.  I recommended a spinal cord stimulator trial for further treatment.  I placed a referral to pain management for spinal cord stimulator trial.  Given his symptoms are ongoing and still debilitating I recommend that he continue to refrain from returning to work work while he gets a spinal cord stimulator trial coordinated.      History of Present Illness     Benjamin Muñiz is a 39-year-old male with a history of right-sided leg pain who is status post right L4-5 laminotomy foraminotomies on 2024.  He initially did well after surgery but developed a return of his right leg pain approximately 6 weeks or so after surgery.  He has tried more epidural steroid injections and had a little bit of relief but the pain returned again.  He has tried  "physical therapy again.  He continues to get the right leg pain and the right buttock, posterior lateral thigh, and occasionally extends to the posterior calf.  He presents to clinic to discuss results of MRI lumbar spine, x-ray lumbar flex ex, EMG.    Review of Systems    Objective     /90 (BP Location: Left arm, Patient Position: Sitting, Cuff Size: Adult)   Pulse 101   Temp 98.2 °F (36.8 °C) (Temporal)   Ht 6' 3\" (1.905 m)   Wt 126 kg (278 lb)   SpO2 98%   BMI 34.75 kg/m²     Physical Exam  Administrative Statements   A&OX3  Gaze conjugate  EOMI  FS  TM  Fcx4  5/5 BUE  5/5 BLE  SILT    Imaging:    EMG reviewed and did not show any evidence of radiculopathy on the right side.  X-ray lumbar flex ex reviewed and did not show any evidence of instability.  MRI lumbar spine reviewed and showed good right-sided lateral gutter decompression at L4-5.  The traversing L5 nerve root appears to be well decompressed.  There is no evidence of new herniated disc or new stenosis.      I spent 30 minutes obtaining history and physical exam, reviewing imaging, discussing treatment options, and coordinating care.          "

## 2024-05-29 NOTE — TELEPHONE ENCOUNTER
Caller: Benjamin     Doctor: carlos    Reason for call: pt would like to discuss the stim trial.     Call back#: [phone]

## 2024-06-01 ENCOUNTER — APPOINTMENT (OUTPATIENT)
Dept: LAB | Facility: HOSPITAL | Age: 39
End: 2024-06-01

## 2024-06-01 DIAGNOSIS — Z00.6 ENCOUNTER FOR EXAMINATION FOR NORMAL COMPARISON OR CONTROL IN CLINICAL RESEARCH PROGRAM: ICD-10-CM

## 2024-06-01 PROCEDURE — 36415 COLL VENOUS BLD VENIPUNCTURE: CPT

## 2024-06-04 ENCOUNTER — OFFICE VISIT (OUTPATIENT)
Dept: PAIN MEDICINE | Facility: CLINIC | Age: 39
End: 2024-06-04
Payer: COMMERCIAL

## 2024-06-04 VITALS
SYSTOLIC BLOOD PRESSURE: 122 MMHG | HEART RATE: 116 BPM | TEMPERATURE: 98 F | DIASTOLIC BLOOD PRESSURE: 84 MMHG | HEIGHT: 75 IN | WEIGHT: 282 LBS | BODY MASS INDEX: 35.06 KG/M2

## 2024-06-04 DIAGNOSIS — M96.1 LUMBAR POSTLAMINECTOMY SYNDROME: ICD-10-CM

## 2024-06-04 DIAGNOSIS — M51.26 PROTRUSION OF LUMBAR INTERVERTEBRAL DISC: ICD-10-CM

## 2024-06-04 DIAGNOSIS — M54.16 LUMBAR RADICULOPATHY: Primary | ICD-10-CM

## 2024-06-04 DIAGNOSIS — M79.604 RIGHT LEG PAIN: ICD-10-CM

## 2024-06-04 PROCEDURE — 99214 OFFICE O/P EST MOD 30 MIN: CPT | Performed by: PHYSICIAN ASSISTANT

## 2024-06-04 NOTE — PROGRESS NOTES
Assessment:  1. Lumbar radiculopathy    2. Lumbar postlaminectomy syndrome    3. Protrusion of lumbar intervertebral disc        Plan:  While the patient was in the office today, I did have a thorough conversation regarding their chronic pain syndrome, medication management, and treatment plan options.    I had a very thorough conversation with the patient regarding the spinal cord stimulator trial and possible permanent implantation. I explained that there is pre-authorization process, including the need to proceed with a psychological evaluation.  I spent a significant amount of time reviewing the basic theory of spinal cord stimulation as well as the hope that it would provide at least moderate improvement and improve the patient's pain and overall functions with regards to performing activities of daily living and leisure activities with as little pain as possible. I feel that with regards to the stimulator process, I answered the patient's questions to the best of my abilities and until the patient was thoroughly satisfied. While the patient was in the office today, a stimulator booklet was sent home for the patient and family to review. I encouraged that before any decisions regarding the stimulator were made that they also proceed with the stimulator education class and proceed from there with regards to proceeding with the psychological evaluation. I advised the patient that if they are interested in proceeding with the spinal cord stimulator trial they need to then proceed with the psychological evaluation and the thoracic spine MRI so we can try to expedite the insurance pre-authorization process, which can take at least 4-6 weeks to obtain approval. The patient verbalized an understanding    The patient was advised to contact the office should their symptoms worsen in the interim. The patient was agreeable and verbalized an understanding.        History of Present Illness:    The patient is a 39 y.o. male  "last seen on 5-8-24 who presents for a follow up office visit in regards to chronic low back pain secondary to lumbar spondylosis, lumbar disc protrusion, lumbar radiculopathy, lumbar postlaminectomy pain syndrome.  The patient currently reports persistent right lower extremity radicular pain that he rates a 4 out of 10 and describes it as an intermittent dull and aching pain.  Unfortunately the patient did not note any improvement with a right L5 and S1 transforaminal epidural steroid injection.  He had an office visit with Dr. Slade who did not recommend any additional neurosurgical intervention.  Dr. Slade did recommend a spinal cord stimulator trial.  The patient presents today with his wife to discuss the stimulator trial process.    I have personally reviewed and/or updated the patient's past medical history, past surgical history, family history, social history, current medications, allergies, and vital signs today.       Review of Systems:    Review of Systems   Respiratory:  Negative for shortness of breath.    Cardiovascular:  Negative for chest pain.   Gastrointestinal:  Negative for constipation, diarrhea, nausea and vomiting.   Musculoskeletal:  Negative for arthralgias, gait problem, joint swelling and myalgias.   Skin:  Negative for rash.   Neurological:  Negative for dizziness, seizures and weakness.   All other systems reviewed and are negative.        Past Medical History:   Diagnosis Date   • ADD (attention deficit disorder)    • Anesthesia complication     \"Takes a lot to get me down\"   • Arthritis    • Chronic pain    • Chronic pain disorder    • Depression    • GERD (gastroesophageal reflux disease)    • Heartburn    • Hypertension    • Mood disorder (HCC)    • Tremor        Past Surgical History:   Procedure Laterality Date   • CLOSED REDUCTION CALCANEAL FRACTURE     • COLONOSCOPY     • PERONEAL TENDON EXPLORATION     • [place] PADILLA FACETECTOMY & FORAMOTOMY 1 VRT SGM LUMBAR Right 2/6/2024    " "Procedure: RIGHT L4-5 MIS FORAMINOTOMY;  Surgeon: Al Slade MD;  Location:  MAIN OR;  Service: Neurosurgery       Family History   Problem Relation Age of Onset   • Parkinsonism Paternal Grandfather    • Heart disease Father    • Hypertension Father    • Breast cancer Mother    • Intellectual disability Sister    • Heart disease Maternal Grandfather    • Substance Abuse Neg Hx    • Mental illness Neg Hx        Social History     Occupational History   • Not on file   Tobacco Use   • Smoking status: Former     Current packs/day: 0.25     Average packs/day: 0.3 packs/day for 25.4 years (6.4 ttl pk-yrs)     Types: Cigarettes, Pipe     Start date: 1999   • Smokeless tobacco: Never   Vaping Use   • Vaping status: Never Used   Substance and Sexual Activity   • Alcohol use: Not Currently     Alcohol/week: 10.0 standard drinks of alcohol     Types: 10 Cans of beer per week     Comment: social   • Drug use: Never   • Sexual activity: Yes     Partners: Female     Birth control/protection: I.U.D.         Current Outpatient Medications:   •  ADDERALL XR, 20MG, 20 MG 24 hr capsule, Take 1 capsule (20 mg total) by mouth every morning Max Daily Amount: 20 mg Do not start before January 19, 2024., Disp: 90 capsule, Rfl: 0  •  B-D HYPODERMIC NEEDLE 18GX1.5\" 18G X 1-1/2\" MISC, Use as directed---for Testosterone, Disp: , Rfl:   •  B-D SYRINGE LUER-MILAGROS 1CC 1 ML MISC, USE FOR BI WEEKLY TESTOSTERONE INJECTIONS, Disp: , Rfl:   •  B-D SYRINGE LUER-MILAGROS 3CC 3 ML MISC, USE AS DIRECTED FOR TESTOSTERONE INJECTION, Disp: , Rfl:   •  BD Hypodermic Needle 25G X 1-1/2\" MISC, USE FOR INJECTING TESTOSTERONE BIWEEKLY, Disp: , Rfl:   •  DEXILANT 60 MG capsule, Take 1 capsule by mouth daily, Disp: , Rfl: 3  •  gabapentin (NEURONTIN) 600 MG tablet, Take 600 mg by mouth 2 (two) times a day  , Disp: , Rfl: 0  •  hydrOXYzine HCL (ATARAX) 50 mg tablet, Take 1 tablet (50 mg total) by mouth 2 (two) times a day as needed for anxiety, Disp: 180 tablet, Rfl: " "0  •  losartan-hydrochlorothiazide (HYZAAR) 50-12.5 mg per tablet, Take 1 tablet by mouth daily, Disp: 90 tablet, Rfl: 3  •  OxyCONTIN 15 MG 12 hr tablet, TAKE 1 TABLET BY MOUTH TWICE A DAY FOR PAIN, Disp: , Rfl:   •  risperiDONE (RisperDAL) 0.5 mg tablet, Take 0.5 tablets (0.25 mg total) by mouth every morning 1/2 tab in the AM, Disp: 30 tablet, Rfl: 0  •  risperiDONE (RisperDAL) 4 mg tablet, Take 1 tablet (4 mg total) by mouth daily at bedtime 1 at hs, Disp: 30 tablet, Rfl: 0  •  rosuvastatin (CRESTOR) 10 MG tablet, Take 1 tablet (10 mg total) by mouth daily, Disp: 90 tablet, Rfl: 3  •  sildenafil (VIAGRA) 100 mg tablet, Take 1 tablet by mouth one (1) hour prior to intercourse on an empty stomach.  Limit to 3 encounters per week., Disp: 30 tablet, Rfl: 0  •  testosterone cypionate (DEPO-TESTOSTERONE) 200 mg/mL SOLN, INJECT 1ML EVERY 2 WEEKS. DISCARD VIAL AFTER 30 DAYS FROM OPENING DATE, Disp: , Rfl:   •  tiZANidine (ZANAFLEX) 4 mg tablet, Take 1 tablet (4 mg total) by mouth daily at bedtime, Disp: 30 tablet, Rfl: 2  •  zolpidem (AMBIEN) 10 mg tablet, Take 10 mg by mouth daily at bedtime as needed for sleep, Disp: , Rfl:   •  fluticasone (FLONASE) 50 mcg/act nasal spray, 2 sprays into each nostril daily for 14 days, Disp: 16 g, Rfl: 0    No Known Allergies    Physical Exam:    /84 (BP Location: Left arm, Patient Position: Sitting, Cuff Size: Standard)   Pulse (!) 116   Temp 98 °F (36.7 °C)   Ht 6' 3\" (1.905 m)   Wt 128 kg (282 lb)   BMI 35.25 kg/m²     Constitutional:normal, well developed, well nourished, alert, in no distress and non-toxic and no overt pain behavior.  Eyes:anicteric  HEENT:grossly intact  Neck:supple, symmetric, trachea midline and no masses   Pulmonary:even and unlabored  Cardiovascular:No edema or pitting edema present  Skin:Normal without rashes or lesions and well hydrated  Psychiatric:Mood and affect appropriate  Neurologic:Cranial Nerves II-XII grossly " intact  Musculoskeletal:normal      Imaging  MRI LUMBAR SPINE WITHOUT CONTRAST     INDICATION: M79.604: Pain in right leg.     COMPARISON: 6/9/2023     TECHNIQUE:  Multiplanar, multisequence imaging of the lumbar spine was performed. .        IMAGE QUALITY:  Diagnostic     FINDINGS:     VERTEBRAL BODIES:  There are 5 lumbar type vertebral bodies.  Normal alignment of the lumbar spine.  No spondylolysis or spondylolisthesis. No scoliosis.  No compression fracture.    Normal marrow signal is identified within the visualized bony   structures.  No discrete marrow lesion.     SACRUM:  Normal signal within the sacrum. No evidence of insufficiency or stress fracture.     DISTAL CORD AND CONUS:  Normal size and signal within the distal cord and conus. The conus medullaris terminates at the L1 level.     PARASPINAL SOFT TISSUES:  Paraspinal soft tissues are unremarkable.     LOWER THORACIC DISC SPACES:  Normal disc height and signal.  No disc herniation, canal stenosis or foraminal narrowing.     LUMBAR DISC SPACES:     L1-L2:  Normal.     L2-L3:  Normal.     L3-L4:  Normal.     L4-L5: There is bilateral facet hypertrophy. There is a right subarticular zone/neural foraminal disc protrusion with mild to moderate right subarticular zone narrowing. Central canal and left neural foramen patent. This level is similar to the prior   study.     L5-S1: There is a diffuse disk bulge.  No significant central canal or neural foraminal narrowing.     OTHER FINDINGS:  None.     IMPRESSION:     Lumbar spondylosis most pronounced on the right at L4-5 is similar to the prior study.     No orders to display         No orders of the defined types were placed in this encounter.

## 2024-06-14 DIAGNOSIS — K21.9 GASTROESOPHAGEAL REFLUX DISEASE WITHOUT ESOPHAGITIS: Primary | ICD-10-CM

## 2024-06-14 RX ORDER — PANTOPRAZOLE SODIUM 40 MG/1
40 TABLET, DELAYED RELEASE ORAL EVERY MORNING
Qty: 90 TABLET | Refills: 1 | Status: SHIPPED | OUTPATIENT
Start: 2024-06-14 | End: 2024-12-11

## 2024-06-26 ENCOUNTER — AMB VIDEO VISIT (OUTPATIENT)
Dept: OTHER | Facility: HOSPITAL | Age: 39
End: 2024-06-26
Payer: COMMERCIAL

## 2024-06-26 DIAGNOSIS — J01.00 ACUTE NON-RECURRENT MAXILLARY SINUSITIS: Primary | ICD-10-CM

## 2024-06-26 PROBLEM — F10.20 UNCOMPLICATED ALCOHOL DEPENDENCE (HCC): Status: RESOLVED | Noted: 2024-01-09 | Resolved: 2024-06-26

## 2024-06-26 PROCEDURE — 99212 OFFICE O/P EST SF 10 MIN: CPT | Performed by: PHYSICIAN ASSISTANT

## 2024-06-26 RX ORDER — AMOXICILLIN AND CLAVULANATE POTASSIUM 875; 125 MG/1; MG/1
1 TABLET, FILM COATED ORAL 2 TIMES DAILY
Qty: 20 TABLET | Refills: 0 | Status: SHIPPED | OUTPATIENT
Start: 2024-06-26 | End: 2024-07-06

## 2024-06-26 NOTE — PATIENT INSTRUCTIONS
"As discussed, sinus infections are typically viral and will get better on their own in 7-10 days. You should try symptomatic relief in the meantime with OTC antihistamine (Claritin or Zyrtec), Afrin for up to 3 days then switch to Flonase, and Sudafed (behind the counter) and mucinex. You can also try sinus rinses with a neti pot or nasal lavage (be sure to use distilled water.) Sleep with a cool mist humidifier at your bedside. If your symptoms do not improve after 7-10 days, you may need antibiotics because it is more likely to be bacterial at that point.  Follow-up with your primary care physician for recheck in 2-3 business days. Go to the ER for sudden severe headache, high fevers, change in vision, seizure activity or anything else that is concerning.    [facility] Phone number is [phone] if you need assistance or have further questions  note in \"Letters\" section of [employer] polo. Can print if opened from a web browser      "

## 2024-06-26 NOTE — PROGRESS NOTES
"Required Documentation:  Encounter provider: Shannon D Severino, PA-C    Identify all parties in room with patient during virtual visit:  No one else    The patient was identified by name and date of birth. Benjamin Muñiz was informed that this is a telemedicine visit and that the visit is being conducted through the [employer] platform. He agrees to proceed..  My office door was closed. No one else was in the room.  He acknowledged consent and understanding of privacy and security of the video platform. The patient has agreed to participate and understands they can discontinue the visit at any time.    Verification of patient location:  Patient is located at Home in the following state in which I hold an active license [place]    Patient is aware this is a billable service.     Reason for visit is No chief complaint on file.       Subjective  HPI   Pt reports rhinorrhea x 1 week which has turned green and foul smelling x 2 days. COVID negative. Endorses some sinus pressure on R side, PND. Nothing tried for sx. No fevers, CP, SOB, HA, rash, sore throat, ear pain. No known sick contacts.     Past Medical History:   Diagnosis Date    ADD (attention deficit disorder)     Anesthesia complication     \"Takes a lot to get me down\"    Arthritis     Chronic pain     Chronic pain disorder     Depression     GERD (gastroesophageal reflux disease)     Heartburn     Hypertension     Mood disorder (HCC)     Tremor     Uncomplicated alcohol dependence (HCC) 01/09/2024       Past Surgical History:   Procedure Laterality Date    CLOSED REDUCTION CALCANEAL FRACTURE      COLONOSCOPY      PERONEAL TENDON EXPLORATION      DC PADILLA FACETECTOMY & FORAMOTOMY 1 VRT SGM LUMBAR Right 2/6/2024    Procedure: RIGHT L4-5 MIS FORAMINOTOMY;  Surgeon: Al Slade MD;  Location:  MAIN OR;  Service: Neurosurgery        No Known Allergies    Review of Systems   Constitutional:  Negative for fever.   HENT:  Positive for sinus pressure and sinus " pain. Negative for nosebleeds.    Eyes:  Negative for redness.   Respiratory:  Negative for shortness of breath.    Cardiovascular:  Negative for chest pain.   Gastrointestinal:  Negative for blood in stool.   Genitourinary:  Negative for hematuria.   Musculoskeletal:  Negative for gait problem.   Skin:  Negative for rash.   Neurological:  Negative for seizures.   Psychiatric/Behavioral:  Negative for behavioral problems.        Video Exam    There were no vitals filed for this visit.    Physical Exam  Constitutional:       General: He is not in acute distress.     Appearance: Normal appearance. He is obese. He is not toxic-appearing.   HENT:      Head: Normocephalic and atraumatic.      Nose: No rhinorrhea.      Comments: Sounds congested     Mouth/Throat:      Mouth: Mucous membranes are moist.   Eyes:      Conjunctiva/sclera: Conjunctivae normal.   Pulmonary:      Effort: Pulmonary effort is normal. No respiratory distress.      Breath sounds: No wheezing (no gross audible wheeze through computer).   Musculoskeletal:      Cervical back: Normal range of motion.   Skin:     Findings: No rash (on face or neck).   Neurological:      Mental Status: He is alert.      Cranial Nerves: No dysarthria or facial asymmetry.   Psychiatric:         Mood and Affect: Mood normal.         Behavior: Behavior normal.         Visit Time  Total Visit Duration: 8 minutes    Assessment/Plan:    Diagnoses and all orders for this visit:    Acute non-recurrent maxillary sinusitis  -     amoxicillin-clavulanate (AUGMENTIN) 875-125 mg per tablet; Take 1 tablet by mouth 2 (two) times a day for 10 days        Patient Instructions   As discussed, sinus infections are typically viral and will get better on their own in 7-10 days. You should try symptomatic relief in the meantime with OTC antihistamine (Claritin or Zyrtec), Afrin for up to 3 days then switch to Flonase, and Sudafed (behind the counter) and mucinex. You can also try sinus rinses  "with a neti pot or nasal lavage (be sure to use distilled water.) Sleep with a cool mist humidifier at your bedside. If your symptoms do not improve after 7-10 days, you may need antibiotics because it is more likely to be bacterial at that point.  Follow-up with your primary care physician for recheck in 2-3 business days. Go to the ER for sudden severe headache, high fevers, change in vision, seizure activity or anything else that is concerning.    [facility] Phone number is [phone] if you need assistance or have further questions  note in \"Letters\" section of [employer]. Can print if opened from a web browser      "

## 2024-06-26 NOTE — CARE ANYWHERE EVISITS
Visit Summary for NAYA MTZ - Gender: Male - Date of Birth: 1985  Date: [id number] - Duration: 7 minutes  Patient: NAYA MTZ  Provider: Shannon Severino PA-C    Patient Contact Information  Address  [address]  [place]; [place] [address]      Visit Topics    Triage Questions   What is your current physical address in the event of a medical emergency? Answer []  Are you allergic to any medications? Answer []  Are you now or could you be pregnant? Answer []  Do you have any immune system compromise or chronic lung   disease? Answer []  Do you have any vulnerable family members in the home (infant, pregnant, cancer, elderly)? Answer []     Conversation Transcripts  [0A][0A] [Notification] You are connected with Shannon Severino PA-C, Urgent Care Specialist.[0A][Notification] NAYA MTZ is located in [place].[0A][Notification] NAYA MTZ has shared health history...[0A]    Diagnosis  Acute maxillary sinusitis    Procedures  Value: 02120 Code: CPT-4 UNLISTED E&M SERVICE    Medications Prescribed    No prescriptions ordered    Electronically signed by: Severino PA-C, Shannon(NPI [id number])

## 2024-06-27 ENCOUNTER — TELEPHONE (OUTPATIENT)
Age: 39
End: 2024-06-27

## 2024-06-27 NOTE — TELEPHONE ENCOUNTER
Patient called for revision to his return to work letter that includes date of 7/8/24 and any stipulations that he has. Pt number

## 2024-07-08 ENCOUNTER — TELEPHONE (OUTPATIENT)
Age: 39
End: 2024-07-08

## 2024-07-08 NOTE — TELEPHONE ENCOUNTER
Pt went to [facility] office to request note for work office closed , he called in and message sent to Dr Slade and Aleksandra for note completion

## 2024-07-09 ENCOUNTER — TELEPHONE (OUTPATIENT)
Age: 39
End: 2024-07-09

## 2024-07-09 NOTE — TELEPHONE ENCOUNTER
PT CALLED ATTEMPTING TO ACCESS RETURN TO WORK LETTER IN [employer]. AFTER MULTIPLE ATTEMPTS ON MOBILE AND DESKTOP DEVICES PT STILL COULD NOT VIEW RETURN TO WORK LETTER IN LETTERS TAB.    PLEASE FAX 7/9 RETURN TO WORK LETTER TO:    GISEL FITCH  [phone]

## 2024-08-08 DIAGNOSIS — F17.200 TOBACCO DEPENDENCE: ICD-10-CM

## 2024-08-09 ENCOUNTER — TELEPHONE (OUTPATIENT)
Dept: GASTROENTEROLOGY | Facility: CLINIC | Age: 39
End: 2024-08-09

## 2024-08-14 DIAGNOSIS — I10 PRIMARY HYPERTENSION: ICD-10-CM

## 2024-08-14 DIAGNOSIS — E78.2 MIXED HYPERLIPIDEMIA: ICD-10-CM

## 2024-08-14 DIAGNOSIS — R73.01 IMPAIRED FASTING GLUCOSE: Primary | ICD-10-CM

## 2024-08-14 DIAGNOSIS — D50.9 IRON DEFICIENCY ANEMIA, UNSPECIFIED IRON DEFICIENCY ANEMIA TYPE: ICD-10-CM

## 2024-08-26 LAB
APOB+LDLR+PCSK9 GENE MUT ANL BLD/T: NOT DETECTED
BRCA1+BRCA2 DEL+DUP + FULL MUT ANL BLD/T: NOT DETECTED
MLH1+MSH2+MSH6+PMS2 GN DEL+DUP+FUL M: NOT DETECTED

## 2024-09-06 PROBLEM — N52.9 ED (ERECTILE DYSFUNCTION): Status: ACTIVE | Noted: 2024-09-06

## 2024-09-06 RX ORDER — DEXLANSOPRAZOLE 60 MG/1
1 CAPSULE, DELAYED RELEASE ORAL DAILY
COMMUNITY
Start: 2024-07-22 | End: 2024-09-19 | Stop reason: SDUPTHER

## 2024-09-06 RX ORDER — OXYCODONE AND ACETAMINOPHEN 5; 325 MG/1; MG/1
1 TABLET ORAL
COMMUNITY
Start: 2024-07-24

## 2024-09-06 RX ORDER — DEXTROAMPHETAMINE SACCHARATE, AMPHETAMINE ASPARTATE, DEXTROAMPHETAMINE SULFATE AND AMPHETAMINE SULFATE 2.5; 2.5; 2.5; 2.5 MG/1; MG/1; MG/1; MG/1
TABLET ORAL
COMMUNITY
Start: 2024-08-07

## 2024-09-06 RX ORDER — IBUPROFEN 800 MG/1
TABLET, FILM COATED ORAL
COMMUNITY

## 2024-09-06 NOTE — PROGRESS NOTES
Ambulatory Visit  Name: Benjamin Muñiz      : 1985      MRN: [id number]  Encounter Provider: Erica Payne PA-C  Encounter Date: 2024   [facility]    Assessment & Plan   1. Hypogonadism in male  Assessment & Plan:  Continue utilizing testosterone cypionate injections 200 mg/milliliters, 1 mL every 2 weeks  Patient is content with medication effects   Refill as needed -- managed by pain medicine team   Patient needs updated labs--testosterone, PSA, CBC (ordered)  Most recent PSA from 2023 was 0.4  Most recent testosterone from 1/10/2022 was 597  Continue to monitor peripherally   Orders:  -     Testosterone, free, total; Future  -     CBC and Platelet; Future  -     PSA, Total Screen; Future; Expected date: 2025  -     tadalafil (CIALIS) 5 MG tablet; Take 1 tablet (5 mg total) by mouth daily as needed for erectile dysfunction  2. Tobacco dependence  Assessment & Plan:  6 cigarettes a day for 25 years   No interest in quitting at this time   3. Erectile dysfunction, unspecified erectile dysfunction type  Assessment & Plan:  Last evaluated 2020 by Dr. Macedo  Initiated on Viagra 100 mg as needed for erectile dysfunction  Overall not pleased with the medication results   Etiology -- hypogonadism + multiple medications for psychologic and pain management   Begin taking Cialis 5 mg daily while utilizing Viagra on an as needed basis   Side effects discussed  Priapism reversal reviewed   Trimix discussed -- patient and his wife are hesitant when it comes to injection therapy   IPP discussed -- not interested  Follow up in 6 months to discuss next steps   Orders:  -     tadalafil (CIALIS) 5 MG tablet; Take 1 tablet (5 mg total) by mouth daily as needed for erectile dysfunction  4. Obesity (BMI 35.0-39.9 without comorbidity)  Assessment & Plan:  Discussed diet and exercise recommendations  Obesity also contributes to worsening erectile  dysfunction      History of Present Illness     Benjamin Muñiz is a 39 y.o. male who presents to the office in follow-up to discuss ongoing erectile dysfunction and hypogonadism.  Patient was last evaluated by Dr. Macedo in July 2020.  At that time they discussed trialing Cialis versus Viagra for his ongoing erectile dysfunction.  He does not believe he ever was given a prescription for Cialis but has been on Viagra for quite some time with minimal results.  He is able to get approximately 25% erect but rigidity is not maintained and seems unable to achieve penetration.  At this time begin taking Cialis 5 mg daily along with Viagra 100 mg as needed 1 hour prior to sexual activity to see if this offers better results.  Side effects and priapism reversal discussed with the patient.  Patient will reach out to office to update us on medication effects and we can try to change it if needed.  Trimix was discussed with the patient and both him and his wife do not feel comfortable proceeding at this time.  IPP was also mentioned and he is not interested in surgical intervention at this time.  Patient has been diagnosed with bipolar disorder, ADD, and PTSD.  He is on a variety of antipsychotic/antidepressant medications including Ambien, Risperdal, Atarax, and Adderall.  He also follows with pain medicine and is currently on daily gabapentin, Percocet, and Zanaflex for his chronic pain.  Patient educated that the likely etiology of his erectile dysfunction is due to the large variety of medications for both pain and psychologic conditions.    Patient reports his hypogonadism is managed by his pain medicine doctors.  He currently utilizes testosterone cypionate injections 200 mg/ML, 1 mL every 2 weeks.  Patient is overall content with the results of his testosterone supplementation.  Patient is due for updated labs at this time and it does not appear that pain medication has been ordering them.  Repeat total testosterone,  "PSA, and CBC have been ordered.  Most recent PSA from February 2023 was 0.4.  Most recent testosterone from January 2022 was 597.  Plan to follow-up in 6 months with the patient to rediscuss his ongoing erectile dysfunction and to continue monitoring his overall hypogonadism.    Review of Systems   Constitutional:  Negative for activity change, chills, fatigue and fever.   Respiratory:  Negative for apnea, cough and shortness of breath.    Cardiovascular:  Negative for chest pain and leg swelling.   Gastrointestinal:  Negative for abdominal distention, abdominal pain, constipation, diarrhea, nausea and vomiting.   Genitourinary:  Negative for decreased urine volume, difficulty urinating, dysuria, flank pain, frequency, hematuria, penile pain, testicular pain and urgency.   Neurological:  Negative for dizziness and headaches.   Psychiatric/Behavioral: Negative.       Medical History Reviewed by provider this encounter:  Tobacco  Allergies  Meds  Problems  Med Hx  Surg Hx  Fam Hx       Current Outpatient Medications on File Prior to Visit   Medication Sig Dispense Refill    ADDERALL XR, 20MG, 20 MG 24 hr capsule Take 1 capsule (20 mg total) by mouth every morning Max Daily Amount: 20 mg Do not start before January 19, 2024. 90 capsule 0    amphetamine-dextroamphetamine (ADDERALL) 10 mg tablet Taken around noon      B-D HYPODERMIC NEEDLE 18GX1.5\" 18G X 1-1/2\" MISC Use as directed---for Testosterone      B-D SYRINGE LUER-MILAGROS 1CC 1 ML MISC USE FOR BI WEEKLY TESTOSTERONE INJECTIONS      B-D SYRINGE LUER-MILAGROS 3CC 3 ML MISC USE AS DIRECTED FOR TESTOSTERONE INJECTION      dexlansoprazole (DEXILANT) 60 MG capsule Take 1 capsule by mouth daily      gabapentin (NEURONTIN) 600 MG tablet Take 600 mg by mouth 2 (two) times a day    0    hydrOXYzine HCL (ATARAX) 50 mg tablet Take 1 tablet (50 mg total) by mouth 2 (two) times a day as needed for anxiety 180 tablet 0    ibuprofen (MOTRIN) 800 mg tablet TAKE 1 TABLET BY MOUTH " "EVERY 8 HOURS AS NEEDED (PAIN).      losartan-hydrochlorothiazide (HYZAAR) 50-12.5 mg per tablet Take 1 tablet by mouth daily 90 tablet 3    oxyCODONE-acetaminophen (PERCOCET) 5-325 mg per tablet Take 1 tablet by mouth every 4-6 hours as needed      OxyCONTIN 15 MG 12 hr tablet TAKE 1 TABLET BY MOUTH TWICE A DAY FOR PAIN      risperiDONE (RisperDAL) 0.5 mg tablet Take 0.5 tablets (0.25 mg total) by mouth every morning 1/2 tab in the AM 30 tablet 0    risperiDONE (RisperDAL) 4 mg tablet Take 1 tablet (4 mg total) by mouth daily at bedtime 1 at hs 30 tablet 0    rosuvastatin (CRESTOR) 10 MG tablet Take 1 tablet (10 mg total) by mouth daily 90 tablet 3    testosterone cypionate (DEPO-TESTOSTERONE) 200 mg/mL SOLN INJECT 1ML EVERY 2 WEEKS. DISCARD VIAL AFTER 30 DAYS FROM OPENING DATE      tiZANidine (ZANAFLEX) 4 mg tablet Take 1 tablet (4 mg total) by mouth daily at bedtime 30 tablet 2    zolpidem (AMBIEN) 10 mg tablet Take 10 mg by mouth daily at bedtime as needed for sleep      BD Hypodermic Needle 25G X 1-1/2\" MISC USE FOR INJECTING TESTOSTERONE BIWEEKLY (Patient not taking: Reported on 9/9/2024)      fluticasone (FLONASE) 50 mcg/act nasal spray 2 sprays into each nostril daily for 14 days 16 g 0    nicotine (NICODERM CQ) 7 mg/24hr TD 24 hr patch Place 1 patch on the skin over 24 hours every 24 hours (Patient not taking: Reported on 9/9/2024) 84 patch 0    pantoprazole (PROTONIX) 40 mg tablet Take 1 tablet (40 mg total) by mouth every morning (Patient not taking: Reported on 9/9/2024) 90 tablet 1    sildenafil (VIAGRA) 100 mg tablet Take 1 tablet by mouth one (1) hour prior to intercourse on an empty stomach.  Limit to 3 encounters per week. (Patient not taking: Reported on 9/9/2024) 30 tablet 0     No current facility-administered medications on file prior to visit.      Social History     Tobacco Use    Smoking status: Former     Current packs/day: 0.25     Average packs/day: 0.3 packs/day for 25.7 years (6.4 ttl " pk-yrs)     Types: Cigarettes, Pipe     Start date: 1999    Smokeless tobacco: Never   Vaping Use    Vaping status: Never Used   Substance and Sexual Activity    Alcohol use: Not Currently     Alcohol/week: 10.0 standard drinks of alcohol     Types: 10 Cans of beer per week     Comment: social    Drug use: Never    Sexual activity: Yes     Partners: Female     Birth control/protection: I.U.D.       Objective     /90 (BP Location: Left arm, Patient Position: Sitting, Cuff Size: Large)   Pulse 98   Wt 127 kg (280 lb)   SpO2 98%   BMI 35.00 kg/m²   Physical Exam  Vitals and nursing note reviewed.   Constitutional:       General: He is not in acute distress.     Appearance: Normal appearance. He is well-developed. He is obese.   HENT:      Head: Normocephalic and atraumatic.   Eyes:      Conjunctiva/sclera: Conjunctivae normal.   Cardiovascular:      Rate and Rhythm: Normal rate and regular rhythm.      Heart sounds: No murmur heard.  Pulmonary:      Effort: Pulmonary effort is normal. No respiratory distress.      Breath sounds: Normal breath sounds.   Abdominal:      General: Abdomen is flat. There is no distension.      Palpations: Abdomen is soft.      Tenderness: There is no abdominal tenderness.   Musculoskeletal:         General: No swelling.      Cervical back: Neck supple.   Skin:     General: Skin is warm and dry.      Capillary Refill: Capillary refill takes less than 2 seconds.   Neurological:      Mental Status: He is alert.   Psychiatric:         Mood and Affect: Mood normal.       Results  Lab Results   Component Value Date    PSA 0.4 02/09/2023     Lab Results   Component Value Date    CALCIUM 8.8 01/22/2024    K 4.0 01/22/2024    CO2 26 01/22/2024     01/22/2024    BUN 9 01/22/2024    CREATININE 0.97 01/22/2024     Lab Results   Component Value Date    WBC 7.72 01/22/2024    HGB 12.0 01/22/2024    HCT 40.7 01/22/2024    MCV 81 (L) 01/22/2024     01/22/2024       Office Urine  Dip  No results found for this or any previous visit (from the past 1 hour(s)).]    Administrative Statements   I have spent a total time of 48 minutes in caring for this patient on the day of the visit/encounter including Diagnostic results, Prognosis, Risks and benefits of tx options, Instructions for management, Patient and family education, Importance of tx compliance, Risk factor reductions, Impressions, Counseling / Coordination of care, Documenting in the medical record, Reviewing / ordering tests, medicine, procedures  , Obtaining or reviewing history  , and Communicating with other healthcare professionals .

## 2024-09-06 NOTE — ASSESSMENT & PLAN NOTE
Continue utilizing testosterone cypionate injections 200 mg/milliliters, 1 mL every 2 weeks  Patient is content with medication effects   Refill as needed -- managed by pain medicine team   Patient needs updated labs--testosterone, PSA, CBC (ordered)  Most recent PSA from 2/9/2023 was 0.4  Most recent testosterone from 1/10/2022 was 597  Continue to monitor peripherally

## 2024-09-06 NOTE — ASSESSMENT & PLAN NOTE
Last evaluated July 2020 by Dr. Macedo  Initiated on Viagra 100 mg as needed for erectile dysfunction  Overall not pleased with the medication results   Etiology -- hypogonadism + multiple medications for psychologic and pain management   Begin taking Cialis 5 mg daily while utilizing Viagra on an as needed basis   Side effects discussed  Priapism reversal reviewed   Trimix discussed -- patient and his wife are hesitant when it comes to injection therapy   IPP discussed -- not interested  Follow up in 6 months to discuss next steps

## 2024-09-09 ENCOUNTER — OFFICE VISIT (OUTPATIENT)
Dept: UROLOGY | Facility: HOSPITAL | Age: 39
End: 2024-09-09
Payer: COMMERCIAL

## 2024-09-09 VITALS
WEIGHT: 280 LBS | OXYGEN SATURATION: 98 % | SYSTOLIC BLOOD PRESSURE: 130 MMHG | DIASTOLIC BLOOD PRESSURE: 90 MMHG | BODY MASS INDEX: 35 KG/M2 | HEART RATE: 98 BPM

## 2024-09-09 DIAGNOSIS — E66.9 OBESITY (BMI 35.0-39.9 WITHOUT COMORBIDITY): ICD-10-CM

## 2024-09-09 DIAGNOSIS — N52.9 ERECTILE DYSFUNCTION, UNSPECIFIED ERECTILE DYSFUNCTION TYPE: ICD-10-CM

## 2024-09-09 DIAGNOSIS — E29.1 HYPOGONADISM IN MALE: Primary | ICD-10-CM

## 2024-09-09 DIAGNOSIS — F17.200 TOBACCO DEPENDENCE: ICD-10-CM

## 2024-09-09 PROCEDURE — 99214 OFFICE O/P EST MOD 30 MIN: CPT

## 2024-09-09 RX ORDER — TADALAFIL 5 MG/1
5 TABLET ORAL DAILY PRN
Qty: 30 TABLET | Refills: 0 | Status: SHIPPED | OUTPATIENT
Start: 2024-09-09 | End: 2024-09-19

## 2024-09-12 DIAGNOSIS — M79.18 MYOFASCIAL PAIN SYNDROME: ICD-10-CM

## 2024-09-12 DIAGNOSIS — I10 PRIMARY HYPERTENSION: ICD-10-CM

## 2024-09-12 RX ORDER — LOSARTAN POTASSIUM AND HYDROCHLOROTHIAZIDE 12.5; 5 MG/1; MG/1
1 TABLET ORAL DAILY
Qty: 90 TABLET | Refills: 3 | Status: SHIPPED | OUTPATIENT
Start: 2024-09-12

## 2024-09-13 ENCOUNTER — TELEPHONE (OUTPATIENT)
Dept: GASTROENTEROLOGY | Facility: MEDICAL CENTER | Age: 39
End: 2024-09-13

## 2024-09-13 NOTE — TELEPHONE ENCOUNTER
Recall letter mailed to patient to schedule Colonoscopy, asked to please schedule for the Month of January or February 2025

## 2024-09-14 ENCOUNTER — APPOINTMENT (OUTPATIENT)
Dept: LAB | Facility: HOSPITAL | Age: 39
End: 2024-09-14
Payer: COMMERCIAL

## 2024-09-14 DIAGNOSIS — E29.1 HYPOGONADISM IN MALE: ICD-10-CM

## 2024-09-14 DIAGNOSIS — E78.2 MIXED HYPERLIPIDEMIA: ICD-10-CM

## 2024-09-14 DIAGNOSIS — I10 PRIMARY HYPERTENSION: ICD-10-CM

## 2024-09-14 DIAGNOSIS — R73.01 IMPAIRED FASTING GLUCOSE: ICD-10-CM

## 2024-09-14 DIAGNOSIS — D50.9 IRON DEFICIENCY ANEMIA, UNSPECIFIED IRON DEFICIENCY ANEMIA TYPE: ICD-10-CM

## 2024-09-14 LAB
ALBUMIN SERPL BCG-MCNC: 4.4 G/DL (ref 3.5–5)
ALP SERPL-CCNC: 96 U/L (ref 34–104)
ALT SERPL W P-5'-P-CCNC: 46 U/L (ref 7–52)
ANION GAP SERPL CALCULATED.3IONS-SCNC: 11 MMOL/L (ref 4–13)
AST SERPL W P-5'-P-CCNC: 46 U/L (ref 13–39)
BILIRUB SERPL-MCNC: 0.83 MG/DL (ref 0.2–1)
BUN SERPL-MCNC: 13 MG/DL (ref 5–25)
CALCIUM SERPL-MCNC: 9.4 MG/DL (ref 8.4–10.2)
CHLORIDE SERPL-SCNC: 106 MMOL/L (ref 96–108)
CHOLEST SERPL-MCNC: 193 MG/DL
CO2 SERPL-SCNC: 20 MMOL/L (ref 21–32)
CREAT SERPL-MCNC: 0.92 MG/DL (ref 0.6–1.3)
FERRITIN SERPL-MCNC: 20 NG/ML (ref 24–336)
GFR SERPL CREATININE-BSD FRML MDRD: 102 ML/MIN/1.73SQ M
GLUCOSE P FAST SERPL-MCNC: 96 MG/DL (ref 65–99)
HDLC SERPL-MCNC: 41 MG/DL
IRON SATN MFR SERPL: 43 % (ref 15–50)
IRON SERPL-MCNC: 172 UG/DL (ref 50–212)
NONHDLC SERPL-MCNC: 152 MG/DL
POTASSIUM SERPL-SCNC: 4.2 MMOL/L (ref 3.5–5.3)
PROT SERPL-MCNC: 7.1 G/DL (ref 6.4–8.4)
PSA SERPL-MCNC: 0.26 NG/ML (ref 0–4)
SODIUM SERPL-SCNC: 137 MMOL/L (ref 135–147)
TIBC SERPL-MCNC: 401 UG/DL (ref 250–450)
TRIGL SERPL-MCNC: 506 MG/DL
TSH SERPL DL<=0.05 MIU/L-ACNC: 1.22 UIU/ML (ref 0.45–4.5)
UIBC SERPL-MCNC: 229 UG/DL (ref 155–355)

## 2024-09-14 PROCEDURE — 83540 ASSAY OF IRON: CPT

## 2024-09-14 PROCEDURE — 80061 LIPID PANEL: CPT

## 2024-09-14 PROCEDURE — 84402 ASSAY OF FREE TESTOSTERONE: CPT

## 2024-09-14 PROCEDURE — 83550 IRON BINDING TEST: CPT

## 2024-09-14 PROCEDURE — 84403 ASSAY OF TOTAL TESTOSTERONE: CPT

## 2024-09-14 PROCEDURE — 82728 ASSAY OF FERRITIN: CPT

## 2024-09-14 PROCEDURE — 36415 COLL VENOUS BLD VENIPUNCTURE: CPT

## 2024-09-14 PROCEDURE — G0103 PSA SCREENING: HCPCS

## 2024-09-14 PROCEDURE — 84443 ASSAY THYROID STIM HORMONE: CPT

## 2024-09-16 LAB
TESTOST FREE SERPL-MCNC: 8.2 PG/ML (ref 6.8–21.5)
TESTOST SERPL-MCNC: 216 NG/DL (ref 264–916)

## 2024-09-19 ENCOUNTER — TELEPHONE (OUTPATIENT)
Dept: GASTROENTEROLOGY | Facility: CLINIC | Age: 39
End: 2024-09-19

## 2024-09-19 ENCOUNTER — OFFICE VISIT (OUTPATIENT)
Dept: GASTROENTEROLOGY | Facility: CLINIC | Age: 39
End: 2024-09-19
Payer: COMMERCIAL

## 2024-09-19 VITALS
DIASTOLIC BLOOD PRESSURE: 75 MMHG | BODY MASS INDEX: 35.14 KG/M2 | HEIGHT: 75 IN | SYSTOLIC BLOOD PRESSURE: 124 MMHG | WEIGHT: 282.6 LBS

## 2024-09-19 DIAGNOSIS — F17.200 TOBACCO DEPENDENCE: ICD-10-CM

## 2024-09-19 DIAGNOSIS — K21.9 GASTROESOPHAGEAL REFLUX DISEASE WITHOUT ESOPHAGITIS: ICD-10-CM

## 2024-09-19 DIAGNOSIS — K31.A0 GASTRIC INTESTINAL METAPLASIA: Primary | ICD-10-CM

## 2024-09-19 DIAGNOSIS — Z86.010 PERSONAL HISTORY OF COLONIC POLYPS: ICD-10-CM

## 2024-09-19 DIAGNOSIS — K85.20 ALCOHOL-INDUCED ACUTE PANCREATITIS WITHOUT INFECTION OR NECROSIS: ICD-10-CM

## 2024-09-19 PROCEDURE — 99214 OFFICE O/P EST MOD 30 MIN: CPT | Performed by: INTERNAL MEDICINE

## 2024-09-19 RX ORDER — DEXLANSOPRAZOLE 60 MG/1
1 CAPSULE, DELAYED RELEASE ORAL DAILY
Start: 2024-09-19

## 2024-09-19 NOTE — TELEPHONE ENCOUNTER
Scheduled date of combo (as of today):10-10-24  Physician performing combo:Shin  Location of combo:[facility]  Bowel prep reviewed with patient:Ila  Instructions reviewed with patient by:clotilde  Clearances: no

## 2024-09-19 NOTE — PROGRESS NOTES
"[facility] - Outpatient Follow-up Note  Benjamin Muñiz 39 y.o. male MRN: [id number]  Encounter: [id number]    ASSESSMENT AND PLAN:      1. Gastric intestinal metaplasia  39M here today for f/u. No complaints. Last egd in 2021 showed gastric intestinal metaplasia. Still smoking and drinking.     - EGD w gastric mapping    2. Personal history of colonic polyps  Will schedule at this time.     - Colonoscopy; Future  - polyethylene glycol (COLYTE) 4000 mL solution; Take 4,000 mL by mouth once for 1 dose Take 4000 mL by mouth once for 1 dose. Use as directed  Dispense: 4000 mL; Refill: 0    3. Tobacco dependence  Smoking cessation advised    4. Alcohol-induced acute pancreatitis without infection or necrosis  No pancreatitis episode since. Still drinking several beers weekly.     5. Gastroesophageal reflux disease without esophagitis  Well controlled.     - dexlansoprazole (DEXILANT) 60 MG capsule; Take 1 capsule (60 mg total) by mouth daily      Followup Appointment: 1 year  ______________________________________________________________________    Chief Complaint   Patient presents with    Schedule EGD and colonoscopy     HPI:  39M here today for f/u. No complaints. Doing ok. Reflux well controlled.     Historical Information   Past Medical History:   Diagnosis Date    ADD (attention deficit disorder)     Anesthesia complication     \"Takes a lot to get me down\"    Arthritis     Chronic pain     Chronic pain disorder     Depression     GERD (gastroesophageal reflux disease)     Heartburn     Hypertension     Mood disorder (HCC)     Tremor     Uncomplicated alcohol dependence (HCC) 01/09/2024     Past Surgical History:   Procedure Laterality Date    CLOSED REDUCTION CALCANEAL FRACTURE      COLONOSCOPY      PERONEAL TENDON EXPLORATION      [place]A FACETECTOMY & FORAMOTOMY 1 VRT SGM LUMBAR Right 02/06/2024    Procedure: RIGHT L4-5 MIS FORAMINOTOMY;  Surgeon: Al Slade MD;  Location: [facility] " "MAIN OR;  Service: Neurosurgery    UPPER GASTROINTESTINAL ENDOSCOPY       Social History     Substance and Sexual Activity   Alcohol Use Yes    Alcohol/week: 10.0 standard drinks of alcohol    Types: 10 Cans of beer per week    Comment: social     Social History     Substance and Sexual Activity   Drug Use Never     Social History     Tobacco Use   Smoking Status Every Day    Current packs/day: 1.00    Average packs/day: 1 pack/day for 25.7 years (25.0 ttl pk-yrs)    Types: Cigarettes, Pipe    Start date: 1999   Smokeless Tobacco Never     Family History   Problem Relation Age of Onset    Breast cancer Mother     Heart disease Father     Hypertension Father     Intellectual disability Sister     Heart disease Maternal Grandfather     Parkinsonism Paternal Grandfather     Substance Abuse Neg Hx     Mental illness Neg Hx     Colon cancer Neg Hx     Colon polyps Neg Hx          Current Outpatient Medications:     ADDERALL XR, 20MG, 20 MG 24 hr capsule    amphetamine-dextroamphetamine (ADDERALL) 10 mg tablet    B-D HYPODERMIC NEEDLE 18GX1.5\" 18G X 1-1/2\" MISC    B-D SYRINGE LUER-MILAGROS 1CC 1 ML MISC    B-D SYRINGE LUER-MILAGROS 3CC 3 ML MISC    BD Hypodermic Needle 25G X 1-1/2\" MISC    dexlansoprazole (DEXILANT) 60 MG capsule    gabapentin (NEURONTIN) 600 MG tablet    hydrOXYzine HCL (ATARAX) 50 mg tablet    ibuprofen (MOTRIN) 800 mg tablet    losartan-hydrochlorothiazide (HYZAAR) 50-12.5 mg per tablet    oxyCODONE-acetaminophen (PERCOCET) 5-325 mg per tablet    OxyCONTIN 15 MG 12 hr tablet    polyethylene glycol (COLYTE) 4000 mL solution    risperiDONE (RisperDAL) 0.5 mg tablet    risperiDONE (RisperDAL) 4 mg tablet    rosuvastatin (CRESTOR) 10 MG tablet    testosterone cypionate (DEPO-TESTOSTERONE) 200 mg/mL SOLN    tiZANidine (ZANAFLEX) 4 mg tablet    zolpidem (AMBIEN) 10 mg tablet  No Known Allergies  Reviewed medications and allergies and updated as indicated    PHYSICAL EXAM:    Blood pressure 124/75, height 6' 3\" (1.905 " m), weight 128 kg (282 lb 9.6 oz). Body mass index is 35.32 kg/m².  General Appearance: NAD, cooperative, alert  Eyes: Anicteric, conjunctiva pink  ENT:  Normocephalic, atraumatic, normal mucosa.    Neck:  Supple, symmetrical, trachea midline  Resp:  Clear to auscultation bilaterally; no rales, rhonchi or wheezing; respirations unlabored   CV:  S1 S2, Regular rate and rhythm; no murmur, rub, or gallop.  GI:  Soft, non-tender, non-distended; normal bowel sounds; no masses, no organomegaly   Rectal: Deferred  Musculoskeletal: No cyanosis, clubbing or edema. Normal ROM.  Skin:  No jaundice, rashes, or lesions   Heme/Lymph: No palpable cervical lymphadenopathy  Psych: Normal affect, good eye contact  Neuro: No gross deficits, AAOx3    Lab Results:   Lab Results   Component Value Date    WBC 7.72 01/22/2024    HGB 12.0 01/22/2024    HCT 40.7 01/22/2024    MCV 81 (L) 01/22/2024     01/22/2024     Lab Results   Component Value Date    K 4.2 09/14/2024     09/14/2024    CO2 20 (L) 09/14/2024    BUN 13 09/14/2024    CREATININE 0.92 09/14/2024    GLUF 96 09/14/2024    CALCIUM 9.4 09/14/2024    CORRECTEDCA 9.6 02/09/2023    AST 46 (H) 09/14/2024    ALT 46 09/14/2024    ALKPHOS 96 09/14/2024    EGFR 102 09/14/2024       Radiology Results:   No results found.    Answers submitted by the patient for this visit:  Abdominal Pain Questionnaire (Submitted on 9/19/2024)  Chief Complaint: Abdominal pain  Chronicity: recurrent  Onset: more than 1 year ago  Onset quality: undetermined  Frequency: rarely  Episode duration: 5 Hours  Progression since onset: resolved  Pain location: right flank  Pain - numeric: 0/10  Pain quality: aching  Radiates to: does not radiate  anorexia: Yes  arthralgias: No  belching: No  constipation: No  diarrhea: Yes  dysuria: No  fever: No  flatus: No  frequency: No  headaches: No  hematochezia: No  hematuria: No  melena: No  myalgias: No  nausea: No  weight loss: No  vomiting: No  Aggravated by:  nothing  Relieved by: nothing

## 2024-10-01 DIAGNOSIS — F17.210 CIGARETTE NICOTINE DEPENDENCE WITHOUT COMPLICATION: Primary | ICD-10-CM

## 2024-10-01 RX ORDER — NICOTINE 21 MG/24HR
1 PATCH, TRANSDERMAL 24 HOURS TRANSDERMAL EVERY 24 HOURS
Qty: 30 PATCH | Refills: 0 | Status: SHIPPED | OUTPATIENT
Start: 2024-10-01

## 2024-10-15 ENCOUNTER — OFFICE VISIT (OUTPATIENT)
Dept: FAMILY MEDICINE CLINIC | Facility: HOSPITAL | Age: 39
End: 2024-10-15
Payer: COMMERCIAL

## 2024-10-15 ENCOUNTER — TELEPHONE (OUTPATIENT)
Age: 39
End: 2024-10-15

## 2024-10-15 VITALS
HEIGHT: 75 IN | SYSTOLIC BLOOD PRESSURE: 136 MMHG | OXYGEN SATURATION: 99 % | RESPIRATION RATE: 16 BRPM | DIASTOLIC BLOOD PRESSURE: 84 MMHG | WEIGHT: 279 LBS | BODY MASS INDEX: 34.69 KG/M2 | HEART RATE: 72 BPM | TEMPERATURE: 97.5 F

## 2024-10-15 DIAGNOSIS — E78.2 MIXED HYPERLIPIDEMIA: ICD-10-CM

## 2024-10-15 DIAGNOSIS — Z00.00 ANNUAL PHYSICAL EXAM: ICD-10-CM

## 2024-10-15 DIAGNOSIS — I10 PRIMARY HYPERTENSION: ICD-10-CM

## 2024-10-15 DIAGNOSIS — F90.2 ATTENTION DEFICIT HYPERACTIVITY DISORDER (ADHD), COMBINED TYPE: Primary | ICD-10-CM

## 2024-10-15 PROCEDURE — 99396 PREV VISIT EST AGE 40-64: CPT | Performed by: INTERNAL MEDICINE

## 2024-10-15 PROCEDURE — 99213 OFFICE O/P EST LOW 20 MIN: CPT | Performed by: INTERNAL MEDICINE

## 2024-10-15 RX ORDER — DEXTROAMPHETAMINE SULFATE, DEXTROAMPHETAMINE SACCHARATE, AMPHETAMINE SULFATE AND AMPHETAMINE ASPARTATE 5; 5; 5; 5 MG/1; MG/1; MG/1; MG/1
20 CAPSULE, EXTENDED RELEASE ORAL EVERY MORNING
Qty: 30 CAPSULE | Refills: 0 | Status: SHIPPED | OUTPATIENT
Start: 2024-10-15

## 2024-10-15 RX ORDER — ROSUVASTATIN CALCIUM 20 MG/1
20 TABLET, COATED ORAL DAILY
Qty: 100 TABLET | Refills: 3 | Status: SHIPPED | OUTPATIENT
Start: 2024-10-15

## 2024-10-15 RX ORDER — DEXTROAMPHETAMINE SACCHARATE, AMPHETAMINE ASPARTATE, DEXTROAMPHETAMINE SULFATE AND AMPHETAMINE SULFATE 2.5; 2.5; 2.5; 2.5 MG/1; MG/1; MG/1; MG/1
10 TABLET ORAL
Qty: 30 TABLET | Refills: 0 | Status: SHIPPED | OUTPATIENT
Start: 2024-10-15

## 2024-10-15 NOTE — ASSESSMENT & PLAN NOTE
Patient has ADHD combined type.  He wishes to switch from [facility] to [facility].  He has no psychiatrist to prescribe Adderall at [facility] currently and asked me to prescribe Adderall for him that helps maintain his focus.  He takes 20 mg extended release in the morning and 10 mg regular Adderall at 1 PM    I reviewed [place] and prescribed Adderall for him until he transfers his care to [facility]

## 2024-10-15 NOTE — PROGRESS NOTES
Adult Annual Physical  Name: Benjamin Muñiz      : 1985      MRN: [id number]  Encounter Provider: Ever Tolliver MD  Encounter Date: 10/15/2024   Encounter department: [facility] PRIMARY [facility] [address]    Assessment & Plan  Attention deficit hyperactivity disorder (ADHD), combined type    Orders:    amphetamine-dextroamphetamine (ADDERALL) 10 mg tablet; Take 1 tablet (10 mg total) by mouth daily after lunch Max Daily Amount: 10 mg    ADDERALL XR, 20MG, 20 MG 24 hr capsule; Take 1 capsule (20 mg total) by mouth every morning Max Daily Amount: 20 mg    Mixed hyperlipidemia    Orders:    rosuvastatin (CRESTOR) 20 MG tablet; Take 1 tablet (20 mg total) by mouth daily    Comprehensive metabolic panel; Future    Lipid panel; Future    Primary hypertension    Orders:    Comprehensive metabolic panel; Future    Lipid panel; Future    Annual physical exam         1. Attention deficit hyperactivity disorder (ADHD), combined type  Assessment & Plan:  Patient has ADHD combined type.  He wishes to switch from [facility] to [facility].  He has no psychiatrist to prescribe Adderall at [facility] currently and asked me to prescribe Adderall for him that helps maintain his focus.  He takes 20 mg extended release in the morning and 10 mg regular Adderall at 1 PM    I reviewed [place] and prescribed Adderall for him until he transfers his care to [facility]  Orders:  -     amphetamine-dextroamphetamine (ADDERALL) 10 mg tablet; Take 1 tablet (10 mg total) by mouth daily after lunch Max Daily Amount: 10 mg  -     ADDERALL XR, 20MG, 20 MG 24 hr capsule; Take 1 capsule (20 mg total) by mouth every morning Max Daily Amount: 20 mg  2. Mixed hyperlipidemia  Assessment & Plan:  Patient has mixed hyperlipidemia.  His cholesterol has gotten better significantly but LDL was still 150 in September.  Advised patient to consume low-fat diet and I increase patient's rosuvastatin to 20 mg daily from 10 mg  "daily.  I will check his LFTs and cholesterol in 6 months  Orders:  -     rosuvastatin (CRESTOR) 20 MG tablet; Take 1 tablet (20 mg total) by mouth daily  -     Comprehensive metabolic panel; Future; Expected date: 04/15/2025  -     Lipid panel; Future; Expected date: 04/15/2025  3. Primary hypertension  Assessment & Plan:  Patient has hypertension.  He tries to consume a low-salt diet and he walks his dogs.  Blood pressure is acceptable.  His electrolytes and renal function were normal on September 14.    I advised him to continue consuming low-salt diet and go for regular walks.  He will continue losartan/HCTZ.  I will recheck his electrolytes and renal function in 6 months before next visit  Orders:  -     Comprehensive metabolic panel; Future; Expected date: 04/15/2025  -     Lipid panel; Future; Expected date: 04/15/2025  4. Annual physical exam      Immunizations and preventive care screenings were discussed with patient today. Appropriate education was printed on patient's after visit summary.        Counseling:  Exercise: the importance of regular exercise/physical activity was discussed. Recommend exercise 3-5 times per week for at least 30 minutes.   Patient received flu shot this season and will get the COVID booster in 2 weeks         History of Present Illness     Adult Annual Physical:  Patient presents for annual physical.     Diet and Physical Activity:  - Diet/Nutrition:. Patient tries to consume low-salt diet, I also asked him to consume low-fat diet  - Exercise:. He walks his dogs    General Health:  - Sleep: sleeps well.  - Hearing: normal hearing bilateral ears.  - Vision: no vision problems.  - Dental: no dental visits for > 1 year.    /GYN Health:    - History of STDs: no     Health:  - History of STDs: no.     Review of Systems      Objective     /84 (BP Location: Left arm, Patient Position: Sitting)   Pulse 72   Temp 97.5 °F (36.4 °C) (Tympanic)   Resp 16   Ht 6' 3\" (1.905 m)  "  Wt 127 kg (279 lb)   SpO2 99%   BMI 34.87 kg/m²     Physical Exam  Constitutional:       General: He is not in acute distress.     Appearance: He is well-developed. He is not toxic-appearing.   HENT:      Head: Normocephalic.      Right Ear: Tympanic membrane normal. There is no impacted cerumen.      Left Ear: Tympanic membrane normal. There is no impacted cerumen.      Mouth/Throat:      Mouth: Mucous membranes are moist.      Pharynx: No oropharyngeal exudate.   Eyes:      Conjunctiva/sclera: Conjunctivae normal.   Cardiovascular:      Rate and Rhythm: Normal rate and regular rhythm.      Heart sounds: No murmur heard.  Pulmonary:      Effort: No respiratory distress.      Breath sounds: No wheezing or rales.   Abdominal:      General: Bowel sounds are normal.      Palpations: Abdomen is soft.      Tenderness: There is no abdominal tenderness.   Musculoskeletal:      Cervical back: Neck supple.   Skin:     General: Skin is warm and dry.   Neurological:      Mental Status: He is alert and oriented to person, place, and time.      Cranial Nerves: No cranial nerve deficit.      Motor: No weakness.      Gait: Gait normal.   Psychiatric:         Mood and Affect: Mood normal.

## 2024-10-15 NOTE — ASSESSMENT & PLAN NOTE
Orders:    rosuvastatin (CRESTOR) 20 MG tablet; Take 1 tablet (20 mg total) by mouth daily    Comprehensive metabolic panel; Future    Lipid panel; Future

## 2024-10-15 NOTE — ASSESSMENT & PLAN NOTE
Patient has hypertension.  He tries to consume a low-salt diet and he walks his dogs.  Blood pressure is acceptable.  His electrolytes and renal function were normal on September 14.    I advised him to continue consuming low-salt diet and go for regular walks.  He will continue losartan/HCTZ.  I will recheck his electrolytes and renal function in 6 months before next visit

## 2024-10-15 NOTE — ASSESSMENT & PLAN NOTE
Orders:    amphetamine-dextroamphetamine (ADDERALL) 10 mg tablet; Take 1 tablet (10 mg total) by mouth daily after lunch Max Daily Amount: 10 mg    ADDERALL XR, 20MG, 20 MG 24 hr capsule; Take 1 capsule (20 mg total) by mouth every morning Max Daily Amount: 20 mg

## 2024-10-15 NOTE — ASSESSMENT & PLAN NOTE
Patient has mixed hyperlipidemia.  His cholesterol has gotten better significantly but LDL was still 150 in September.  Advised patient to consume low-fat diet and I increase patient's rosuvastatin to 20 mg daily from 10 mg daily.  I will check his LFTs and cholesterol in 6 months

## 2024-10-28 ENCOUNTER — TELEPHONE (OUTPATIENT)
Age: 39
End: 2024-10-28

## 2024-10-28 DIAGNOSIS — F31.70 BIPOLAR DISORDER IN FULL REMISSION, MOST RECENT EPISODE UNSPECIFIED TYPE (HCC): Primary | ICD-10-CM

## 2024-10-28 DIAGNOSIS — F90.2 ATTENTION DEFICIT HYPERACTIVITY DISORDER (ADHD), COMBINED TYPE: ICD-10-CM

## 2024-10-28 DIAGNOSIS — F43.10 PTSD (POST-TRAUMATIC STRESS DISORDER): ICD-10-CM

## 2024-10-28 NOTE — TELEPHONE ENCOUNTER
"Behavioral Health Outpatient Intake Questions    Referred By   :     Please advise interviewee that they need to answer all questions truthfully to allow for best care, and any misrepresentations of information may affect their ability to be seen at this clinic   => Was this discussed? Yes     Behavioral Health Outpatient Intake History -     Presenting Problem (in patient's own words): bipolar, depression, anxiety, add/adhd    Are there any communication barriers for this patient?     Yes                                               If yes, please describe barriers:  easily distracted/comprehension    Are you taking any psychiatric medications? Yes     If \"YES\" -What are they Risperdal, Atarax, Adderall XR     If \"YES\" -[facility]    Has the Patient previously received outpatient Talk Therapy or Medication Management from [facility]  No     Has the Patient abused alcohol or other substances in the last 6 months ? No  No concerns of substance abuse are reported.    Legal History-     Is this treatment court ordered? No     Has the Patient been convicted of a felony?  No    ACCEPTED as a patient Yes  If \"Yes\" Appointment Date: NP V appt 11/15 at 9 am armen MARTINEZ NP packet sent via [employer]  Pt shared prefers virtual appts    Referred Elsewhere? No    Name of Insurance Co:[facility]  Insurance ID#[id number]   Insurance Phone #  If ins is primary or secondary?Primary  "

## 2024-10-30 DIAGNOSIS — F43.10 PTSD (POST-TRAUMATIC STRESS DISORDER): ICD-10-CM

## 2024-10-30 DIAGNOSIS — F31.70 BIPOLAR DISORDER IN FULL REMISSION, MOST RECENT EPISODE UNSPECIFIED TYPE (HCC): ICD-10-CM

## 2024-10-30 RX ORDER — RISPERIDONE 0.5 MG/1
0.25 TABLET ORAL EVERY MORNING
Qty: 30 TABLET | Refills: 0 | Status: SHIPPED | OUTPATIENT
Start: 2024-10-30

## 2024-10-30 RX ORDER — RISPERIDONE 4 MG/1
4 TABLET ORAL
Qty: 30 TABLET | Refills: 0 | Status: SHIPPED | OUTPATIENT
Start: 2024-10-30

## 2024-10-30 RX ORDER — HYDROXYZINE HYDROCHLORIDE 50 MG/1
50 TABLET, FILM COATED ORAL EVERY 6 HOURS PRN
Qty: 180 TABLET | Refills: 0 | Status: SHIPPED | OUTPATIENT
Start: 2024-10-30 | End: 2025-01-28

## 2024-11-11 ENCOUNTER — TELEPHONE (OUTPATIENT)
Dept: GASTROENTEROLOGY | Facility: CLINIC | Age: 39
End: 2024-11-11

## 2024-11-11 NOTE — TELEPHONE ENCOUNTER
Scheduled date of combo (as of today):01/15/25  Physician performing combo:Shin  Location of combo:[facility]  Bowel prep reviewed with patient:Ila  Instructions reviewed with patient by: PT still has a copy and stated that he didn't need another copy - DS  Clearances: NONE

## 2024-11-13 ENCOUNTER — TELEPHONE (OUTPATIENT)
Dept: UROLOGY | Facility: HOSPITAL | Age: 39
End: 2024-11-13

## 2024-11-13 DIAGNOSIS — N52.9 ERECTILE DYSFUNCTION, UNSPECIFIED ERECTILE DYSFUNCTION TYPE: ICD-10-CM

## 2024-11-13 DIAGNOSIS — N52.9 ERECTILE DYSFUNCTION, UNSPECIFIED ERECTILE DYSFUNCTION TYPE: Primary | ICD-10-CM

## 2024-11-13 RX ORDER — TADALAFIL 20 MG/1
20 TABLET ORAL DAILY PRN
Qty: 10 TABLET | Refills: 0 | Status: SHIPPED | OUTPATIENT
Start: 2024-11-13 | End: 2024-11-13 | Stop reason: SDUPTHER

## 2024-11-13 RX ORDER — TADALAFIL 20 MG/1
20 TABLET ORAL DAILY PRN
Qty: 10 TABLET | Refills: 0 | Status: SHIPPED | OUTPATIENT
Start: 2024-11-13

## 2024-11-13 NOTE — TELEPHONE ENCOUNTER
PA for TADALAFIL 20 MG  DENIED      NOT COVERED BY PLAN FOR DX OF ED    PHARMACY TO PROVIDE GOOD RX OPTION TO PT

## 2024-11-15 ENCOUNTER — TELEMEDICINE (OUTPATIENT)
Dept: PSYCHIATRY | Facility: CLINIC | Age: 39
End: 2024-11-15
Payer: COMMERCIAL

## 2024-11-15 DIAGNOSIS — Z79.899 MEDICATION COURSE CHANGED: ICD-10-CM

## 2024-11-15 DIAGNOSIS — F31.70 BIPOLAR DISORDER IN FULL REMISSION, MOST RECENT EPISODE UNSPECIFIED TYPE (HCC): ICD-10-CM

## 2024-11-15 DIAGNOSIS — F41.1 GENERALIZED ANXIETY DISORDER: Primary | ICD-10-CM

## 2024-11-15 DIAGNOSIS — F90.2 ATTENTION DEFICIT HYPERACTIVITY DISORDER (ADHD), COMBINED TYPE: ICD-10-CM

## 2024-11-15 DIAGNOSIS — F43.10 PTSD (POST-TRAUMATIC STRESS DISORDER): ICD-10-CM

## 2024-11-15 PROCEDURE — 99214 OFFICE O/P EST MOD 30 MIN: CPT | Performed by: STUDENT IN AN ORGANIZED HEALTH CARE EDUCATION/TRAINING PROGRAM

## 2024-11-15 RX ORDER — FLUOXETINE 20 MG/1
20 TABLET, FILM COATED ORAL DAILY
Qty: 30 TABLET | Refills: 0 | Status: SHIPPED | OUTPATIENT
Start: 2024-11-15 | End: 2024-12-15
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
## 2024-11-25 DIAGNOSIS — M79.18 MYOFASCIAL PAIN SYNDROME: ICD-10-CM

## 2024-12-04 ENCOUNTER — TELEPHONE (OUTPATIENT)
Dept: GASTROENTEROLOGY | Facility: CLINIC | Age: 39
End: 2024-12-04

## 2024-12-04 DIAGNOSIS — K21.9 GASTROESOPHAGEAL REFLUX DISEASE WITHOUT ESOPHAGITIS: ICD-10-CM

## 2024-12-04 DIAGNOSIS — R10.13 EPIGASTRIC PAIN: Primary | ICD-10-CM

## 2024-12-04 DIAGNOSIS — K85.20 ALCOHOL-INDUCED ACUTE PANCREATITIS WITHOUT INFECTION OR NECROSIS: ICD-10-CM

## 2024-12-04 RX ORDER — DEXLANSOPRAZOLE 60 MG/1
1 CAPSULE, DELAYED RELEASE ORAL DAILY
Qty: 90 CAPSULE | Refills: 3 | Status: SHIPPED | OUTPATIENT
Start: 2024-12-04 | End: 2024-12-06 | Stop reason: SDUPTHER

## 2024-12-05 ENCOUNTER — APPOINTMENT (OUTPATIENT)
Dept: LAB | Facility: HOSPITAL | Age: 39
End: 2024-12-05
Payer: COMMERCIAL

## 2024-12-05 DIAGNOSIS — Z79.899 MEDICATION COURSE CHANGED: ICD-10-CM

## 2024-12-05 DIAGNOSIS — K85.20 ALCOHOL-INDUCED ACUTE PANCREATITIS WITHOUT INFECTION OR NECROSIS: ICD-10-CM

## 2024-12-05 DIAGNOSIS — R10.13 EPIGASTRIC PAIN: ICD-10-CM

## 2024-12-05 LAB
BASOPHILS # BLD AUTO: 0.06 THOUSANDS/ÂΜL (ref 0–0.1)
BASOPHILS NFR BLD AUTO: 1 % (ref 0–1)
EOSINOPHIL # BLD AUTO: 0.19 THOUSAND/ÂΜL (ref 0–0.61)
EOSINOPHIL NFR BLD AUTO: 3 % (ref 0–6)
ERYTHROCYTE [DISTWIDTH] IN BLOOD BY AUTOMATED COUNT: 13.9 % (ref 11.6–15.1)
EST. AVERAGE GLUCOSE BLD GHB EST-MCNC: 111 MG/DL
HBA1C MFR BLD: 5.5 %
HCT VFR BLD AUTO: 42.1 % (ref 36.5–49.3)
HGB BLD-MCNC: 13.3 G/DL (ref 12–17)
IMM GRANULOCYTES # BLD AUTO: 0.02 THOUSAND/UL (ref 0–0.2)
IMM GRANULOCYTES NFR BLD AUTO: 0 % (ref 0–2)
LIPASE SERPL-CCNC: 173 U/L (ref 11–82)
LYMPHOCYTES # BLD AUTO: 1.85 THOUSANDS/ÂΜL (ref 0.6–4.47)
LYMPHOCYTES NFR BLD AUTO: 27 % (ref 14–44)
MCH RBC QN AUTO: 26.3 PG (ref 26.8–34.3)
MCHC RBC AUTO-ENTMCNC: 31.6 G/DL (ref 31.4–37.4)
MCV RBC AUTO: 83 FL (ref 82–98)
MONOCYTES # BLD AUTO: 0.51 THOUSAND/ÂΜL (ref 0.17–1.22)
MONOCYTES NFR BLD AUTO: 8 % (ref 4–12)
NEUTROPHILS # BLD AUTO: 4.14 THOUSANDS/ÂΜL (ref 1.85–7.62)
NEUTS SEG NFR BLD AUTO: 61 % (ref 43–75)
NRBC BLD AUTO-RTO: 0 /100 WBCS
PLATELET # BLD AUTO: 195 THOUSANDS/UL (ref 149–390)
PMV BLD AUTO: 10.6 FL (ref 8.9–12.7)
PROLACTIN SERPL-MCNC: 26.79 NG/ML (ref 2.64–13.13)
RBC # BLD AUTO: 5.05 MILLION/UL (ref 3.88–5.62)
WBC # BLD AUTO: 6.77 THOUSAND/UL (ref 4.31–10.16)

## 2024-12-05 PROCEDURE — 83690 ASSAY OF LIPASE: CPT

## 2024-12-05 PROCEDURE — 84146 ASSAY OF PROLACTIN: CPT

## 2024-12-05 PROCEDURE — 36415 COLL VENOUS BLD VENIPUNCTURE: CPT

## 2024-12-05 PROCEDURE — 85025 COMPLETE CBC W/AUTO DIFF WBC: CPT

## 2024-12-06 ENCOUNTER — RESULTS FOLLOW-UP (OUTPATIENT)
Dept: GASTROENTEROLOGY | Facility: CLINIC | Age: 39
End: 2024-12-06

## 2024-12-06 DIAGNOSIS — K85.20 ALCOHOL-INDUCED ACUTE PANCREATITIS WITHOUT INFECTION OR NECROSIS: Primary | ICD-10-CM

## 2024-12-06 DIAGNOSIS — K21.9 GASTROESOPHAGEAL REFLUX DISEASE WITHOUT ESOPHAGITIS: ICD-10-CM

## 2024-12-06 RX ORDER — DEXLANSOPRAZOLE 60 MG/1
1 CAPSULE, DELAYED RELEASE ORAL DAILY
Qty: 90 CAPSULE | Refills: 3 | Status: SHIPPED | OUTPATIENT
Start: 2024-12-06

## 2024-12-06 RX ORDER — ONDANSETRON 4 MG/1
4 TABLET, ORALLY DISINTEGRATING ORAL EVERY 6 HOURS PRN
Qty: 30 TABLET | Refills: 0 | Status: SHIPPED | OUTPATIENT
Start: 2024-12-06

## 2024-12-06 NOTE — RESULT ENCOUNTER NOTE
LFTs pending. Lipase is slightly up again. D/w pt. Able to keep fluids down. Will send some zofran just in case given it's Friday. Pt understands that if symptoms worsen or unable tolerate po, he needs to go to the ER for IVF.

## 2024-12-13 ENCOUNTER — TELEPHONE (OUTPATIENT)
Dept: PSYCHIATRY | Facility: CLINIC | Age: 39
End: 2024-12-13

## 2024-12-13 ENCOUNTER — TELEMEDICINE (OUTPATIENT)
Dept: PSYCHIATRY | Facility: CLINIC | Age: 39
End: 2024-12-13
Payer: COMMERCIAL

## 2024-12-13 DIAGNOSIS — F43.10 PTSD (POST-TRAUMATIC STRESS DISORDER): ICD-10-CM

## 2024-12-13 DIAGNOSIS — F41.1 GENERALIZED ANXIETY DISORDER: Primary | ICD-10-CM

## 2024-12-13 DIAGNOSIS — F90.2 ATTENTION DEFICIT HYPERACTIVITY DISORDER (ADHD), COMBINED TYPE: ICD-10-CM

## 2024-12-13 PROCEDURE — 99214 OFFICE O/P EST MOD 30 MIN: CPT | Performed by: STUDENT IN AN ORGANIZED HEALTH CARE EDUCATION/TRAINING PROGRAM

## 2024-12-13 RX ORDER — FLUOXETINE 40 MG/1
40 CAPSULE ORAL DAILY
Qty: 14 CAPSULE | Refills: 0 | Status: SHIPPED | OUTPATIENT
Start: 2024-12-13 | End: 2024-12-27

## 2024-12-13 NOTE — TELEPHONE ENCOUNTER
Called pt and left VM regarding scheduling 2wk f/u appt with Dr. Johnson.    Pt is now scheduled virtually on 12/27/24 @ 8:30am.    Gave [facility] phone number to call back if r/s is needed.

## 2024-12-13 NOTE — PSYCH
MEDICATION MANAGEMENT NOTE        [facility]      Name and Date of Birth:  Benjamin Muñiz 39 y.o. 1985 MRN: [id number]    Date of Visit: December 13, 2024    Reason for Visit: Follow-up visit for medication management     Virtual Visit Disclaimer:       TeleMed provider: Ambreen Johnson D.O.    Location: [place]     Verification of patient location:     Patient is currently located in the [facility]  Patient is currently located in a state in which I am licensed     After connecting through tracx, the patient was identified by name and date of birth.  Benjamin Muñiz was informed that this is a telemedicine visit that is being conducted through [employer], and the patient was informed that this is a secure, HIPAA-compliant platform. My office door was closed. No one else was in the room. Benjamin Muñiz acknowledged consent and understanding of privacy and security of the video platform. Benjamin understands that the online visit is based solely on information provided by the patient, and that, in the absence of a face-to-face physical evaluation by the physician, the diagnosis Benjamin  receives is both limited and provisional in terms of accuracy and completeness. Benjamin Muñiz understands that they can discontinue the visit at any time. I informed Benjamin that I have reviewed their record in EP[facility] and presented the opportunity for them to ask any questions regarding the visit today. Benjamin Muñiz voiced understanding and consented to these terms. Benjamin is aware this is a billable service. Benjamin is present in [place]    SUBJECTIVE:    Benjamin Muñiz is a 39 y.o. male with past psychiatric history significant for MDD, HERI, PTSD, ADHD who was personally seen and evaluated today at the [facility] outpatient clinic for follow-up and medication management. Benjamin denies SI, HI, AVH, delusions, gibson since his last appointment.  After  "discussion of risks, benefits, potential side effects, alternatives, we will increase dose of Prozac to 40 mg daily to better ascertain affect.  He denies acute mental complaints or concerns at this time.        Current Rating Scores:     None completed today.    Review Of Systems:      Constitutional negative   ENT negative   Cardiovascular negative   Respiratory negative   Gastrointestinal negative   Genitourinary negative   Musculoskeletal negative   Integumentary negative   Neurological negative   Endocrine negative   Other Symptoms none, all other systems are negative       Past Psychiatric History: (unchanged information from previous note copied and italicized) - Information that is bolded has been updated.     See intake    Substance Abuse History: (unchanged information from previous note copied and italicized) - Information that is bolded has been updated.     See intake    Social History: (unchanged information from previous note copied and italicized) - Information that is bolded has been updated.     See intake    Traumatic History: (unchanged information from previous note copied and italicized) - Information that is bolded has been updated.     See intake      Past Medical History:    Past Medical History:   Diagnosis Date    ADD (attention deficit disorder)     Anesthesia complication     \"Takes a lot to get me down\"    Arthritis     Chronic pain     Chronic pain disorder     Depression     GERD (gastroesophageal reflux disease)     Heartburn     Hypertension     Mood disorder (HCC)     Tremor     Uncomplicated alcohol dependence (HCC) 01/09/2024        Past Surgical History:   Procedure Laterality Date    CLOSED REDUCTION CALCANEAL FRACTURE      COLONOSCOPY      PERONEAL TENDON EXPLORATION      DE PADILLA FACETECTOMY & FORAMOTOMY 1 VRT SGM LUMBAR Right 02/06/2024    Procedure: RIGHT L4-5 MIS FORAMINOTOMY;  Surgeon: Al Slade MD;  Location:  MAIN OR;  Service: Neurosurgery    UPPER GASTROINTESTINAL " ENDOSCOPY       No Known Allergies    Substance Abuse History:    Social History     Substance and Sexual Activity   Alcohol Use Yes    Alcohol/week: 10.0 standard drinks of alcohol    Types: 10 Cans of beer per week    Comment: social     Social History     Substance and Sexual Activity   Drug Use Never       Social History:    Social History     Socioeconomic History    Marital status: /Civil Union     Spouse name: Not on file    Number of children: Not on file    Years of education: Not on file    Highest education level: Not on file   Occupational History    Not on file   Tobacco Use    Smoking status: Every Day     Current packs/day: 1.00     Average packs/day: 1 pack/day for 25.9 years (25.2 ttl pk-yrs)     Types: Cigarettes, Pipe     Start date: 1999    Smokeless tobacco: Never   Vaping Use    Vaping status: Never Used   Substance and Sexual Activity    Alcohol use: Yes     Alcohol/week: 10.0 standard drinks of alcohol     Types: 10 Cans of beer per week     Comment: social    Drug use: Never    Sexual activity: Yes     Partners: Female     Birth control/protection: I.U.D.   Other Topics Concern    Not on file   Social History Narrative    Lives with wife    Sees dentist occasionally    No living will     Social Drivers of Health     Financial Resource Strain: Not on file   Food Insecurity: No Food Insecurity (6/30/2023)    Hunger Vital Sign     Worried About Running Out of Food in the Last Year: Never true     Ran Out of Food in the Last Year: Never true   Transportation Needs: No Transportation Needs (6/30/2023)    PRAPARE - Transportation     Lack of Transportation (Medical): No     Lack of Transportation (Non-Medical): No   Physical Activity: Not on file   Stress: Not on file   Social Connections: Not on file   Intimate Partner Violence: Not on file   Housing Stability: Low Risk  (6/30/2023)    Housing Stability Vital Sign     Unable to Pay for Housing in the Last Year: No     Number of Places  Lived in the Last Year: 1     Unstable Housing in the Last Year: No       Family Psychiatric History:     Family History   Problem Relation Age of Onset    Breast cancer Mother     Heart disease Father     Hypertension Father     Intellectual disability Sister     Heart disease Maternal Grandfather     Parkinsonism Paternal Grandfather     Substance Abuse Neg Hx     Mental illness Neg Hx     Colon cancer Neg Hx     Colon polyps Neg Hx        History Review: The following portions of the patient's history were reviewed and updated as appropriate: allergies, current medications, past family history, past medical history, past social history, past surgical history, and problem list.         OBJECTIVE:     Vital signs in last 24 hours:    There were no vitals filed for this visit.    Mental Status Evaluation:    Appearance age appropriate, casually dressed   Behavior cooperative, mildly anxious   Speech normal rate, normal volume, normal pitch   Mood normal   Affect constricted   Thought Processes organized, goal directed   Associations intact associations   Thought Content no overt delusions   Perceptual Disturbances: no auditory hallucinations, no visual hallucinations   Abnormal Thoughts  Risk Potential Suicidal ideation - None at present  Homicidal ideation - None at present  Potential for aggression - Not at present   Orientation oriented to person, place, time/date, and situation   Memory recent and remote memory grossly intact   Consciousness alert and awake   Attention Span Concentration Span attention span and concentration are age appropriate   Intellect appears to be of average intelligence   Insight intact   Judgement intact   Muscle Strength and  Gait unable to assess today due to virtual visit   Motor activity unable to assess today due to virtual visit   Language no difficulty naming common objects, no difficulty repeating a phrase, no difficulty writing a sentence   Fund of Knowledge adequate knowledge  of current events  adequate fund of knowledge regarding past history  adequate fund of knowledge regarding vocabulary    Pain none   Pain Scale Did not ask patient to formally rate       Laboratory Results: I have personally reviewed all pertinent laboratory/tests results    Recent Labs (last 2 months):   Appointment on 12/05/2024   Component Date Value    Prolactin 12/05/2024 26.79 (H)     WBC 12/05/2024 6.77     RBC 12/05/2024 5.05     Hemoglobin 12/05/2024 13.3     Hematocrit 12/05/2024 42.1     MCV 12/05/2024 83     MCH 12/05/2024 26.3 (L)     MCHC 12/05/2024 31.6     RDW 12/05/2024 13.9     MPV 12/05/2024 10.6     Platelets 12/05/2024 195     nRBC 12/05/2024 0     Segmented % 12/05/2024 61     Immature Grans % 12/05/2024 0     Lymphocytes % 12/05/2024 27     Monocytes % 12/05/2024 8     Eosinophils Relative 12/05/2024 3     Basophils Relative 12/05/2024 1     Absolute Neutrophils 12/05/2024 4.14     Absolute Immature Grans 12/05/2024 0.02     Absolute Lymphocytes 12/05/2024 1.85     Absolute Monocytes 12/05/2024 0.51     Eosinophils Absolute 12/05/2024 0.19     Basophils Absolute 12/05/2024 0.06     Lipase 12/05/2024 173 (H)        Suicide/Homicide Risk Assessment:    Risk of Harm to Self:  The following ratings are based on assessment at the time of the interview  Historical Risk Factors include: chronic psychiatric problems  Protective Factors: no current suicidal ideation, access to mental health treatment, compliant with medications, compliant with mental health treatment, having a desire to be alive, responsibilities and duties to others, strong relationships    Risk of Harm to Others:  The following ratings are based on assessment at the time of the interview  Historical Risk Factors include: none.  Protective Factors: no current homicidal ideation, access to mental health treatment, compliant with medications, compliant with mental health treatment, responsibilities and duties to others    The following  interventions are recommended: continue medication management, contracts for safety at present - agrees to go to ED if feeling unsafe, contracts for safety at present - agrees to call Crisis Intervention Service if feeling unsafe      Lethality Statement:    Based on today's assessment and clinical criteria, Benjamin Muñiz contracts for safety and is not an imminent risk of harm to self or others. Outpatient level of care is deemed appropriate at this current time. Benjamin understands that if they can no longer contract for safety, they need to call the office or report to their nearest Emergency Room for immediate evaluation. They voiced understanding and agreement to call 911 or head to the nearest ED should they have any physical or mental decompensation whatsoever.       Assessment/Plan:     1.)  HERI  2.)  PTSD  3.)  ADHD  4.)  MDD, recurrent, mild    After discussion of risks, benefits, potential side effects, alternatives, we will continue risperidone 4 mg nightly, 0.5 mg every morning, Adderall XR 20 in the morning, immediate release 10 mg in the afternoon, hydroxyzine 50 mg every 8 hours as needed for severe anxiety.  We will increase fluoxetine to 40 mg daily to better ascertain affect.  Goal is to explore alternate medications that may help patient with any feelings of depression or anxiety.  Currently patient has been equivocal at 20 mg daily so we will trial higher dose.  Thereafter, we will discuss decreasing or modifying risperidone due to elevated prolactin and overall focus and clarify regimen in light of his previous misdiagnosis his bipolar disorder.  He denies acute mental complaints or concerns at this time        Aware of 24 hour and weekend coverage for urgent situations accessed by calling [facility] main practice number    Medications Risks/Benefits      Risks, Benefits And Possible Side Effects Of Medications:    Risks, benefits, and possible side effects of medications  explained to Benjamin and he verbalizes understanding and agreement for treatment.    Controlled Medication Discussion:     Benjamin has been filling controlled prescriptions on time as prescribed according to [place] Prescription Drug Monitoring Program    Psychotherapy Provided:     Individual psychotherapy provided: Crisis/safety plan discussed with Benjamin.     Treatment Plan:    Completed and signed during the session:  We will complete at next appointment due to lack of time today      Visit Time    Visit Start Time: 8:00 AM  Visit Stop Time: 8:20 AM  Total Visit Duration:  20 minutes     The total visit duration detailed above includes: patient engagement, medication management, psychotherapy/counseling, discussion regarding treatment goals, documentation, review of past medical records, and coordination of care.      Note Share Disclaimer:     This note was not shared with the patient due to reasonable likelihood of causing patient harm      Ambreen Johnson DO  Psychiatry  12/13/24

## 2024-12-16 DIAGNOSIS — N52.9 ERECTILE DYSFUNCTION, UNSPECIFIED ERECTILE DYSFUNCTION TYPE: ICD-10-CM

## 2024-12-16 RX ORDER — TADALAFIL 20 MG/1
20 TABLET ORAL DAILY PRN
Qty: 10 TABLET | Refills: 0 | Status: CANCELLED | OUTPATIENT
Start: 2024-12-16

## 2024-12-17 DIAGNOSIS — N52.9 ERECTILE DYSFUNCTION, UNSPECIFIED ERECTILE DYSFUNCTION TYPE: ICD-10-CM

## 2024-12-17 RX ORDER — TADALAFIL 5 MG/1
5 TABLET ORAL DAILY PRN
Qty: 10 TABLET | Refills: 0 | Status: SHIPPED | OUTPATIENT
Start: 2024-12-17

## 2024-12-17 RX ORDER — TADALAFIL 20 MG/1
20 TABLET ORAL DAILY PRN
Qty: 10 TABLET | Refills: 0 | Status: SHIPPED | OUTPATIENT
Start: 2024-12-17

## 2024-12-17 NOTE — TELEPHONE ENCOUNTER
Patient called stating that he received a message that his request for a refill of tadalafil was denied. I checked his chart and it looks like both strengths he requested went in. Patient verbalized understanding.

## 2024-12-27 ENCOUNTER — TELEMEDICINE (OUTPATIENT)
Dept: PSYCHIATRY | Facility: CLINIC | Age: 39
End: 2024-12-27
Payer: COMMERCIAL

## 2024-12-27 DIAGNOSIS — F33.9 RECURRENT MAJOR DEPRESSIVE DISORDER, REMISSION STATUS UNSPECIFIED (HCC): ICD-10-CM

## 2024-12-27 DIAGNOSIS — F41.1 GENERALIZED ANXIETY DISORDER: Primary | ICD-10-CM

## 2024-12-27 DIAGNOSIS — F43.10 PTSD (POST-TRAUMATIC STRESS DISORDER): ICD-10-CM

## 2024-12-27 PROCEDURE — 99214 OFFICE O/P EST MOD 30 MIN: CPT | Performed by: STUDENT IN AN ORGANIZED HEALTH CARE EDUCATION/TRAINING PROGRAM

## 2024-12-27 RX ORDER — DULOXETIN HYDROCHLORIDE 30 MG/1
CAPSULE, DELAYED RELEASE ORAL
Qty: 67 CAPSULE | Refills: 0 | Status: SHIPPED | OUTPATIENT
Start: 2024-12-27 | End: 2025-01-05

## 2024-12-31 NOTE — PSYCH
MEDICATION MANAGEMENT NOTE        [facility]      Name and Date of Birth:  Benjamin Muñiz 39 y.o. 1985 MRN: [id number]    Date of Visit: December 31, 2024    Reason for Visit: Follow-up visit for medication management     Virtual Visit Disclaimer:       TeleMed provider: Ambreen Johnson D.O.    Location: [place]     Verification of patient location:     Patient is currently located in the [facility]  Patient is currently located in a state in which I am licensed     After connecting through [employer], the patient was identified by name and date of birth.  Benjamin Muñiz was informed that this is a telemedicine visit that is being conducted through [employer], and the patient was informed that this is a secure, HIPAA-compliant platform. My office door was closed. No one else was in the room. Benjamin Muñiz acknowledged consent and understanding of privacy and security of the video platform. Bejnamin understands that the online visit is based solely on information provided by the patient, and that, in the absence of a face-to-face physical evaluation by the physician, the diagnosis Benjamin  receives is both limited and provisional in terms of accuracy and completeness. Benjamin Muñiz understands that they can discontinue the visit at any time. I informed Benjamin that I have reviewed their record in EP[facility] and presented the opportunity for them to ask any questions regarding the visit today. Benjamin Muñiz voiced understanding and consented to these terms. Benjamin is aware this is a billable service. Benjamin is present in [place]    SUBJECTIVE:    Benjamin Muñiz is a 39 y.o. male with past psychiatric history significant for MDD, HERI, PTSD, ADHD who was personally seen and evaluated today at the [facility] outpatient clinic for follow-up and medication management. Benjamin denies SI, HI, AVH, delusions, gibson since his last appointment.  A patient  "stated that he did not notice much of a difference with fluoxetine 40 mg daily and after discussion of risks, benefits, potential side effects, alternatives, we will discontinue fluoxetine and initiate Cymbalta 30 mg daily which will be titrated upwards to 60 mg daily if tolerated.  Patient denies acute mental complaints or concerns at this time.      Current Rating Scores:     None completed today.    Review Of Systems:      Constitutional negative   ENT negative   Cardiovascular negative   Respiratory negative   Gastrointestinal negative   Genitourinary negative   Musculoskeletal negative   Integumentary negative   Neurological negative   Endocrine negative   Other Symptoms none, all other systems are negative       Past Psychiatric History: (unchanged information from previous note copied and italicized) - Information that is bolded has been updated.     See intake    Substance Abuse History: (unchanged information from previous note copied and italicized) - Information that is bolded has been updated.     See intake    Social History: (unchanged information from previous note copied and italicized) - Information that is bolded has been updated.     See intake    Traumatic History: (unchanged information from previous note copied and italicized) - Information that is bolded has been updated.     See intake      Past Medical History:    Past Medical History:   Diagnosis Date    ADD (attention deficit disorder)     Anesthesia complication     \"Takes a lot to get me down\"    Arthritis     Chronic pain     Chronic pain disorder     Depression     GERD (gastroesophageal reflux disease)     Heartburn     Hypertension     Mood disorder (HCC)     Tremor     Uncomplicated alcohol dependence (HCC) 01/09/2024        Past Surgical History:   Procedure Laterality Date    CLOSED REDUCTION CALCANEAL FRACTURE      COLONOSCOPY      PERONEAL TENDON EXPLORATION      [place] FACETECTOMY & FORAMOTOMY 1 VRT SGM LUMBAR Right 02/06/2024 "    Procedure: RIGHT L4-5 MIS FORAMINOTOMY;  Surgeon: Al Slade MD;  Location:  MAIN OR;  Service: Neurosurgery    UPPER GASTROINTESTINAL ENDOSCOPY       No Known Allergies    Substance Abuse History:    Social History     Substance and Sexual Activity   Alcohol Use Yes    Alcohol/week: 10.0 standard drinks of alcohol    Types: 10 Cans of beer per week    Comment: social     Social History     Substance and Sexual Activity   Drug Use Never       Social History:    Social History     Socioeconomic History    Marital status: /Civil Union     Spouse name: Not on file    Number of children: Not on file    Years of education: Not on file    Highest education level: Not on file   Occupational History    Not on file   Tobacco Use    Smoking status: Every Day     Current packs/day: 1.00     Average packs/day: 1 pack/day for 26.0 years (25.2 ttl pk-yrs)     Types: Cigarettes, Pipe     Start date: 1999    Smokeless tobacco: Never   Vaping Use    Vaping status: Never Used   Substance and Sexual Activity    Alcohol use: Yes     Alcohol/week: 10.0 standard drinks of alcohol     Types: 10 Cans of beer per week     Comment: social    Drug use: Never    Sexual activity: Yes     Partners: Female     Birth control/protection: I.U.D.   Other Topics Concern    Not on file   Social History Narrative    Lives with wife    Sees dentist occasionally    No living will     Social Drivers of Health     Financial Resource Strain: Not on file   Food Insecurity: No Food Insecurity (6/30/2023)    Hunger Vital Sign     Worried About Running Out of Food in the Last Year: Never true     Ran Out of Food in the Last Year: Never true   Transportation Needs: No Transportation Needs (6/30/2023)    PRAPARE - Transportation     Lack of Transportation (Medical): No     Lack of Transportation (Non-Medical): No   Physical Activity: Not on file   Stress: Not on file   Social Connections: Not on file   Intimate Partner Violence: Not on file   Housing  "Stability: Low Risk  (6/30/2023)    Housing Stability Vital Sign     Unable to Pay for Housing in the Last Year: No     Number of Places Lived in the Last Year: 1     Unstable Housing in the Last Year: No       Family Psychiatric History:     Family History   Problem Relation Age of Onset    Breast cancer Mother     Heart disease Father     Hypertension Father     Intellectual disability Sister     Heart disease Maternal Grandfather     Parkinsonism Paternal Grandfather     Substance Abuse Neg Hx     Mental illness Neg Hx     Colon cancer Neg Hx     Colon polyps Neg Hx        History Review: The following portions of the patient's history were reviewed and updated as appropriate: allergies, current medications, past family history, past medical history, past social history, past surgical history, and problem list.         OBJECTIVE:     Vital signs in last 24 hours:    There were no vitals filed for this visit.    Mental Status Evaluation:    Appearance age appropriate, casually dressed   Behavior cooperative, mildly anxious   Speech normal rate, normal volume, normal pitch   Mood \"The same\"   Affect Dysphoric   Thought Processes organized, goal directed   Associations intact associations   Thought Content no overt delusions   Perceptual Disturbances: no auditory hallucinations, no visual hallucinations   Abnormal Thoughts  Risk Potential Suicidal ideation - None at present  Homicidal ideation - None at present  Potential for aggression - Not at present   Orientation oriented to person, place, time/date, and situation   Memory recent and remote memory grossly intact   Consciousness alert and awake   Attention Span Concentration Span attention span and concentration are age appropriate   Intellect appears to be of average intelligence   Insight intact   Judgement intact   Muscle Strength and  Gait unable to assess today due to virtual visit   Motor activity unable to assess today due to virtual visit   Language no " difficulty naming common objects, no difficulty repeating a phrase, no difficulty writing a sentence   Fund of Knowledge adequate knowledge of current events  adequate fund of knowledge regarding past history  adequate fund of knowledge regarding vocabulary    Pain none   Pain Scale Did not ask patient to formally rate       Laboratory Results: I have personally reviewed all pertinent laboratory/tests results    Recent Labs (last 2 months):   Appointment on 12/05/2024   Component Date Value    Prolactin 12/05/2024 26.79 (H)     WBC 12/05/2024 6.77     RBC 12/05/2024 5.05     Hemoglobin 12/05/2024 13.3     Hematocrit 12/05/2024 42.1     MCV 12/05/2024 83     MCH 12/05/2024 26.3 (L)     MCHC 12/05/2024 31.6     RDW 12/05/2024 13.9     MPV 12/05/2024 10.6     Platelets 12/05/2024 195     nRBC 12/05/2024 0     Segmented % 12/05/2024 61     Immature Grans % 12/05/2024 0     Lymphocytes % 12/05/2024 27     Monocytes % 12/05/2024 8     Eosinophils Relative 12/05/2024 3     Basophils Relative 12/05/2024 1     Absolute Neutrophils 12/05/2024 4.14     Absolute Immature Grans 12/05/2024 0.02     Absolute Lymphocytes 12/05/2024 1.85     Absolute Monocytes 12/05/2024 0.51     Eosinophils Absolute 12/05/2024 0.19     Basophils Absolute 12/05/2024 0.06     Lipase 12/05/2024 173 (H)        Suicide/Homicide Risk Assessment:    Risk of Harm to Self:  The following ratings are based on assessment at the time of the interview  Historical Risk Factors include: chronic psychiatric problems  Protective Factors: no current suicidal ideation, access to mental health treatment, compliant with medications, compliant with mental health treatment, having a desire to be alive, responsibilities and duties to others, strong relationships    Risk of Harm to Others:  The following ratings are based on assessment at the time of the interview  Historical Risk Factors include: none.  Protective Factors: no current homicidal ideation, access to mental  health treatment, compliant with medications, compliant with mental health treatment, responsibilities and duties to others    The following interventions are recommended: continue medication management, contracts for safety at present - agrees to go to ED if feeling unsafe, contracts for safety at present - agrees to call Crisis Intervention Service if feeling unsafe      Lethality Statement:    Based on today's assessment and clinical criteria, Benjamin Muñiz contracts for safety and is not an imminent risk of harm to self or others. Outpatient level of care is deemed appropriate at this current time. Benjamin understands that if they can no longer contract for safety, they need to call the office or report to their nearest Emergency Room for immediate evaluation. They voiced understanding and agreement to call 911 or head to the nearest ED should they have any physical or mental decompensation whatsoever.       Assessment/Plan:     1.)  HERI  2.)  PTSD  3.)  ADHD  4.)  MDD, recurrent, mild    After discussion of risks, benefits, potential side effects, alternatives, we will continue risperidone 4 mg nightly, 0.5 mg every morning, Adderall XR 20 in the morning, immediate release 10 mg in the afternoon, hydroxyzine 50 mg every 8 hours as needed for severe anxiety.  We will discontinue fluoxetine and initiate Cymbalta at 30 mg daily for 7 days followed by, if tolerated, 60 mg daily thereafter.  Intent is to explore alternate classes of antidepressant/antianxiety medications to better control patient's mood.  Cymbalta may also help patient with his chronic pain.  Future directions may include decreasing risperidone as patient's prolactin levels have returned elevated.  He denies acute mental complaints or concerns at this time        Aware of 24 hour and weekend coverage for urgent situations accessed by calling [facility] main practice number    Medications Risks/Benefits      Risks, Benefits And  Possible Side Effects Of Medications:    Risks, benefits, and possible side effects of medications explained to Benjamin and he verbalizes understanding and agreement for treatment.    Controlled Medication Discussion:     Benjamin has been filling controlled prescriptions on time as prescribed according to [place] Prescription Drug Monitoring Program    Psychotherapy Provided:     Individual psychotherapy provided: Crisis/safety plan discussed with Benjamin.     Treatment Plan:    Completed and signed during the session:  We will complete at next appointment due to lack of time today      Visit Time    Visit Start Time: 8:30 AM  Visit Stop Time: 8:50 AM  Total Visit Duration:  20 minutes     The total visit duration detailed above includes: patient engagement, medication management, psychotherapy/counseling, discussion regarding treatment goals, documentation, review of past medical records, and coordination of care.      Note Share Disclaimer:     This note was not shared with the patient due to reasonable likelihood of causing patient harm      Ambreen Johnson DO  Psychiatry  12/31/24

## 2025-01-02 ENCOUNTER — TELEPHONE (OUTPATIENT)
Dept: GASTROENTEROLOGY | Facility: CLINIC | Age: 40
End: 2025-01-02

## 2025-01-02 NOTE — TELEPHONE ENCOUNTER
Procedure confirmed  Colonoscopy/Endoscopy     Via: [employer]    Instructions given: Given to Patient at Visit     Prep Given: Golytely    Call the office if there are any questions.

## 2025-01-03 ENCOUNTER — TELEPHONE (OUTPATIENT)
Dept: PSYCHIATRY | Facility: CLINIC | Age: 40
End: 2025-01-03

## 2025-01-03 ENCOUNTER — APPOINTMENT (EMERGENCY)
Dept: CT IMAGING | Facility: HOSPITAL | Age: 40
DRG: 101 | End: 2025-01-03
Payer: COMMERCIAL

## 2025-01-03 ENCOUNTER — HOSPITAL ENCOUNTER (INPATIENT)
Facility: HOSPITAL | Age: 40
LOS: 2 days | Discharge: HOME | DRG: 101 | End: 2025-01-05
Attending: EMERGENCY MEDICINE | Admitting: INTERNAL MEDICINE
Payer: COMMERCIAL

## 2025-01-03 DIAGNOSIS — I10 PRIMARY HYPERTENSION: ICD-10-CM

## 2025-01-03 DIAGNOSIS — R56.9 NEW ONSET SEIZURE (HCC): Primary | ICD-10-CM

## 2025-01-03 DIAGNOSIS — E87.1 HYPONATREMIA: ICD-10-CM

## 2025-01-03 DIAGNOSIS — R56.9 SEIZURE (HCC): ICD-10-CM

## 2025-01-03 LAB
ALBUMIN SERPL BCG-MCNC: 4.1 G/DL (ref 3.5–5)
ALP SERPL-CCNC: 75 U/L (ref 34–104)
ALT SERPL W P-5'-P-CCNC: 23 U/L (ref 7–52)
AMPHETAMINES SERPL QL SCN: NEGATIVE
ANION GAP SERPL CALCULATED.3IONS-SCNC: 11 MMOL/L (ref 4–13)
ANION GAP SERPL CALCULATED.3IONS-SCNC: 11 MMOL/L (ref 4–13)
ANION GAP SERPL CALCULATED.3IONS-SCNC: 15 MMOL/L (ref 4–13)
AST SERPL W P-5'-P-CCNC: 35 U/L (ref 13–39)
ATRIAL RATE: 115 BPM
BARBITURATES UR QL: NEGATIVE
BASOPHILS # BLD AUTO: 0.03 THOUSANDS/ΜL (ref 0–0.1)
BASOPHILS NFR BLD AUTO: 0 % (ref 0–1)
BENZODIAZ UR QL: NEGATIVE
BILIRUB SERPL-MCNC: 0.59 MG/DL (ref 0.2–1)
BILIRUB UR QL STRIP: NEGATIVE
BNP SERPL-MCNC: 42 PG/ML (ref 0–100)
BUN SERPL-MCNC: 5 MG/DL (ref 5–25)
BUN SERPL-MCNC: 5 MG/DL (ref 5–25)
BUN SERPL-MCNC: 7 MG/DL (ref 5–25)
CALCIUM SERPL-MCNC: 8 MG/DL (ref 8.4–10.2)
CALCIUM SERPL-MCNC: 8.3 MG/DL (ref 8.4–10.2)
CALCIUM SERPL-MCNC: 8.7 MG/DL (ref 8.4–10.2)
CHLORIDE SERPL-SCNC: 101 MMOL/L (ref 96–108)
CHLORIDE SERPL-SCNC: 92 MMOL/L (ref 96–108)
CHLORIDE SERPL-SCNC: 98 MMOL/L (ref 96–108)
CLARITY UR: CLEAR
CO2 SERPL-SCNC: 19 MMOL/L (ref 21–32)
CO2 SERPL-SCNC: 21 MMOL/L (ref 21–32)
CO2 SERPL-SCNC: 21 MMOL/L (ref 21–32)
COCAINE UR QL: NEGATIVE
COLOR UR: COLORLESS
CREAT SERPL-MCNC: 0.92 MG/DL (ref 0.6–1.3)
CREAT SERPL-MCNC: 1.01 MG/DL (ref 0.6–1.3)
CREAT SERPL-MCNC: 1.04 MG/DL (ref 0.6–1.3)
EOSINOPHIL # BLD AUTO: 0.06 THOUSAND/ΜL (ref 0–0.61)
EOSINOPHIL NFR BLD AUTO: 1 % (ref 0–6)
ERYTHROCYTE [DISTWIDTH] IN BLOOD BY AUTOMATED COUNT: 14.1 % (ref 11.6–15.1)
ETHANOL SERPL-MCNC: <10 MG/DL
FENTANYL UR QL SCN: NEGATIVE
GFR SERPL CREATININE-BSD FRML MDRD: 102 ML/MIN/1.73SQ M
GFR SERPL CREATININE-BSD FRML MDRD: 88 ML/MIN/1.73SQ M
GFR SERPL CREATININE-BSD FRML MDRD: 91 ML/MIN/1.73SQ M
GLUCOSE SERPL-MCNC: 108 MG/DL (ref 65–140)
GLUCOSE SERPL-MCNC: 111 MG/DL (ref 65–140)
GLUCOSE SERPL-MCNC: 114 MG/DL (ref 65–140)
GLUCOSE SERPL-MCNC: 116 MG/DL (ref 65–140)
GLUCOSE UR STRIP-MCNC: NEGATIVE MG/DL
HCT VFR BLD AUTO: 39.5 % (ref 36.5–49.3)
HGB BLD-MCNC: 12.6 G/DL (ref 12–17)
HGB UR QL STRIP.AUTO: NEGATIVE
HYDROCODONE UR QL SCN: NEGATIVE
IMM GRANULOCYTES # BLD AUTO: 0.04 THOUSAND/UL (ref 0–0.2)
IMM GRANULOCYTES NFR BLD AUTO: 1 % (ref 0–2)
KETONES UR STRIP-MCNC: NEGATIVE MG/DL
LEUKOCYTE ESTERASE UR QL STRIP: NEGATIVE
LYMPHOCYTES # BLD AUTO: 1.8 THOUSANDS/ΜL (ref 0.6–4.47)
LYMPHOCYTES NFR BLD AUTO: 22 % (ref 14–44)
MCH RBC QN AUTO: 25.3 PG (ref 26.8–34.3)
MCHC RBC AUTO-ENTMCNC: 31.9 G/DL (ref 31.4–37.4)
MCV RBC AUTO: 79 FL (ref 82–98)
METHADONE UR QL: NEGATIVE
MONOCYTES # BLD AUTO: 0.58 THOUSAND/ΜL (ref 0.17–1.22)
MONOCYTES NFR BLD AUTO: 7 % (ref 4–12)
NEUTROPHILS # BLD AUTO: 5.81 THOUSANDS/ΜL (ref 1.85–7.62)
NEUTS SEG NFR BLD AUTO: 69 % (ref 43–75)
NITRITE UR QL STRIP: NEGATIVE
NRBC BLD AUTO-RTO: 0 /100 WBCS
OPIATES UR QL SCN: NEGATIVE
OSMOLALITY UR/SERPL-RTO: 268 MMOL/KG (ref 282–298)
OSMOLALITY UR: 152 MMOL/KG (ref 250–900)
OXYCODONE+OXYMORPHONE UR QL SCN: NEGATIVE
P AXIS: 31 DEGREES
PCP UR QL: NEGATIVE
PH UR STRIP.AUTO: 6.5 [PH]
PLATELET # BLD AUTO: 181 THOUSANDS/UL (ref 149–390)
PMV BLD AUTO: 11.1 FL (ref 8.9–12.7)
POTASSIUM SERPL-SCNC: 3 MMOL/L (ref 3.5–5.3)
POTASSIUM SERPL-SCNC: 3.3 MMOL/L (ref 3.5–5.3)
POTASSIUM SERPL-SCNC: 4 MMOL/L (ref 3.5–5.3)
PR INTERVAL: 150 MS
PROT SERPL-MCNC: 6.9 G/DL (ref 6.4–8.4)
PROT UR STRIP-MCNC: NEGATIVE MG/DL
QRS AXIS: 37 DEGREES
QRSD INTERVAL: 100 MS
QT INTERVAL: 358 MS
QTC INTERVAL: 495 MS
RBC # BLD AUTO: 4.99 MILLION/UL (ref 3.88–5.62)
SODIUM 24H UR-SCNC: 29 MOL/L
SODIUM SERPL-SCNC: 126 MMOL/L (ref 135–147)
SODIUM SERPL-SCNC: 130 MMOL/L (ref 135–147)
SODIUM SERPL-SCNC: 133 MMOL/L (ref 135–147)
SP GR UR STRIP.AUTO: <1.005 (ref 1–1.03)
T WAVE AXIS: 35 DEGREES
THC UR QL: NEGATIVE
TSH SERPL DL<=0.05 MIU/L-ACNC: 0.66 UIU/ML (ref 0.45–4.5)
URATE SERPL-MCNC: 5.3 MG/DL (ref 3.5–8.5)
UROBILINOGEN UR STRIP-ACNC: <2 MG/DL
VENTRICULAR RATE: 115 BPM
WBC # BLD AUTO: 8.32 THOUSAND/UL (ref 4.31–10.16)

## 2025-01-03 PROCEDURE — 82077 ASSAY SPEC XCP UR&BREATH IA: CPT | Performed by: EMERGENCY MEDICINE

## 2025-01-03 PROCEDURE — 81003 URINALYSIS AUTO W/O SCOPE: CPT | Performed by: INTERNAL MEDICINE

## 2025-01-03 PROCEDURE — 99285 EMERGENCY DEPT VISIT HI MDM: CPT

## 2025-01-03 PROCEDURE — 83880 ASSAY OF NATRIURETIC PEPTIDE: CPT | Performed by: INTERNAL MEDICINE

## 2025-01-03 PROCEDURE — 80053 COMPREHEN METABOLIC PANEL: CPT | Performed by: EMERGENCY MEDICINE

## 2025-01-03 PROCEDURE — 96374 THER/PROPH/DIAG INJ IV PUSH: CPT

## 2025-01-03 PROCEDURE — 83935 ASSAY OF URINE OSMOLALITY: CPT | Performed by: INTERNAL MEDICINE

## 2025-01-03 PROCEDURE — 99291 CRITICAL CARE FIRST HOUR: CPT | Performed by: EMERGENCY MEDICINE

## 2025-01-03 PROCEDURE — 36415 COLL VENOUS BLD VENIPUNCTURE: CPT | Performed by: EMERGENCY MEDICINE

## 2025-01-03 PROCEDURE — 70450 CT HEAD/BRAIN W/O DYE: CPT

## 2025-01-03 PROCEDURE — 82948 REAGENT STRIP/BLOOD GLUCOSE: CPT

## 2025-01-03 PROCEDURE — 80307 DRUG TEST PRSMV CHEM ANLYZR: CPT | Performed by: INTERNAL MEDICINE

## 2025-01-03 PROCEDURE — 85025 COMPLETE CBC W/AUTO DIFF WBC: CPT | Performed by: EMERGENCY MEDICINE

## 2025-01-03 PROCEDURE — 84550 ASSAY OF BLOOD/URIC ACID: CPT | Performed by: INTERNAL MEDICINE

## 2025-01-03 PROCEDURE — 93005 ELECTROCARDIOGRAM TRACING: CPT

## 2025-01-03 PROCEDURE — 82533 TOTAL CORTISOL: CPT | Performed by: INTERNAL MEDICINE

## 2025-01-03 PROCEDURE — 99223 1ST HOSP IP/OBS HIGH 75: CPT | Performed by: INTERNAL MEDICINE

## 2025-01-03 PROCEDURE — 84443 ASSAY THYROID STIM HORMONE: CPT | Performed by: INTERNAL MEDICINE

## 2025-01-03 PROCEDURE — 84300 ASSAY OF URINE SODIUM: CPT | Performed by: INTERNAL MEDICINE

## 2025-01-03 PROCEDURE — 80048 BASIC METABOLIC PNL TOTAL CA: CPT | Performed by: INTERNAL MEDICINE

## 2025-01-03 PROCEDURE — 83930 ASSAY OF BLOOD OSMOLALITY: CPT | Performed by: INTERNAL MEDICINE

## 2025-01-03 RX ORDER — NICOTINE 21 MG/24HR
1 PATCH, TRANSDERMAL 24 HOURS TRANSDERMAL DAILY
Status: DISCONTINUED | OUTPATIENT
Start: 2025-01-03 | End: 2025-01-05 | Stop reason: HOSPADM

## 2025-01-03 RX ORDER — OXYCODONE HCL 10 MG/1
10 TABLET, FILM COATED, EXTENDED RELEASE ORAL EVERY 12 HOURS SCHEDULED
Status: DISCONTINUED | OUTPATIENT
Start: 2025-01-03 | End: 2025-01-05 | Stop reason: HOSPADM

## 2025-01-03 RX ORDER — PANTOPRAZOLE SODIUM 40 MG/1
40 TABLET, DELAYED RELEASE ORAL
Status: DISCONTINUED | OUTPATIENT
Start: 2025-01-04 | End: 2025-01-05 | Stop reason: HOSPADM

## 2025-01-03 RX ORDER — RISPERIDONE 2 MG/1
4 TABLET ORAL
Status: DISCONTINUED | OUTPATIENT
Start: 2025-01-03 | End: 2025-01-05 | Stop reason: HOSPADM

## 2025-01-03 RX ORDER — OXYCODONE AND ACETAMINOPHEN 5; 325 MG/1; MG/1
2 TABLET ORAL ONCE
Refills: 0 | Status: DISCONTINUED | OUTPATIENT
Start: 2025-01-03 | End: 2025-01-03

## 2025-01-03 RX ORDER — DULOXETIN HYDROCHLORIDE 30 MG/1
60 CAPSULE, DELAYED RELEASE ORAL DAILY
Status: DISCONTINUED | OUTPATIENT
Start: 2025-01-04 | End: 2025-01-03

## 2025-01-03 RX ORDER — ACETAMINOPHEN 325 MG/1
650 TABLET ORAL EVERY 6 HOURS PRN
Status: DISCONTINUED | OUTPATIENT
Start: 2025-01-03 | End: 2025-01-05 | Stop reason: HOSPADM

## 2025-01-03 RX ORDER — OXYCODONE HCL 10 MG/1
10 TABLET, FILM COATED, EXTENDED RELEASE ORAL ONCE
Refills: 0 | Status: DISCONTINUED | OUTPATIENT
Start: 2025-01-03 | End: 2025-01-03

## 2025-01-03 RX ORDER — HYDRALAZINE HYDROCHLORIDE 20 MG/ML
10 INJECTION INTRAMUSCULAR; INTRAVENOUS EVERY 6 HOURS PRN
Status: DISCONTINUED | OUTPATIENT
Start: 2025-01-03 | End: 2025-01-05 | Stop reason: HOSPADM

## 2025-01-03 RX ORDER — ONDANSETRON 2 MG/ML
4 INJECTION INTRAMUSCULAR; INTRAVENOUS EVERY 6 HOURS PRN
Status: DISCONTINUED | OUTPATIENT
Start: 2025-01-03 | End: 2025-01-05 | Stop reason: HOSPADM

## 2025-01-03 RX ORDER — DEXTROAMPHETAMINE SACCHARATE, AMPHETAMINE ASPARTATE, DEXTROAMPHETAMINE SULFATE AND AMPHETAMINE SULFATE 5; 5; 5; 5 MG/1; MG/1; MG/1; MG/1
20 TABLET ORAL DAILY
Status: DISCONTINUED | OUTPATIENT
Start: 2025-01-04 | End: 2025-01-05 | Stop reason: HOSPADM

## 2025-01-03 RX ORDER — GABAPENTIN 300 MG/1
600 CAPSULE ORAL 2 TIMES DAILY
Status: DISCONTINUED | OUTPATIENT
Start: 2025-01-03 | End: 2025-01-05 | Stop reason: HOSPADM

## 2025-01-03 RX ORDER — NICOTINE 21 MG/24HR
1 PATCH, TRANSDERMAL 24 HOURS TRANSDERMAL DAILY
Status: DISCONTINUED | OUTPATIENT
Start: 2025-01-04 | End: 2025-01-03

## 2025-01-03 RX ORDER — OXYCODONE HYDROCHLORIDE 5 MG/1
5 TABLET ORAL EVERY 6 HOURS PRN
Refills: 0 | Status: DISCONTINUED | OUTPATIENT
Start: 2025-01-03 | End: 2025-01-05 | Stop reason: HOSPADM

## 2025-01-03 RX ORDER — HYDROCHLOROTHIAZIDE 12.5 MG/1
12.5 TABLET ORAL DAILY
Status: DISCONTINUED | OUTPATIENT
Start: 2025-01-04 | End: 2025-01-03

## 2025-01-03 RX ORDER — RISPERIDONE 0.25 MG/1
0.25 TABLET ORAL EVERY MORNING
Status: DISCONTINUED | OUTPATIENT
Start: 2025-01-04 | End: 2025-01-05 | Stop reason: HOSPADM

## 2025-01-03 RX ORDER — ZOLPIDEM TARTRATE 5 MG/1
10 TABLET ORAL
Status: DISCONTINUED | OUTPATIENT
Start: 2025-01-03 | End: 2025-01-05 | Stop reason: HOSPADM

## 2025-01-03 RX ORDER — HYDROXYZINE HYDROCHLORIDE 25 MG/1
50 TABLET, FILM COATED ORAL EVERY 6 HOURS PRN
Status: DISCONTINUED | OUTPATIENT
Start: 2025-01-03 | End: 2025-01-05 | Stop reason: HOSPADM

## 2025-01-03 RX ORDER — DEXTROAMPHETAMINE SACCHARATE, AMPHETAMINE ASPARTATE, DEXTROAMPHETAMINE SULFATE AND AMPHETAMINE SULFATE 2.5; 2.5; 2.5; 2.5 MG/1; MG/1; MG/1; MG/1
10 TABLET ORAL
Status: DISCONTINUED | OUTPATIENT
Start: 2025-01-03 | End: 2025-01-05 | Stop reason: HOSPADM

## 2025-01-03 RX ORDER — ENOXAPARIN SODIUM 100 MG/ML
40 INJECTION SUBCUTANEOUS DAILY
Status: DISCONTINUED | OUTPATIENT
Start: 2025-01-04 | End: 2025-01-05 | Stop reason: HOSPADM

## 2025-01-03 RX ORDER — LORAZEPAM 2 MG/ML
1 INJECTION INTRAMUSCULAR ONCE
Status: COMPLETED | OUTPATIENT
Start: 2025-01-03 | End: 2025-01-03

## 2025-01-03 RX ORDER — SODIUM CHLORIDE 9 MG/ML
75 INJECTION, SOLUTION INTRAVENOUS CONTINUOUS
Status: DISCONTINUED | OUTPATIENT
Start: 2025-01-03 | End: 2025-01-04

## 2025-01-03 RX ORDER — ATORVASTATIN CALCIUM 40 MG/1
40 TABLET, FILM COATED ORAL
Status: DISCONTINUED | OUTPATIENT
Start: 2025-01-03 | End: 2025-01-05 | Stop reason: HOSPADM

## 2025-01-03 RX ORDER — LOSARTAN POTASSIUM 50 MG/1
50 TABLET ORAL DAILY
Status: DISCONTINUED | OUTPATIENT
Start: 2025-01-04 | End: 2025-01-05 | Stop reason: HOSPADM

## 2025-01-03 RX ADMIN — NICOTINE 1 PATCH: 21 PATCH, EXTENDED RELEASE TRANSDERMAL at 21:37

## 2025-01-03 RX ADMIN — SODIUM CHLORIDE 500 ML: 0.9 INJECTION, SOLUTION INTRAVENOUS at 18:01

## 2025-01-03 RX ADMIN — LORAZEPAM 1 MG: 2 INJECTION INTRAMUSCULAR; INTRAVENOUS at 15:17

## 2025-01-03 RX ADMIN — SODIUM CHLORIDE 75 ML/HR: 0.9 INJECTION, SOLUTION INTRAVENOUS at 18:01

## 2025-01-03 RX ADMIN — OXYCODONE HYDROCHLORIDE 5 MG: 5 TABLET ORAL at 18:02

## 2025-01-03 RX ADMIN — GABAPENTIN 600 MG: 300 CAPSULE ORAL at 18:01

## 2025-01-03 RX ADMIN — DEXTROAMPHETAMINE SACCHARATE, AMPHETAMINE ASPARTATE, DEXTROAMPHETAMINE SULFATE AND AMPHETAMINE SULFATE 10 MG: 2.5; 2.5; 2.5; 2.5 TABLET ORAL at 18:01

## 2025-01-03 RX ADMIN — ATORVASTATIN CALCIUM 40 MG: 40 TABLET, FILM COATED ORAL at 18:01

## 2025-01-03 RX ADMIN — OXYCODONE HYDROCHLORIDE 10 MG: 10 TABLET, FILM COATED, EXTENDED RELEASE ORAL at 21:36

## 2025-01-03 RX ADMIN — TIZANIDINE 4 MG: 4 TABLET ORAL at 21:36

## 2025-01-03 RX ADMIN — RISPERIDONE 4 MG: 2 TABLET, FILM COATED ORAL at 21:37

## 2025-01-03 RX ADMIN — ZOLPIDEM TARTRATE 10 MG: 5 TABLET ORAL at 21:40

## 2025-01-03 NOTE — ASSESSMENT & PLAN NOTE
Continue on Risperdal and hold Cymbalta  As per patient his psychiatrist started patient on Cymbalta yesterday and plan was to taper down on Risperdal

## 2025-01-03 NOTE — H&P
H&P - Hospitalist   Name: Benjamin Muñiz 39 y.o. male I MRN: [id number]  Unit/Bed#: ED 08 I Date of Admission: 1/3/2025   Date of Service: 1/3/2025 I Hospital Day: 0     Assessment & Plan  Hyponatremia  Patient admitted with sodium of 126  Will order IV fluids  Trend BMP every 4 hours  Hyponatremia lab work  Hold hydrochlorothiazide  Hold Cymbalta which was started yesterday  Nephrology consult  Seizure (HCC)  Patient presented with new onset seizure  ER spoke with neurology and hold off on AED  Seizure precautions  Urine drug screen pending  Neurology consult  EEG  Bipolar disorder (HCC)  Continue on Risperdal and hold Cymbalta  As per patient his psychiatrist started patient on Cymbalta yesterday and plan was to taper down on Risperdal  Attention deficit hyperactivity disorder (ADHD), combined type  Continue on Adderall  Gastroesophageal reflux disease without esophagitis  On Protonix  Primary hypertension  Continue on Cozaar.  Hold hydrochlorothiazide  Monitor vitals as per protocol  Obesity (BMI 35.0-39.9 without comorbidity)  Encourage weight loss and lifestyle medication  Mixed hyperlipidemia  Continue on statin    Chief Complaint   Patient presents with    Seizure - New Onset     Pt presents to ER from home by KARINA NIEVES for new onset seizure around 1350 hours. Pt was in living room with wife and she noted tonic clonic activity lasting 3-5 minutes and his eyes rolled back into his head. Was post ictal x30 minutes for EMS. Arrives in ER amnesic to actual seizure but now AxO otherwise.         HPI:  Benjamin Muñiz is a 39 y.o. male with past medical history of chronic pain, hypertension, bipolar disorder, ADHD, hypertension, GERD, alcohol use who presented to emergency department with new onset of seizure which was witnessed by his wife lasting about 2 minutes.  As per wife patient had tonic-clonic activity and his eyes were rolled back into the head which lasted for 2 minutes and patient was postictal for about  "30 minutes.  Currently patient is alert awake oriented x 3.  Patient in ER was found to have hyponatremia and ER spoke with neurology who recommended to hold off on AED obtain urine drug screen.  As per patient he was started on Cymbalta yesterday by his psychiatrist and plan was to decrease risperidone.  Patient denies any fever, chills, chest pain, shortness of breath, nausea, vomiting, abdominal pain, dizziness.    Historical Information   Past Medical History:   Diagnosis Date    ADD (attention deficit disorder)     Anesthesia complication     \"Takes a lot to get me down\"    Arthritis     Chronic pain     Chronic pain disorder     Depression     GERD (gastroesophageal reflux disease)     Heartburn     Hypertension     Mood disorder (HCC)     Tremor     Uncomplicated alcohol dependence (HCC) 01/09/2024     Past Surgical History:   Procedure Laterality Date    CLOSED REDUCTION CALCANEAL FRACTURE      COLONOSCOPY      PERONEAL TENDON EXPLORATION      SC [facility]A FACETECTOMY & FORAMOTOMY 1 VRT SGM LUMBAR Right 02/06/2024    Procedure: RIGHT L4-5 MIS FORAMINOTOMY;  Surgeon: Al Slade MD;  Location:  MAIN OR;  Service: Neurosurgery    UPPER GASTROINTESTINAL ENDOSCOPY       Social History   Social History     Substance and Sexual Activity   Alcohol Use Yes    Alcohol/week: 10.0 standard drinks of alcohol    Types: 10 Cans of beer per week    Comment: social     Social History     Substance and Sexual Activity   Drug Use Never     Social History     Tobacco Use   Smoking Status Every Day    Current packs/day: 1.00    Average packs/day: 1 pack/day for 26.0 years (25.3 ttl pk-yrs)    Types: Cigarettes, Pipe    Start date: 1999   Smokeless Tobacco Never     Family History   Problem Relation Age of Onset    Breast cancer Mother     Heart disease Father     Hypertension Father     Intellectual disability Sister     Heart disease Maternal Grandfather     Parkinsonism Paternal Grandfather     Substance Abuse Neg Hx     Mental " illness Neg Hx     Colon cancer Neg Hx     Colon polyps Neg Hx        Meds/Allergies   No Known Allergies    Meds:    Current Facility-Administered Medications:     acetaminophen (TYLENOL) tablet 650 mg, 650 mg, Oral, Q6H PRN, Baldo Wood MD    amphetamine-dextroamphetamine (ADDERALL) tablet 10 mg, 10 mg, Oral, After Lunch, Baldo Wood MD    [START ON 1/4/2025] amphetamine-dextroamphetamine (ADDERALL) tablet 20 mg, 20 mg, Oral, Daily, Baldo Wood MD    atorvastatin (LIPITOR) tablet 40 mg, 40 mg, Oral, Daily With Dinner, Baldo Wood MD    [START ON 1/4/2025] enoxaparin (LOVENOX) subcutaneous injection 40 mg, 40 mg, Subcutaneous, Daily, Baldo Wood MD    gabapentin (NEURONTIN) capsule 600 mg, 600 mg, Oral, BID, Baldo Wood MD    [START ON 1/4/2025] losartan (COZAAR) tablet 50 mg, 50 mg, Oral, Daily **AND** [START ON 1/4/2025] hydroCHLOROthiazide tablet 12.5 mg, 12.5 mg, Oral, Daily, Baldo Wood MD    hydrOXYzine HCL (ATARAX) tablet 50 mg, 50 mg, Oral, Q6H PRN, Baldo Wood MD    [START ON 1/4/2025] nicotine (NICODERM CQ) 21 mg/24 hr TD 24 hr patch 1 patch, 1 patch, Transdermal, Daily, Baldo Wood MD    ondansetron (ZOFRAN) injection 4 mg, 4 mg, Intravenous, Q6H PRN, Baldo Wood MD    oxyCODONE (OxyCONTIN) 12 hr tablet 10 mg, 10 mg, Oral, Q12H PREM, Baldo Wood MD    [START ON 1/4/2025] pantoprazole (PROTONIX) EC tablet 40 mg, 40 mg, Oral, Early Morning, Baldo Wood MD    [START ON 1/4/2025] risperiDONE (RisperDAL) tablet 0.25 mg, 0.25 mg, Oral, QAM, Baldo Wood MD    risperiDONE (RisperDAL) tablet 4 mg, 4 mg, Oral, HS, Baldo Wood MD    sodium chloride 0.9 % bolus 500 mL, 500 mL, Intravenous, Once, Baldo Wood MD    sodium chloride 0.9 % infusion, 75 mL/hr, Intravenous, Continuous, Baldo Wood MD    tiZANidine (ZANAFLEX) tablet 4 mg, 4 mg, Oral, HS, Baldo Wood MD    zolpidem (AMBIEN) tablet 10 mg, 10 mg, Oral, HS PRN,  "Baldo Wood MD    Current Outpatient Medications:     ADDERALL XR, 20MG, 20 MG 24 hr capsule, Take 1 capsule (20 mg total) by mouth every morning Max Daily Amount: 20 mg, Disp: 30 capsule, Rfl: 0    amphetamine-dextroamphetamine (ADDERALL) 10 mg tablet, Take 1 tablet (10 mg total) by mouth daily after lunch Max Daily Amount: 10 mg, Disp: 30 tablet, Rfl: 0    B-D HYPODERMIC NEEDLE 18GX1.5\" 18G X 1-1/2\" MISC, Use as directed---for Testosterone, Disp: , Rfl:     B-D SYRINGE LUER-MILAGROS 1CC 1 ML MISC, USE FOR BI WEEKLY TESTOSTERONE INJECTIONS, Disp: , Rfl:     B-D SYRINGE LUER-MILAGROS 3CC 3 ML MISC, USE AS DIRECTED FOR TESTOSTERONE INJECTION, Disp: , Rfl:     BD Hypodermic Needle 25G X 1-1/2\" MISC, , Disp: , Rfl:     dexlansoprazole (DEXILANT) 60 MG capsule, Take 1 capsule (60 mg total) by mouth daily, Disp: 90 capsule, Rfl: 3    DULoxetine (CYMBALTA) 30 mg delayed release capsule, Take 1 capsule (30 mg total) by mouth daily for 7 days, THEN 2 capsules (60 mg total) daily., Disp: 67 capsule, Rfl: 0    gabapentin (NEURONTIN) 600 MG tablet, Take 600 mg by mouth 2 (two) times a day  , Disp: , Rfl: 0    hydrOXYzine HCL (ATARAX) 50 mg tablet, Take 1 tablet (50 mg total) by mouth every 6 (six) hours as needed for anxiety, Disp: 180 tablet, Rfl: 0    ibuprofen (MOTRIN) 800 mg tablet, TAKE 1 TABLET BY MOUTH EVERY 8 HOURS AS NEEDED (PAIN)., Disp: , Rfl:     losartan-hydrochlorothiazide (HYZAAR) 50-12.5 mg per tablet, Take 1 tablet by mouth daily, Disp: 90 tablet, Rfl: 1    nicotine (NICODERM CQ) 14 mg/24hr TD 24 hr patch, Place 1 patch on the skin over 24 hours every 24 hours, Disp: 30 patch, Rfl: 0    nicotine (NICODERM CQ) 21 mg/24 hr TD 24 hr patch, Place 1 patch on the skin over 24 hours every 24 hours, Disp: 30 patch, Rfl: 0    nicotine (NICODERM CQ) 7 mg/24hr TD 24 hr patch, Place 1 patch on the skin over 24 hours every 24 hours, Disp: 30 patch, Rfl: 0    ondansetron (ZOFRAN-ODT) 4 mg disintegrating tablet, Take 1 tablet (4 " mg total) by mouth every 6 (six) hours as needed for nausea or vomiting, Disp: 30 tablet, Rfl: 0    oxyCODONE-acetaminophen (PERCOCET) 5-325 mg per tablet, Take 1 tablet by mouth every 4-6 hours as needed, Disp: , Rfl:     OxyCONTIN 15 MG 12 hr tablet, TAKE 1 TABLET BY MOUTH TWICE A DAY FOR PAIN, Disp: , Rfl:     polyethylene glycol (COLYTE) 4000 mL solution, Take 4,000 mL by mouth once for 1 dose Take 4000 mL by mouth once for 1 dose. Use as directed, Disp: 4000 mL, Rfl: 0    risperiDONE (RisperDAL) 0.5 mg tablet, Take 0.5 tablets (0.25 mg total) by mouth every morning 1/2 tab in the AM, Disp: 30 tablet, Rfl: 0    risperiDONE (RisperDAL) 4 mg tablet, Take 1 tablet (4 mg total) by mouth daily at bedtime 1 at hs, Disp: 30 tablet, Rfl: 0    rosuvastatin (CRESTOR) 20 MG tablet, Take 1 tablet (20 mg total) by mouth daily, Disp: 100 tablet, Rfl: 3    tadalafil (CIALIS) 20 MG tablet, Take 1 tablet (20 mg total) by mouth daily as needed for erectile dysfunction, Disp: 10 tablet, Rfl: 0    tadalafil (CIALIS) 5 MG tablet, Take 1 tablet (5 mg total) by mouth daily as needed for erectile dysfunction, Disp: 10 tablet, Rfl: 0    testosterone cypionate (DEPO-TESTOSTERONE) 200 mg/mL SOLN, INJECT 1ML EVERY 2 WEEKS. DISCARD VIAL AFTER 30 DAYS FROM OPENING DATE, Disp: , Rfl:     tiZANidine (ZANAFLEX) 4 mg tablet, Take 1 tablet (4 mg total) by mouth daily at bedtime, Disp: 30 tablet, Rfl: 0    zolpidem (AMBIEN) 10 mg tablet, Take 10 mg by mouth daily at bedtime as needed for sleep, Disp: , Rfl:     Not in a hospital admission.      Review of Systems   Constitutional:  Positive for activity change and fatigue. Negative for chills and fever.   HENT:  Negative for ear pain and sore throat.    Eyes:  Negative for pain and visual disturbance.   Respiratory:  Negative for cough and shortness of breath.    Cardiovascular:  Negative for chest pain and palpitations.   Gastrointestinal:  Negative for abdominal pain and vomiting.    Genitourinary:  Negative for dysuria and hematuria.   Musculoskeletal:  Negative for arthralgias and back pain.   Skin:  Negative for color change and rash.   Neurological:  Positive for seizures. Negative for syncope.   All other systems reviewed and are negative.      Current Vitals:   Blood Pressure: 131/95 (01/03/25 1530)  Pulse: (!) 118 (01/03/25 1530)  Temperature: 98.7 °F (37.1 °C) (01/03/25 1501)  Temp Source: Temporal (01/03/25 1501)  Respirations: 22 (01/03/25 1530)  Height: 6' (182.9 cm) (01/03/25 1501)  Weight - Scale: 127 kg (279 lb) (01/03/25 1501)  SpO2: 96 % (01/03/25 1530)  SPO2 RA Rest      Flowsheet Row ED to Hosp-Admission (Current) from 1/3/2025 in  [facility] Emergency Department   SpO2 96 %   SpO2 Activity At Rest   O2 Device None (Room air)   O2 Flow Rate --          No intake or output data in the 24 hours ending 01/03/25 1735  Body mass index is 37.84 kg/m².     Physical Exam  Vitals and nursing note reviewed.   Constitutional:       General: He is not in acute distress.     Appearance: He is well-developed.   HENT:      Head: Normocephalic and atraumatic.   Eyes:      General: No scleral icterus.     Conjunctiva/sclera: Conjunctivae normal.      Pupils: Pupils are equal, round, and reactive to light.   Neck:      Thyroid: No thyromegaly.      Vascular: No JVD.   Cardiovascular:      Rate and Rhythm: Normal rate and regular rhythm.      Heart sounds: Normal heart sounds. No murmur heard.  Pulmonary:      Effort: Pulmonary effort is normal. No respiratory distress.      Breath sounds: Normal breath sounds. No wheezing or rales.   Chest:      Chest wall: No tenderness.   Abdominal:      General: Bowel sounds are normal. There is no distension.      Palpations: Abdomen is soft. There is no mass.      Tenderness: There is no abdominal tenderness. There is no guarding or rebound.   Musculoskeletal:         General: No tenderness or deformity. Normal range of motion.       "Cervical back: Normal range of motion and neck supple.   Skin:     General: Skin is warm.      Coloration: Skin is not pale.      Findings: No erythema or rash.   Neurological:      General: No focal deficit present.      Mental Status: He is alert and oriented to person, place, and time. Mental status is at baseline.      Cranial Nerves: No cranial nerve deficit.      Coordination: Coordination normal.   Psychiatric:         Behavior: Behavior normal.         Judgment: Judgment normal.         Lab Results:   CBC:   Lab Results   Component Value Date    WBC 8.32 01/03/2025    HGB 12.6 01/03/2025    HCT 39.5 01/03/2025    MCV 79 (L) 01/03/2025     01/03/2025    RBC 4.99 01/03/2025    MCH 25.3 (L) 01/03/2025    MCHC 31.9 01/03/2025    RDW 14.1 01/03/2025    MPV 11.1 01/03/2025    NRBC 0 01/03/2025     CMP:  Lab Results   Component Value Date    CL 92 (L) 01/03/2025    CO2 19 (L) 01/03/2025    BUN 7 01/03/2025    CREATININE 1.04 01/03/2025    CALCIUM 8.0 (L) 01/03/2025    AST 35 01/03/2025    ALT 23 01/03/2025    ALKPHOS 75 01/03/2025    EGFR 88 01/03/2025     No results found for: \"TROPONINI\", \"CKMB\", \"CKTOTAL\"  Coagulation:   Lab Results   Component Value Date    INR 1.02 01/22/2024    Urinalysis:  Lab Results   Component Value Date    COLORU Yellow 01/22/2024    CLARITYU Clear 01/22/2024    SPECGRAV 1.015 01/22/2024    PHUR 6.5 01/22/2024    LEUKOCYTESUR Negative 01/22/2024    NITRITE Negative 01/22/2024    GLUCOSEU Negative 01/22/2024    KETONESU Negative 01/22/2024    BILIRUBINUR Negative 01/22/2024    BLOODU Negative 01/22/2024      Amylase: No results found for: \"AMYLASE\"  Lipase:   Lab Results   Component Value Date    LIPASE 173 (H) 12/05/2024        Imaging: CT head wo contrast  Result Date: 1/3/2025  Narrative: CT BRAIN - WITHOUT CONTRAST INDICATION:   new onset seizure. COMPARISON: MRI brain pituitary with and without contrast 12/12/2019. TECHNIQUE:  CT examination of the brain was performed.  " "Multiplanar 2D reformatted images were created from the source data. Radiation dose length product (DLP) for this visit:  886.36 mGy-cm .  This examination, like all CT scans performed in the [facility], was performed utilizing techniques to minimize radiation dose exposure, including the use of iterative  reconstruction and automated exposure control. IMAGE QUALITY:  Diagnostic. FINDINGS: PARENCHYMA: Decreased attenuation is noted in periventricular and subcortical white matter demonstrating an appearance that is statistically most likely to represent mild microangiopathic change. Some of these are also small dilated perivascular spaces in bilateral cerebral hemispheres and right periatrial white matter as seen on MRI brain pituitary 12/12/2019. No CT signs of acute infarction.  No intracranial mass, mass effect or midline shift.  No acute parenchymal hemorrhage. VENTRICLES AND EXTRA-AXIAL SPACES:  Normal for the patient's age. VISUALIZED ORBITS: Normal visualized orbits. PARANASAL SINUSES: Mild mucosal thickening in right maxillary sinus CALVARIUM AND EXTRACRANIAL SOFT TISSUES: Normal. OTHER: Motion degradation in visualized bilateral proximal mandibles.     Impression: No acute intracranial abnormality. Workstation performed: [id number]     EKG, Pathology, and Other Studies: I have personally reviewed the results.  VTE Pharmacologic Prophylaxis: Enoxaparin (Lovenox)  VTE Mechanical Prophylaxis: sequential compression device    Code Status: Level 1 - Full Code    Anticipated Length of Stay:  Patient will be admitted on an Inpatient basis with an anticipated length of stay of greater than 2 midnights.     Counseling / Coordination of Care  Total floor / unit time spent today 75 minutes.  Greater than 50% of total time was spent with the patient and / or family counseling and / or coordination of care.     \"This note has been constructed using a voice recognition system\"      Baldo Wood, " MD  1/3/2025, 5:35 PM

## 2025-01-03 NOTE — ASSESSMENT & PLAN NOTE
Patient admitted with sodium of 126  Will order IV fluids  Trend BMP every 4 hours  Hyponatremia lab work  Hold hydrochlorothiazide  Hold Cymbalta which was started yesterday  Nephrology consult

## 2025-01-03 NOTE — ED PROVIDER NOTES
Time reflects when diagnosis was documented in both MDM as applicable and the Disposition within this note       Time User Action Codes Description Comment    1/3/2025  4:37 PM Celis, Guilherme Add [R56.9] Seizure (HCC)     1/3/2025  4:37 PM Celis, Guilherme Remove [R56.9] Seizure (HCC)     1/3/2025  4:37 PM Celis, Guilherme Add [R56.9] New onset seizure (HCC)     1/3/2025  4:42 PM Celis, Guilherme Add [E87.1] Hyponatremia           ED Disposition       ED Disposition   Admit    Condition   Stable    Date/Time   Fri Armin 3, 2025  4:37 PM    Comment   Case was discussed with TOM and the patient's admission status was agreed to be Admission Status: inpatient status to the service of Dr. Wood .               Assessment & Plan       Medical Decision Making  39-year-old male with new onset seizure.  Suspect possible alcohol withdrawal, adverse drug reaction, lack of sleep likely multifactorial.  Obtain labs, CT head.  Ativan administered.  Discussed case with neurology team.  Will admit to [facility].  Patient found to be hyponatremic.    Amount and/or Complexity of Data Reviewed  Labs: ordered.  Radiology: ordered.    Risk  Prescription drug management.  Decision regarding hospitalization.             Medications   sodium chloride 0.9 % bolus 500 mL (500 mL Intravenous New Bag 1/3/25 1801)   amphetamine-dextroamphetamine (ADDERALL) tablet 20 mg (has no administration in time range)   amphetamine-dextroamphetamine (ADDERALL) tablet 10 mg (10 mg Oral Given 1/3/25 1801)   pantoprazole (PROTONIX) EC tablet 40 mg (has no administration in time range)   gabapentin (NEURONTIN) capsule 600 mg (600 mg Oral Given 1/3/25 1801)   hydrOXYzine HCL (ATARAX) tablet 50 mg (has no administration in time range)   losartan (COZAAR) tablet 50 mg (has no administration in time range)   oxyCODONE (OxyCONTIN) 12 hr tablet 10 mg (has no administration in time range)   risperiDONE (RisperDAL) tablet 0.25 mg (has no administration in time range)   risperiDONE  (RisperDAL) tablet 4 mg (has no administration in time range)   atorvastatin (LIPITOR) tablet 40 mg (40 mg Oral Given 1/3/25 1801)   tiZANidine (ZANAFLEX) tablet 4 mg (has no administration in time range)   zolpidem (AMBIEN) tablet 10 mg (has no administration in time range)   sodium chloride 0.9 % infusion (75 mL/hr Intravenous New Bag 1/3/25 1801)   acetaminophen (TYLENOL) tablet 650 mg (has no administration in time range)   ondansetron (ZOFRAN) injection 4 mg (has no administration in time range)   nicotine (NICODERM CQ) 21 mg/24 hr TD 24 hr patch 1 patch (has no administration in time range)   enoxaparin (LOVENOX) subcutaneous injection 40 mg (has no administration in time range)   oxyCODONE (ROXICODONE) IR tablet 5 mg (5 mg Oral Given 1/3/25 1802)   hydrALAZINE (APRESOLINE) injection 10 mg (has no administration in time range)   LORazepam (ATIVAN) injection 1 mg (1 mg Intravenous Given 1/3/25 1517)       ED Risk Strat Scores                          SBIRT 20yo+      Flowsheet Row Most Recent Value   Initial Alcohol Screen: US AUDIT-C     1. How often do you have a drink containing alcohol? 1 Filed at: 01/03/2025 1502   2. How many drinks containing alcohol do you have on a typical day you are drinking?  0 Filed at: 01/03/2025 1502   3a. Male UNDER 65: How often do you have five or more drinks on one occasion? 0 Filed at: 01/03/2025 1502   Audit-C Score 1 Filed at: 01/03/2025 1502   TI: How many times in the past year have you...    Used an illegal drug or used a prescription medication for non-medical reasons? Never Filed at: 01/03/2025 1502                            History of Present Illness       Chief Complaint   Patient presents with    Seizure - New Onset     Pt presents to ER from home by KARINA NIEVES for new onset seizure around 1350 hours. Pt was in living room with wife and she noted tonic clonic activity lasting 3-5 minutes and his eyes rolled back into his head. Was post ictal x30 minutes for EMS.  "Arrives in ER amnesic to actual seizure but now AxO otherwise.        Past Medical History:   Diagnosis Date    ADD (attention deficit disorder)     Anesthesia complication     \"Takes a lot to get me down\"    Arthritis     Chronic pain     Chronic pain disorder     Depression     GERD (gastroesophageal reflux disease)     Heartburn     Hypertension     Mood disorder (HCC)     Tremor     Uncomplicated alcohol dependence (HCC) 01/09/2024      Past Surgical History:   Procedure Laterality Date    CLOSED REDUCTION CALCANEAL FRACTURE      COLONOSCOPY      PERONEAL TENDON EXPLORATION      [place]A FACETECTOMY & FORAMOTOMY 1 VRT SGM LUMBAR Right 02/06/2024    Procedure: RIGHT L4-5 MIS FORAMINOTOMY;  Surgeon: Al Slade MD;  Location:  MAIN OR;  Service: Neurosurgery    UPPER GASTROINTESTINAL ENDOSCOPY        Family History   Problem Relation Age of Onset    Breast cancer Mother     Heart disease Father     Hypertension Father     Intellectual disability Sister     Heart disease Maternal Grandfather     Parkinsonism Paternal Grandfather     Substance Abuse Neg Hx     Mental illness Neg Hx     Colon cancer Neg Hx     Colon polyps Neg Hx       Social History     Tobacco Use    Smoking status: Every Day     Current packs/day: 1.00     Average packs/day: 1 pack/day for 26.0 years (25.3 ttl pk-yrs)     Types: Cigarettes, Pipe     Start date: 1999    Smokeless tobacco: Never   Vaping Use    Vaping status: Never Used   Substance Use Topics    Alcohol use: Yes     Alcohol/week: 10.0 standard drinks of alcohol     Types: 10 Cans of beer per week     Comment: social    Drug use: Never      E-Cigarette/Vaping    E-Cigarette Use Never User       E-Cigarette/Vaping Substances    Nicotine No     THC No     CBD No     Flavoring No     Other No     Unknown No       I have reviewed and agree with the history as documented.     39-year-old male presents for evaluation of witnessed new onset seizure that occurred shortly prior to arrival.  " Patient does not recall much of the event but spouse states that he started acting strangely pacing around the room and then his entire body started shaking.  Lasted approximately 2 minutes then resolved spontaneously.  EMS report that patient was postictal for approximately 30 minutes.  Upon my evaluation, patient is awake and alert.  Reports that he drinks beer daily.  Recently changing medications from Risperdal to Cymbalta.  Also states he did not get much sleep last night.        Review of Systems   Constitutional:  Negative for fever.   Neurological:  Positive for seizures.           Objective       ED Triage Vitals [01/03/25 1501]   Temperature Pulse Blood Pressure Respirations SpO2 Patient Position - Orthostatic VS   98.7 °F (37.1 °C) (!) 120 135/89 20 95 % Sitting      Temp Source Heart Rate Source BP Location FiO2 (%) Pain Score    Temporal Monitor Right arm -- 3      Vitals      Date and Time Temp Pulse SpO2 Resp BP Pain Score FACES Pain Rating User   01/03/25 1855 97.7 °F (36.5 °C) 117 95 % -- 128/92 -- -- DII   01/03/25 1853 97.7 °F (36.5 °C) -- -- -- 128/92 -- -- DII   01/03/25 1802 -- -- -- -- -- 5 -- SL   01/03/25 1530 -- 118 96 % 22 131/95 -- -- AM   01/03/25 1515 -- -- -- -- -- 3 -- AM   01/03/25 1501 98.7 °F (37.1 °C) 120 95 % 20 135/89 3 -- AM            Physical Exam  Vitals and nursing note reviewed.   Constitutional:       General: He is not in acute distress.     Appearance: He is well-developed.   HENT:      Head: Normocephalic and atraumatic.      Right Ear: External ear normal.      Left Ear: External ear normal.      Nose: Nose normal.   Eyes:      General: No scleral icterus.     Extraocular Movements: Extraocular movements intact.      Pupils: Pupils are equal, round, and reactive to light.   Pulmonary:      Effort: Pulmonary effort is normal. No respiratory distress.   Abdominal:      General: There is no distension.      Palpations: Abdomen is soft.   Musculoskeletal:         General:  No deformity. Normal range of motion.      Cervical back: Normal range of motion and neck supple.      Comments: Able to move extremities and follow commands   Skin:     General: Skin is warm.      Findings: No rash.   Neurological:      General: No focal deficit present.      Mental Status: He is alert.      Gait: Gait normal.   Psychiatric:         Mood and Affect: Mood normal.         Results Reviewed       Procedure Component Value Units Date/Time    Basic metabolic panel [[id number]]  (Abnormal) Collected: 01/03/25 1835    Lab Status: Final result Specimen: Blood from Arm, Left Updated: 01/03/25 1857     Sodium 130 mmol/L      Potassium 3.0 mmol/L      Chloride 98 mmol/L      CO2 21 mmol/L      ANION GAP 11 mmol/L      BUN 5 mg/dL      Creatinine 0.92 mg/dL      Glucose 108 mg/dL      Calcium 8.7 mg/dL      eGFR 102 ml/min/1.73sq m     Narrative:      National Kidney Disease Foundation guidelines for Chronic Kidney Disease (CKD):     Stage 1 with normal or high GFR (GFR > 90 mL/min/1.73 square meters)    Stage 2 Mild CKD (GFR = 60-89 mL/min/1.73 square meters)    Stage 3A Moderate CKD (GFR = 45-59 mL/min/1.73 square meters)    Stage 3B Moderate CKD (GFR = 30-44 mL/min/1.73 square meters)    Stage 4 Severe CKD (GFR = 15-29 mL/min/1.73 square meters)    Stage 5 End Stage CKD (GFR <15 mL/min/1.73 square meters)  Note: GFR calculation is accurate only with a steady state creatinine    UA w Reflex to Microscopic w Reflex to Culture [[id number]]  (Abnormal) Collected: 01/03/25 1835    Lab Status: Final result Specimen: Urine, Clean Catch Updated: 01/03/25 1849     Color, UA Colorless     Clarity, UA Clear     Specific Gravity, UA <1.005     pH, UA 6.5     Leukocytes, UA Negative     Nitrite, UA Negative     Protein, UA Negative mg/dl      Glucose, UA Negative mg/dl      Ketones, UA Negative mg/dl      Urobilinogen, UA <2.0 mg/dl      Bilirubin, UA Negative     Occult Blood, UA Negative    Rapid drug screen, urine  "[[id number]] Collected: 01/03/25 1835    Lab Status: In process Specimen: Urine, Clean Catch Updated: 01/03/25 1839    TSH baseline [[id number]]  (Normal) Collected: 01/03/25 1725    Lab Status: Final result Specimen: Blood from Arm, Left Updated: 01/03/25 1813     TSH Baseline 0.659 uIU/mL     Cortisol Level, AM Specimen [[id number]] Updated: 01/03/25 1805    Lab Status: No result Specimen: Blood from Arm, Left     B-Type Natriuretic Peptide(BNP) [[id number]]  (Normal) Collected: 01/03/25 1725    Lab Status: Final result Specimen: Blood from Arm, Left Updated: 01/03/25 1803     BNP 42 pg/mL     Uric acid [[id number]]  (Normal) Collected: 01/03/25 1725    Lab Status: Final result Specimen: Blood from Arm, Left Updated: 01/03/25 1758     Uric Acid 5.3 mg/dL     Narrative:      N-acetyl-p-benzoquinone imine (metabolite of Acetaminophen) will generate erroneously low results in samples for patients that have taken an overdose of Acetaminophen.    Sodium, urine, random [[id number]] Collected: 01/03/25 1726    Lab Status: Final result Specimen: Urine, Clean Catch Updated: 01/03/25 1741     Sodium, Ur 29    Osmolality-\"If this is regarding a toxic alcohol, please STOP and consult medical  for further guidance.\" [[id number]] Collected: 01/03/25 1725    Lab Status: In process Specimen: Blood from Arm, Left Updated: 01/03/25 1736    Osmolality, Urine, random [[id number]] Collected: 01/03/25 1726    Lab Status: In process Specimen: Urine, Clean Catch Updated: 01/03/25 1733    Comprehensive metabolic panel [[id number]]  (Abnormal) Collected: 01/03/25 1508    Lab Status: Final result Specimen: Blood from Arm, Left Updated: 01/03/25 1530     Sodium 126 mmol/L      Potassium 4.0 mmol/L      Chloride 92 mmol/L      CO2 19 mmol/L      ANION GAP 15 mmol/L      BUN 7 mg/dL      Creatinine 1.04 mg/dL      Glucose 111 mg/dL      Calcium 8.0 mg/dL      AST 35 U/L      ALT 23 U/L      Alkaline Phosphatase 75 U/L      Total Protein 6.9 " g/dL      Albumin 4.1 g/dL      Total Bilirubin 0.59 mg/dL      eGFR 88 ml/min/1.73sq m     Narrative:      National Kidney Disease Foundation guidelines for Chronic Kidney Disease (CKD):     Stage 1 with normal or high GFR (GFR > 90 mL/min/1.73 square meters)    Stage 2 Mild CKD (GFR = 60-89 mL/min/1.73 square meters)    Stage 3A Moderate CKD (GFR = 45-59 mL/min/1.73 square meters)    Stage 3B Moderate CKD (GFR = 30-44 mL/min/1.73 square meters)    Stage 4 Severe CKD (GFR = 15-29 mL/min/1.73 square meters)    Stage 5 End Stage CKD (GFR <15 mL/min/1.73 square meters)  Note: GFR calculation is accurate only with a steady state creatinine    Ethanol [[id number]]  (Normal) Collected: 01/03/25 1508    Lab Status: Final result Specimen: Blood from Arm, Left Updated: 01/03/25 1530     Ethanol Lvl <10 mg/dL     CBC and differential [[id number]]  (Abnormal) Collected: 01/03/25 1508    Lab Status: Final result Specimen: Blood from Arm, Left Updated: 01/03/25 1515     WBC 8.32 Thousand/uL      RBC 4.99 Million/uL      Hemoglobin 12.6 g/dL      Hematocrit 39.5 %      MCV 79 fL      MCH 25.3 pg      MCHC 31.9 g/dL      RDW 14.1 %      MPV 11.1 fL      Platelets 181 Thousands/uL      nRBC 0 /100 WBCs      Segmented % 69 %      Immature Grans % 1 %      Lymphocytes % 22 %      Monocytes % 7 %      Eosinophils Relative 1 %      Basophils Relative 0 %      Absolute Neutrophils 5.81 Thousands/µL      Absolute Immature Grans 0.04 Thousand/uL      Absolute Lymphocytes 1.80 Thousands/µL      Absolute Monocytes 0.58 Thousand/µL      Eosinophils Absolute 0.06 Thousand/µL      Basophils Absolute 0.03 Thousands/µL     Fingerstick Glucose (POCT) [[id number]]  (Normal) Collected: 01/03/25 1513    Lab Status: Final result Specimen: Blood Updated: 01/03/25 1514     POC Glucose 114 mg/dl             CT head wo contrast   Final Interpretation by Raza Chase MD (01/03 1605)      No acute intracranial abnormality.                 "  Workstation performed: [id number]             CriticalCare Time    Date/Time: 1/3/2025 6:58 PM    Performed by: Guilherme Celis DO  Authorized by: Guilherme Celis DO    Critical care provider statement:     Critical care time (minutes):  31    Critical care time was exclusive of:  Separately billable procedures and treating other patients and teaching time    Critical care was necessary to treat or prevent imminent or life-threatening deterioration of the following conditions:  CNS failure or compromise and metabolic crisis    Critical care was time spent personally by me on the following activities:  Blood draw for specimens, obtaining history from patient or surrogate, development of treatment plan with patient or surrogate, discussions with consultants, discussions with primary provider, evaluation of patient's response to treatment, examination of patient, ordering and performing treatments and interventions, ordering and review of laboratory studies, ordering and review of radiographic studies, re-evaluation of patient's condition and review of old charts    I assumed direction of critical care for this patient from another provider in my specialty: no        ED Medication and Procedure Management   Prior to Admission Medications   Prescriptions Last Dose Informant Patient Reported? Taking?   ADDERALL XR, 20MG, 20 MG 24 hr capsule   No No   Sig: Take 1 capsule (20 mg total) by mouth every morning Max Daily Amount: 20 mg   B-D HYPODERMIC NEEDLE 18GX1.5\" 18G X 1-1/2\" MISC  Self Yes No   Sig: Use as directed---for Testosterone   B-D SYRINGE LUER-MILAGROS 1CC 1 ML MISC  Self Yes No   Sig: USE FOR BI WEEKLY TESTOSTERONE INJECTIONS   B-D SYRINGE LUER-MILAGROS 3CC 3 ML MISC  Self Yes No   Sig: USE AS DIRECTED FOR TESTOSTERONE INJECTION   BD Hypodermic Needle 25G X 1-1/2\" MISC  Self Yes No   DULoxetine (CYMBALTA) 30 mg delayed release capsule   No No   Sig: Take 1 capsule (30 mg total) by mouth daily for 7 days, THEN 2 capsules (60 " mg total) daily.   OxyCONTIN 15 MG 12 hr tablet  Self Yes No   Sig: TAKE 1 TABLET BY MOUTH TWICE A DAY FOR PAIN   amphetamine-dextroamphetamine (ADDERALL) 10 mg tablet   No No   Sig: Take 1 tablet (10 mg total) by mouth daily after lunch Max Daily Amount: 10 mg   dexlansoprazole (DEXILANT) 60 MG capsule   No No   Sig: Take 1 capsule (60 mg total) by mouth daily   gabapentin (NEURONTIN) 600 MG tablet  Self Yes No   Sig: Take 600 mg by mouth 2 (two) times a day     hydrOXYzine HCL (ATARAX) 50 mg tablet   No No   Sig: Take 1 tablet (50 mg total) by mouth every 6 (six) hours as needed for anxiety   ibuprofen (MOTRIN) 800 mg tablet  Self Yes No   Sig: TAKE 1 TABLET BY MOUTH EVERY 8 HOURS AS NEEDED (PAIN).   losartan-hydrochlorothiazide (HYZAAR) 50-12.5 mg per tablet   No No   Sig: Take 1 tablet by mouth daily   nicotine (NICODERM CQ) 14 mg/24hr TD 24 hr patch   No No   Sig: Place 1 patch on the skin over 24 hours every 24 hours   nicotine (NICODERM CQ) 21 mg/24 hr TD 24 hr patch   No No   Sig: Place 1 patch on the skin over 24 hours every 24 hours   nicotine (NICODERM CQ) 7 mg/24hr TD 24 hr patch   No No   Sig: Place 1 patch on the skin over 24 hours every 24 hours   ondansetron (ZOFRAN-ODT) 4 mg disintegrating tablet   No No   Sig: Take 1 tablet (4 mg total) by mouth every 6 (six) hours as needed for nausea or vomiting   oxyCODONE-acetaminophen (PERCOCET) 5-325 mg per tablet  Self Yes No   Sig: Take 1 tablet by mouth every 4-6 hours as needed   polyethylene glycol (COLYTE) 4000 mL solution   No No   Sig: Take 4,000 mL by mouth once for 1 dose Take 4000 mL by mouth once for 1 dose. Use as directed   risperiDONE (RisperDAL) 0.5 mg tablet   No No   Sig: Take 0.5 tablets (0.25 mg total) by mouth every morning 1/2 tab in the AM   risperiDONE (RisperDAL) 4 mg tablet   No No   Sig: Take 1 tablet (4 mg total) by mouth daily at bedtime 1 at hs   rosuvastatin (CRESTOR) 20 MG tablet   No No   Sig: Take 1 tablet (20 mg total) by  "mouth daily   tadalafil (CIALIS) 20 MG tablet   No No   Sig: Take 1 tablet (20 mg total) by mouth daily as needed for erectile dysfunction   tadalafil (CIALIS) 5 MG tablet   No No   Sig: Take 1 tablet (5 mg total) by mouth daily as needed for erectile dysfunction   testosterone cypionate (DEPO-TESTOSTERONE) 200 mg/mL SOLN  Self Yes No   Sig: INJECT 1ML EVERY 2 WEEKS. DISCARD VIAL AFTER 30 DAYS FROM OPENING DATE   tiZANidine (ZANAFLEX) 4 mg tablet   No No   Sig: Take 1 tablet (4 mg total) by mouth daily at bedtime   zolpidem (AMBIEN) 10 mg tablet  Self Yes No   Sig: Take 10 mg by mouth daily at bedtime as needed for sleep      Facility-Administered Medications: None     Current Discharge Medication List        CONTINUE these medications which have NOT CHANGED    Details   ADDERALL XR, 20MG, 20 MG 24 hr capsule Take 1 capsule (20 mg total) by mouth every morning Max Daily Amount: 20 mg  Qty: 30 capsule, Refills: 0    Associated Diagnoses: Attention deficit hyperactivity disorder (ADHD), combined type      amphetamine-dextroamphetamine (ADDERALL) 10 mg tablet Take 1 tablet (10 mg total) by mouth daily after lunch Max Daily Amount: 10 mg  Qty: 30 tablet, Refills: 0    Associated Diagnoses: Attention deficit hyperactivity disorder (ADHD), combined type      B-D HYPODERMIC NEEDLE 18GX1.5\" 18G X 1-1/2\" MISC Use as directed---for Testosterone      !! B-D SYRINGE LUER-MILAGROS 1CC 1 ML MISC USE FOR BI WEEKLY TESTOSTERONE INJECTIONS      !! B-D SYRINGE LUER-MILAGROS 3CC 3 ML MISC USE AS DIRECTED FOR TESTOSTERONE INJECTION      BD Hypodermic Needle 25G X 1-1/2\" MISC       dexlansoprazole (DEXILANT) 60 MG capsule Take 1 capsule (60 mg total) by mouth daily  Qty: 90 capsule, Refills: 3    Associated Diagnoses: Gastroesophageal reflux disease without esophagitis      DULoxetine (CYMBALTA) 30 mg delayed release capsule Take 1 capsule (30 mg total) by mouth daily for 7 days, THEN 2 capsules (60 mg total) daily.  Qty: 67 capsule, Refills: 0 "    Associated Diagnoses: Generalized anxiety disorder; PTSD (post-traumatic stress disorder); Recurrent major depressive disorder, remission status unspecified (HCC)      gabapentin (NEURONTIN) 600 MG tablet Take 600 mg by mouth 2 (two) times a day    Refills: 0      hydrOXYzine HCL (ATARAX) 50 mg tablet Take 1 tablet (50 mg total) by mouth every 6 (six) hours as needed for anxiety  Qty: 180 tablet, Refills: 0    Associated Diagnoses: PTSD (post-traumatic stress disorder)      ibuprofen (MOTRIN) 800 mg tablet TAKE 1 TABLET BY MOUTH EVERY 8 HOURS AS NEEDED (PAIN).      losartan-hydrochlorothiazide (HYZAAR) 50-12.5 mg per tablet Take 1 tablet by mouth daily  Qty: 90 tablet, Refills: 1    Associated Diagnoses: Primary hypertension      nicotine (NICODERM CQ) 14 mg/24hr TD 24 hr patch Place 1 patch on the skin over 24 hours every 24 hours  Qty: 30 patch, Refills: 0    Associated Diagnoses: Cigarette nicotine dependence without complication      nicotine (NICODERM CQ) 21 mg/24 hr TD 24 hr patch Place 1 patch on the skin over 24 hours every 24 hours  Qty: 30 patch, Refills: 0    Associated Diagnoses: Cigarette nicotine dependence without complication      nicotine (NICODERM CQ) 7 mg/24hr TD 24 hr patch Place 1 patch on the skin over 24 hours every 24 hours  Qty: 30 patch, Refills: 0    Associated Diagnoses: Cigarette nicotine dependence without complication      ondansetron (ZOFRAN-ODT) 4 mg disintegrating tablet Take 1 tablet (4 mg total) by mouth every 6 (six) hours as needed for nausea or vomiting  Qty: 30 tablet, Refills: 0    Associated Diagnoses: Alcohol-induced acute pancreatitis without infection or necrosis      oxyCODONE-acetaminophen (PERCOCET) 5-325 mg per tablet Take 1 tablet by mouth every 4-6 hours as needed      OxyCONTIN 15 MG 12 hr tablet TAKE 1 TABLET BY MOUTH TWICE A DAY FOR PAIN      polyethylene glycol (COLYTE) 4000 mL solution Take 4,000 mL by mouth once for 1 dose Take 4000 mL by mouth once for 1  dose. Use as directed  Qty: 4000 mL, Refills: 0    Associated Diagnoses: Personal history of colonic polyps      !! risperiDONE (RisperDAL) 0.5 mg tablet Take 0.5 tablets (0.25 mg total) by mouth every morning 1/2 tab in the AM  Qty: 30 tablet, Refills: 0    Comments: Disregard previous prescription, he takes 0.25 mg in the morning!  Associated Diagnoses: Bipolar disorder in full remission, most recent episode unspecified type (HCC)      !! risperiDONE (RisperDAL) 4 mg tablet Take 1 tablet (4 mg total) by mouth daily at bedtime 1 at hs  Qty: 30 tablet, Refills: 0    Associated Diagnoses: Bipolar disorder in full remission, most recent episode unspecified type (HCC)      rosuvastatin (CRESTOR) 20 MG tablet Take 1 tablet (20 mg total) by mouth daily  Qty: 100 tablet, Refills: 3    Comments: D/c rosuvastatin 10mg  Associated Diagnoses: Mixed hyperlipidemia      !! tadalafil (CIALIS) 20 MG tablet Take 1 tablet (20 mg total) by mouth daily as needed for erectile dysfunction  Qty: 10 tablet, Refills: 0    Associated Diagnoses: Erectile dysfunction, unspecified erectile dysfunction type      !! tadalafil (CIALIS) 5 MG tablet Take 1 tablet (5 mg total) by mouth daily as needed for erectile dysfunction  Qty: 10 tablet, Refills: 0    Associated Diagnoses: Erectile dysfunction, unspecified erectile dysfunction type      testosterone cypionate (DEPO-TESTOSTERONE) 200 mg/mL SOLN INJECT 1ML EVERY 2 WEEKS. DISCARD VIAL AFTER 30 DAYS FROM OPENING DATE      tiZANidine (ZANAFLEX) 4 mg tablet Take 1 tablet (4 mg total) by mouth daily at bedtime  Qty: 30 tablet, Refills: 0    Associated Diagnoses: Myofascial pain syndrome      zolpidem (AMBIEN) 10 mg tablet Take 10 mg by mouth daily at bedtime as needed for sleep       !! - Potential duplicate medications found. Please discuss with provider.        No discharge procedures on file.  ED SEPSIS DOCUMENTATION   Time reflects when diagnosis was documented in both MDM as applicable and the  Disposition within this note       Time User Action Codes Description Comment    1/3/2025  4:37 PM Celis, Guilherme Add [R56.9] Seizure (HCC)     1/3/2025  4:37 PM Celis, Guilherme Remove [R56.9] Seizure (HCC)     1/3/2025  4:37 PM Celis, Guilherme Add [R56.9] New onset seizure (HCC)     1/3/2025  4:42 PM Celis, Guilherme Add [E87.1] Hyponatremia                  Guilherme Celis DO  01/03/25 1854

## 2025-01-03 NOTE — ASSESSMENT & PLAN NOTE
Patient presented with new onset seizure  ER spoke with neurology and hold off on AED  Seizure precautions  Urine drug screen pending  Neurology consult  EEG

## 2025-01-04 PROBLEM — E87.6 HYPOKALEMIA: Status: ACTIVE | Noted: 2025-01-04

## 2025-01-04 LAB
ALBUMIN SERPL BCG-MCNC: 3.7 G/DL (ref 3.5–5)
ALP SERPL-CCNC: 67 U/L (ref 34–104)
ALT SERPL W P-5'-P-CCNC: 18 U/L (ref 7–52)
ANION GAP SERPL CALCULATED.3IONS-SCNC: 8 MMOL/L (ref 4–13)
ANION GAP SERPL CALCULATED.3IONS-SCNC: 9 MMOL/L (ref 4–13)
AST SERPL W P-5'-P-CCNC: 20 U/L (ref 13–39)
BASOPHILS # BLD AUTO: 0.05 THOUSANDS/ΜL (ref 0–0.1)
BASOPHILS NFR BLD AUTO: 1 % (ref 0–1)
BILIRUB SERPL-MCNC: 0.76 MG/DL (ref 0.2–1)
BUN SERPL-MCNC: 10 MG/DL (ref 5–25)
BUN SERPL-MCNC: 5 MG/DL (ref 5–25)
CALCIUM SERPL-MCNC: 8 MG/DL (ref 8.4–10.2)
CALCIUM SERPL-MCNC: 8.2 MG/DL (ref 8.4–10.2)
CALCIUM SERPL-MCNC: 8.2 MG/DL (ref 8.4–10.2)
CALCIUM SERPL-MCNC: 9.2 MG/DL (ref 8.4–10.2)
CHLORIDE SERPL-SCNC: 107 MMOL/L (ref 96–108)
CHLORIDE SERPL-SCNC: 108 MMOL/L (ref 96–108)
CO2 SERPL-SCNC: 21 MMOL/L (ref 21–32)
CO2 SERPL-SCNC: 21 MMOL/L (ref 21–32)
CO2 SERPL-SCNC: 23 MMOL/L (ref 21–32)
CO2 SERPL-SCNC: 23 MMOL/L (ref 21–32)
CORTIS AM PEAK SERPL-MCNC: 7.8 UG/DL (ref 6.7–22.6)
CREAT SERPL-MCNC: 0.93 MG/DL (ref 0.6–1.3)
CREAT SERPL-MCNC: 0.96 MG/DL (ref 0.6–1.3)
CREAT SERPL-MCNC: 0.97 MG/DL (ref 0.6–1.3)
CREAT SERPL-MCNC: 1.16 MG/DL (ref 0.6–1.3)
EOSINOPHIL # BLD AUTO: 0.08 THOUSAND/ΜL (ref 0–0.61)
EOSINOPHIL NFR BLD AUTO: 1 % (ref 0–6)
ERYTHROCYTE [DISTWIDTH] IN BLOOD BY AUTOMATED COUNT: 14.2 % (ref 11.6–15.1)
GFR SERPL CREATININE-BSD FRML MDRD: 101 ML/MIN/1.73SQ M
GFR SERPL CREATININE-BSD FRML MDRD: 77 ML/MIN/1.73SQ M
GFR SERPL CREATININE-BSD FRML MDRD: 96 ML/MIN/1.73SQ M
GFR SERPL CREATININE-BSD FRML MDRD: 97 ML/MIN/1.73SQ M
GLUCOSE SERPL-MCNC: 105 MG/DL (ref 65–140)
GLUCOSE SERPL-MCNC: 106 MG/DL (ref 65–140)
GLUCOSE SERPL-MCNC: 113 MG/DL (ref 65–140)
GLUCOSE SERPL-MCNC: 117 MG/DL (ref 65–140)
HCT VFR BLD AUTO: 38.8 % (ref 36.5–49.3)
HGB BLD-MCNC: 12.7 G/DL (ref 12–17)
IMM GRANULOCYTES # BLD AUTO: 0.04 THOUSAND/UL (ref 0–0.2)
IMM GRANULOCYTES NFR BLD AUTO: 0 % (ref 0–2)
LYMPHOCYTES # BLD AUTO: 2.33 THOUSANDS/ΜL (ref 0.6–4.47)
LYMPHOCYTES NFR BLD AUTO: 22 % (ref 14–44)
MAGNESIUM SERPL-MCNC: 1.8 MG/DL (ref 1.9–2.7)
MCH RBC QN AUTO: 26 PG (ref 26.8–34.3)
MCHC RBC AUTO-ENTMCNC: 32.7 G/DL (ref 31.4–37.4)
MCV RBC AUTO: 80 FL (ref 82–98)
MONOCYTES # BLD AUTO: 0.68 THOUSAND/ΜL (ref 0.17–1.22)
MONOCYTES NFR BLD AUTO: 7 % (ref 4–12)
NEUTROPHILS # BLD AUTO: 7.29 THOUSANDS/ΜL (ref 1.85–7.62)
NEUTS SEG NFR BLD AUTO: 69 % (ref 43–75)
NRBC BLD AUTO-RTO: 0 /100 WBCS
OSMOLALITY UR/SERPL-RTO: 278 MMOL/KG (ref 282–298)
OSMOLALITY UR/SERPL-RTO: 286 MMOL/KG (ref 282–298)
OSMOLALITY UR/SERPL-RTO: 287 MMOL/KG (ref 282–298)
PHOSPHATE SERPL-MCNC: 3 MG/DL (ref 2.7–4.5)
PLATELET # BLD AUTO: 174 THOUSANDS/UL (ref 149–390)
PMV BLD AUTO: 10.8 FL (ref 8.9–12.7)
POTASSIUM SERPL-SCNC: 3.3 MMOL/L (ref 3.5–5.3)
POTASSIUM SERPL-SCNC: 3.8 MMOL/L (ref 3.5–5.3)
PROT SERPL-MCNC: 6.3 G/DL (ref 6.4–8.4)
RBC # BLD AUTO: 4.88 MILLION/UL (ref 3.88–5.62)
SODIUM SERPL-SCNC: 137 MMOL/L (ref 135–147)
SODIUM SERPL-SCNC: 137 MMOL/L (ref 135–147)
SODIUM SERPL-SCNC: 138 MMOL/L (ref 135–147)
SODIUM SERPL-SCNC: 138 MMOL/L (ref 135–147)
WBC # BLD AUTO: 10.47 THOUSAND/UL (ref 4.31–10.16)

## 2025-01-04 PROCEDURE — 99232 SBSQ HOSP IP/OBS MODERATE 35: CPT | Performed by: PHYSICIAN ASSISTANT

## 2025-01-04 PROCEDURE — 80048 BASIC METABOLIC PNL TOTAL CA: CPT | Performed by: INTERNAL MEDICINE

## 2025-01-04 PROCEDURE — 83930 ASSAY OF BLOOD OSMOLALITY: CPT | Performed by: INTERNAL MEDICINE

## 2025-01-04 PROCEDURE — 84100 ASSAY OF PHOSPHORUS: CPT | Performed by: INTERNAL MEDICINE

## 2025-01-04 PROCEDURE — 85025 COMPLETE CBC W/AUTO DIFF WBC: CPT | Performed by: INTERNAL MEDICINE

## 2025-01-04 PROCEDURE — 83735 ASSAY OF MAGNESIUM: CPT | Performed by: INTERNAL MEDICINE

## 2025-01-04 PROCEDURE — 80048 BASIC METABOLIC PNL TOTAL CA: CPT | Performed by: NURSE PRACTITIONER

## 2025-01-04 PROCEDURE — 99255 IP/OBS CONSLTJ NEW/EST HI 80: CPT | Performed by: INTERNAL MEDICINE

## 2025-01-04 PROCEDURE — 99255 IP/OBS CONSLTJ NEW/EST HI 80: CPT | Performed by: PSYCHIATRY & NEUROLOGY

## 2025-01-04 PROCEDURE — 80053 COMPREHEN METABOLIC PANEL: CPT | Performed by: INTERNAL MEDICINE

## 2025-01-04 RX ORDER — MAGNESIUM SULFATE HEPTAHYDRATE 40 MG/ML
2 INJECTION, SOLUTION INTRAVENOUS ONCE
Status: COMPLETED | OUTPATIENT
Start: 2025-01-04 | End: 2025-01-04

## 2025-01-04 RX ORDER — POTASSIUM CHLORIDE 1500 MG/1
40 TABLET, EXTENDED RELEASE ORAL ONCE
Status: COMPLETED | OUTPATIENT
Start: 2025-01-04 | End: 2025-01-04

## 2025-01-04 RX ADMIN — ENOXAPARIN SODIUM 40 MG: 40 INJECTION SUBCUTANEOUS at 08:23

## 2025-01-04 RX ADMIN — DEXTROSE 250 ML: 5 SOLUTION INTRAVENOUS at 13:10

## 2025-01-04 RX ADMIN — NICOTINE 1 PATCH: 21 PATCH, EXTENDED RELEASE TRANSDERMAL at 22:04

## 2025-01-04 RX ADMIN — DEXTROAMPHETAMINE SACCHARATE, AMPHETAMINE ASPARTATE, DEXTROAMPHETAMINE SULFATE AND AMPHETAMINE SULFATE 20 MG: 5; 5; 5; 5 TABLET ORAL at 08:23

## 2025-01-04 RX ADMIN — MAGNESIUM SULFATE HEPTAHYDRATE 2 G: 40 INJECTION, SOLUTION INTRAVENOUS at 08:24

## 2025-01-04 RX ADMIN — ZOLPIDEM TARTRATE 10 MG: 5 TABLET ORAL at 23:20

## 2025-01-04 RX ADMIN — OXYCODONE HYDROCHLORIDE 10 MG: 10 TABLET, FILM COATED, EXTENDED RELEASE ORAL at 22:02

## 2025-01-04 RX ADMIN — GABAPENTIN 600 MG: 300 CAPSULE ORAL at 17:05

## 2025-01-04 RX ADMIN — GABAPENTIN 600 MG: 300 CAPSULE ORAL at 08:23

## 2025-01-04 RX ADMIN — TIZANIDINE 4 MG: 4 TABLET ORAL at 22:02

## 2025-01-04 RX ADMIN — LOSARTAN POTASSIUM 50 MG: 50 TABLET, FILM COATED ORAL at 08:24

## 2025-01-04 RX ADMIN — DEXTROAMPHETAMINE SACCHARATE, AMPHETAMINE ASPARTATE, DEXTROAMPHETAMINE SULFATE AND AMPHETAMINE SULFATE 10 MG: 2.5; 2.5; 2.5; 2.5 TABLET ORAL at 13:13

## 2025-01-04 RX ADMIN — RISPERIDONE 0.25 MG: 0.25 TABLET ORAL at 08:23

## 2025-01-04 RX ADMIN — PANTOPRAZOLE SODIUM 40 MG: 40 TABLET, DELAYED RELEASE ORAL at 05:23

## 2025-01-04 RX ADMIN — RISPERIDONE 4 MG: 2 TABLET, FILM COATED ORAL at 22:01

## 2025-01-04 RX ADMIN — SODIUM CHLORIDE 75 ML/HR: 0.9 INJECTION, SOLUTION INTRAVENOUS at 05:23

## 2025-01-04 RX ADMIN — POTASSIUM CHLORIDE 40 MEQ: 1500 TABLET, EXTENDED RELEASE ORAL at 08:24

## 2025-01-04 RX ADMIN — ATORVASTATIN CALCIUM 40 MG: 40 TABLET, FILM COATED ORAL at 17:05

## 2025-01-04 RX ADMIN — OXYCODONE HYDROCHLORIDE 10 MG: 10 TABLET, FILM COATED, EXTENDED RELEASE ORAL at 08:23

## 2025-01-04 NOTE — ASSESSMENT & PLAN NOTE
Acute hyponatremia on admission sodium level 126  Urine drug screen negative  CT of the head negative  EEG pending  Seen by neurology

## 2025-01-04 NOTE — ASSESSMENT & PLAN NOTE
Benjamin Muñiz is a 39 y.o. male with HTN, HLD, ADHD on chronic stimulant, bipolar disorder, tobacco abuse, alcohol abuse, essential tremors  who presents to [facility] ED on 1/3/2025 with new onset seizure-like activity.    Seizure-like activity witnessed by patient's wife, described as patient jerking his head forward into the left, eyes rolling back, and bleeding at the site of his mouth.  Unclear if patient had any rhythmic jerking of the extremities.  Episode lasted approximately 2 minutes and resolved spontaneously.  Noted to have postictal confusion x 30 minutes.  No reported urinary/fecal incontinence.    Workup:  - CT head: Unremarkable for acute intracranial abnormalities  - Labs on presentation:  Hyponatremia, 126  Labs WNL: Ethanol level, TSH, BNP, uric acid, RDU, UA    First time seizure, etiology unclear at this time. Can consider medication effect, such as Adderall, however patient has been on this medication for many years. Unlikely related to new medication Cymbalta as patient has only taken 2 doses, however cannot entirely rule out. Patient was found to be hyponatremic on admission, which may have increased risk along with sleep deprivation. Unlikely alcohol withdrawal seizure as patient did not stop drinking abruptly, however alcohol use may lower seizure threshold.     On exam patient was found to have some facial asymmetry however this was determined to be his baseline, confirmed on drivers license picture.  Will continue workup and obtain MRI brain.     Plan:  - MRI brain with and without contrast pending   - Routine EEG pending; can also be done outpatient if patient is deemed ready for discharge   - Hold off on AEDs at this time   - Okay to come off Cymbalta if patient wishes to discontinue    - Discussed seizure precautions:  No driving, lifting heavy machinery, climbing heights, swimming unattended, hot tub baths or any other activity that will place you in danger in case of a seizure for  at least six months.  - [employer] form completed and submitted online on 1/4/2025  - Medical management supportive care per primary team, notify with changes

## 2025-01-04 NOTE — ASSESSMENT & PLAN NOTE
Continue on Risperdal and hold Cymbalta  As per patient his psychiatrist started patient on Cymbalta yesterday and plan was to taper down on Risperdal  Hold cymbalta on discharge

## 2025-01-04 NOTE — PLAN OF CARE
Problem: PAIN - ADULT  Goal: Verbalizes/displays adequate comfort level or baseline comfort level  Description: Interventions:  - Encourage patient to monitor pain and request assistance  - Assess pain using appropriate pain scale  - Administer analgesics based on type and severity of pain and evaluate response  - Implement non-pharmacological measures as appropriate and evaluate response  - Consider cultural and social influences on pain and pain management  - Notify physician/advanced practitioner if interventions unsuccessful or patient reports new pain  Outcome: Progressing     Problem: INFECTION - ADULT  Goal: Absence or prevention of progression during hospitalization  Description: INTERVENTIONS:  - Assess and monitor for signs and symptoms of infection  - Monitor lab/diagnostic results  - Monitor all insertion sites, i.e. indwelling lines, tubes, and drains  - Monitor endotracheal if appropriate and nasal secretions for changes in amount and color  - [place] appropriate cooling/warming therapies per order  - Administer medications as ordered  - Instruct and encourage patient and family to use good hand hygiene technique  - Identify and instruct in appropriate isolation precautions for identified infection/condition  Outcome: Progressing  Goal: Absence of fever/infection during neutropenic period  Description: INTERVENTIONS:  - Monitor WBC    Outcome: Progressing     Problem: SAFETY ADULT  Goal: Patient will remain free of falls  Description: INTERVENTIONS:  - Educate patient/family on patient safety including physical limitations  - Instruct patient to call for assistance with activity   - Consult OT/PT to assist with strengthening/mobility   - Keep Call bell within reach  - Keep bed low and locked with side rails adjusted as appropriate  - Keep care items and personal belongings within reach  - Initiate and maintain comfort rounds  - Make Fall Risk Sign visible to staff  - Offer Toileting every x Hours,  in advance of need  - Initiate/Maintain xalarm  - Obtain necessary fall risk management equipment: x  - Apply yellow socks and bracelet for high fall risk patients  - Consider moving patient to room near nurses station  Outcome: Progressing  Goal: Maintain or return to baseline ADL function  Description: INTERVENTIONS:  -  Assess patient's ability to carry out ADLs; assess patient's baseline for ADL function and identify physical deficits which impact ability to perform ADLs (bathing, care of mouth/teeth, toileting, grooming, dressing, etc.)  - Assess/evaluate cause of self-care deficits   - Assess range of motion  - Assess patient's mobility; develop plan if impaired  - Assess patient's need for assistive devices and provide as appropriate  - Encourage maximum independence but intervene and supervise when necessary  - Involve family in performance of ADLs  - Assess for home care needs following discharge   - Consider OT consult to assist with ADL evaluation and planning for discharge  - Provide patient education as appropriate  Outcome: Progressing  Goal: Maintains/Returns to pre admission functional level  Description: INTERVENTIONS:  - Perform AM-PAC 6 Click Basic Mobility/ Daily Activity assessment daily.  - Set and communicate daily mobility goal to care team and patient/family/caregiver.   - Collaborate with rehabilitation services on mobility goals if consulted  - Perform Range of Motion x times a day.  - Reposition patient every x hours.  - Dangle patient x times a day  - Stand patient x times a day  - Ambulate patient x times a day  - Out of bed to chair x times a day   - Out of bed for meals x times a day  - Out of bed for toileting  - Record patient progress and toleration of activity level   Outcome: Progressing     Problem: DISCHARGE PLANNING  Goal: Discharge to home or other facility with appropriate resources  Description: INTERVENTIONS:  - Identify barriers to discharge w/patient and caregiver  -  Arrange for needed discharge resources and transportation as appropriate  - Identify discharge learning needs (meds, wound care, etc.)  - Arrange for interpretive services to assist at discharge as needed  - Refer to Case Management Department for coordinating discharge planning if the patient needs post-hospital services based on physician/advanced practitioner order or complex needs related to functional status, cognitive ability, or social support system  Outcome: Progressing     Problem: Knowledge Deficit  Goal: Patient/family/caregiver demonstrates understanding of disease process, treatment plan, medications, and discharge instructions  Description: Complete learning assessment and assess knowledge base.  Interventions:  - Provide teaching at level of understanding  - Provide teaching via preferred learning methods  Outcome: Progressing

## 2025-01-04 NOTE — ASSESSMENT & PLAN NOTE
Patient admitted with sodium of 126  S/p IV fluids overnight - discontinued this AM given sodium of 137  Hyponatremia lab work  Hold hydrochlorothiazide  Hold Cymbalta  Nephrology consult  Will give D5 bolus to avoid overcorrection

## 2025-01-04 NOTE — ASSESSMENT & PLAN NOTE
Patient presented with new onset seizure  ER spoke with neurology and hold off on AED  Seizure precautions  Urine drug screen negative  Neurology consult  MRI brain  EEG - likely to not be done over the weekend.  If MRI brain unremarkable and patient remains at baseline, can be done as outpt

## 2025-01-04 NOTE — PROGRESS NOTES
Progress Note - Hospitalist   Name: Benjamin Muñiz 39 y.o. male I MRN: [id number]  Unit/Bed#: -01 I Date of Admission: 1/3/2025   Date of Service: 1/4/2025 I Hospital Day: 1    Assessment & Plan  Hyponatremia  Patient admitted with sodium of 126  S/p IV fluids overnight - discontinued this AM given sodium of 137  Hyponatremia lab work  Hold hydrochlorothiazide  Hold Cymbalta  Nephrology consult  Will give D5 bolus to avoid overcorrection  Seizure (HCC)  Patient presented with new onset seizure  ER spoke with neurology and hold off on AED  Seizure precautions  Urine drug screen negative  Neurology consult  MRI brain  EEG - likely to not be done over the weekend.  If MRI brain unremarkable and patient remains at baseline, can be done as outpt  Bipolar disorder (HCC)  Continue on Risperdal and hold Cymbalta  As per patient his psychiatrist started patient on Cymbalta yesterday and plan was to taper down on Risperdal  Hold cymbalta on discharge  Attention deficit hyperactivity disorder (ADHD), combined type  Continue on Adderall  Gastroesophageal reflux disease without esophagitis  On Protonix  Primary hypertension  Continue on Cozaar.  Hold hydrochlorothiazide  Monitor vitals as per protocol  Obesity (BMI 35.0-39.9 without comorbidity)  Encourage weight loss and lifestyle medication  Mixed hyperlipidemia  Continue on statin    VTE Pharmacologic Prophylaxis: VTE Score: 4 Moderate Risk (Score 3-4) - Pharmacological DVT Prophylaxis Ordered: enoxaparin (Lovenox).    Mobility:   Basic Mobility Inpatient Raw Score: 24  JH-HLM Goal: 8: Walk 250 feet or more  JH-HLM Achieved: 8: Walk 250 feet ot more  JH-HLM Goal achieved. Continue to encourage appropriate mobility.    Patient Centered Rounds: I performed bedside rounds with nursing staff today.   Discussions with Specialists or Other Care Team Provider: Nephrology AP, Neurology AP    Education and Discussions with Family / Patient: Updated  (wife) at  bedside.    Current Length of Stay: 1 day(s)  Current Patient Status: Inpatient   Certification Statement: The patient will continue to require additional inpatient hospital stay due to monitoring sodium level, MRI brain  Discharge Plan: Anticipate discharge in 24-48 hrs to home.    Code Status: Level 1 - Full Code    Subjective   Patient feels well.  No events overnight.  Does not recall events leading up to hospitalization, but remembers coming up to his hospital room last night.  Denies chest pain/palpitations, shortness of breath, nausea/vomiting, abdominal pain, constipation or diarrhea.    Objective :  Temp:  [97.6 °F (36.4 °C)-98.7 °F (37.1 °C)] 97.7 °F (36.5 °C)  HR:  [] 104  BP: (128-145)/() 138/98  Resp:  [16-22] 16  SpO2:  [93 %-99 %] 96 %  O2 Device: None (Room air)    Body mass index is 33.3 kg/m².     Input and Output Summary (last 24 hours):     Intake/Output Summary (Last 24 hours) at 1/4/2025 1222  Last data filed at 1/4/2025 0900  Gross per 24 hour   Intake 1379 ml   Output 0 ml   Net 1379 ml       Physical Exam  Vitals and nursing note reviewed.   Constitutional:       Appearance: Normal appearance.      Comments: No acute distress   HENT:      Head: Normocephalic.   Eyes:      General: No scleral icterus.     Extraocular Movements: Extraocular movements intact.      Conjunctiva/sclera: Conjunctivae normal.   Cardiovascular:      Rate and Rhythm: Regular rhythm. Tachycardia present.      Heart sounds: Normal heart sounds. No murmur heard.  Pulmonary:      Effort: Pulmonary effort is normal. No respiratory distress.      Breath sounds: Normal breath sounds. No wheezing, rhonchi or rales.   Abdominal:      General: Bowel sounds are normal.      Palpations: Abdomen is soft.      Tenderness: There is no abdominal tenderness. There is no guarding or rebound.   Musculoskeletal:      Cervical back: Normal range of motion.      Comments: Able to move upper/lower ext bilaterally, no edema    Skin:     General: Skin is warm and dry.   Neurological:      Mental Status: He is alert and oriented to person, place, and time.   Psychiatric:         Mood and Affect: Mood normal.         Speech: Speech normal.         Behavior: Behavior normal.           Lines/Drains:              Lab Results: I have reviewed the following results:   Results from last 7 days   Lab Units 01/04/25  0506   WBC Thousand/uL 10.47*   HEMOGLOBIN g/dL 12.7   HEMATOCRIT % 38.8   PLATELETS Thousands/uL 174   SEGS PCT % 69   LYMPHO PCT % 22   MONO PCT % 7   EOS PCT % 1     Results from last 7 days   Lab Units 01/04/25  0816 01/04/25  0506   SODIUM mmol/L 137 138  138   POTASSIUM mmol/L 3.3* 3.3*  3.3*   CHLORIDE mmol/L 107 107  107   CO2 mmol/L 21 23  23   BUN mg/dL 5 5  5   CREATININE mg/dL 0.93 0.97  0.96   ANION GAP mmol/L 9 8  8   CALCIUM mg/dL 8.0* 8.2*  8.2*   ALBUMIN g/dL  --  3.7   TOTAL BILIRUBIN mg/dL  --  0.76   ALK PHOS U/L  --  67   ALT U/L  --  18   AST U/L  --  20   GLUCOSE RANDOM mg/dL 113 106  105         Results from last 7 days   Lab Units 01/03/25  1513   POC GLUCOSE mg/dl 114               Recent Cultures (last 7 days):         Imaging Results Review: I reviewed radiology reports from this admission including: CT head.  Other Study Results Review: No additional pertinent studies reviewed.    Last 24 Hours Medication List:     Current Facility-Administered Medications:     acetaminophen (TYLENOL) tablet 650 mg, Q6H PRN    amphetamine-dextroamphetamine (ADDERALL) tablet 10 mg, After Lunch    amphetamine-dextroamphetamine (ADDERALL) tablet 20 mg, Daily    atorvastatin (LIPITOR) tablet 40 mg, Daily With Dinner    enoxaparin (LOVENOX) subcutaneous injection 40 mg, Daily    gabapentin (NEURONTIN) capsule 600 mg, BID    hydrALAZINE (APRESOLINE) injection 10 mg, Q6H PRN    hydrOXYzine HCL (ATARAX) tablet 50 mg, Q6H PRN    losartan (COZAAR) tablet 50 mg, Daily **AND** [DISCONTINUED] hydroCHLOROthiazide tablet 12.5  mg, Daily    nicotine (NICODERM CQ) 21 mg/24 hr TD 24 hr patch 1 patch, Daily    ondansetron (ZOFRAN) injection 4 mg, Q6H PRN    oxyCODONE (OxyCONTIN) 12 hr tablet 10 mg, Q12H PREM    oxyCODONE (ROXICODONE) IR tablet 5 mg, Q6H PRN    pantoprazole (PROTONIX) EC tablet 40 mg, Early Morning    risperiDONE (RisperDAL) tablet 0.25 mg, QAM    risperiDONE (RisperDAL) tablet 4 mg, HS    tiZANidine (ZANAFLEX) tablet 4 mg, HS    zolpidem (AMBIEN) tablet 10 mg, HS PRN    Administrative Statements   Today, Patient Was Seen By: Lacy Campo PA-C  I have spent a total time of 35 minutes in caring for this patient on the day of the visit/encounter including Diagnostic results, Instructions for management, Patient and family education, Importance of tx compliance, Risk factor reductions, Counseling / Coordination of care, Documenting in the medical record, Reviewing / ordering tests, medicine, procedures  , Obtaining or reviewing history  , and Communicating with other healthcare professionals .    **Please Note: This note may have been constructed using a voice recognition system.**

## 2025-01-04 NOTE — UTILIZATION REVIEW
Initial Clinical Review    Admission: Date/Time/Statement:   Admission Orders (From admission, onward)       Ordered        01/03/25 1642  INPATIENT ADMISSION  Once                          Orders Placed This Encounter   Procedures    INPATIENT ADMISSION     Standing Status:   Standing     Number of Occurrences:   1     Level of Care:   Med Surg [16]     Estimated length of stay:   More than 2 Midnights     Certification:   I certify that inpatient services are medically necessary for this patient for a duration of greater than two midnights. See H&P and MD Progress Notes for additional information about the patient's course of treatment.     ED Arrival Information       Expected   -    Arrival   1/3/2025 14:57    Acuity   Emergent              Means of arrival   Ambulance    Escorted by   [facility]    Service   Hospitalist    Admission type   Emergency              Arrival complaint   seizure             Chief Complaint   Patient presents with    Seizure - New Onset     Pt presents to ER from home by [facility] for new onset seizure around 1350 hours. Pt was in living room with wife and she noted tonic clonic activity lasting 3-5 minutes and his eyes rolled back into his head. Was post ictal x30 minutes for EMS. Arrives in ER amnesic to actual seizure but now AxO otherwise.      Initial Presentation: 39 y.o. male presents to the ED via EMS From home with c/o tonic-clonic seizure activity w/ eyes rolled back x 2 minutes and post ictal x 30 minutes.  Pt was just started on Cymbalta 1 day PTA.  PMH: chronic pain, HTN, bipolar disorder, ADHD, HTN, GERD, ETOH use. In the ED , BP WNL, pain rated 3/10, amnesic to event.  Labs - low  then up to 133, + anion gap, UDS negative. Imaging - CTH no acute disease.  Treated with IV Ativan, IV NSS fluid bolus and drip, Adderall, Neurontin, Statin, Oxycodone.  On exam he was awake and alert, mental status at baseline.  Admitted to INPATIENT status with  Hyponatremia, New onset seizure, bipolar disorder, ADHD - PLAN: IV fluids, BNP q 4 hr, hold HCTZ, Cymbalta, Nephrology consult, seizure prec, UDS, Neuro consult, EEG, continue Risperdal, Adderall, PPI, Cozaar, statin.     Date: 1/4   Day 2:   Hyponatremia, New onset seizure, Bipolar, ADHD - IV fluids d/c today, NA is 3.3 today.  Continue holding HCTZ and Cymbalta.  MRI Brain pending. Can do OP EEG if MRI is unremarkable.  Today pt has no complaints.  Does remember coming to his hospital room, not the seizure. Remains tachycardic, MARCOS x 4.  A&O x 3. Has increased WBC today. Remains intermittently tachycardic today. D/c IV fluids today.    1/4 Nephrology Consult - Acute hyponatremia - now up to 137.  No indication for hypertonic saline, will give 250 cc D5W for slight overcorrection.  BMP at 1800 and then daily.  Give extra KCL, encourage oral intake, continue holding HCTZ.  Plasma osmolality = 268, urine osmolality = 152, urine sodium = 29.      1/4 Neuro Consult - new onset seizure - imaging WNL. Hyponatremia, other labs WNL. Will get MRI Brain w/ and w/o contrast, routine EEG, hold AEDs, ok to d/c Cymbalta if pt wishes to. No driving x 6 months. NA trending up.  No neuro deficits on exam.     Date: 1/5  Day 3: Has surpassed a 2nd midnight with active treatments and services.  Hyponatremia, New onset seizure, Bipolar, ADHD - MRI Brain WNL. EEG pending. Pt is written for d/c home today in stable condition.      ED Treatment-Medication Administration from 01/03/2025 1457 to 01/03/2025 1848         Date/Time Order Dose Route Action     01/03/2025 1517 LORazepam (ATIVAN) injection 1 mg 1 mg Intravenous Given     01/03/2025 1801 sodium chloride 0.9 % bolus 500 mL 500 mL Intravenous New Bag     01/03/2025 1801 amphetamine-dextroamphetamine (ADDERALL) tablet 10 mg 10 mg Oral Given     01/03/2025 1801 gabapentin (NEURONTIN) capsule 600 mg 600 mg Oral Given     01/03/2025 1801 atorvastatin (LIPITOR) tablet 40 mg 40 mg Oral  Given     01/03/2025 1801 sodium chloride 0.9 % infusion 75 mL/hr Intravenous New Bag     01/03/2025 1802 oxyCODONE (ROXICODONE) IR tablet 5 mg 5 mg Oral Given            Scheduled Medications:  amphetamine-dextroamphetamine, 10 mg, Oral, After Lunch  amphetamine-dextroamphetamine, 20 mg, Oral, Daily  atorvastatin, 40 mg, Oral, Daily With Dinner  enoxaparin, 40 mg, Subcutaneous, Daily  gabapentin, 600 mg, Oral, BID  losartan, 50 mg, Oral, Daily  nicotine, 1 patch, Transdermal, Daily  oxyCODONE, 10 mg, Oral, Q12H PREM  pantoprazole, 40 mg, Oral, Early Morning  risperiDONE, 0.25 mg, Oral, QAM  risperiDONE, 4 mg, Oral, HS  tiZANidine, 4 mg, Oral, HS      Continuous IV Infusions:    IV NSS @ 75 cc/hr - d/c 1/4 AM.      PRN Meds:  acetaminophen, 650 mg, Oral, Q6H PRN  hydrALAZINE, 10 mg, Intravenous, Q6H PRN  hydrOXYzine HCL, 50 mg, Oral, Q6H PRN  ondansetron, 4 mg, Intravenous, Q6H PRN  oxyCODONE, 5 mg, Oral, Q6H PRN - x 1 1/3  zolpidem, 10 mg, Oral, HS PRN -x 1 1/3, x 1 1/14      ED Triage Vitals [01/03/25 1501]   Temperature Pulse Respirations Blood Pressure SpO2 Pain Score   98.7 °F (37.1 °C) (!) 120 20 135/89 95 % 3     Weight (last 2 days)       Date/Time Weight    01/03/25 1948 121 (266.4)    01/03/25 1501 127 (279)            Vital Signs (last 3 days)       Date/Time Temp Pulse Resp BP MAP (mmHg) SpO2 O2 Device Patient Position - Orthostatic VS [place] Coma Scale Score Pain    01/05/25 0900 -- -- -- -- -- -- -- -- 15 No Pain    01/05/25 07:07:14 97.6 °F (36.4 °C) 108 18 148/95 113 97 % -- -- -- --    01/05/25 0500 -- 99 -- -- -- 94 % -- -- -- --    01/05/25 04:30:31 97.8 °F (36.6 °C) 98 18 130/88 102 96 % None (Room air) Lying -- --    01/05/25 0300 -- 90 -- -- -- 94 % -- -- -- --    01/05/25 0100 -- 94 -- -- -- 94 % -- -- -- --    01/04/25 2300 -- 100 -- -- -- 96 % -- -- -- --    01/04/25 2100 -- 110 -- -- -- 95 % -- -- 15 5    01/04/25 20:43:27 97.8 °F (36.6 °C) 101 18 126/90 102 96 % None (Room air) Lying -- --     01/04/25 1900 -- 111 -- -- -- 97 % -- -- -- --    01/04/25 1700 -- 101 -- -- -- 97 % -- -- -- --    01/04/25 15:47:11 98.6 °F (37 °C) 113 16 124/93 103 97 % None (Room air) Sitting -- --    01/04/25 0900 -- -- -- -- -- -- -- -- 15 No Pain    01/04/25 06:44:48 97.7 °F (36.5 °C) 104 16 138/98 111 96 % None (Room air) Sitting -- --    01/04/25 05:25:59 97.6 °F (36.4 °C) 112 20 145/105 118 97 % None (Room air) Sitting -- --    01/04/25 0500 -- 81 -- -- -- 93 % -- -- -- --    01/04/25 0300 -- 111 -- -- -- 99 % -- -- -- --    01/04/25 0100 -- 108 -- -- -- 97 % -- -- -- --    01/03/25 2300 -- 82 -- -- -- 94 % -- -- -- --    01/03/25 1948 97.7 °F (36.5 °C) 115 20 -- -- 97 % -- -- -- 5 01/03/25 1900 -- 117 -- 128/92 104 95 % -- -- 15 5 01/03/25 18:55:04 97.7 °F (36.5 °C) 117 -- 128/92 104 95 % -- -- -- --    01/03/25 18:53:16 97.7 °F (36.5 °C) -- -- 128/92 104 -- -- -- -- --    01/03/25 1802 -- -- -- -- -- -- -- -- -- 5 01/03/25 1715 -- -- -- -- -- -- -- -- 15 --    01/03/25 1615 -- -- -- -- -- -- -- -- 15 --    01/03/25 1530 -- 118 22 131/95 108 96 % -- -- -- --    01/03/25 1515 -- -- -- -- -- -- None (Room air) -- 15 3    01/03/25 1501 98.7 °F (37.1 °C) 120 20 135/89 -- 95 % None (Room air) Sitting -- 3              Pertinent Labs/Diagnostic Test Results:   Radiology:  CT head wo contrast   Final Interpretation by Raza Chase MD (01/03 1601)      No acute intracranial abnormality.      MRI inpatient order    (Results Pending)   Stable MRI of the brain. No acute intracranial abnormality. Scattered white matter changes are nonspecific and may represent early chronic microangiopathic change.     Small cluster of cysts lateral to the atria of the right lateral ventricle is unchanged compared to the prior examination. 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      Cardiology:  ECG 12 lead    by Interface, Ris Results In (01/03 1562)        GI:  No orders to display            Results from last 7 days   Lab Units 01/04/25  0506 01/03/25  1508   WBC Thousand/uL 10.47* 8.32   HEMOGLOBIN g/dL 12.7 12.6   HEMATOCRIT % 38.8 39.5   PLATELETS Thousands/uL 174 181   TOTAL NEUT ABS Thousands/µL 7.29 5.81         Results from last 7 days   Lab Units 01/05/25  0433 01/04/25  1812 01/04/25  0816 01/04/25  0506 01/03/25  2149   SODIUM mmol/L 139 137 137 138  138 133*   POTASSIUM mmol/L 3.3* 3.8 3.3* 3.3*  3.3* 3.3*   CHLORIDE mmol/L 109* 108 107 107  107 101   CO2 mmol/L 22 21 21 23  23 21   ANION GAP mmol/L 8 8 9 8  8 11   BUN mg/dL 11 10 5 5  5 5   CREATININE mg/dL 1.01 1.16 0.93 0.97  0.96 1.01   EGFR ml/min/1.73sq m 91 77 101 96  97 91   CALCIUM mg/dL 8.2* 9.2 8.0* 8.2*  8.2* 8.3*   MAGNESIUM mg/dL 2.0  --   --  1.8*  --    PHOSPHORUS mg/dL 3.3  --   --  3.0  --      Results from last 7 days   Lab Units 01/04/25  0506 01/03/25  1508   AST U/L 20 35   ALT U/L 18 23   ALK PHOS U/L 67 75   TOTAL PROTEIN g/dL 6.3* 6.9   ALBUMIN g/dL 3.7 4.1   TOTAL BILIRUBIN mg/dL 0.76 0.59     Results from last 7 days   Lab Units 01/03/25  1513   POC GLUCOSE mg/dl 114     Results from last 7 days   Lab Units 01/05/25  0433 01/04/25  1812 01/04/25  0816 01/04/25  0506 01/03/25  2149 01/03/25  1835 01/03/25  1508   GLUCOSE RANDOM mg/dL 110 117 113 106  105 116 108 111     Results from last 7 days   Lab Units 01/04/25  0816 01/04/25  0506 01/03/25 2149 01/03/25  1725   OSMOLALITY, SERUM mmol/ 287 278* 268*       Results from last 7 days   Lab Units 01/03/25  1725   BNP pg/mL 42       Results from last 7 days   Lab Units 01/04/25  0816 01/04/25  0506 01/03/25  2149 01/03/25  1726 01/03/25  1725   OSMOLALITY, SERUM mmol/ 287 278*  --  268*   OSMO UR mmol/KG  --   --   --  152*  --      Results from last 7 days   Lab Units 01/03/25  1835 01/03/25  1726   CLARITY UA  Clear  --    COLOR UA  Colorless  --    SPEC GRAV UA  <1.005*  --    PH UA  6.5  --    GLUCOSE UA mg/dl Negative  --    KETONES UA  "mg/dl Negative  --    BLOOD UA  Negative  --    PROTEIN UA mg/dl Negative  --    NITRITE UA  Negative  --    BILIRUBIN UA  Negative  --    UROBILINOGEN UA (BE) mg/dl <2.0  --    LEUKOCYTES UA  Negative  --    SODIUM UR   --  29             Results from last 7 days   Lab Units 01/03/25  1835   AMPH/METH  Negative   BARBITURATE UR  Negative   BENZODIAZEPINE UR  Negative   COCAINE UR  Negative   METHADONE URINE  Negative   OPIATE UR  Negative   PCP UR  Negative   THC UR  Negative     Results from last 7 days   Lab Units 01/03/25  1508   ETHANOL LVL mg/dL <10     Past Medical History:   Diagnosis Date    ADD (attention deficit disorder)     Anesthesia complication     \"Takes a lot to get me down\"    Arthritis     Chronic pain     Chronic pain disorder     Depression     GERD (gastroesophageal reflux disease)     Heartburn     Hypertension     Mood disorder (HCC)     Tremor     Uncomplicated alcohol dependence (HCC) 01/09/2024     Present on Admission:   Bipolar disorder (HCC)   Attention deficit hyperactivity disorder (ADHD), combined type   Gastroesophageal reflux disease without esophagitis   Mixed hyperlipidemia   Obesity (BMI 35.0-39.9 without comorbidity)   Primary hypertension      Admitting Diagnosis: Hyponatremia [E87.1]  Seizure (HCC) [R56.9]  New onset seizure (HCC) [R56.9]  Age/Sex: 39 y.o. male    Network Utilization Review Department  ATTENTION: Please call with any questions or concerns to [phone] and carefully listen to the prompts so that you are directed to the right person. All voicemails are confidential.   For Discharge needs, contact Care Management DC Support Team at [phone] opt. 2  Send all requests for admission clinical reviews, approved or denied determinations and any other requests to dedicated fax number below belonging to the campus where the patient is receiving treatment. List of dedicated fax numbers for the Facilities:  FACILITY NAME UR FAX NUMBER   ADMISSION DENIALS " (Administrative/Medical Necessity) [phone]   DISCHARGE SUPPORT TEAM (NETWORK) [phone]   PARENT CHILD HEALTH (Maternity/NICU/Pediatrics) [phone]   [facility] [phone]   [facility] [phone]   [facility] [phone]   [facility] [phone]   [facility] [phone]   [facility] [phone]   [facility] [phone]   [facility] [phone]   [facility] [phone]   [facility] [phone]   [facility] [phone]   [facility] [phone]

## 2025-01-04 NOTE — CASE MANAGEMENT
Case Management Assessment & Discharge Planning Note    Patient name Benjamin Muñiz  Location /-01 MRN [id number]  : 1985 Date 2025       Current Admission Date: 1/3/2025  Current Admission Diagnosis:Hyponatremia   Patient Active Problem List    Diagnosis Date Noted Date Diagnosed    Hyponatremia 2025     Seizure (HCC) 2025     Gastric intestinal metaplasia 2024     ED (erectile dysfunction) 2024     Mixed hyperlipidemia 2024     Obesity (BMI 35.0-39.9 without comorbidity) 2024     Lumbosacral radiculopathy 2023     Lumbar disc herniation      Lumbar foraminal stenosis      Lumbar radiculopathy      Personal history of colonic polyps 2023     Nicotine dependence 2023     Alcohol-induced acute pancreatitis without infection or necrosis 2023     Seasonal allergic rhinitis due to pollen 2023     Right leg pain 2023     Impaired fasting glucose 2023     Narcotic dependency, continuous (HCC) 2022     Primary hypertension 2022     Chronic right-sided low back pain with right-sided sciatica      Tobacco dependence      Gastroesophageal reflux disease without esophagitis 2021     PTSD (post-traumatic stress disorder) 2021     Bipolar disorder (HCC) 2019     Attention deficit hyperactivity disorder (ADHD), combined type 2019     Hypogonadism in male 2017    Primary localized osteoarthrosis of ankle and foot 2012       LOS (days): 1  Geometric Mean LOS (GMLOS) (days):   Days to GMLOS:     OBJECTIVE:    Risk of Unplanned Readmission Score: 22.6         Current admission status: Inpatient       Preferred Pharmacy:   [facility] - [place] - [address]  [address]  [place] [address]  Phone: [phone] Fax: [phone]    [id number] - [place] - [address]  [address]  [place] [address]  Phone: [phone] Fax: [phone]    Primary Care Provider: Ever Tolliver MD    Primary Insurance: CAPITAL  Secondary Insurance:     ASSESSMENT:  Active Health Care Proxies       Noni Muñiz [facility] Care Representative - Spouse   Primary Phone: [phone] (Home)                           Readmission Root Cause  30 Day Readmission: No    Patient Information  Admitted from:: Home  Mental Status: Alert  During Assessment patient was accompanied by: Spouse  Assessment information provided by:: Patient, Spouse  Primary Caregiver: Self  Support Systems: Spouse/significant other  County of Residence: [place]  What city do you live in?: [place]  Home entry access options. Select all that apply.: Stairs  Number of steps to enter home.: 1  Do the steps have railings?: No  Type of Current Residence: 2 story home  Upon entering residence, is there a bedroom on the main floor (no further steps)?: No  A bedroom is located on the following floor levels of residence (select all that apply):: 2nd Floor  Upon entering residence, is there a bathroom on the main floor (no further steps)?: Yes  Number of steps to 2nd floor from main floor: 5  Living Arrangements: Lives w/ Spouse/significant other  Is patient a ?: No    Activities of Daily Living Prior to Admission  Functional Status: Independent  Completes ADLs independently?: Yes  Ambulates independently?: Yes  Does patient use assisted devices?: No  Does patient currently own DME?: No  Does patient have a history of Outpatient Therapy (PT/OT)?: No  Does the patient have a history of Short-Term Rehab?: No  Does patient have a history of HHC?: No  Does patient currently have HHC?: No         Patient Information Continued  Income Source: Employed  Does patient have prescription coverage?: Yes  Does patient receive dialysis treatments?: No  Does patient have a history of substance abuse?:  (Daily ETOH drinking but denies any addiction  issues)  Does patient have a history of Mental Health Diagnosis?: Yes (Bipolar)  Is patient receiving treatment for mental health?: Yes  Has patient received inpatient treatment related to mental health in the last 2 years?: No         Means of Transportation  Means of Transport to Appts:: Drives Self          DISCHARGE DETAILS:    Discharge planning discussed with:: Pt and his wife at bedside  Freedom of Choice: Yes     CM contacted family/caregiver?: Yes  Were Treatment Team discharge recommendations reviewed with patient/caregiver?: Yes  Did patient/caregiver verbalize understanding of patient care needs?: Yes  Were patient/caregiver advised of the risks associated with not following Treatment Team discharge recommendations?: Yes    Contacts  Patient Contacts: Noni Muñiz  Relationship to Patient:: Family  Contact Method: In Person  Reason/Outcome: Continuity of Care, Emergency Contact, Discharge Planning    Requested Home Health Care         Is the patient interested in HHC at discharge?: No    DME Referral Provided  Referral made for DME?: No                 Discharge Destination Plan:: Home  Transport at Discharge : Family          Additional Comments: Met with pt and his wife at bedside to review CM role and possible discharge planning needs. Pt stated that the he lives with his wife in a 2SH with 1 step to enter. Pt stated that he has been independent with all ADL care prior to admisison. Pt stated the has no hx with DME, HHC or STR placement. Pt denies any hx with D&A or MH Inpt stays. Pt reports no concerns about his ability to return home and care for himself. Made pt and his wife aware of CM availability for ongoing discharge planning needs. Pt's wife to provide transport to home when d/c'd.

## 2025-01-04 NOTE — CONSULTS
Consultation - Neurology   Name: Benjamin Muñiz 39 y.o. male I MRN: [id number]  Unit/Bed#: -01 I Date of Admission: 1/3/2025   Date of Service: 1/4/2025 I Hospital Day: 1   Inpatient consult to Neurology  Consult performed by: Mildred Collazo PA-C  Consult ordered by: Baldo Wood MD      Physician Requesting Evaluation: Baldo Wood MD   Reason for Evaluation / Principal Problem: New onset seizure    Assessment & Plan  Seizure (HCC)  Benjamin Muñiz is a 39 y.o. male with HTN, HLD, ADHD on chronic stimulant, bipolar disorder, tobacco abuse, alcohol abuse, essential tremors  who presents to [facility] ED on 1/3/2025 with new onset seizure-like activity.    Seizure-like activity witnessed by patient's wife, described as patient jerking his head forward into the left, eyes rolling back, and bleeding at the site of his mouth.  Unclear if patient had any rhythmic jerking of the extremities.  Episode lasted approximately 2 minutes and resolved spontaneously.  Noted to have postictal confusion x 30 minutes.  No reported urinary/fecal incontinence.    Workup:  - CT head: Unremarkable for acute intracranial abnormalities  - Labs on presentation:  Hyponatremia, 126  Labs WNL: Ethanol level, TSH, BNP, uric acid, RDU, UA    First time seizure, etiology unclear at this time. Can consider medication effect, such as Adderall, however patient has been on this medication for many years. Unlikely related to new medication Cymbalta as patient has only taken 2 doses, however cannot entirely rule out. Patient was found to be hyponatremic on admission, which may have increased risk along with sleep deprivation. Unlikely alcohol withdrawal seizure as patient did not stop drinking abruptly, however alcohol use may lower seizure threshold.     On exam patient was found to have some facial asymmetry however this was determined to be his baseline, confirmed on drivers license picture.  Will continue workup and obtain MRI brain.      Plan:  - MRI brain with and without contrast pending   - Routine EEG pending; can also be done outpatient if patient is deemed ready for discharge   - Hold off on AEDs at this time   - Okay to come off Cymbalta if patient wishes to discontinue    - Discussed seizure precautions:  No driving, lifting heavy machinery, climbing heights, swimming unattended, hot tub baths or any other activity that will place you in danger in case of a seizure for at least six months.  - [employer] form completed and submitted online on 1/4/2025  - Medical management supportive care per primary team, notify with changes  Hyponatremia  - Sodium on presentation 126, now trending up  - Management per primary team    Neurology follow-up:  Benjamin Muñiz will need follow-up in in 6 weeks with epilepsy team for Other in 60 minute appointment.  Patient may require outpatient routine EEG if this is not completed during this admission.  Message sent to schedulers.    History of Present Illness   Benjamin Muñiz is a 39 y.o.  male with HTN, HLD, ADHD on chronic stimulants, bipolar disorder, tobacco abuse, alcohol abuse, essential tremors  who presents to [facility] ED on 1/3/2025 with new onset seizure-like activity.    History obtained per discussion with patient and wife at bedside. Patient states he remembers the  coming in the morning of 1/3/2025, which is the last thing he remembers.  Wife states he was acting strange after the  came, reportedly letting the dogs back into the house while the  was in the house. She states he did not look right when she was talking with him and had him sit on the couch. She states she checked his HR which was in the 90s-100s. He then had head twitched back and to the left, with eyes rolling back while sitting on the couch. She then saw blood coming out the side of his mouth and states he turned gray. She can't remember if there was any extremity stiffness/shaking.  Wife  "called for help and called EMS. She states the seizure activity lasted a little over 2 minutes. She states he then has a gasp of air, followed by rapid breathing. She states for the next 30 minutes he was confused, unable to state his name. Wife denies any urinary or fecal incontinence.  Patient does not recall this event.    Patient does admit to having a runny nose with green mucous and states he took some cold/flu medicine for this.  He admits to not been sleeping okay, stating he ran out of his Ambien.  Patient reports he recently switched from Risperdal to Cymbalta (only taken 2 doses). He reports he's been on Adderall for 15 years without any issues.  Patient also states he takes Percocet/OxyContin for chronic back pain.  When asked about seizure history, patient reports he was with a nurse in the past and 1 day he ran out of a pain med.  He was told by this nurse that he had a grand mal seizure, however patient states he did not lose consciousness with that event, only felt like he has restless legs. Denies recent head injury, meningitis/CNS infection in the past, premature birth, illicit drug use. He does admit to daily alcohol intake, a few beers a day.     Patient presented to [facility] ED on 1/3/2025, BP on presentation 135/89, HR elevated 120.  CT head unremarkable for acute intracranial abnormalities. Patient states he remembers getting off the ambulance, getting into the CT scan, and being moved around to multiple rooms. He denies any residual headache or sore muscles. He does state he bit his tongue, however other than that feels as if he is at his baseline.    Review of Systems   12 point ROS performed, as stated above, all others negative.  I have reviewed the patient's PMH, PSH, Social History, Family History, Meds, and Allergies  Historical Information   Past Medical History:   Diagnosis Date    ADD (attention deficit disorder)     Anesthesia complication     \"Takes a lot to get me down\"    " Arthritis     Chronic pain     Chronic pain disorder     Depression     GERD (gastroesophageal reflux disease)     Heartburn     Hypertension     Mood disorder (HCC)     Tremor     Uncomplicated alcohol dependence (HCC) 01/09/2024     Past Surgical History:   Procedure Laterality Date    CLOSED REDUCTION CALCANEAL FRACTURE      COLONOSCOPY      PERONEAL TENDON EXPLORATION      [place] PADILLA FACETECTOMY & FORAMOTOMY 1 VRT SGM LUMBAR Right 02/06/2024    Procedure: RIGHT L4-5 MIS FORAMINOTOMY;  Surgeon: Al Slade MD;  Location:  MAIN OR;  Service: Neurosurgery    UPPER GASTROINTESTINAL ENDOSCOPY       Social History     Tobacco Use    Smoking status: Every Day     Current packs/day: 1.00     Average packs/day: 1 pack/day for 26.0 years (25.3 ttl pk-yrs)     Types: Cigarettes, Pipe     Start date: 1999    Smokeless tobacco: Never   Vaping Use    Vaping status: Never Used   Substance and Sexual Activity    Alcohol use: Yes     Alcohol/week: 10.0 standard drinks of alcohol     Types: 10 Cans of beer per week     Comment: social    Drug use: Never    Sexual activity: Yes     Partners: Female     Birth control/protection: I.U.D.     E-Cigarette/Vaping    E-Cigarette Use Never User      E-Cigarette/Vaping Substances    Nicotine No     THC No     CBD No     Flavoring No     Other No     Unknown No      Family History   Problem Relation Age of Onset    Breast cancer Mother     Heart disease Father     Hypertension Father     Intellectual disability Sister     Heart disease Maternal Grandfather     Parkinsonism Paternal Grandfather     Substance Abuse Neg Hx     Mental illness Neg Hx     Colon cancer Neg Hx     Colon polyps Neg Hx      Social History     Tobacco Use    Smoking status: Every Day     Current packs/day: 1.00     Average packs/day: 1 pack/day for 26.0 years (25.3 ttl pk-yrs)     Types: Cigarettes, Pipe     Start date: 1999    Smokeless tobacco: Never   Vaping Use    Vaping status: Never Used   Substance and Sexual  "Activity    Alcohol use: Yes     Alcohol/week: 10.0 standard drinks of alcohol     Types: 10 Cans of beer per week     Comment: social    Drug use: Never    Sexual activity: Yes     Partners: Female     Birth control/protection: I.U.D.       Current Facility-Administered Medications:     acetaminophen (TYLENOL) tablet 650 mg, Q6H PRN    amphetamine-dextroamphetamine (ADDERALL) tablet 10 mg, After Lunch    amphetamine-dextroamphetamine (ADDERALL) tablet 20 mg, Daily    atorvastatin (LIPITOR) tablet 40 mg, Daily With Dinner    enoxaparin (LOVENOX) subcutaneous injection 40 mg, Daily    gabapentin (NEURONTIN) capsule 600 mg, BID    hydrALAZINE (APRESOLINE) injection 10 mg, Q6H PRN    hydrOXYzine HCL (ATARAX) tablet 50 mg, Q6H PRN    losartan (COZAAR) tablet 50 mg, Daily **AND** [DISCONTINUED] hydroCHLOROthiazide tablet 12.5 mg, Daily    magnesium sulfate 2 g/50 mL IVPB (premix) 2 g, Once    nicotine (NICODERM CQ) 21 mg/24 hr TD 24 hr patch 1 patch, Daily    ondansetron (ZOFRAN) injection 4 mg, Q6H PRN    oxyCODONE (OxyCONTIN) 12 hr tablet 10 mg, Q12H PREM    oxyCODONE (ROXICODONE) IR tablet 5 mg, Q6H PRN    pantoprazole (PROTONIX) EC tablet 40 mg, Early Morning    potassium chloride (Klor-Con M20) CR tablet 40 mEq, Once    risperiDONE (RisperDAL) tablet 0.25 mg, QAM    risperiDONE (RisperDAL) tablet 4 mg, HS    tiZANidine (ZANAFLEX) tablet 4 mg, HS    zolpidem (AMBIEN) tablet 10 mg, HS PRN  Prior to Admission Medications   Prescriptions Last Dose Informant Patient Reported? Taking?   ADDERALL XR, 20MG, 20 MG 24 hr capsule   No No   Sig: Take 1 capsule (20 mg total) by mouth every morning Max Daily Amount: 20 mg   B-D HYPODERMIC NEEDLE 18GX1.5\" 18G X 1-1/2\" MISC  Self Yes No   Sig: Use as directed---for Testosterone   B-D SYRINGE LUER-MLIAGROS 1CC 1 ML MISC  Self Yes No   Sig: USE FOR BI WEEKLY TESTOSTERONE INJECTIONS   B-D SYRINGE LUER-MILAGROS 3CC 3 ML MISC  Self Yes No   Sig: USE AS DIRECTED FOR TESTOSTERONE INJECTION   BD " "Hypodermic Needle 25G X 1-1/2\" MISC  Self Yes No   DULoxetine (CYMBALTA) 30 mg delayed release capsule   No No   Sig: Take 1 capsule (30 mg total) by mouth daily for 7 days, THEN 2 capsules (60 mg total) daily.   OxyCONTIN 15 MG 12 hr tablet  Self Yes No   Sig: TAKE 1 TABLET BY MOUTH TWICE A DAY FOR PAIN   amphetamine-dextroamphetamine (ADDERALL) 10 mg tablet   No No   Sig: Take 1 tablet (10 mg total) by mouth daily after lunch Max Daily Amount: 10 mg   dexlansoprazole (DEXILANT) 60 MG capsule   No No   Sig: Take 1 capsule (60 mg total) by mouth daily   gabapentin (NEURONTIN) 600 MG tablet  Self Yes No   Sig: Take 600 mg by mouth 2 (two) times a day     hydrOXYzine HCL (ATARAX) 50 mg tablet   No No   Sig: Take 1 tablet (50 mg total) by mouth every 6 (six) hours as needed for anxiety   ibuprofen (MOTRIN) 800 mg tablet  Self Yes No   Sig: TAKE 1 TABLET BY MOUTH EVERY 8 HOURS AS NEEDED (PAIN).   losartan-hydrochlorothiazide (HYZAAR) 50-12.5 mg per tablet   No No   Sig: Take 1 tablet by mouth daily   nicotine (NICODERM CQ) 14 mg/24hr TD 24 hr patch   No No   Sig: Place 1 patch on the skin over 24 hours every 24 hours   nicotine (NICODERM CQ) 21 mg/24 hr TD 24 hr patch   No No   Sig: Place 1 patch on the skin over 24 hours every 24 hours   nicotine (NICODERM CQ) 7 mg/24hr TD 24 hr patch   No No   Sig: Place 1 patch on the skin over 24 hours every 24 hours   ondansetron (ZOFRAN-ODT) 4 mg disintegrating tablet   No No   Sig: Take 1 tablet (4 mg total) by mouth every 6 (six) hours as needed for nausea or vomiting   oxyCODONE-acetaminophen (PERCOCET) 5-325 mg per tablet  Self Yes No   Sig: Take 1 tablet by mouth every 4-6 hours as needed   polyethylene glycol (COLYTE) 4000 mL solution   No No   Sig: Take 4,000 mL by mouth once for 1 dose Take 4000 mL by mouth once for 1 dose. Use as directed   risperiDONE (RisperDAL) 0.5 mg tablet   No No   Sig: Take 0.5 tablets (0.25 mg total) by mouth every morning 1/2 tab in the AM "   risperiDONE (RisperDAL) 4 mg tablet   No No   Sig: Take 1 tablet (4 mg total) by mouth daily at bedtime 1 at hs   rosuvastatin (CRESTOR) 20 MG tablet   No No   Sig: Take 1 tablet (20 mg total) by mouth daily   tadalafil (CIALIS) 20 MG tablet   No No   Sig: Take 1 tablet (20 mg total) by mouth daily as needed for erectile dysfunction   tadalafil (CIALIS) 5 MG tablet   No No   Sig: Take 1 tablet (5 mg total) by mouth daily as needed for erectile dysfunction   testosterone cypionate (DEPO-TESTOSTERONE) 200 mg/mL SOLN  Self Yes No   Sig: INJECT 1ML EVERY 2 WEEKS. DISCARD VIAL AFTER 30 DAYS FROM OPENING DATE   tiZANidine (ZANAFLEX) 4 mg tablet   No No   Sig: Take 1 tablet (4 mg total) by mouth daily at bedtime   zolpidem (AMBIEN) 10 mg tablet  Self Yes No   Sig: Take 10 mg by mouth daily at bedtime as needed for sleep      Facility-Administered Medications: None     Patient has no known allergies.    Objective :  Temp:  [97.6 °F (36.4 °C)-98.7 °F (37.1 °C)] 97.7 °F (36.5 °C)  HR:  [] 104  BP: (128-145)/() 138/98  Resp:  [16-22] 16  SpO2:  [93 %-99 %] 96 %  O2 Device: None (Room air)    Physical Exam  Vitals and nursing note reviewed.   Constitutional:       General: He is not in acute distress.     Appearance: Normal appearance. He is obese. He is not ill-appearing, toxic-appearing or diaphoretic.   HENT:      Head: Normocephalic and atraumatic.   Eyes:      General: No scleral icterus.        Right eye: No discharge.         Left eye: No discharge.      Extraocular Movements: Extraocular movements intact.      Conjunctiva/sclera: Conjunctivae normal.      Pupils: Pupils are equal, round, and reactive to light.   Musculoskeletal:         General: Normal range of motion.      Cervical back: Normal range of motion and neck supple.   Skin:     General: Skin is warm and dry.      Coloration: Skin is not jaundiced or pale.   Neurological:      Mental Status: He is alert.      Motor: Motor strength is  normal.  Psychiatric:         Mood and Affect: Mood normal.         Behavior: Behavior normal.         Thought Content: Thought content normal.         Judgment: Judgment normal.       Neurological Exam  Mental Status  Alert.  Patient is alert, sitting up in bed, accompanied by wife  Oriented to person, place, month, and year   Follows central and appendicular commands   Answers all questions appropriately   Able to spell WORLD forwards and backwards   No dysarthria or aphasia noted .    Cranial Nerves  CN III, IV, VI: Extraocular movements intact bilaterally. Pupils equal round and reactive to light bilaterally.  Primary gaze midline, conjugate gaze noted   No gaze preference or forced gaze deviation   Pupils equal, round, reactive bilaterally   No visual field deficits noted   EOMs intacts, no evidence of nystagmus   Wider eye opening on the right; chronic   Asymmetric forehead wrinkling, less on left  Hearing intact   Tongue midline  Left lateral tongue laceration noted .    Motor  Normal muscle bulk throughout. Normal muscle tone. Strength is 5/5 throughout all four extremities.    Sensory  Light touch is normal in upper and lower extremities. Pinprick is normal in upper and lower extremities.   Diminished vibration in bilateral toes     No evidence of extinction on exam .    Reflexes  Right Plantar: downgoing  Left Plantar: downgoing  BUE reflexes 2+ throughout     BLE patellar reflexes 2+    Bilateral achilles trace     No involuntary movements or rhythmic seizure like activity noted throughout exam .    Coordination  Right: Finger-to-nose normal.Left: Finger-to-nose normal.  No ataxia or dysmetria noted on exam .      Lab Results: I have personally reviewed pertinent reports.  Recent Results (from the past 24 hours)   ECG 12 lead    Collection Time: 01/03/25  3:06 PM   Result Value Ref Range    Ventricular Rate 115 BPM    Atrial Rate 115 BPM    NV Interval 150 ms    QRSD Interval 100 ms    QT Interval 358 ms     QTC Interval 495 ms    P Axis 31 degrees    QRS Axis 37 degrees    T Wave Axis 35 degrees   CBC and differential    Collection Time: 01/03/25  3:08 PM   Result Value Ref Range    WBC 8.32 4.31 - 10.16 Thousand/uL    RBC 4.99 3.88 - 5.62 Million/uL    Hemoglobin 12.6 12.0 - 17.0 g/dL    Hematocrit 39.5 36.5 - 49.3 %    MCV 79 (L) 82 - 98 fL    MCH 25.3 (L) 26.8 - 34.3 pg    MCHC 31.9 31.4 - 37.4 g/dL    RDW 14.1 11.6 - 15.1 %    MPV 11.1 8.9 - 12.7 fL    Platelets 181 149 - 390 Thousands/uL    nRBC 0 /100 WBCs    Segmented % 69 43 - 75 %    Immature Grans % 1 0 - 2 %    Lymphocytes % 22 14 - 44 %    Monocytes % 7 4 - 12 %    Eosinophils Relative 1 0 - 6 %    Basophils Relative 0 0 - 1 %    Absolute Neutrophils 5.81 1.85 - 7.62 Thousands/µL    Absolute Immature Grans 0.04 0.00 - 0.20 Thousand/uL    Absolute Lymphocytes 1.80 0.60 - 4.47 Thousands/µL    Absolute Monocytes 0.58 0.17 - 1.22 Thousand/µL    Eosinophils Absolute 0.06 0.00 - 0.61 Thousand/µL    Basophils Absolute 0.03 0.00 - 0.10 Thousands/µL   Comprehensive metabolic panel    Collection Time: 01/03/25  3:08 PM   Result Value Ref Range    Sodium 126 (L) 135 - 147 mmol/L    Potassium 4.0 3.5 - 5.3 mmol/L    Chloride 92 (L) 96 - 108 mmol/L    CO2 19 (L) 21 - 32 mmol/L    ANION GAP 15 (H) 4 - 13 mmol/L    BUN 7 5 - 25 mg/dL    Creatinine 1.04 0.60 - 1.30 mg/dL    Glucose 111 65 - 140 mg/dL    Calcium 8.0 (L) 8.4 - 10.2 mg/dL    AST 35 13 - 39 U/L    ALT 23 7 - 52 U/L    Alkaline Phosphatase 75 34 - 104 U/L    Total Protein 6.9 6.4 - 8.4 g/dL    Albumin 4.1 3.5 - 5.0 g/dL    Total Bilirubin 0.59 0.20 - 1.00 mg/dL    eGFR 88 ml/min/1.73sq m   Ethanol    Collection Time: 01/03/25  3:08 PM   Result Value Ref Range    Ethanol Lvl <10 <10 mg/dL   Fingerstick Glucose (POCT)    Collection Time: 01/03/25  3:13 PM   Result Value Ref Range    POC Glucose 114 65 - 140 mg/dl   TSH baseline    Collection Time: 01/03/25  5:25 PM   Result Value Ref Range    TSH Baseline  "0.659 0.450 - 4.500 uIU/mL   Osmolality-\"If this is regarding a toxic alcohol, please STOP and consult medical  for further guidance.\"    Collection Time: 01/03/25  5:25 PM   Result Value Ref Range    Osmolality Serum 268 (L) 282 - 298 mmol/KG   B-Type Natriuretic Peptide(BNP)    Collection Time: 01/03/25  5:25 PM   Result Value Ref Range    BNP 42 0 - 100 pg/mL   Uric acid    Collection Time: 01/03/25  5:25 PM   Result Value Ref Range    Uric Acid 5.3 3.5 - 8.5 mg/dL   Sodium, urine, random    Collection Time: 01/03/25  5:26 PM   Result Value Ref Range    Sodium, Ur 29 Reference range not established.   Osmolality, Urine, random    Collection Time: 01/03/25  5:26 PM   Result Value Ref Range    Osmolality, Ur 152 (L) 250 - 900 mmol/KG   Rapid drug screen, urine    Collection Time: 01/03/25  6:35 PM   Result Value Ref Range    Amph/Meth UR Negative Negative    Barbiturate Ur Negative Negative    Benzodiazepine Urine Negative Negative    Cocaine Urine Negative Negative    Methadone Urine Negative Negative    Opiate Urine Negative Negative    PCP Ur Negative Negative    THC Urine Negative Negative    Oxycodone Urine Negative Negative    Fentanyl Urine Negative Negative    HYDROCODONE URINE Negative Negative   UA w Reflex to Microscopic w Reflex to Culture    Collection Time: 01/03/25  6:35 PM    Specimen: Urine, Clean Catch   Result Value Ref Range    Color, UA Colorless     Clarity, UA Clear     Specific Gravity, UA <1.005 (L) 1.005 - 1.030    pH, UA 6.5 4.5, 5.0, 5.5, 6.0, 6.5, 7.0, 7.5, 8.0    Leukocytes, UA Negative Negative    Nitrite, UA Negative Negative    Protein, UA Negative Negative mg/dl    Glucose, UA Negative Negative mg/dl    Ketones, UA Negative Negative mg/dl    Urobilinogen, UA <2.0 <2.0 mg/dl mg/dl    Bilirubin, UA Negative Negative    Occult Blood, UA Negative Negative   Basic metabolic panel    Collection Time: 01/03/25  6:35 PM   Result Value Ref Range    Sodium 130 (L) 135 - 147 " "mmol/L    Potassium 3.0 (L) 3.5 - 5.3 mmol/L    Chloride 98 96 - 108 mmol/L    CO2 21 21 - 32 mmol/L    ANION GAP 11 4 - 13 mmol/L    BUN 5 5 - 25 mg/dL    Creatinine 0.92 0.60 - 1.30 mg/dL    Glucose 108 65 - 140 mg/dL    Calcium 8.7 8.4 - 10.2 mg/dL    eGFR 102 ml/min/1.73sq m   Cortisol Level, AM Specimen    Collection Time: 01/03/25  9:49 PM   Result Value Ref Range    Cortisol - AM 7.8 6.7 - 22.6 ug/dL   Basic metabolic panel    Collection Time: 01/03/25  9:49 PM   Result Value Ref Range    Sodium 133 (L) 135 - 147 mmol/L    Potassium 3.3 (L) 3.5 - 5.3 mmol/L    Chloride 101 96 - 108 mmol/L    CO2 21 21 - 32 mmol/L    ANION GAP 11 4 - 13 mmol/L    BUN 5 5 - 25 mg/dL    Creatinine 1.01 0.60 - 1.30 mg/dL    Glucose 116 65 - 140 mg/dL    Calcium 8.3 (L) 8.4 - 10.2 mg/dL    eGFR 91 ml/min/1.73sq m   Osmolality-\"If this is regarding a toxic alcohol, please STOP and consult medical  for further guidance.\"    Collection Time: 01/03/25  9:49 PM   Result Value Ref Range    Osmolality Serum 278 (L) 282 - 298 mmol/KG   Basic metabolic panel    Collection Time: 01/04/25  5:06 AM   Result Value Ref Range    Sodium 138 135 - 147 mmol/L    Potassium 3.3 (L) 3.5 - 5.3 mmol/L    Chloride 107 96 - 108 mmol/L    CO2 23 21 - 32 mmol/L    ANION GAP 8 4 - 13 mmol/L    BUN 5 5 - 25 mg/dL    Creatinine 0.96 0.60 - 1.30 mg/dL    Glucose 105 65 - 140 mg/dL    Calcium 8.2 (L) 8.4 - 10.2 mg/dL    eGFR 97 ml/min/1.73sq m   Magnesium    Collection Time: 01/04/25  5:06 AM   Result Value Ref Range    Magnesium 1.8 (L) 1.9 - 2.7 mg/dL   Comprehensive metabolic panel    Collection Time: 01/04/25  5:06 AM   Result Value Ref Range    Sodium 138 135 - 147 mmol/L    Potassium 3.3 (L) 3.5 - 5.3 mmol/L    Chloride 107 96 - 108 mmol/L    CO2 23 21 - 32 mmol/L    ANION GAP 8 4 - 13 mmol/L    BUN 5 5 - 25 mg/dL    Creatinine 0.97 0.60 - 1.30 mg/dL    Glucose 106 65 - 140 mg/dL    Calcium 8.2 (L) 8.4 - 10.2 mg/dL    AST 20 13 - 39 U/L    ALT " 18 7 - 52 U/L    Alkaline Phosphatase 67 34 - 104 U/L    Total Protein 6.3 (L) 6.4 - 8.4 g/dL    Albumin 3.7 3.5 - 5.0 g/dL    Total Bilirubin 0.76 0.20 - 1.00 mg/dL    eGFR 96 ml/min/1.73sq m   Phosphorus    Collection Time: 01/04/25  5:06 AM   Result Value Ref Range    Phosphorus 3.0 2.7 - 4.5 mg/dL   CBC and differential    Collection Time: 01/04/25  5:06 AM   Result Value Ref Range    WBC 10.47 (H) 4.31 - 10.16 Thousand/uL    RBC 4.88 3.88 - 5.62 Million/uL    Hemoglobin 12.7 12.0 - 17.0 g/dL    Hematocrit 38.8 36.5 - 49.3 %    MCV 80 (L) 82 - 98 fL    MCH 26.0 (L) 26.8 - 34.3 pg    MCHC 32.7 31.4 - 37.4 g/dL    RDW 14.2 11.6 - 15.1 %    MPV 10.8 8.9 - 12.7 fL    Platelets 174 149 - 390 Thousands/uL    nRBC 0 /100 WBCs    Segmented % 69 43 - 75 %    Immature Grans % 0 0 - 2 %    Lymphocytes % 22 14 - 44 %    Monocytes % 7 4 - 12 %    Eosinophils Relative 1 0 - 6 %    Basophils Relative 1 0 - 1 %    Absolute Neutrophils 7.29 1.85 - 7.62 Thousands/µL    Absolute Immature Grans 0.04 0.00 - 0.20 Thousand/uL    Absolute Lymphocytes 2.33 0.60 - 4.47 Thousands/µL    Absolute Monocytes 0.68 0.17 - 1.22 Thousand/µL    Eosinophils Absolute 0.08 0.00 - 0.61 Thousand/µL    Basophils Absolute 0.05 0.00 - 0.10 Thousands/µL     Imaging Studies: I have personally reviewed pertinent reports and I have personally reviewed pertinent films in PACS.    EKG, Pathology, and Other Studies: I have personally reviewed pertinent reports.    VTE Prophylaxis: Enoxaparin (Lovenox)    Dictation voice to text software has been used in the creation of this document.  Please consider this in light of any contextual or grammatical errors.

## 2025-01-04 NOTE — ASSESSMENT & PLAN NOTE
Sodium level 126 on admission.  No prior history.  Most recent labs are from September 2024 sodium level normal at that time with 2 prior episodes of mildly decreased sodium level in 2021 and 2022 with sodium levels 132 and 135 respectively.  On HCTZ approximately 1 year  High fluid intake on a regular basis.  Drinks 6-8 [employer]ine[employer] every night.  Eats 1 meal a day.  Drank 2 L of fluid in approximately 1 and half hours prior to seizure  Recently started on Cymbalta-status post 2 doses prior to seizure  Workup:   Sodium level 29 on admission, low urine osmolality 152.  Serum osmolality 268 therefore true hypoosmolar hyponatremia.    Uric acid level 5.3  Cortisol 7.8.  Level obtained at 2200 hrs. rather than in the a.m.  TSH normal  Received normal saline in the emergency room with large volume urine output afterwards and rapid correction of sodium  Etiology: Likely SIADH in the setting of recently started Cymbalta/low solute intake due to beer Potomania and polydipsia-large fluid intake in a short time which likely overwhelmed diluting capacity.  Seizure likely due to rapid decline in sodium level.  Unable to assess for chronicity due to paucity of labs.  Sodium level up to 138 as of 0500 and currently 137 on repeat labs  Will give 250 mL D5W bolus.  No fluid restriction at this time.  Hold HCTZ  Hold Cymbalta  Check sodium level at 1800  Check labs in the a.m.  Recommendations communicated to primary service

## 2025-01-04 NOTE — CONSULTS
NEPHROLOGY HOSPITAL CONSULTATION   Benjamin Muñiz 39 y.o. male MRN: [id number]  Unit/Bed#: -01 Encounter: [id number]    Brief History of Admission -39-year-old male who presented with seizure activity.  Found to be hyponatremi prompting nephrology consultc    Assessment & Plan  Hyponatremia  Sodium level 126 on admission.  No prior history.  Most recent labs are from September 2024 sodium level normal at that time with 2 prior episodes of mildly decreased sodium level in 2021 and 2022 with sodium levels 132 and 135 respectively.  On HCTZ approximately 1 year  High fluid intake on a regular basis.  Drinks 6-8 [employer] every night.  Eats 1 meal a day.  Drank 2 L of fluid in approximately 1 and half hours prior to seizure  Recently started on Cymbalta-status post 2 doses prior to seizure  Workup:   Sodium level 29 on admission, low urine osmolality 152.  Serum osmolality 268 therefore true hypoosmolar hyponatremia.    Uric acid level 5.3  Cortisol 7.8.  Level obtained at 2200 hrs. rather than in the a.m.  TSH normal  Received normal saline in the emergency room with large volume urine output afterwards and rapid correction of sodium  Etiology: Likely SIADH in the setting of recently started Cymbalta/low solute intake due to beer Potomania and polydipsia-large fluid intake in a short time which likely overwhelmed diluting capacity.  Seizure likely due to rapid decline in sodium level.  Unable to assess for chronicity due to paucity of labs.  Sodium level up to 138 as of 0500 and currently 137 on repeat labs  Will give 250 mL D5W bolus.  No fluid restriction at this time.  Hold HCTZ  Hold Cymbalta  Check sodium level at 1800  Check labs in the a.m.  Recommendations communicated to primary service  Bipolar disorder (HCC)  On Risperdal  Hold Cymbalta  Attention deficit hyperactivity disorder (ADHD), combined type  On Adderall  Gastroesophageal reflux disease without esophagitis  On Protonix  Primary  "hypertension  Blood pressure acceptable  Continue losartan  Hold HCTZ  Obesity (BMI 35.0-39.9 without comorbidity)    Mixed hyperlipidemia    Seizure (HCC)  Acute hyponatremia on admission sodium level 126  Urine drug screen negative  CT of the head negative  EEG pending  Seen by neurology  Hypokalemia  Given replacement with potassium chloride 40 mEq x 1 this morning        HISTORY OF PRESENT ILLNESS:  Requesting Physician: Baldo Wood MD  Reason for Consult: Hyponatremia    Benjamin Muñiz is a 39 y.o. male with past medical history of hypertension, bipolar disorder, ADHD, chronic pain, GERD, alcohol use who was admitted to [facility] after presenting with seizure.  No prior history of seizure activity.  Patient reports that he generally drinks large amounts of iced tea.  Yesterday he drank 2 L of fluid within an hour and a half.  He was also started on Cymbalta recently.  He has a cold but has not had any acute illness.  He has been on HCTZ for approximately 1 year.  He drinks approximately 6-8 [employer] every night.  He generally eats 1 meal a day breakfast or lunch.  On the day of admission he ate breakfast.  He had a recent cold but no other illness.  Urine drug screen negative.  Patient was seen the day following admission for consultation.  Sitting out of bed.  Eating lunch.  Has tongue soreness/mouth soreness since he bit his tongue during seizure otherwise no complaints.  No nausea vomiting diarrhea.  Denies pain.  A renal consultation is requested today for assistance in the management of hyponatremia.    PAST MEDICAL HISTORY:  Past Medical History:   Diagnosis Date    ADD (attention deficit disorder)     Anesthesia complication     \"Takes a lot to get me down\"    Arthritis     Chronic pain     Chronic pain disorder     Depression     GERD (gastroesophageal reflux disease)     Heartburn     Hypertension     Mood disorder (HCC)     Tremor     Uncomplicated alcohol dependence (HCC) 01/09/2024 "       PAST SURGICAL HISTORY:  Past Surgical History:   Procedure Laterality Date    CLOSED REDUCTION CALCANEAL FRACTURE      COLONOSCOPY      PERONEAL TENDON EXPLORATION      ID PADILLA FACETECTOMY & FORAMOTOMY 1 VRT SGM LUMBAR Right 02/06/2024    Procedure: RIGHT L4-5 MIS FORAMINOTOMY;  Surgeon: Al Slade MD;  Location:  MAIN OR;  Service: Neurosurgery    UPPER GASTROINTESTINAL ENDOSCOPY         ALLERGIES:  No Known Allergies    SOCIAL HISTORY:  Social History     Substance and Sexual Activity   Alcohol Use Yes    Alcohol/week: 10.0 standard drinks of alcohol    Types: 10 Cans of beer per week    Comment: social     Social History     Substance and Sexual Activity   Drug Use Never     Social History     Tobacco Use   Smoking Status Every Day    Current packs/day: 1.00    Average packs/day: 1 pack/day for 26.0 years (25.3 ttl pk-yrs)    Types: Cigarettes, Pipe    Start date: 1999   Smokeless Tobacco Never       FAMILY HISTORY:  Family History   Problem Relation Age of Onset    Breast cancer Mother     Heart disease Father     Hypertension Father     Intellectual disability Sister     Heart disease Maternal Grandfather     Parkinsonism Paternal Grandfather     Substance Abuse Neg Hx     Mental illness Neg Hx     Colon cancer Neg Hx     Colon polyps Neg Hx        MEDICATIONS:    Current Facility-Administered Medications:     acetaminophen (TYLENOL) tablet 650 mg, 650 mg, Oral, Q6H PRN, Baldo Wood MD    amphetamine-dextroamphetamine (ADDERALL) tablet 10 mg, 10 mg, Oral, After Lunch, Baldo Wood MD, 10 mg at 01/03/25 1801    amphetamine-dextroamphetamine (ADDERALL) tablet 20 mg, 20 mg, Oral, Daily, Baldo Wood MD, 20 mg at 01/04/25 0823    atorvastatin (LIPITOR) tablet 40 mg, 40 mg, Oral, Daily With Dinner, Baldo Wood MD, 40 mg at 01/03/25 1801    enoxaparin (LOVENOX) subcutaneous injection 40 mg, 40 mg, Subcutaneous, Daily, Baldo Wood MD, 40 mg at 01/04/25 0823    gabapentin  (NEURONTIN) capsule 600 mg, 600 mg, Oral, BID, Baldo Wood MD, 600 mg at 01/04/25 0823    hydrALAZINE (APRESOLINE) injection 10 mg, 10 mg, Intravenous, Q6H PRN, Baldo Wood MD    hydrOXYzine HCL (ATARAX) tablet 50 mg, 50 mg, Oral, Q6H PRN, Baldo Wood MD    losartan (COZAAR) tablet 50 mg, 50 mg, Oral, Daily, 50 mg at 01/04/25 0824 **AND** [DISCONTINUED] hydroCHLOROthiazide tablet 12.5 mg, 12.5 mg, Oral, Daily, Baldo Wood MD    nicotine (NICODERM CQ) 21 mg/24 hr TD 24 hr patch 1 patch, 1 patch, Transdermal, Daily, Aramis Linn PA-C, 1 patch at 01/03/25 2137    ondansetron (ZOFRAN) injection 4 mg, 4 mg, Intravenous, Q6H PRN, Baldo Wood MD    oxyCODONE (OxyCONTIN) 12 hr tablet 10 mg, 10 mg, Oral, Q12H PREM, Baldo Wood MD, 10 mg at 01/04/25 0823    oxyCODONE (ROXICODONE) IR tablet 5 mg, 5 mg, Oral, Q6H PRN, Baldo Wood MD, 5 mg at 01/03/25 1802    pantoprazole (PROTONIX) EC tablet 40 mg, 40 mg, Oral, Early Morning, Baldo Wood MD, 40 mg at 01/04/25 0523    risperiDONE (RisperDAL) tablet 0.25 mg, 0.25 mg, Oral, QAM, Baldo Wood MD, 0.25 mg at 01/04/25 0823    risperiDONE (RisperDAL) tablet 4 mg, 4 mg, Oral, HS, Baldo Wood MD, 4 mg at 01/03/25 2137    tiZANidine (ZANAFLEX) tablet 4 mg, 4 mg, Oral, HS, Baldo Wood MD, 4 mg at 01/03/25 2136    zolpidem (AMBIEN) tablet 10 mg, 10 mg, Oral, HS PRN, Baldo Wood MD, 10 mg at 01/03/25 0081    REVIEW OF SYSTEMS:  Constitutional: Negative for unusual fatigue.  No fevers or chills.  HENT: Negative for postnasal drip  Eyes: Negative for visual disturbance.   Respiratory: Negative for cough, shortness of breath and wheezing.   Cardiovascular: Negative for chest pain, palpitations and leg swelling.   Gastrointestinal: Negative for abdominal pain, constipation, diarrhea, nausea and vomiting.   Genitourinary: No dysuria, hematuria  Endocrine: Negative for polyuria.   Musculoskeletal: Negative for unusual  arthralgia or myalgia rthralgias, back pain and joint swelling.   Skin: Negative for rash.   Neurological: Negative for focal weakness, headaches, dizziness.  Hematological: Negative for easy bruising or bleeding.  Psychiatric/Behavioral: Negative for confusion and sleep disturbance.   All the systems were reviewed and were negative except as documented on the HPI.    PHYSICAL EXAM:  Current Weight: Weight - Scale: 121 kg (266 lb 6.4 oz)  First Weight: Weight - Scale: 127 kg (279 lb)  Vitals:    01/04/25 0300 01/04/25 0500 01/04/25 0525 01/04/25 0644   BP:   (!) 145/105 138/98   BP Location:   Right arm Right arm   Pulse: (!) 111 81 (!) 112 104   Resp:   20 16   Temp:   97.6 °F (36.4 °C) 97.7 °F (36.5 °C)   TempSrc:   Oral Oral   SpO2: 99% 93% 97% 96%   Weight:       Height:           Intake/Output Summary (Last 24 hours) at 1/4/2025 1235  Last data filed at 1/4/2025 0900  Gross per 24 hour   Intake 1379 ml   Output 0 ml   Net 1379 ml     Physical Exam  Constitutional:       General: He is not in acute distress.     Appearance: He is well-developed. He is not ill-appearing, toxic-appearing or diaphoretic.   HENT:      Head: Normocephalic and atraumatic.      Nose: Nose normal. No congestion.      Mouth/Throat:      Mouth: Mucous membranes are moist.      Pharynx: No oropharyngeal exudate.   Eyes:      General: No scleral icterus.        Right eye: No discharge.         Left eye: No discharge.      Extraocular Movements: Extraocular movements intact.      Conjunctiva/sclera: Conjunctivae normal.   Neck:      Thyroid: No thyromegaly.      Vascular: No carotid bruit or JVD.      Trachea: No tracheal deviation.   Cardiovascular:      Rate and Rhythm: Normal rate and regular rhythm.      Heart sounds: No murmur heard.     No friction rub. No gallop.   Pulmonary:      Effort: Pulmonary effort is normal.      Breath sounds: Normal breath sounds.   Abdominal:      General: Bowel sounds are normal. There is no distension.     "  Palpations: Abdomen is soft. There is no mass.      Tenderness: There is no abdominal tenderness. There is no guarding or rebound.   Musculoskeletal:         General: No tenderness or signs of injury. Normal range of motion.      Cervical back: Normal range of motion and neck supple. No rigidity or tenderness.      Right lower leg: No edema.      Left lower leg: No edema.   Skin:     General: Skin is warm and dry.      Capillary Refill: Capillary refill takes less than 2 seconds.      Coloration: Skin is not jaundiced or pale.      Findings: No erythema or rash.   Neurological:      Mental Status: He is alert and oriented to person, place, and time.   Psychiatric:         Mood and Affect: Mood normal.         Behavior: Behavior normal.         Thought Content: Thought content normal.         Judgment: Judgment normal.           Invasive Devices:      Lab Results:   Results from last 7 days   Lab Units 01/04/25  0816 01/04/25  0506 01/03/25  2149 01/03/25  1835 01/03/25  1508   WBC Thousand/uL  --  10.47*  --   --  8.32   HEMOGLOBIN g/dL  --  12.7  --   --  12.6   HEMATOCRIT %  --  38.8  --   --  39.5   PLATELETS Thousands/uL  --  174  --   --  181   POTASSIUM mmol/L 3.3* 3.3*  3.3* 3.3*   < > 4.0   CHLORIDE mmol/L 107 107  107 101   < > 92*   CO2 mmol/L 21 23  23 21   < > 19*   BUN mg/dL 5 5  5 5   < > 7   CREATININE mg/dL 0.93 0.97  0.96 1.01   < > 1.04   CALCIUM mg/dL 8.0* 8.2*  8.2* 8.3*   < > 8.0*   MAGNESIUM mg/dL  --  1.8*  --   --   --    PHOSPHORUS mg/dL  --  3.0  --   --   --    ALK PHOS U/L  --  67  --   --  75   ALT U/L  --  18  --   --  23   AST U/L  --  20  --   --  35    < > = values in this interval not displayed.     Other Studies:     Portions of the record may have been created with voice recognition software. Occasional wrong word or \"sound a like\" substitutions may have occurred due to the inherent limitations of voice recognition software. Read the chart carefully and recognize, using " context, where substitutions have occurred.If you have any questions, please contact the dictating provider.

## 2025-01-04 NOTE — UTILIZATION REVIEW
NOTIFICATION OF INPATIENT ADMISSION   AUTHORIZATION REQUEST   SERVICING FACILITY:   [place] [address]  Tax ID: [id number]  NPI: [id number] ATTENDING PROVIDER:  Attending Name and NPI#: Baldo Wood Md [[id number]]  Address: [address] [place] [address]  Phone: [phone]   ADMISSION INFORMATION:  [facility]  Place of Service Code: 21  Inpatient Admission Date/Time: 1/3/25  4:42 PM  Discharge Date/Time: No discharge date for patient encounter.  Admitting Diagnosis Code/Description:  Hyponatremia [E87.1]  Seizure (HCC) [R56.9]  New onset seizure (HCC) [R56.9]     UTILIZATION REVIEW CONTACT:  Belkis Nuñez, Utilization   Network Utilization Review Department  Phone: [phone]  Fax: [phone]  Email: [email or web address]  Contact for approvals/pending authorizations, clinical reviews, and discharge.     PHYSICIAN ADVISORY SERVICES:  Medical Necessity Denial & [employer] Review  Phone: [phone]  Fax: [phone]  Email: [email or web address]     DISCHARGE SUPPORT TEAM:  For Patients Discharge Needs & Updates  Phone: [phone] opt. 2 Fax: [phone]  Email: [email or web address]

## 2025-01-05 ENCOUNTER — APPOINTMENT (INPATIENT)
Dept: MRI IMAGING | Facility: HOSPITAL | Age: 40
DRG: 101 | End: 2025-01-05
Payer: COMMERCIAL

## 2025-01-05 VITALS
HEART RATE: 108 BPM | RESPIRATION RATE: 18 BRPM | WEIGHT: 266.4 LBS | BODY MASS INDEX: 33.12 KG/M2 | SYSTOLIC BLOOD PRESSURE: 148 MMHG | TEMPERATURE: 97.6 F | HEIGHT: 75 IN | DIASTOLIC BLOOD PRESSURE: 95 MMHG | OXYGEN SATURATION: 97 %

## 2025-01-05 LAB
ANION GAP SERPL CALCULATED.3IONS-SCNC: 8 MMOL/L (ref 4–13)
BUN SERPL-MCNC: 11 MG/DL (ref 5–25)
CALCIUM SERPL-MCNC: 8.2 MG/DL (ref 8.4–10.2)
CHLORIDE SERPL-SCNC: 109 MMOL/L (ref 96–108)
CO2 SERPL-SCNC: 22 MMOL/L (ref 21–32)
CREAT SERPL-MCNC: 1.01 MG/DL (ref 0.6–1.3)
GFR SERPL CREATININE-BSD FRML MDRD: 91 ML/MIN/1.73SQ M
GLUCOSE SERPL-MCNC: 110 MG/DL (ref 65–140)
MAGNESIUM SERPL-MCNC: 2 MG/DL (ref 1.9–2.7)
PHOSPHATE SERPL-MCNC: 3.3 MG/DL (ref 2.7–4.5)
POTASSIUM SERPL-SCNC: 3.3 MMOL/L (ref 3.5–5.3)
SODIUM SERPL-SCNC: 139 MMOL/L (ref 135–147)

## 2025-01-05 PROCEDURE — 80048 BASIC METABOLIC PNL TOTAL CA: CPT | Performed by: NURSE PRACTITIONER

## 2025-01-05 PROCEDURE — 84100 ASSAY OF PHOSPHORUS: CPT | Performed by: INTERNAL MEDICINE

## 2025-01-05 PROCEDURE — 70553 MRI BRAIN STEM W/O & W/DYE: CPT

## 2025-01-05 PROCEDURE — A9585 GADOBUTROL INJECTION: HCPCS | Performed by: INTERNAL MEDICINE

## 2025-01-05 PROCEDURE — 99239 HOSP IP/OBS DSCHRG MGMT >30: CPT | Performed by: PHYSICIAN ASSISTANT

## 2025-01-05 PROCEDURE — 83735 ASSAY OF MAGNESIUM: CPT | Performed by: INTERNAL MEDICINE

## 2025-01-05 RX ORDER — GADOBUTROL 604.72 MG/ML
12 INJECTION INTRAVENOUS
Status: COMPLETED | OUTPATIENT
Start: 2025-01-05 | End: 2025-01-05

## 2025-01-05 RX ORDER — POTASSIUM CHLORIDE 1500 MG/1
40 TABLET, EXTENDED RELEASE ORAL ONCE
Status: COMPLETED | OUTPATIENT
Start: 2025-01-05 | End: 2025-01-05

## 2025-01-05 RX ORDER — LOSARTAN POTASSIUM 50 MG/1
50 TABLET ORAL DAILY
Qty: 30 TABLET | Refills: 0 | Status: SHIPPED | OUTPATIENT
Start: 2025-01-06

## 2025-01-05 RX ADMIN — RISPERIDONE 0.25 MG: 0.25 TABLET ORAL at 08:11

## 2025-01-05 RX ADMIN — GABAPENTIN 600 MG: 300 CAPSULE ORAL at 08:11

## 2025-01-05 RX ADMIN — LOSARTAN POTASSIUM 50 MG: 50 TABLET, FILM COATED ORAL at 08:11

## 2025-01-05 RX ADMIN — DEXTROAMPHETAMINE SACCHARATE, AMPHETAMINE ASPARTATE, DEXTROAMPHETAMINE SULFATE AND AMPHETAMINE SULFATE 20 MG: 5; 5; 5; 5 TABLET ORAL at 08:11

## 2025-01-05 RX ADMIN — DEXTROAMPHETAMINE SACCHARATE, AMPHETAMINE ASPARTATE, DEXTROAMPHETAMINE SULFATE AND AMPHETAMINE SULFATE 10 MG: 2.5; 2.5; 2.5; 2.5 TABLET ORAL at 13:49

## 2025-01-05 RX ADMIN — POTASSIUM CHLORIDE 40 MEQ: 1500 TABLET, EXTENDED RELEASE ORAL at 08:11

## 2025-01-05 RX ADMIN — ENOXAPARIN SODIUM 40 MG: 40 INJECTION SUBCUTANEOUS at 08:11

## 2025-01-05 RX ADMIN — OXYCODONE HYDROCHLORIDE 5 MG: 5 TABLET ORAL at 13:49

## 2025-01-05 RX ADMIN — GADOBUTROL 12 ML: 604.72 INJECTION INTRAVENOUS at 11:16

## 2025-01-05 RX ADMIN — PANTOPRAZOLE SODIUM 40 MG: 40 TABLET, DELAYED RELEASE ORAL at 04:31

## 2025-01-05 RX ADMIN — OXYCODONE HYDROCHLORIDE 10 MG: 10 TABLET, FILM COATED, EXTENDED RELEASE ORAL at 08:11

## 2025-01-05 NOTE — PLAN OF CARE
Problem: PAIN - ADULT  Goal: Verbalizes/displays adequate comfort level or baseline comfort level  Description: Interventions:  - Encourage patient to monitor pain and request assistance  - Assess pain using appropriate pain scale  - Administer analgesics based on type and severity of pain and evaluate response  - Implement non-pharmacological measures as appropriate and evaluate response  - Consider cultural and social influences on pain and pain management  - Notify physician/advanced practitioner if interventions unsuccessful or patient reports new pain  Outcome: Progressing     Problem: INFECTION - ADULT  Goal: Absence or prevention of progression during hospitalization  Description: INTERVENTIONS:  - Assess and monitor for signs and symptoms of infection  - Monitor lab/diagnostic results  - Monitor all insertion sites, i.e. indwelling lines, tubes, and drains  - Monitor endotracheal if appropriate and nasal secretions for changes in amount and color  - [place] appropriate cooling/warming therapies per order  - Administer medications as ordered  - Instruct and encourage patient and family to use good hand hygiene technique  - Identify and instruct in appropriate isolation precautions for identified infection/condition  Outcome: Progressing  Goal: Absence of fever/infection during neutropenic period  Description: INTERVENTIONS:  - Monitor WBC    Outcome: Progressing     Problem: SAFETY ADULT  Goal: Patient will remain free of falls  Description: INTERVENTIONS:  - Educate patient/family on patient safety including physical limitations  - Instruct patient to call for assistance with activity   - Consult OT/PT to assist with strengthening/mobility   - Keep Call bell within reach  - Keep bed low and locked with side rails adjusted as appropriate  - Keep care items and personal belongings within reach  - Initiate and maintain comfort rounds  - Make Fall Risk Sign visible to staff  - Offer Toileting every x Hours,  in advance of need  - Initiate/Maintain xalarm  - Obtain necessary fall risk management equipment: x  - Apply yellow socks and bracelet for high fall risk patients  - Consider moving patient to room near nurses station  Outcome: Progressing  Goal: Maintain or return to baseline ADL function  Description: INTERVENTIONS:  -  Assess patient's ability to carry out ADLs; assess patient's baseline for ADL function and identify physical deficits which impact ability to perform ADLs (bathing, care of mouth/teeth, toileting, grooming, dressing, etc.)  - Assess/evaluate cause of self-care deficits   - Assess range of motion  - Assess patient's mobility; develop plan if impaired  - Assess patient's need for assistive devices and provide as appropriate  - Encourage maximum independence but intervene and supervise when necessary  - Involve family in performance of ADLs  - Assess for home care needs following discharge   - Consider OT consult to assist with ADL evaluation and planning for discharge  - Provide patient education as appropriate  Outcome: Progressing  Goal: Maintains/Returns to pre admission functional level  Description: INTERVENTIONS:  - Perform AM-PAC 6 Click Basic Mobility/ Daily Activity assessment daily.  - Set and communicate daily mobility goal to care team and patient/family/caregiver.   - Collaborate with rehabilitation services on mobility goals if consulted  - Perform Range of Motion xx times a day.  - Reposition patient every x hours.  - Dangle patient x times a day  - Stand patient x times a day  - Ambulate patient x times a day  - Out of bed to chair x times a day   - Out of bed for meals x times a day  - Out of bed for toileting  - Record patient progress and toleration of activity level   Outcome: Progressing     Problem: DISCHARGE PLANNING  Goal: Discharge to home or other facility with appropriate resources  Description: INTERVENTIONS:  - Identify barriers to discharge w/patient and caregiver  -  Arrange for needed discharge resources and transportation as appropriate  - Identify discharge learning needs (meds, wound care, etc.)  - Arrange for interpretive services to assist at discharge as needed  - Refer to Case Management Department for coordinating discharge planning if the patient needs post-hospital services based on physician/advanced practitioner order or complex needs related to functional status, cognitive ability, or social support system  Outcome: Progressing     Problem: Knowledge Deficit  Goal: Patient/family/caregiver demonstrates understanding of disease process, treatment plan, medications, and discharge instructions  Description: Complete learning assessment and assess knowledge base.  Interventions:  - Provide teaching at level of understanding  - Provide teaching via preferred learning methods  Outcome: Progressing

## 2025-01-05 NOTE — ASSESSMENT & PLAN NOTE
Potassium 3.3 this morning, repleted  Anticipate will improve now that HCTZ will remain on hold  Repeat BMP in 1 week

## 2025-01-05 NOTE — ASSESSMENT & PLAN NOTE
Continue on Risperdal and hold Cymbalta  As per patient his psychiatrist started patient on Cymbalta and plan was to taper down on Risperdal  Hold cymbalta on discharge

## 2025-01-05 NOTE — ASSESSMENT & PLAN NOTE
Patient admitted with sodium of 126  S/p IV fluids overnight - discontinued yesterday given sodium of 137  Hyponatremia lab work  Hold hydrochlorothiazide on dc  Hold Cymbalta  Sodium stable  Repeat BMP in 1 week

## 2025-01-05 NOTE — DISCHARGE SUMMARY
Discharge Summary - Hospitalist   Name: Benjamin Muñiz 39 y.o. male I MRN: [id number]  Unit/Bed#: -01 I Date of Admission: 1/3/2025   Date of Service: 1/5/2025 I Hospital Day: 2     Assessment & Plan  Hyponatremia  Patient admitted with sodium of 126  S/p IV fluids overnight - discontinued yesterday given sodium of 137  Hyponatremia lab work  Hold hydrochlorothiazide on dc  Hold Cymbalta  Sodium stable  Repeat BMP in 1 week  Seizure (HCC)  Patient presented with new onset seizure  ER spoke with neurology and hold off on AED  Seizure precautions  Urine drug screen negative  Neurology consult  MRI brain without acute findings  EEG to be done as outpatient per neurology  Stable for discharge home today off of AED  Needs outpatient follow-up with epilepsy clinic  [employer] form submitted by neurology service prior to discharge.  Patient verbalizes understanding no driving at this time  Bipolar disorder (HCC)  Continue on Risperdal and hold Cymbalta  As per patient his psychiatrist started patient on Cymbalta and plan was to taper down on Risperdal  Hold cymbalta on discharge  Attention deficit hyperactivity disorder (ADHD), combined type  Continue on Adderall  Gastroesophageal reflux disease without esophagitis  On Protonix  Primary hypertension  Continue on Cozaar.  Hold hydrochlorothiazide on dc  Obesity (BMI 35.0-39.9 without comorbidity)  Encourage weight loss and lifestyle medication  Mixed hyperlipidemia  Continue on statin  Hypokalemia  Potassium 3.3 this morning, repleted  Anticipate will improve now that HCTZ will remain on hold  Repeat BMP in 1 week     Medical Problems       Resolved Problems  Date Reviewed: 1/3/2025   None       Discharging Physician / Practitioner: Lacy Campo PA-C  PCP: Ever Tolliver MD  Admission Date:   Admission Orders (From admission, onward)       Ordered        01/03/25 1642  INPATIENT ADMISSION  Once                          Discharge Date: 01/05/25    Consultations  During Hospital Stay:  Neurology  Nephrology    Procedures Performed:   None    Significant Findings / Test Results:   CT head: No acute intracranial abnormality  MRI brain: Stable MRI of the brain. No acute intracranial abnormality. Scattered white matter changes are nonspecific and may represent early chronic microangiopathic change.  Small cluster of cysts lateral to the atria of the right lateral ventricle is unchanged compared to the prior examination. 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    Incidental Findings:   None     Test Results Pending at Discharge (will require follow up):   None     Outpatient Tests Requested:  Repeat BMP in 1 week  Outpatient EEG    Complications:  None    Reason for Admission: Seizure activity    Hospital Course:   Benjamin Muñiz is a 39 y.o. male patient who originally presented to the hospital on 1/3/2025 due to seizure activity.    Past medical history significant for ADHD, hypertension, GERD, hyperlipidemia, bipolar disorder.  Patient presented to the emergency department following seizure-like activity.  Was found to have hyponatremia and started on IV fluids.  HCTZ and Cymbalta were held.  Sodium improved with slight overcorrection and received bolus of D5 on 1/4.  Nephrology recommends holding Cymbalta and hydrochlorothiazide on discharge.  No seizure activity noted throughout hospitalization.  MRI brain unremarkable for any acute findings.  Neurology recommends no EEG at this time but outpatient follow-up with epilepsy clinic and outpatient EEG.  On day of discharge patient was afebrile, hemodynamically stable and verbalized understanding for requested outpatient follow-up.    Please see above list of diagnoses and related plan for additional information.     Condition at Discharge: stable    Discharge Day Visit / Exam:   Subjective: Continues to feel at baseline, eager for discharge.  Vitals: Blood Pressure: 148/95 (01/05/25 0707)  Pulse:  "(!) 108 (01/05/25 0707)  Temperature: 97.6 °F (36.4 °C) (01/05/25 0707)  Temp Source: Oral (01/05/25 0707)  Respirations: 18 (01/05/25 0707)  Height: 6' 3\" (190.5 cm) (01/03/25 1948)  Weight - Scale: 121 kg (266 lb 6.4 oz) (01/03/25 1948)  SpO2: 97 % (01/05/25 0707)  Physical Exam  Vitals and nursing note reviewed.   Constitutional:       Appearance: Normal appearance.      Comments: No acute distress   HENT:      Head: Normocephalic.   Eyes:      General: No scleral icterus.     Extraocular Movements: Extraocular movements intact.      Conjunctiva/sclera: Conjunctivae normal.   Cardiovascular:      Rate and Rhythm: Normal rate and regular rhythm.      Heart sounds: Normal heart sounds. No murmur heard.  Pulmonary:      Effort: Pulmonary effort is normal. No respiratory distress.      Breath sounds: Normal breath sounds. No wheezing, rhonchi or rales.   Abdominal:      General: Bowel sounds are normal.      Palpations: Abdomen is soft.      Tenderness: There is no abdominal tenderness. There is no guarding or rebound.   Musculoskeletal:      Cervical back: Normal range of motion.      Comments: Able to move upper/lower extremities bilaterally, no edema   Skin:     General: Skin is warm and dry.   Neurological:      Mental Status: He is alert and oriented to person, place, and time.   Psychiatric:         Mood and Affect: Mood normal.         Speech: Speech normal.         Behavior: Behavior normal.          Discussion with Family: Patient declined call to .     Discharge instructions/Information to patient and family:   See after visit summary for information provided to patient and family.      Provisions for Follow-Up Care:  See after visit summary for information related to follow-up care and any pertinent home health orders.      Mobility at time of Discharge:   Basic Mobility Inpatient Raw Score: 24  JH-HLM Goal: 8: Walk 250 feet or more  JH-HLM Achieved: 8: Walk 250 feet ot more  HLM Goal " achieved. Continue to encourage appropriate mobility.     Disposition:   Home    Planned Readmission: None    Discharge Medications:  See after visit summary for reconciled discharge medications provided to patient and/or family.      Administrative Statements   Discharge Statement:  I have spent a total time of 60 minutes in caring for this patient on the day of the visit/encounter. >30 minutes of time was spent on: Diagnostic results, Instructions for management, Patient and family education, Impressions, Counseling / Coordination of care, Documenting in the medical record, Reviewing / ordering tests, medicine, procedures  , and Communicating with other healthcare professionals .    **Please Note: This note may have been constructed using a voice recognition system**

## 2025-01-05 NOTE — DISCHARGE INSTR - AVS FIRST PAGE
Recommend follow-up with PCP within 1 week for posthospitalization follow-up  Recommend outpatient follow-up with neurology (epilepsy clinic).  You will also need EEG completed as outpatient which will be ordered by neurology  Recommend repeat BMP in 1 week to follow up potassium and sodium level.  These should improve now that you are no longer taking hydrochlorothiazide.  These results can be followed up by your PCP  No driving at this time until follow-up with neurology/until you receive clearance to resume driving    Return to the emergency department for further evaluation with any chest pain/palpitations, shortness of breath, nausea/vomiting, abdominal pain, fever/chills, recurrent seizure activity.

## 2025-01-05 NOTE — ASSESSMENT & PLAN NOTE
Patient presented with new onset seizure  ER spoke with neurology and hold off on AED  Seizure precautions  Urine drug screen negative  Neurology consult  MRI brain without acute findings  EEG to be done as outpatient per neurology  Stable for discharge home today off of AED  Needs outpatient follow-up with epilepsy clinic  [employer] form submitted by neurology service prior to discharge.  Patient verbalizes understanding no driving at this time

## 2025-01-06 ENCOUNTER — TELEPHONE (OUTPATIENT)
Age: 40
End: 2025-01-06

## 2025-01-06 NOTE — TELEPHONE ENCOUNTER
Patient scheduled for: 2-/Valentin/Eric/11 am         HFU/SLUB/New onset seizures/1/3/2025 - 1/5/2025 to Home/Self Care         Neurology Consult Note:  Benjamin Muñiz will need follow-up in in 6 weeks with epilepsy team for Other=ATTENDING  in 60 minute appointment.  Patient may require outpatient routine EEG if this is not completed during this admission.

## 2025-01-06 NOTE — UTILIZATION REVIEW
NOTIFICATION OF ADMISSION DISCHARGE   This is a Notification of Discharge from [facility]. Please be advised that this patient has been discharge from our facility. Below you will find the admission and discharge date and time including the patient’s disposition.   UTILIZATION REVIEW CONTACT:  Belkis Nuñez  Utilization   Network Utilization Review Department  Phone: [phone] x carefully listen to the prompts. All voicemails are confidential.  Email: [email or web address]     ADMISSION INFORMATION  PRESENTATION DATE: 1/3/2025  2:59 PM  OBERVATION ADMISSION DATE: N/A  INPATIENT ADMISSION DATE: 1/3/25  4:42 PM   DISCHARGE DATE: 1/5/2025  3:08 PM   DISPOSITION:Home/Self Care    Network Utilization Review Department  ATTENTION: Please call with any questions or concerns to [phone] and carefully listen to the prompts so that you are directed to the right person. All voicemails are confidential.   For Discharge needs, contact Care Management DC Support Team at [phone] opt. 2  Send all requests for admission clinical reviews, approved or denied determinations and any other requests to dedicated fax number below belonging to the campus where the patient is receiving treatment. List of dedicated fax numbers for the Facilities:  FACILITY NAME UR FAX NUMBER   ADMISSION DENIALS (Administrative/Medical Necessity) [phone]   DISCHARGE SUPPORT TEAM ([facility]) [phone]   PARENT CHILD HEALTH (Maternity/NICU/Pediatrics) [phone]   [facility] [phone]   [facility] [phone]   [facility] [phone]   [facility] [phone]   [facility] [phone]   [facility] [phone]   [facility] [phone]   [facility] [phone]    [facility] [phone]   [facility] [phone]   [facility] [phone]   [facility] [phone]

## 2025-01-07 ENCOUNTER — TRANSITIONAL CARE MANAGEMENT (OUTPATIENT)
Dept: FAMILY MEDICINE CLINIC | Facility: HOSPITAL | Age: 40
End: 2025-01-07

## 2025-01-10 ENCOUNTER — APPOINTMENT (OUTPATIENT)
Dept: LAB | Facility: HOSPITAL | Age: 40
End: 2025-01-10
Payer: COMMERCIAL

## 2025-01-10 ENCOUNTER — OFFICE VISIT (OUTPATIENT)
Dept: FAMILY MEDICINE CLINIC | Facility: HOSPITAL | Age: 40
End: 2025-01-10
Payer: COMMERCIAL

## 2025-01-10 VITALS
DIASTOLIC BLOOD PRESSURE: 70 MMHG | HEIGHT: 75 IN | SYSTOLIC BLOOD PRESSURE: 138 MMHG | HEART RATE: 105 BPM | OXYGEN SATURATION: 97 % | WEIGHT: 266 LBS | BODY MASS INDEX: 33.07 KG/M2 | RESPIRATION RATE: 16 BRPM | TEMPERATURE: 98.7 F

## 2025-01-10 DIAGNOSIS — F31.70 BIPOLAR DISORDER IN FULL REMISSION, MOST RECENT EPISODE UNSPECIFIED TYPE (HCC): ICD-10-CM

## 2025-01-10 DIAGNOSIS — R56.9 SEIZURE (HCC): Primary | ICD-10-CM

## 2025-01-10 DIAGNOSIS — E87.1 HYPONATREMIA: ICD-10-CM

## 2025-01-10 DIAGNOSIS — J01.00 ACUTE MAXILLARY SINUSITIS, RECURRENCE NOT SPECIFIED: ICD-10-CM

## 2025-01-10 DIAGNOSIS — I10 PRIMARY HYPERTENSION: ICD-10-CM

## 2025-01-10 DIAGNOSIS — Z78.9 ALCOHOL USE: ICD-10-CM

## 2025-01-10 DIAGNOSIS — F17.200 TOBACCO DEPENDENCE: ICD-10-CM

## 2025-01-10 DIAGNOSIS — F17.210 CIGARETTE NICOTINE DEPENDENCE WITHOUT COMPLICATION: ICD-10-CM

## 2025-01-10 PROBLEM — E87.6 HYPOKALEMIA: Status: RESOLVED | Noted: 2025-01-04 | Resolved: 2025-01-10

## 2025-01-10 PROBLEM — M79.604 RIGHT LEG PAIN: Status: RESOLVED | Noted: 2023-03-28 | Resolved: 2025-01-10

## 2025-01-10 LAB
ALBUMIN SERPL BCG-MCNC: 3.9 G/DL (ref 3.5–5)
ALP SERPL-CCNC: 70 U/L (ref 34–104)
ALT SERPL W P-5'-P-CCNC: 17 U/L (ref 7–52)
ANION GAP SERPL CALCULATED.3IONS-SCNC: 7 MMOL/L (ref 4–13)
AST SERPL W P-5'-P-CCNC: 20 U/L (ref 13–39)
BILIRUB SERPL-MCNC: 0.4 MG/DL (ref 0.2–1)
BUN SERPL-MCNC: 16 MG/DL (ref 5–25)
CALCIUM SERPL-MCNC: 8.6 MG/DL (ref 8.4–10.2)
CHLORIDE SERPL-SCNC: 106 MMOL/L (ref 96–108)
CO2 SERPL-SCNC: 24 MMOL/L (ref 21–32)
CREAT SERPL-MCNC: 1.16 MG/DL (ref 0.6–1.3)
GFR SERPL CREATININE-BSD FRML MDRD: 77 ML/MIN/1.73SQ M
GLUCOSE SERPL-MCNC: 106 MG/DL (ref 65–140)
POTASSIUM SERPL-SCNC: 3.8 MMOL/L (ref 3.5–5.3)
PROT SERPL-MCNC: 6.6 G/DL (ref 6.4–8.4)
SODIUM SERPL-SCNC: 137 MMOL/L (ref 135–147)

## 2025-01-10 PROCEDURE — 36415 COLL VENOUS BLD VENIPUNCTURE: CPT

## 2025-01-10 PROCEDURE — 80053 COMPREHEN METABOLIC PANEL: CPT

## 2025-01-10 PROCEDURE — 99496 TRANSJ CARE MGMT HIGH F2F 7D: CPT | Performed by: INTERNAL MEDICINE

## 2025-01-10 RX ORDER — THIAMINE HCL 50 MG
100 TABLET ORAL DAILY
Qty: 60 TABLET | Refills: 0 | Status: SHIPPED | OUTPATIENT
Start: 2025-01-10

## 2025-01-10 RX ORDER — NICOTINE 21 MG/24HR
1 PATCH, TRANSDERMAL 24 HOURS TRANSDERMAL EVERY 24 HOURS
Qty: 28 PATCH | Refills: 0 | Status: SHIPPED | OUTPATIENT
Start: 2025-01-10

## 2025-01-10 NOTE — LETTER
January 10, 2025     Patient: Benjamin Muñiz  YOB: 1985  Date of Visit: 1/10/2025      To Whom it May Concern:    Benjamin Muñiz is under my professional care. Benjamin was seen in my office on 1/10/2025. Benjamin may return to work on 1/13/2025 .    If you have any questions or concerns, please don't hesitate to call.         Sincerely,          Ever Tolliver MD        CC: No Recipients

## 2025-01-10 NOTE — ASSESSMENT & PLAN NOTE
Patient smokes a pack a day.  He is willing to quit smoking.  I will try nicotine patch and a tapering fashion.

## 2025-01-10 NOTE — ASSESSMENT & PLAN NOTE
Moods are stable.  Loxitane was discontinued on discharge.  Denies depression today.    He will discuss Risperdal with patient's psychiatrist.

## 2025-01-10 NOTE — ASSESSMENT & PLAN NOTE
Patient presents with wife for [facility]AN[facility] management after hospital discharge on January 5.  He was admitted with hyponatremia and a seizure episode.  This was patient's first seizure.  No AED was started during the hospital stay.  He has send for recurrence of seizures.  Patient will follow-up with neurology.  We discussed that he would not drive for 6 months

## 2025-01-10 NOTE — ASSESSMENT & PLAN NOTE
Patient had hyponatremia with a low urine osmolarity.  He drank the day before he went in and he was on hydrochlorothiazide.  He took 3 doses of duloxetine before hospital admission.  All these contributed to the hyponatremia most likely.    Sodium was normal at 139 on discharge    Patient is euvolemic today.  I will recheck his sodium    His blood pressure is acceptable.  He will continue holding HCTZ    He will discuss duloxetine with his psychiatrist

## 2025-01-13 DIAGNOSIS — M79.18 MYOFASCIAL PAIN SYNDROME: ICD-10-CM

## 2025-01-13 DIAGNOSIS — N52.9 ERECTILE DYSFUNCTION, UNSPECIFIED ERECTILE DYSFUNCTION TYPE: ICD-10-CM

## 2025-01-13 DIAGNOSIS — F43.10 PTSD (POST-TRAUMATIC STRESS DISORDER): ICD-10-CM

## 2025-01-14 ENCOUNTER — TELEPHONE (OUTPATIENT)
Dept: GASTROENTEROLOGY | Facility: CLINIC | Age: 40
End: 2025-01-14

## 2025-01-14 RX ORDER — TADALAFIL 20 MG/1
20 TABLET ORAL DAILY PRN
Qty: 10 TABLET | Refills: 5 | Status: SHIPPED | OUTPATIENT
Start: 2025-01-14

## 2025-01-14 RX ORDER — SODIUM CHLORIDE 9 MG/ML
100 INJECTION, SOLUTION INTRAVENOUS CONTINUOUS
OUTPATIENT
Start: 2025-01-14

## 2025-01-14 RX ORDER — HYDROXYZINE HYDROCHLORIDE 50 MG/1
50 TABLET, FILM COATED ORAL EVERY 6 HOURS PRN
Qty: 180 TABLET | Refills: 0 | OUTPATIENT
Start: 2025-01-14 | End: 2025-04-14

## 2025-01-14 RX ORDER — TADALAFIL 5 MG/1
5 TABLET ORAL DAILY PRN
Qty: 10 TABLET | Refills: 5 | Status: SHIPPED | OUTPATIENT
Start: 2025-01-14

## 2025-01-14 NOTE — TELEPHONE ENCOUNTER
Pt's psychiatrist, Dr Johnson will review this med and refill it if appropriate on 1/17/25 when they have an appointment

## 2025-01-14 NOTE — TELEPHONE ENCOUNTER
Hello, looks like he had a recent hospitalization.  I will refill if appropriate at our next appointment.

## 2025-01-14 NOTE — TELEPHONE ENCOUNTER
Confirmed tizanidine 4 mg, 1 pill at bedtime. Per pt, the medication has not been helping lately. C/o increased spasms and pain in his low back and down his leg x 3 - 4 days. Pt stated that he did send a message to Dr. Slade via My Chart - waiting for a reply. Pt stated that his LD of tizanidine was last week.     Advised pt, the writer will d/w MG. Anticipate rx will be sent to [facility] in [place] for pu today. This office will cb if there is any question or change in the plan as discussed. Please call the office if there are any problems with the rx or if this medication does not improve the symptoms after a few days. Pt verbalized understanding and appreciation.

## 2025-01-14 NOTE — TELEPHONE ENCOUNTER
Called pt. Given recent new onset seizure, recommend holding off on procedure until neurology follow up. Please call pt to reschedule for after neurology epilepsy clinic follow up. Thank you.

## 2025-01-17 ENCOUNTER — TELEMEDICINE (OUTPATIENT)
Dept: PSYCHIATRY | Facility: CLINIC | Age: 40
End: 2025-01-17
Payer: COMMERCIAL

## 2025-01-17 DIAGNOSIS — F43.10 PTSD (POST-TRAUMATIC STRESS DISORDER): ICD-10-CM

## 2025-01-17 DIAGNOSIS — F90.2 ATTENTION DEFICIT HYPERACTIVITY DISORDER (ADHD), COMBINED TYPE: ICD-10-CM

## 2025-01-17 DIAGNOSIS — F31.70 BIPOLAR DISORDER IN FULL REMISSION, MOST RECENT EPISODE UNSPECIFIED TYPE (HCC): ICD-10-CM

## 2025-01-17 DIAGNOSIS — F33.9 RECURRENT MAJOR DEPRESSIVE DISORDER, REMISSION STATUS UNSPECIFIED (HCC): ICD-10-CM

## 2025-01-17 DIAGNOSIS — F41.1 GENERALIZED ANXIETY DISORDER: Primary | ICD-10-CM

## 2025-01-17 PROCEDURE — 90833 PSYTX W PT W E/M 30 MIN: CPT | Performed by: STUDENT IN AN ORGANIZED HEALTH CARE EDUCATION/TRAINING PROGRAM

## 2025-01-17 PROCEDURE — 99214 OFFICE O/P EST MOD 30 MIN: CPT | Performed by: STUDENT IN AN ORGANIZED HEALTH CARE EDUCATION/TRAINING PROGRAM

## 2025-01-17 RX ORDER — CLONAZEPAM 0.5 MG/1
0.5 TABLET ORAL DAILY PRN
Qty: 14 TABLET | Refills: 0 | Status: SHIPPED | OUTPATIENT
Start: 2025-01-17 | End: 2025-01-31

## 2025-01-17 RX ORDER — DEXTROAMPHETAMINE SACCHARATE, AMPHETAMINE ASPARTATE, DEXTROAMPHETAMINE SULFATE AND AMPHETAMINE SULFATE 2.5; 2.5; 2.5; 2.5 MG/1; MG/1; MG/1; MG/1
10 TABLET ORAL
Qty: 30 TABLET | Refills: 0 | Status: SHIPPED | OUTPATIENT
Start: 2025-01-17

## 2025-01-17 RX ORDER — DEXTROAMPHETAMINE SULFATE, DEXTROAMPHETAMINE SACCHARATE, AMPHETAMINE SULFATE AND AMPHETAMINE ASPARTATE 5; 5; 5; 5 MG/1; MG/1; MG/1; MG/1
20 CAPSULE, EXTENDED RELEASE ORAL EVERY MORNING
Qty: 30 CAPSULE | Refills: 0 | Status: SHIPPED | OUTPATIENT
Start: 2025-01-17

## 2025-01-17 RX ORDER — RISPERIDONE 0.5 MG/1
0.5 TABLET ORAL EVERY MORNING
Qty: 30 TABLET | Refills: 0 | Status: SHIPPED | OUTPATIENT
Start: 2025-01-17

## 2025-01-17 RX ORDER — RISPERIDONE 4 MG/1
4 TABLET ORAL
Qty: 30 TABLET | Refills: 0 | Status: SHIPPED | OUTPATIENT
Start: 2025-01-17

## 2025-01-18 NOTE — PSYCH
MEDICATION MANAGEMENT NOTE        [facility]      Name and Date of Birth:  Benjamin Muñiz 39 y.o. 1985 MRN: [id number]    Date of Visit: January 17, 2025    Reason for Visit: Follow-up visit for medication management     Virtual Visit Disclaimer:       TeleMed provider: Ambreen Johnson D.O.    Location: [place]     Verification of patient location:     Patient is currently located in the [facility]  Patient is currently located in a state in which I am licensed     After connecting through [employer], the patient was identified by name and date of birth.  Benjamin Muñiz was informed that this is a telemedicine visit that is being conducted through [employer], and the patient was informed that this is a secure, HIPAA-compliant platform. My office door was closed. No one else was in the room. Benjamin Muñiz acknowledged consent and understanding of privacy and security of the video platform. Benjamin understands that the online visit is based solely on information provided by the patient, and that, in the absence of a face-to-face physical evaluation by the physician, the diagnosis Benjamin  receives is both limited and provisional in terms of accuracy and completeness. Benjamin Muñiz understands that they can discontinue the visit at any time. I informed Benjamin that I have reviewed their record in EPIC and presented the opportunity for them to ask any questions regarding the visit today. Benjamin Muñiz voiced understanding and consented to these terms. Benjamin is aware this is a billable service. Benjamin is present in [place]    SUBJECTIVE:    Benjamin uMñiz is a 39 y.o. male with past psychiatric history significant for MDD, HERI, PTSD, ADHD who was personally seen and evaluated today at the [facility] outpatient clinic for follow-up and medication management. Benjamin denies SI, HI, AVH, delusions, gibson since his last appointment.  However he does  "endorse hyponatremic seizures as a result of a confluence of medications which include duloxetine, previous use of fluoxetine, hydrochlorothiazide, risperidone.  After discussion of risks, benefits, potential side effects, alternatives, we will keep Cymbalta and fluoxetine discontinued.  Patient states that he is experiencing intermittent panic attacks and we will initiate Klonopin as needed 0.5 mg daily for severe panic.  Patient will take a week or so to contemplate future directions in her treatment.  Rest of regimen will remain the same and he denies acute mental complaints or concerns at this time    Current Rating Scores:     None completed today.    Review Of Systems:      Constitutional negative   ENT negative   Cardiovascular negative   Respiratory negative   Gastrointestinal negative   Genitourinary negative   Musculoskeletal negative   Integumentary negative   Neurological negative   Endocrine negative   Other Symptoms none, all other systems are negative       Past Psychiatric History: (unchanged information from previous note copied and italicized) - Information that is bolded has been updated.     See intake    Substance Abuse History: (unchanged information from previous note copied and italicized) - Information that is bolded has been updated.     See intake    Social History: (unchanged information from previous note copied and italicized) - Information that is bolded has been updated.     See intake    Traumatic History: (unchanged information from previous note copied and italicized) - Information that is bolded has been updated.     See intake      Past Medical History:    Past Medical History:   Diagnosis Date    ADD (attention deficit disorder)     Anesthesia complication     \"Takes a lot to get me down\"    Arthritis     Chronic pain     Chronic pain disorder     Depression     GERD (gastroesophageal reflux disease)     Heartburn     Hypertension     Mood disorder (HCC)     Tremor     " Uncomplicated alcohol dependence (HCC) 01/09/2024        Past Surgical History:   Procedure Laterality Date    CLOSED REDUCTION CALCANEAL FRACTURE      COLONOSCOPY      PERONEAL TENDON EXPLORATION      OR PADILLA FACETECTOMY & FORAMOTOMY 1 VRT SGM LUMBAR Right 02/06/2024    Procedure: RIGHT L4-5 MIS FORAMINOTOMY;  Surgeon: Al Slade MD;  Location:  MAIN OR;  Service: Neurosurgery    UPPER GASTROINTESTINAL ENDOSCOPY       No Known Allergies    Substance Abuse History:    Social History     Substance and Sexual Activity   Alcohol Use Yes    Alcohol/week: 10.0 standard drinks of alcohol    Types: 10 Cans of beer per week    Comment: social     Social History     Substance and Sexual Activity   Drug Use Never       Social History:    Social History     Socioeconomic History    Marital status: /Civil Union     Spouse name: Not on file    Number of children: Not on file    Years of education: Not on file    Highest education level: Not on file   Occupational History    Not on file   Tobacco Use    Smoking status: Every Day     Current packs/day: 1.00     Average packs/day: 1 pack/day for 26.0 years (25.3 ttl pk-yrs)     Types: Cigarettes, Pipe     Start date: 1999    Smokeless tobacco: Never   Vaping Use    Vaping status: Never Used   Substance and Sexual Activity    Alcohol use: Yes     Alcohol/week: 10.0 standard drinks of alcohol     Types: 10 Cans of beer per week     Comment: social    Drug use: Never    Sexual activity: Yes     Partners: Female     Birth control/protection: I.U.D.   Other Topics Concern    Not on file   Social History Narrative    Lives with wife    Sees dentist occasionally    No living will     Social Drivers of Health     Financial Resource Strain: Not on file   Food Insecurity: No Food Insecurity (1/3/2025)    Nursing - Inadequate Food Risk Classification     Worried About Running Out of Food in the Last Year: Not on file     Ran Out of Food in the Last Year: Not on file     Ran Out of  "Food in the Last Year: Never true   Transportation Needs: No Transportation Needs (1/3/2025)    Nursing - Transportation Risk Classification     Lack of Transportation: Not on file     Lack of Transportation: No   Physical Activity: Not on file   Stress: Not on file   Social Connections: Not on file   Intimate Partner Violence: Unknown (1/3/2025)    Nursing IPS     Feels Physically and Emotionally Safe: Not on file     Physically Hurt by Someone: Not on file     Humiliated or Emotionally Abused by Someone: Not on file     Physically Hurt by Someone: No     Hurt or Threatened by Someone: No   Housing Stability: Unknown (1/3/2025)    Nursing: Inadequate Housing Risk Classification     Has Housing: Not on file     Worried About Losing Housing: Not on file     Unable to Get Utilities: Not on file     Unable to Pay for Housing in the Last Year: No     Has Housin       Family Psychiatric History:     Family History   Problem Relation Age of Onset    Breast cancer Mother     Heart disease Father     Hypertension Father     Intellectual disability Sister     Heart disease Maternal Grandfather     Parkinsonism Paternal Grandfather     Substance Abuse Neg Hx     Mental illness Neg Hx     Colon cancer Neg Hx     Colon polyps Neg Hx        History Review: The following portions of the patient's history were reviewed and updated as appropriate: allergies, current medications, past family history, past medical history, past social history, past surgical history, and problem list.         OBJECTIVE:     Vital signs in last 24 hours:    There were no vitals filed for this visit.    Mental Status Evaluation:    Appearance age appropriate, casually dressed   Behavior Cooperative, anxious   Speech normal rate, normal volume, normal pitch   Mood \"Anxious\"   Affect Dysphoric   Thought Processes organized, goal directed   Associations intact associations   Thought Content no overt delusions   Perceptual Disturbances: no auditory " hallucinations, no visual hallucinations   Abnormal Thoughts  Risk Potential Suicidal ideation - None at present  Homicidal ideation - None at present  Potential for aggression - Not at present   Orientation oriented to person, place, time/date, and situation   Memory recent and remote memory grossly intact   Consciousness alert and awake   Attention Span Concentration Span attention span and concentration are age appropriate   Intellect appears to be of average intelligence   Insight intact   Judgement intact   Muscle Strength and  Gait unable to assess today due to virtual visit   Motor activity unable to assess today due to virtual visit   Language no difficulty naming common objects, no difficulty repeating a phrase, no difficulty writing a sentence   Fund of Knowledge adequate knowledge of current events  adequate fund of knowledge regarding past history  adequate fund of knowledge regarding vocabulary    Pain none   Pain Scale Did not ask patient to formally rate       Laboratory Results: I have personally reviewed all pertinent laboratory/tests results    Recent Labs (last 2 months):   Appointment on 01/10/2025   Component Date Value    Sodium 01/10/2025 137     Potassium 01/10/2025 3.8     Chloride 01/10/2025 106     CO2 01/10/2025 24     ANION GAP 01/10/2025 7     BUN 01/10/2025 16     Creatinine 01/10/2025 1.16     Glucose 01/10/2025 106     Calcium 01/10/2025 8.6     AST 01/10/2025 20     ALT 01/10/2025 17     Alkaline Phosphatase 01/10/2025 70     Total Protein 01/10/2025 6.6     Albumin 01/10/2025 3.9     Total Bilirubin 01/10/2025 0.40     eGFR 01/10/2025 77    Admission on 01/03/2025, Discharged on 01/05/2025   Component Date Value    WBC 01/03/2025 8.32     RBC 01/03/2025 4.99     Hemoglobin 01/03/2025 12.6     Hematocrit 01/03/2025 39.5     MCV 01/03/2025 79 (L)     MCH 01/03/2025 25.3 (L)     MCHC 01/03/2025 31.9     RDW 01/03/2025 14.1     MPV 01/03/2025 11.1     Platelets 01/03/2025 181     nRBC  01/03/2025 0     Segmented % 01/03/2025 69     Immature Grans % 01/03/2025 1     Lymphocytes % 01/03/2025 22     Monocytes % 01/03/2025 7     Eosinophils Relative 01/03/2025 1     Basophils Relative 01/03/2025 0     Absolute Neutrophils 01/03/2025 5.81     Absolute Immature Grans 01/03/2025 0.04     Absolute Lymphocytes 01/03/2025 1.80     Absolute Monocytes 01/03/2025 0.58     Eosinophils Absolute 01/03/2025 0.06     Basophils Absolute 01/03/2025 0.03     Sodium 01/03/2025 126 (L)     Potassium 01/03/2025 4.0     Chloride 01/03/2025 92 (L)     CO2 01/03/2025 19 (L)     ANION GAP 01/03/2025 15 (H)     BUN 01/03/2025 7     Creatinine 01/03/2025 1.04     Glucose 01/03/2025 111     Calcium 01/03/2025 8.0 (L)     AST 01/03/2025 35     ALT 01/03/2025 23     Alkaline Phosphatase 01/03/2025 75     Total Protein 01/03/2025 6.9     Albumin 01/03/2025 4.1     Total Bilirubin 01/03/2025 0.59     eGFR 01/03/2025 88     Ethanol Lvl 01/03/2025 <10     Amph/Meth UR 01/03/2025 Negative     Barbiturate Ur 01/03/2025 Negative     Benzodiazepine Urine 01/03/2025 Negative     Cocaine Urine 01/03/2025 Negative     Methadone Urine 01/03/2025 Negative     Opiate Urine 01/03/2025 Negative     PCP Ur 01/03/2025 Negative     THC Urine 01/03/2025 Negative     Oxycodone Urine 01/03/2025 Negative     Fentanyl Urine 01/03/2025 Negative     HYDROCODONE URINE 01/03/2025 Negative     Ventricular Rate 01/03/2025 115     Atrial Rate 01/03/2025 115     VA Interval 01/03/2025 150     QRSD Interval 01/03/2025 100     QT Interval 01/03/2025 358     QTC Interval 01/03/2025 495     P [place] 01/03/2025 31     QRS [place] 01/03/2025 37     T Wave [place] 01/03/2025 35     POC Glucose 01/03/2025 114     Color, UA 01/03/2025 Colorless     Clarity, UA 01/03/2025 Clear     Specific Gravity, UA 01/03/2025 <1.005 (L)     pH, UA 01/03/2025 6.5     Leukocytes, UA 01/03/2025 Negative     Nitrite, UA 01/03/2025 Negative     Protein, UA 01/03/2025 Negative     Glucose, UA  01/03/2025 Negative     Ketones, UA 01/03/2025 Negative     Urobilinogen, UA 01/03/2025 <2.0     Bilirubin, UA 01/03/2025 Negative     Occult Blood, UA 01/03/2025 Negative     Sodium 01/03/2025 130 (L)     Potassium 01/03/2025 3.0 (L)     Chloride 01/03/2025 98     CO2 01/03/2025 21     ANION GAP 01/03/2025 11     BUN 01/03/2025 5     Creatinine 01/03/2025 0.92     Glucose 01/03/2025 108     Calcium 01/03/2025 8.7     eGFR 01/03/2025 102     TSH Baseline 01/03/2025 0.659     Osmolality Serum 01/03/2025 268 (L)     Cortisol - AM 01/03/2025 7.8     BNP 01/03/2025 42     Sodium, Ur 01/03/2025 29     Osmolality, Ur 01/03/2025 152 (L)     Uric Acid 01/03/2025 5.3     Sodium 01/03/2025 133 (L)     Potassium 01/03/2025 3.3 (L)     Chloride 01/03/2025 101     CO2 01/03/2025 21     ANION GAP 01/03/2025 11     BUN 01/03/2025 5     Creatinine 01/03/2025 1.01     Glucose 01/03/2025 116     Calcium 01/03/2025 8.3 (L)     eGFR 01/03/2025 91     Osmolality Serum 01/03/2025 278 (L)     Sodium 01/04/2025 138     Potassium 01/04/2025 3.3 (L)     Chloride 01/04/2025 107     CO2 01/04/2025 23     ANION GAP 01/04/2025 8     BUN 01/04/2025 5     Creatinine 01/04/2025 0.96     Glucose 01/04/2025 105     Calcium 01/04/2025 8.2 (L)     eGFR 01/04/2025 97     Osmolality Serum 01/04/2025 287     Magnesium 01/04/2025 1.8 (L)     Sodium 01/04/2025 138     Potassium 01/04/2025 3.3 (L)     Chloride 01/04/2025 107     CO2 01/04/2025 23     ANION GAP 01/04/2025 8     BUN 01/04/2025 5     Creatinine 01/04/2025 0.97     Glucose 01/04/2025 106     Calcium 01/04/2025 8.2 (L)     AST 01/04/2025 20     ALT 01/04/2025 18     Alkaline Phosphatase 01/04/2025 67     Total Protein 01/04/2025 6.3 (L)     Albumin 01/04/2025 3.7     Total Bilirubin 01/04/2025 0.76     eGFR 01/04/2025 96     Phosphorus 01/04/2025 3.0     WBC 01/04/2025 10.47 (H)     RBC 01/04/2025 4.88     Hemoglobin 01/04/2025 12.7     Hematocrit 01/04/2025 38.8     MCV 01/04/2025 80 (L)     MCH  01/04/2025 26.0 (L)     MCHC 01/04/2025 32.7     RDW 01/04/2025 14.2     MPV 01/04/2025 10.8     Platelets 01/04/2025 174     nRBC 01/04/2025 0     Segmented % 01/04/2025 69     Immature Grans % 01/04/2025 0     Lymphocytes % 01/04/2025 22     Monocytes % 01/04/2025 7     Eosinophils Relative 01/04/2025 1     Basophils Relative 01/04/2025 1     Absolute Neutrophils 01/04/2025 7.29     Absolute Immature Grans 01/04/2025 0.04     Absolute Lymphocytes 01/04/2025 2.33     Absolute Monocytes 01/04/2025 0.68     Eosinophils Absolute 01/04/2025 0.08     Basophils Absolute 01/04/2025 0.05     Sodium 01/04/2025 137     Potassium 01/04/2025 3.3 (L)     Chloride 01/04/2025 107     CO2 01/04/2025 21     ANION GAP 01/04/2025 9     BUN 01/04/2025 5     Creatinine 01/04/2025 0.93     Glucose 01/04/2025 113     Calcium 01/04/2025 8.0 (L)     eGFR 01/04/2025 101     Osmolality Serum 01/04/2025 286     Sodium 01/04/2025 137     Potassium 01/04/2025 3.8     Chloride 01/04/2025 108     CO2 01/04/2025 21     ANION GAP 01/04/2025 8     BUN 01/04/2025 10     Creatinine 01/04/2025 1.16     Glucose 01/04/2025 117     Calcium 01/04/2025 9.2     eGFR 01/04/2025 77     Sodium 01/05/2025 139     Potassium 01/05/2025 3.3 (L)     Chloride 01/05/2025 109 (H)     CO2 01/05/2025 22     ANION GAP 01/05/2025 8     BUN 01/05/2025 11     Creatinine 01/05/2025 1.01     Glucose 01/05/2025 110     Calcium 01/05/2025 8.2 (L)     eGFR 01/05/2025 91     Phosphorus 01/05/2025 3.3     Magnesium 01/05/2025 2.0    Appointment on 12/05/2024   Component Date Value    Prolactin 12/05/2024 26.79 (H)     WBC 12/05/2024 6.77     RBC 12/05/2024 5.05     Hemoglobin 12/05/2024 13.3     Hematocrit 12/05/2024 42.1     MCV 12/05/2024 83     MCH 12/05/2024 26.3 (L)     MCHC 12/05/2024 31.6     RDW 12/05/2024 13.9     MPV 12/05/2024 10.6     Platelets 12/05/2024 195     nRBC 12/05/2024 0     Segmented % 12/05/2024 61     Immature Grans % 12/05/2024 0     Lymphocytes %  12/05/2024 27     Monocytes % 12/05/2024 8     Eosinophils Relative 12/05/2024 3     Basophils Relative 12/05/2024 1     Absolute Neutrophils 12/05/2024 4.14     Absolute Immature Grans 12/05/2024 0.02     Absolute Lymphocytes 12/05/2024 1.85     Absolute Monocytes 12/05/2024 0.51     Eosinophils Absolute 12/05/2024 0.19     Basophils Absolute 12/05/2024 0.06     Lipase 12/05/2024 173 (H)        Suicide/Homicide Risk Assessment:    Risk of Harm to Self:  The following ratings are based on assessment at the time of the interview  Historical Risk Factors include: chronic psychiatric problems  Protective Factors: no current suicidal ideation, access to mental health treatment, compliant with medications, compliant with mental health treatment, having a desire to be alive, responsibilities and duties to others, strong relationships    Risk of Harm to Others:  The following ratings are based on assessment at the time of the interview  Historical Risk Factors include: none.  Protective Factors: no current homicidal ideation, access to mental health treatment, compliant with medications, compliant with mental health treatment, responsibilities and duties to others    The following interventions are recommended: continue medication management, contracts for safety at present - agrees to go to ED if feeling unsafe, contracts for safety at present - agrees to call Crisis Intervention Service if feeling unsafe      Lethality Statement:    Based on today's assessment and clinical criteria, Benjamin Muñiz contracts for safety and is not an imminent risk of harm to self or others. Outpatient level of care is deemed appropriate at this current time. Benjamin understands that if they can no longer contract for safety, they need to call the office or report to their nearest Emergency Room for immediate evaluation. They voiced understanding and agreement to call 911 or head to the nearest ED should they have any physical or mental  decompensation whatsoever.       Assessment/Plan:     1.)  HERI  2.)  PTSD  3.)  ADHD  4.)  MDD, recurrent, mild    After discussion of risks, benefits, potential side effects, alternatives, we will continue risperidone 4 mg nightly, 0.5 mg every morning, Adderall XR 20 in the morning, immediate release 10 mg in the afternoon, hydroxyzine 50 mg every 8 hours as needed for severe anxiety.  We will keep fluoxetine and duloxetine discontinued secondary to hyponatremic seizures.  Risperidone also poses a remote risk of hyponatremia and has already elevated his prolactin significantly which may cause downstream health effects on a variety of organ systems and is currently affecting his mood and libido.  We will initiate Klonopin 0.5 mg once per day as needed for severe anxiety while patient contemplates future directions in his treatment.  He denies acute mental complaints or concerns at this time        Aware of 24 hour and weekend coverage for urgent situations accessed by calling [facility] main practice number    Medications Risks/Benefits      Risks, Benefits And Possible Side Effects Of Medications:    Risks, benefits, and possible side effects of medications explained to Benjamin and he verbalizes understanding and agreement for treatment.    Controlled Medication Discussion:     Benjamin has been filling controlled prescriptions on time as prescribed according to [place] Prescription Drug Monitoring Program    Psychotherapy Provided:     Individual psychotherapy provided: Crisis/safety plan discussed with Benjamin. Provided at least 16 minutes of distinct psychotherapy using a combination of supportive, interpersonal, motivational, and problem solving approaches to address psychological distress and enhance coping strategies.     Treatment Plan:    Completed and signed during the session:  We will complete at next appointment due to lack of time today      Visit Time    Visit Start Time:  1:00 PM  Visit Stop Time: 1:25 PM  Total Visit Duration:  25 minutes     The total visit duration detailed above includes: patient engagement, medication management, psychotherapy/counseling, discussion regarding treatment goals, documentation, review of past medical records, and coordination of care.      Note Share Disclaimer:     This note was not shared with the patient due to reasonable likelihood of causing patient harm      Ambreen Johnson DO  Psychiatry  01/17/25

## 2025-01-24 ENCOUNTER — TELEMEDICINE (OUTPATIENT)
Dept: PSYCHIATRY | Facility: CLINIC | Age: 40
End: 2025-01-24
Payer: COMMERCIAL

## 2025-01-24 DIAGNOSIS — F33.1 MDD (MAJOR DEPRESSIVE DISORDER), RECURRENT EPISODE, MODERATE (HCC): Primary | ICD-10-CM

## 2025-01-24 DIAGNOSIS — F43.10 PTSD (POST-TRAUMATIC STRESS DISORDER): ICD-10-CM

## 2025-01-24 DIAGNOSIS — F90.2 ATTENTION DEFICIT HYPERACTIVITY DISORDER (ADHD), COMBINED TYPE: ICD-10-CM

## 2025-01-24 PROCEDURE — 99214 OFFICE O/P EST MOD 30 MIN: CPT | Performed by: STUDENT IN AN ORGANIZED HEALTH CARE EDUCATION/TRAINING PROGRAM

## 2025-01-24 RX ORDER — BUPROPION HYDROCHLORIDE 150 MG/1
150 TABLET ORAL DAILY
Qty: 21 TABLET | Refills: 0 | Status: SHIPPED | OUTPATIENT
Start: 2025-01-24 | End: 2025-02-14

## 2025-01-24 RX ORDER — HYDROXYZINE HYDROCHLORIDE 50 MG/1
50 TABLET, FILM COATED ORAL EVERY 6 HOURS PRN
Qty: 90 TABLET | Refills: 0 | Status: SHIPPED | OUTPATIENT
Start: 2025-01-24

## 2025-01-29 ENCOUNTER — HOSPITAL ENCOUNTER (OUTPATIENT)
Dept: NEUROLOGY | Facility: HOSPITAL | Age: 40
Discharge: HOME | End: 2025-01-29
Payer: COMMERCIAL

## 2025-01-29 DIAGNOSIS — R56.9 NEW ONSET SEIZURE (HCC): ICD-10-CM

## 2025-01-29 PROCEDURE — 95819 EEG AWAKE AND ASLEEP: CPT | Performed by: STUDENT IN AN ORGANIZED HEALTH CARE EDUCATION/TRAINING PROGRAM

## 2025-01-29 PROCEDURE — 95819 EEG AWAKE AND ASLEEP: CPT

## 2025-01-30 ENCOUNTER — OFFICE VISIT (OUTPATIENT)
Dept: NEUROLOGY | Facility: CLINIC | Age: 40
End: 2025-01-30
Payer: COMMERCIAL

## 2025-01-30 VITALS
HEART RATE: 82 BPM | HEIGHT: 75 IN | OXYGEN SATURATION: 98 % | WEIGHT: 275 LBS | DIASTOLIC BLOOD PRESSURE: 98 MMHG | BODY MASS INDEX: 34.19 KG/M2 | SYSTOLIC BLOOD PRESSURE: 142 MMHG

## 2025-01-30 DIAGNOSIS — E87.1 HYPONATREMIA: ICD-10-CM

## 2025-01-30 DIAGNOSIS — R56.9 SEIZURE (HCC): Primary | ICD-10-CM

## 2025-01-30 PROCEDURE — G2212 PROLONG OUTPT/OFFICE VIS: HCPCS | Performed by: PSYCHIATRY & NEUROLOGY

## 2025-01-30 PROCEDURE — 99215 OFFICE O/P EST HI 40 MIN: CPT | Performed by: PSYCHIATRY & NEUROLOGY

## 2025-01-30 RX ORDER — OXYCODONE HYDROCHLORIDE 20 MG/1
1 TABLET, FILM COATED, EXTENDED RELEASE ORAL 2 TIMES DAILY
COMMUNITY
Start: 2025-01-13

## 2025-01-30 NOTE — PROGRESS NOTES
Neurology Ambulatory Visit  Name: Benjamin Muñiz       : 1985       MRN: [id number]   Encounter Provider: El Willis MD   Encounter Date: 2025  Encounter department: [facility] [place]    Assessment and Plan  Assessment & Plan  Seizure ([facility])  Overall, it does sound as if he experienced a seizure on 1/3/2025.  This was preceded by a period of confusion, which would be a little bit atypical with a seizure, and may have been the initial symptoms of his hyponatremia.  When he was in the hospital he was found to have a sodium of 126, which could potentially cause a seizure.  He was on hydrochlorothiazide at the time.  He does not have any prior seizures and has not had any since that time.  He does not have any seizure risk factors and neurologic exam was normal.  His prior MRIs of his brain have not shown any source of seizures.  Additionally his initial routine EEG was normal.  At this point I suspect that his event was provoked from hyponatremia, but would like to get at least a sleep deprived EEG to assure that he does not have any potential increased risk of additional seizures.  If this is normal,  I would conclude that his event was a provoked seizure as such, if his sleep deprived EEG is normal, I would be okay filling out a seizure reporting form to try to allow him to drive sooner than the full 6 months.  He is now off of hydrochlorothiazide and his sodium has been normal, so it is unlikely that he would have a recurrent seizure    --In regard to his mood issues, I would suggest using caution with Wellbutrin since he did recently have a provoked seizure.  Wellbutrin can potentially lower seizure threshold or cause seizures at high doses.  As such, I would recommend using alternative medications for his mood if at all possible  Hyponatremia  As noted above, this was likely the cause of his seizure.  He was on hydrochlorothiazide at the time which may have contributed to his low  sodium.  This has been normal on recheck and he is now off of this medication     I spent a total of 60 min with the patient and completing documentation on the day of the encounter. This time was spent specifically discussing his diagnosis, medications, and plan as detailed above     He will Return in about 3 months (around 4/30/2025).    History of Present Illness     HPI   Benjamin Muñiz is a 39 y.o. male with an episode of confusion and possible seizure in setting of hyponatremia, who is presenting to Neurology office for evaluation of his seizure.    Current medications as per Epic    Briefly reviewing his history, he was at home and had an  come to the house. Then his wife came up and he was confused. He had gone out to the 's truck, then came back in the house. He then sat down on couch. His wife told him that he lost consciousness, was unresponsive and shaking all over. He bit his tongue on the lateral aspect. No incontinence. Unclear duration. He next remembers being confused with EMS around him.     He was taken to the ED. There, he was found to have a sodium of 126. His sodium gradually improved when in the hospital to normal.     He denies any other clear seizures. He recalls a time when he was taking risperidone as a teenager. She was shaking a lot, which at the time was questioned if it was a seizure, but in retrospect this was just tremor.     The day before the seizure, he had taken his first doses of duloxetine. He was also on HCTZ and had 3-4 beers, which wasn't out of the ordinary for him.     He was seen by his Psychiatrist last week, and was planned to start Wellbutrin.     He has some trouble with essential tremor. He notices it more when he doesn't eat. This isn't present every day. It doesn't interfere with his overall function and resolves if he has something to eat.     Special Features  Status epilepticus: no  Self Injury Seizures: bite bite  Precipitating Factors:  low sodium    Epilepsy Risk Factors:  Abnormal pregnancy:    no  Abnormal birth/:   no  Abnormal Development:   no  Febrile seizures, simple:   no  Febrile seizures, complex:   no  CNS infection:    no  Intellectual disability:    no  Cerebral palsy:    no  Head injury (moderate/severe):  no  CNS neoplasm:    no  CNS malformation:    no  Neurosurgical procedure:   no  Stroke:     no  Alcohol abuse:    no  Drug abuse:     no  Family history Sz/epilepsy:   no    Prior AEDs:  none    Prior Evaluation:  - MRI brain/pituitary : 2019:   - MRI brain: 2025: (independently reviewed) Stable MRI of the brain. No acute intracranial abnormality. Scattered white matter changes are nonspecific and may represent early chronic microangiopathic change. Small cluster of cysts lateral to the atria of the right lateral ventricle is unchanged compared to the prior examination. 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 Most recent Na level on 1/10/2025: 137.     Psychiatric History:  Depression: no  Anxiety: no  Psychosis: no  Psychiatric Admissions: no     I personally reviewed his MRI brain, as above   I reviewed prior notes including prior hospitalization notes, as documented in Epic/[employer], and summarized above.     The following portions of the patient's history were reviewed and updated as appropriate: allergies, current medications, past family history, past medical history, past social history, past surgical history, and problem list.    Review of Systems    I personally reviewed the ROS that was entered by the medical assistant in a separate note    Objective     There were no vitals taken for this visit.   Physical Exam  Neurologic Exam  GENERAL EXAMINATION:   In general patient is well appearing and in no  distress.  There is no peripheral edema.    NEUROLOGIC EXAMINATION:     Alert and oriented to person, date, location. Fund of knowledge is full with good understanding of medical situation.  Recent and remote memory were intact    Mood and affect are appropriate. Attention is intact.    Language function including fluency, naming, and comprehension intact.    Cranial nerves: Pupils are equal round reactive to light and accommodation.  Visual Fields are full to confrontation bilaterally. Extraocular movements are intact without nystagmus. Facial sensation is intact to light touch. No facial droop, face activates symmetrically. There is no dysarthria. Hearing was intact to finger rub. Tongue and uvula are midline and palate elevates symmetrically.  Shoulder shrug  5/5.    Motor Exam:  No pronator drift. Bulk and tone are normal. Strength is 5/5 throughout.    Deep tendon reflexes: Biceps 2+, brachioradialis 2+, patellar 2+, Achilles 2+ bilaterally.     Sensation: Intact light touch    Coordination: Finger nose finger and heel to shin testing are without dysmetria.     Gait: Negative romberg. Normal casual gait.     Administrative Statements       Voice recognition software was used in the generation of this note. There may be unintentional errors including grammatical errors, spelling errors, or pronoun errors.

## 2025-01-30 NOTE — PATIENT INSTRUCTIONS
-- Please get a routine EEG. If this is normal, we can fill out a Seizure Reporting form to apply the waiver of a single provoked seizure (and try to allow you to drive before 6 months)    -- If you any other odd episodes of confusion or any other concerning symptoms, please let us know.

## 2025-01-31 RX ORDER — DEXTROAMPHETAMINE SULFATE, DEXTROAMPHETAMINE SACCHARATE, AMPHETAMINE SULFATE AND AMPHETAMINE ASPARTATE 5; 5; 5; 5 MG/1; MG/1; MG/1; MG/1
20 CAPSULE, EXTENDED RELEASE ORAL EVERY MORNING
Qty: 30 CAPSULE | Refills: 0 | Status: SHIPPED | OUTPATIENT
Start: 2025-01-31

## 2025-01-31 RX ORDER — DEXTROAMPHETAMINE SACCHARATE, AMPHETAMINE ASPARTATE, DEXTROAMPHETAMINE SULFATE AND AMPHETAMINE SULFATE 2.5; 2.5; 2.5; 2.5 MG/1; MG/1; MG/1; MG/1
10 TABLET ORAL
Qty: 30 TABLET | Refills: 0 | Status: SHIPPED | OUTPATIENT
Start: 2025-01-31

## 2025-02-01 NOTE — PSYCH
MEDICATION MANAGEMENT NOTE        [facility]      Name and Date of Birth:  Benjamin Muñiz 39 y.o. 1985 MRN: [id number]    Date of Visit: January 31, 2025    Reason for Visit: Follow-up visit for medication management     Virtual Visit Disclaimer:       TeleMed provider: Ambreen Johnson D.O.    Location: [place]     Verification of patient location:     Patient is currently located in the [facility]  Patient is currently located in a state in which I am licensed     After connecting through [employer], the patient was identified by name and date of birth.  Benjamin Muñiz was informed that this is a telemedicine visit that is being conducted through [employer], and the patient was informed that this is a secure, HIPAA-compliant platform. My office door was closed. No one else was in the room. Benjamin Muñiz acknowledged consent and understanding of privacy and security of the video platform. Benjamin understands that the online visit is based solely on information provided by the patient, and that, in the absence of a face-to-face physical evaluation by the physician, the diagnosis Benjamin  receives is both limited and provisional in terms of accuracy and completeness. Benjamin Muñiz understands that they can discontinue the visit at any time. I informed Benjamin that I have reviewed their record in EPIC and presented the opportunity for them to ask any questions regarding the visit today. Benjamin Muñiz voiced understanding and consented to these terms. Benjamin is aware this is a billable service. Bejnamin is present in [place]    SUBJECTIVE:    Benjamin Muñiz is a 39 y.o. male with past psychiatric history significant for MDD, HERI, PTSD, ADHD who was personally seen and evaluated today at the [facility] outpatient clinic for follow-up and medication management. Benjamin denies SI, HI, AVH, delusions, gibson since his last appointment.  After  "discussion of risks, benefits, potential side effects, alternatives, we will initiate Wellbutrin 150 mg every morning for his depression, decreased focus.  Wellbutrin was chosen due to its potential for decreased sexual side effects as well which patient prefers.  Patient acknowledges and asserts that he has never had a seizure disorder and that he has been told by his other specialists that his recent seizure was solely due to hyponatremia which is now being managed and under control.  He denies acute mental health complaints or concerns at this time and his wife will be present to observe patient as he starts his new medication for any possible adverse effects.    Current Rating Scores:     None completed today.    Review Of Systems:      Constitutional negative   ENT negative   Cardiovascular negative   Respiratory negative   Gastrointestinal negative   Genitourinary negative   Musculoskeletal negative   Integumentary negative   Neurological negative   Endocrine negative   Other Symptoms none, all other systems are negative       Past Psychiatric History: (unchanged information from previous note copied and italicized) - Information that is bolded has been updated.     See intake    Substance Abuse History: (unchanged information from previous note copied and italicized) - Information that is bolded has been updated.     See intake    Social History: (unchanged information from previous note copied and italicized) - Information that is bolded has been updated.     See intake    Traumatic History: (unchanged information from previous note copied and italicized) - Information that is bolded has been updated.     See intake      Past Medical History:    Past Medical History:   Diagnosis Date    ADD (attention deficit disorder)     Anesthesia complication     \"Takes a lot to get me down\"    Arthritis     Chronic pain     Chronic pain disorder     Depression     GERD (gastroesophageal reflux disease)     Heartburn     " Hypertension     Mood disorder (HCC)     Tremor     Uncomplicated alcohol dependence (HCC) 01/09/2024        Past Surgical History:   Procedure Laterality Date    CLOSED REDUCTION CALCANEAL FRACTURE      COLONOSCOPY      PERONEAL TENDON EXPLORATION      NC PADILLA FACETECTOMY & FORAMOTOMY 1 VRT SGM LUMBAR Right 02/06/2024    Procedure: RIGHT L4-5 MIS FORAMINOTOMY;  Surgeon: Al Slade MD;  Location:  MAIN OR;  Service: Neurosurgery    UPPER GASTROINTESTINAL ENDOSCOPY       No Known Allergies    Substance Abuse History:    Social History     Substance and Sexual Activity   Alcohol Use Yes    Alcohol/week: 10.0 standard drinks of alcohol    Types: 10 Cans of beer per week    Comment: social     Social History     Substance and Sexual Activity   Drug Use Never       Social History:    Social History     Socioeconomic History    Marital status: /Civil Union     Spouse name: Not on file    Number of children: Not on file    Years of education: Not on file    Highest education level: Not on file   Occupational History    Not on file   Tobacco Use    Smoking status: Every Day     Current packs/day: 1.00     Average packs/day: 1 pack/day for 26.1 years (25.3 ttl pk-yrs)     Types: Cigarettes, Pipe     Start date: 1999    Smokeless tobacco: Never   Vaping Use    Vaping status: Never Used   Substance and Sexual Activity    Alcohol use: Yes     Alcohol/week: 10.0 standard drinks of alcohol     Types: 10 Cans of beer per week     Comment: social    Drug use: Never    Sexual activity: Yes     Partners: Female     Birth control/protection: I.U.D.   Other Topics Concern    Not on file   Social History Narrative    Lives with wife and step daughter in [place]    Sees dentist occasionally    No living will     Social Drivers of Health     Financial Resource Strain: Not on file   Food Insecurity: No Food Insecurity (1/3/2025)    Nursing - Inadequate Food Risk Classification     Worried About Running Out of Food in the Last  "Year: Not on file     Ran Out of Food in the Last Year: Not on file     Ran Out of Food in the Last Year: Never true   Transportation Needs: No Transportation Needs (1/3/2025)    Nursing - Transportation Risk Classification     Lack of Transportation: Not on file     Lack of Transportation: No   Physical Activity: Not on file   Stress: Not on file   Social Connections: Not on file   Intimate Partner Violence: Unknown (1/3/2025)    Nursing IPS     Feels Physically and Emotionally Safe: Not on file     Physically Hurt by Someone: Not on file     Humiliated or Emotionally Abused by Someone: Not on file     Physically Hurt by Someone: No     Hurt or Threatened by Someone: No   Housing Stability: Unknown (1/3/2025)    Nursing: Inadequate Housing Risk Classification     Has Housing: Not on file     Worried About Losing Housing: Not on file     Unable to Get Utilities: Not on file     Unable to Pay for Housing in the Last Year: No     Has Housin       Family Psychiatric History:     Family History   Problem Relation Age of Onset    Breast cancer Mother     Heart disease Father     Hypertension Father     Intellectual disability Sister     Heart disease Maternal Grandfather     Parkinsonism Paternal Grandfather     Mental illness Neg Hx     Colon cancer Neg Hx     Colon polyps Neg Hx     Substance Abuse Neg Hx     Seizures Neg Hx        History Review: The following portions of the patient's history were reviewed and updated as appropriate: allergies, current medications, past family history, past medical history, past social history, past surgical history, and problem list.         OBJECTIVE:     Vital signs in last 24 hours:    There were no vitals filed for this visit.    Mental Status Evaluation:    Appearance age appropriate, casually dressed   Behavior Cooperative, anxious   Speech normal rate, normal volume, normal pitch   Mood \"Okay\"   Affect Dysphoric   Thought Processes organized, goal directed   Associations " intact associations   Thought Content no overt delusions   Perceptual Disturbances: no auditory hallucinations, no visual hallucinations   Abnormal Thoughts  Risk Potential Suicidal ideation - None at present  Homicidal ideation - None at present  Potential for aggression - Not at present   Orientation oriented to person, place, time/date, and situation   Memory recent and remote memory grossly intact   Consciousness alert and awake   Attention Span Concentration Span attention span and concentration are age appropriate   Intellect appears to be of average intelligence   Insight intact   Judgement intact   Muscle Strength and  Gait unable to assess today due to virtual visit   Motor activity unable to assess today due to virtual visit   Language no difficulty naming common objects, no difficulty repeating a phrase, no difficulty writing a sentence   Fund of Knowledge adequate knowledge of current events  adequate fund of knowledge regarding past history  adequate fund of knowledge regarding vocabulary    Pain none   Pain Scale Did not ask patient to formally rate       Laboratory Results: I have personally reviewed all pertinent laboratory/tests results    Recent Labs (last 2 months):   Appointment on 01/10/2025   Component Date Value    Sodium 01/10/2025 137     Potassium 01/10/2025 3.8     Chloride 01/10/2025 106     CO2 01/10/2025 24     ANION GAP 01/10/2025 7     BUN 01/10/2025 16     Creatinine 01/10/2025 1.16     Glucose 01/10/2025 106     Calcium 01/10/2025 8.6     AST 01/10/2025 20     ALT 01/10/2025 17     Alkaline Phosphatase 01/10/2025 70     Total Protein 01/10/2025 6.6     Albumin 01/10/2025 3.9     Total Bilirubin 01/10/2025 0.40     eGFR 01/10/2025 77    Admission on 01/03/2025, Discharged on 01/05/2025   Component Date Value    WBC 01/03/2025 8.32     RBC 01/03/2025 4.99     Hemoglobin 01/03/2025 12.6     Hematocrit 01/03/2025 39.5     MCV 01/03/2025 79 (L)     MCH 01/03/2025 25.3 (L)     MCHC  01/03/2025 31.9     RDW 01/03/2025 14.1     MPV 01/03/2025 11.1     Platelets 01/03/2025 181     nRBC 01/03/2025 0     Segmented % 01/03/2025 69     Immature Grans % 01/03/2025 1     Lymphocytes % 01/03/2025 22     Monocytes % 01/03/2025 7     Eosinophils Relative 01/03/2025 1     Basophils Relative 01/03/2025 0     Absolute Neutrophils 01/03/2025 5.81     Absolute Immature Grans 01/03/2025 0.04     Absolute Lymphocytes 01/03/2025 1.80     Absolute Monocytes 01/03/2025 0.58     Eosinophils Absolute 01/03/2025 0.06     Basophils Absolute 01/03/2025 0.03     Sodium 01/03/2025 126 (L)     Potassium 01/03/2025 4.0     Chloride 01/03/2025 92 (L)     CO2 01/03/2025 19 (L)     ANION GAP 01/03/2025 15 (H)     BUN 01/03/2025 7     Creatinine 01/03/2025 1.04     Glucose 01/03/2025 111     Calcium 01/03/2025 8.0 (L)     AST 01/03/2025 35     ALT 01/03/2025 23     Alkaline Phosphatase 01/03/2025 75     Total Protein 01/03/2025 6.9     Albumin 01/03/2025 4.1     Total Bilirubin 01/03/2025 0.59     eGFR 01/03/2025 88     Ethanol Lvl 01/03/2025 <10     Amph/Meth UR 01/03/2025 Negative     Barbiturate Ur 01/03/2025 Negative     Benzodiazepine Urine 01/03/2025 Negative     Cocaine Urine 01/03/2025 Negative     Methadone Urine 01/03/2025 Negative     Opiate Urine 01/03/2025 Negative     PCP Ur 01/03/2025 Negative     THC Urine 01/03/2025 Negative     Oxycodone Urine 01/03/2025 Negative     Fentanyl Urine 01/03/2025 Negative     HYDROCODONE URINE 01/03/2025 Negative     Ventricular Rate 01/03/2025 115     Atrial Rate 01/03/2025 115     NC Interval 01/03/2025 150     QRSD Interval 01/03/2025 100     QT Interval 01/03/2025 358     QTC Interval 01/03/2025 495     P [place] 01/03/2025 31     QRS [place] 01/03/2025 37     T Wave [place] 01/03/2025 35     POC Glucose 01/03/2025 114     Color, UA 01/03/2025 Colorless     Clarity, UA 01/03/2025 Clear     Specific Gravity, UA 01/03/2025 <1.005 (L)     pH, UA 01/03/2025 6.5     Leukocytes, UA 01/03/2025  Negative     Nitrite, UA 01/03/2025 Negative     Protein, UA 01/03/2025 Negative     Glucose, UA 01/03/2025 Negative     Ketones, UA 01/03/2025 Negative     Urobilinogen, UA 01/03/2025 <2.0     Bilirubin, UA 01/03/2025 Negative     Occult Blood, UA 01/03/2025 Negative     Sodium 01/03/2025 130 (L)     Potassium 01/03/2025 3.0 (L)     Chloride 01/03/2025 98     CO2 01/03/2025 21     ANION GAP 01/03/2025 11     BUN 01/03/2025 5     Creatinine 01/03/2025 0.92     Glucose 01/03/2025 108     Calcium 01/03/2025 8.7     eGFR 01/03/2025 102     TSH Baseline 01/03/2025 0.659     Osmolality Serum 01/03/2025 268 (L)     Cortisol - AM 01/03/2025 7.8     BNP 01/03/2025 42     Sodium, Ur 01/03/2025 29     Osmolality, Ur 01/03/2025 152 (L)     Uric Acid 01/03/2025 5.3     Sodium 01/03/2025 133 (L)     Potassium 01/03/2025 3.3 (L)     Chloride 01/03/2025 101     CO2 01/03/2025 21     ANION GAP 01/03/2025 11     BUN 01/03/2025 5     Creatinine 01/03/2025 1.01     Glucose 01/03/2025 116     Calcium 01/03/2025 8.3 (L)     eGFR 01/03/2025 91     Osmolality Serum 01/03/2025 278 (L)     Sodium 01/04/2025 138     Potassium 01/04/2025 3.3 (L)     Chloride 01/04/2025 107     CO2 01/04/2025 23     ANION GAP 01/04/2025 8     BUN 01/04/2025 5     Creatinine 01/04/2025 0.96     Glucose 01/04/2025 105     Calcium 01/04/2025 8.2 (L)     eGFR 01/04/2025 97     Osmolality Serum 01/04/2025 287     Magnesium 01/04/2025 1.8 (L)     Sodium 01/04/2025 138     Potassium 01/04/2025 3.3 (L)     Chloride 01/04/2025 107     CO2 01/04/2025 23     ANION GAP 01/04/2025 8     BUN 01/04/2025 5     Creatinine 01/04/2025 0.97     Glucose 01/04/2025 106     Calcium 01/04/2025 8.2 (L)     AST 01/04/2025 20     ALT 01/04/2025 18     Alkaline Phosphatase 01/04/2025 67     Total Protein 01/04/2025 6.3 (L)     Albumin 01/04/2025 3.7     Total Bilirubin 01/04/2025 0.76     eGFR 01/04/2025 96     Phosphorus 01/04/2025 3.0     WBC 01/04/2025 10.47 (H)     RBC 01/04/2025  4.88     Hemoglobin 01/04/2025 12.7     Hematocrit 01/04/2025 38.8     MCV 01/04/2025 80 (L)     MCH 01/04/2025 26.0 (L)     MCHC 01/04/2025 32.7     RDW 01/04/2025 14.2     MPV 01/04/2025 10.8     Platelets 01/04/2025 174     nRBC 01/04/2025 0     Segmented % 01/04/2025 69     Immature Grans % 01/04/2025 0     Lymphocytes % 01/04/2025 22     Monocytes % 01/04/2025 7     Eosinophils Relative 01/04/2025 1     Basophils Relative 01/04/2025 1     Absolute Neutrophils 01/04/2025 7.29     Absolute Immature Grans 01/04/2025 0.04     Absolute Lymphocytes 01/04/2025 2.33     Absolute Monocytes 01/04/2025 0.68     Eosinophils Absolute 01/04/2025 0.08     Basophils Absolute 01/04/2025 0.05     Sodium 01/04/2025 137     Potassium 01/04/2025 3.3 (L)     Chloride 01/04/2025 107     CO2 01/04/2025 21     ANION GAP 01/04/2025 9     BUN 01/04/2025 5     Creatinine 01/04/2025 0.93     Glucose 01/04/2025 113     Calcium 01/04/2025 8.0 (L)     eGFR 01/04/2025 101     Osmolality Serum 01/04/2025 286     Sodium 01/04/2025 137     Potassium 01/04/2025 3.8     Chloride 01/04/2025 108     CO2 01/04/2025 21     ANION GAP 01/04/2025 8     BUN 01/04/2025 10     Creatinine 01/04/2025 1.16     Glucose 01/04/2025 117     Calcium 01/04/2025 9.2     eGFR 01/04/2025 77     Sodium 01/05/2025 139     Potassium 01/05/2025 3.3 (L)     Chloride 01/05/2025 109 (H)     CO2 01/05/2025 22     ANION GAP 01/05/2025 8     BUN 01/05/2025 11     Creatinine 01/05/2025 1.01     Glucose 01/05/2025 110     Calcium 01/05/2025 8.2 (L)     eGFR 01/05/2025 91     Phosphorus 01/05/2025 3.3     Magnesium 01/05/2025 2.0    Appointment on 12/05/2024   Component Date Value    Prolactin 12/05/2024 26.79 (H)     WBC 12/05/2024 6.77     RBC 12/05/2024 5.05     Hemoglobin 12/05/2024 13.3     Hematocrit 12/05/2024 42.1     MCV 12/05/2024 83     MCH 12/05/2024 26.3 (L)     MCHC 12/05/2024 31.6     RDW 12/05/2024 13.9     MPV 12/05/2024 10.6     Platelets 12/05/2024 195     nRBC  12/05/2024 0     Segmented % 12/05/2024 61     Immature Grans % 12/05/2024 0     Lymphocytes % 12/05/2024 27     Monocytes % 12/05/2024 8     Eosinophils Relative 12/05/2024 3     Basophils Relative 12/05/2024 1     Absolute Neutrophils 12/05/2024 4.14     Absolute Immature Grans 12/05/2024 0.02     Absolute Lymphocytes 12/05/2024 1.85     Absolute Monocytes 12/05/2024 0.51     Eosinophils Absolute 12/05/2024 0.19     Basophils Absolute 12/05/2024 0.06     Lipase 12/05/2024 173 (H)        Suicide/Homicide Risk Assessment:    Risk of Harm to Self:  The following ratings are based on assessment at the time of the interview  Historical Risk Factors include: chronic psychiatric problems  Protective Factors: no current suicidal ideation, access to mental health treatment, compliant with medications, compliant with mental health treatment, having a desire to be alive, responsibilities and duties to others, strong relationships    Risk of Harm to Others:  The following ratings are based on assessment at the time of the interview  Historical Risk Factors include: none.  Protective Factors: no current homicidal ideation, access to mental health treatment, compliant with medications, compliant with mental health treatment, responsibilities and duties to others    The following interventions are recommended: continue medication management, contracts for safety at present - agrees to go to ED if feeling unsafe, contracts for safety at present - agrees to call Crisis Intervention Service if feeling unsafe      Lethality Statement:    Based on today's assessment and clinical criteria, Benjamin Muñiz contracts for safety and is not an imminent risk of harm to self or others. Outpatient level of care is deemed appropriate at this current time. Benjamin understands that if they can no longer contract for safety, they need to call the office or report to their nearest Emergency Room for immediate evaluation. They voiced understanding  and agreement to call 911 or head to the nearest ED should they have any physical or mental decompensation whatsoever.       Assessment/Plan:     1.)  HERI  2.)  PTSD  3.)  ADHD  4.)  MDD, recurrent, mild    After discussion of risks, benefits, potential side effects, alternatives, we will continue risperidone 4 mg nightly, 0.5 mg every morning, Adderall XR 20 in the morning, immediate release 10 mg in the afternoon, hydroxyzine 50 mg every 8 hours as needed for severe anxiety.  Klonopin 0.5 mg daily as needed for severe anxiety will also continue.  We will initiate Wellbutrin 150 mg every morning to better control patient's feelings of depression, avoiding sexual side effects and potentially improving focus.  As stated above, patient denies any concerns of seizures including intrinsic seizure disorder.  It appears based on patient's history that he does not have any previous incidence of seizures aside from related to his temporary hyponatremia which is now being managed.        Aware of 24 hour and weekend coverage for urgent situations accessed by calling [facility] main practice number    Medications Risks/Benefits      Risks, Benefits And Possible Side Effects Of Medications:    Risks, benefits, and possible side effects of medications explained to Benjamin and he verbalizes understanding and agreement for treatment.    Controlled Medication Discussion:     Benjamin has been filling controlled prescriptions on time as prescribed according to [place] Prescription Drug Monitoring Program    Psychotherapy Provided:     Individual psychotherapy provided: Crisis/safety plan discussed with Benjamin. Provided at least 16 minutes of distinct psychotherapy using a combination of supportive, interpersonal, motivational, and problem solving approaches to address psychological distress and enhance coping strategies.     Treatment Plan:    Completed and signed during the session:  We will complete at  next appointment due to lack of time today      Visit Time    Visit Start Time: 1:00 PM  Visit Stop Time: 1:25 PM  Total Visit Duration:  25 minutes     The total visit duration detailed above includes: patient engagement, medication management, psychotherapy/counseling, discussion regarding treatment goals, documentation, review of past medical records, and coordination of care.      Note Share Disclaimer:     This note was not shared with the patient due to reasonable likelihood of causing patient harm      Ambreen Johnson DO  Psychiatry  01/31/25

## 2025-02-02 DIAGNOSIS — Z78.9 ALCOHOL USE: ICD-10-CM

## 2025-02-03 RX ORDER — THIAMINE HCL 50 MG
100 TABLET ORAL DAILY
Qty: 180 TABLET | Refills: 1 | Status: SHIPPED | OUTPATIENT
Start: 2025-02-03

## 2025-02-04 DIAGNOSIS — E78.2 MIXED HYPERLIPIDEMIA: ICD-10-CM

## 2025-02-05 DIAGNOSIS — I10 PRIMARY HYPERTENSION: ICD-10-CM

## 2025-02-05 RX ORDER — ROSUVASTATIN CALCIUM 20 MG/1
20 TABLET, COATED ORAL DAILY
Qty: 100 TABLET | Refills: 0 | Status: SHIPPED | OUTPATIENT
Start: 2025-02-05

## 2025-02-05 RX ORDER — LOSARTAN POTASSIUM 50 MG/1
50 TABLET ORAL DAILY
Qty: 90 TABLET | Refills: 3 | Status: SHIPPED | OUTPATIENT
Start: 2025-02-05

## 2025-02-06 ENCOUNTER — PATIENT MESSAGE (OUTPATIENT)
Dept: NEUROLOGY | Facility: CLINIC | Age: 40
End: 2025-02-06

## 2025-02-06 ENCOUNTER — HOSPITAL ENCOUNTER (OUTPATIENT)
Dept: NEUROLOGY | Facility: CLINIC | Age: 40
End: 2025-02-06
Attending: PSYCHIATRY & NEUROLOGY
Payer: COMMERCIAL

## 2025-02-06 DIAGNOSIS — R56.9 SEIZURE (HCC): ICD-10-CM

## 2025-02-06 PROCEDURE — 95816 EEG AWAKE AND DROWSY: CPT

## 2025-02-06 PROCEDURE — 95816 EEG AWAKE AND DROWSY: CPT | Performed by: PSYCHIATRY & NEUROLOGY

## 2025-02-11 DIAGNOSIS — F31.70 BIPOLAR DISORDER IN FULL REMISSION, MOST RECENT EPISODE UNSPECIFIED TYPE (HCC): ICD-10-CM

## 2025-02-11 DIAGNOSIS — F17.200 TOBACCO DEPENDENCE: ICD-10-CM

## 2025-02-11 RX ORDER — NICOTINE 21 MG/24HR
1 PATCH, TRANSDERMAL 24 HOURS TRANSDERMAL EVERY 24 HOURS
Qty: 28 PATCH | Refills: 0 | Status: SHIPPED | OUTPATIENT
Start: 2025-02-11

## 2025-02-12 RX ORDER — RISPERIDONE 0.5 MG/1
0.5 TABLET ORAL EVERY MORNING
Qty: 90 TABLET | Refills: 1 | OUTPATIENT
Start: 2025-02-12

## 2025-02-14 ENCOUNTER — TELEMEDICINE (OUTPATIENT)
Dept: PSYCHIATRY | Facility: CLINIC | Age: 40
End: 2025-02-14
Payer: COMMERCIAL

## 2025-02-14 DIAGNOSIS — F90.2 ATTENTION DEFICIT HYPERACTIVITY DISORDER (ADHD), COMBINED TYPE: ICD-10-CM

## 2025-02-14 DIAGNOSIS — F41.1 GENERALIZED ANXIETY DISORDER: Primary | ICD-10-CM

## 2025-02-14 PROCEDURE — 99214 OFFICE O/P EST MOD 30 MIN: CPT | Performed by: STUDENT IN AN ORGANIZED HEALTH CARE EDUCATION/TRAINING PROGRAM

## 2025-02-14 PROCEDURE — 90833 PSYTX W PT W E/M 30 MIN: CPT | Performed by: STUDENT IN AN ORGANIZED HEALTH CARE EDUCATION/TRAINING PROGRAM

## 2025-02-14 RX ORDER — ALPRAZOLAM 0.5 MG
TABLET ORAL
Qty: 14 TABLET | Refills: 0 | Status: SHIPPED | OUTPATIENT
Start: 2025-02-14 | End: 2025-03-06 | Stop reason: SDUPTHER

## 2025-02-15 DIAGNOSIS — F31.70 BIPOLAR DISORDER IN FULL REMISSION, MOST RECENT EPISODE UNSPECIFIED TYPE (HCC): ICD-10-CM

## 2025-02-17 NOTE — ASSESSMENT & PLAN NOTE
As noted above, this was likely the cause of his seizure.  He was on hydrochlorothiazide at the time which may have contributed to his low sodium.  This has been normal on recheck and he is now off of this medication

## 2025-02-17 NOTE — ASSESSMENT & PLAN NOTE
Overall, it does sound as if he experienced a seizure on 1/3/2025.  This was preceded by a period of confusion, which would be a little bit atypical with a seizure, and may have been the initial symptoms of his hyponatremia.  When he was in the hospital he was found to have a sodium of 126, which could potentially cause a seizure.  He was on hydrochlorothiazide at the time.  He does not have any prior seizures and has not had any since that time.  He does not have any seizure risk factors and neurologic exam was normal.  His prior MRIs of his brain have not shown any source of seizures.  Additionally his initial routine EEG was normal.  At this point I suspect that his event was provoked from hyponatremia, but would like to get at least a sleep deprived EEG to assure that he does not have any potential increased risk of additional seizures.  If this is normal,  I would conclude that his event was a provoked seizure as such, if his sleep deprived EEG is normal, I would be okay filling out a seizure reporting form to try to allow him to drive sooner than the full 6 months.  He is now off of hydrochlorothiazide and his sodium has been normal, so it is unlikely that he would have a recurrent seizure    --In regard to his mood issues, I would suggest using caution with Wellbutrin since he did recently have a provoked seizure.  Wellbutrin can potentially lower seizure threshold or cause seizures at high doses.  As such, I would recommend using alternative medications for his mood if at all possible

## 2025-02-20 DIAGNOSIS — F31.70 BIPOLAR DISORDER IN FULL REMISSION, MOST RECENT EPISODE UNSPECIFIED TYPE (HCC): ICD-10-CM

## 2025-02-20 RX ORDER — RISPERIDONE 4 MG/1
4 TABLET ORAL
Qty: 30 TABLET | Refills: 0 | Status: SHIPPED | OUTPATIENT
Start: 2025-02-20

## 2025-02-20 RX ORDER — RISPERIDONE 0.5 MG/1
0.5 TABLET ORAL EVERY MORNING
Qty: 30 TABLET | Refills: 0 | OUTPATIENT
Start: 2025-02-20

## 2025-02-20 RX ORDER — RISPERIDONE 0.5 MG/1
0.5 TABLET ORAL EVERY MORNING
Qty: 30 TABLET | Refills: 0 | Status: SHIPPED | OUTPATIENT
Start: 2025-02-20

## 2025-02-24 RX ORDER — DEXTROAMPHETAMINE SACCHARATE, AMPHETAMINE ASPARTATE, DEXTROAMPHETAMINE SULFATE AND AMPHETAMINE SULFATE 2.5; 2.5; 2.5; 2.5 MG/1; MG/1; MG/1; MG/1
10 TABLET ORAL
Qty: 30 TABLET | Refills: 0 | Status: SHIPPED | OUTPATIENT
Start: 2025-02-24

## 2025-02-24 RX ORDER — DEXTROAMPHETAMINE SULFATE, DEXTROAMPHETAMINE SACCHARATE, AMPHETAMINE SULFATE AND AMPHETAMINE ASPARTATE 5; 5; 5; 5 MG/1; MG/1; MG/1; MG/1
20 CAPSULE, EXTENDED RELEASE ORAL EVERY MORNING
Qty: 30 CAPSULE | Refills: 0 | Status: SHIPPED | OUTPATIENT
Start: 2025-02-24

## 2025-02-24 NOTE — PSYCH
MEDICATION MANAGEMENT NOTE        [facility]      Name and Date of Birth:  Benjamin Muñiz 39 y.o. 1985 MRN: [id number]    Date of Visit: February 24, 2025    Reason for Visit: Follow-up visit for medication management       VIRTUAL CARE DOCUMENTATION:     1. This service was provided via Telemedicine using Other: Epic     2. Parties in the room with patient during teleconsult Patient only    3. Confidentiality My office door was closed     4. Participants No one else was in the room    5. Patient acknowledged consent and understanding of privacy and security of the  Telemedicine consult. I informed the patient that I have reviewed their record in Epic and presented the opportunity for them to ask any questions regarding the visit today.  The patient agreed to participate.    6. I have spent a total time of 25 minutes in caring for this patient on the day of the visit/encounter including Diagnostic results, Prognosis, Risks and benefits of tx options, Instructions for management, Patient and family education, Importance of tx compliance, Risk factor reductions, Impressions, Counseling / Coordination of care, Documenting in the medical record, Reviewing/placing orders in the medical record (including tests, medications, and/or procedures), Obtaining or reviewing history  , and Communicating with other healthcare professionals , not including the time spent for establishing the audio/video connection.       SUBJECTIVE:    Benjamin Muñiz is a 39 y.o. male with past psychiatric history significant for MDD, HERI, PTSD, ADHD who was personally seen and evaluated today at the [facility] outpatient clinic for follow-up and medication management. Benjamin denies SI, HI, AVH, delusions, gibson since his last appointment.  After discussion of risks, benefits, potential side effects, alternatives, we will discontinue Wellbutrin as patient did not tolerate  "this secondary to side effects such as increased anxiety.  After discussion of risks, benefits, potential side effects, alternatives, patient's wishes to remain on his current regimen as is without any changes due to its long time effectiveness as he explores the reasons for his seizure and feels as if his mood is stable at this time.  He understands that his prolactin is elevated likely due to his risperidone and we will follow with his other medical specialist for treatment of this along with his other chronic medical conditions.  He denies acute mental complaints or concerns at this time.  The single change that we will make is to swap his clonazepam with alprazolam due to quicker onset of action for panic attacks.    Current Rating Scores:     None completed today.    Review Of Systems:      Constitutional negative   ENT negative   Cardiovascular negative   Respiratory negative   Gastrointestinal negative   Genitourinary negative   Musculoskeletal negative   Integumentary negative   Neurological negative   Endocrine negative   Other Symptoms none, all other systems are negative       Past Psychiatric History: (unchanged information from previous note copied and italicized) - Information that is bolded has been updated.     See intake    Medication trials: Wellbutrin discontinued secondary to side effects.    Substance Abuse History: (unchanged information from previous note copied and italicized) - Information that is bolded has been updated.     See intake    Social History: (unchanged information from previous note copied and italicized) - Information that is bolded has been updated.     See intake    Traumatic History: (unchanged information from previous note copied and italicized) - Information that is bolded has been updated.     See intake      Past Medical History:    Past Medical History:   Diagnosis Date    ADD (attention deficit disorder)     Anesthesia complication     \"Takes a lot to get me down\"    " Arthritis     Chronic pain     Chronic pain disorder     Depression     GERD (gastroesophageal reflux disease)     Heartburn     Hypertension     Mood disorder (HCC)     Tremor     Uncomplicated alcohol dependence (HCC) 01/09/2024        Past Surgical History:   Procedure Laterality Date    CLOSED REDUCTION CALCANEAL FRACTURE      COLONOSCOPY      PERONEAL TENDON EXPLORATION      CA PADILLA FACETECTOMY & FORAMOTOMY 1 VRT SGM LUMBAR Right 02/06/2024    Procedure: RIGHT L4-5 MIS FORAMINOTOMY;  Surgeon: Al Slade MD;  Location:  MAIN OR;  Service: Neurosurgery    UPPER GASTROINTESTINAL ENDOSCOPY       No Known Allergies    Substance Abuse History:    Social History     Substance and Sexual Activity   Alcohol Use Yes    Alcohol/week: 10.0 standard drinks of alcohol    Types: 10 Cans of beer per week    Comment: social     Social History     Substance and Sexual Activity   Drug Use Never       Social History:    Social History     Socioeconomic History    Marital status: /Civil Union     Spouse name: Not on file    Number of children: Not on file    Years of education: Not on file    Highest education level: Not on file   Occupational History    Not on file   Tobacco Use    Smoking status: Every Day     Current packs/day: 1.00     Average packs/day: 1 pack/day for 26.1 years (25.4 ttl pk-yrs)     Types: Cigarettes, Pipe     Start date: 1999    Smokeless tobacco: Never   Vaping Use    Vaping status: Never Used   Substance and Sexual Activity    Alcohol use: Yes     Alcohol/week: 10.0 standard drinks of alcohol     Types: 10 Cans of beer per week     Comment: social    Drug use: Never    Sexual activity: Yes     Partners: Female     Birth control/protection: I.U.D.   Other Topics Concern    Not on file   Social History Narrative    Lives with wife and step daughter in [place]    Sees dentist occasionally    No living will     Social Drivers of Health     Financial Resource Strain: Not on file   Food Insecurity:  No Food Insecurity (1/3/2025)    Nursing - Inadequate Food Risk Classification     Worried About Running Out of Food in the Last Year: Not on file     Ran Out of Food in the Last Year: Not on file     Ran Out of Food in the Last Year: Never true   Transportation Needs: No Transportation Needs (1/3/2025)    Nursing - Transportation Risk Classification     Lack of Transportation: Not on file     Lack of Transportation: No   Physical Activity: Not on file   Stress: Not on file   Social Connections: Not on file   Intimate Partner Violence: Unknown (1/3/2025)    Nursing IPS     Feels Physically and Emotionally Safe: Not on file     Physically Hurt by Someone: Not on file     Humiliated or Emotionally Abused by Someone: Not on file     Physically Hurt by Someone: No     Hurt or Threatened by Someone: No   Housing Stability: Unknown (1/3/2025)    Nursing: Inadequate Housing Risk Classification     Has Housing: Not on file     Worried About Losing Housing: Not on file     Unable to Get Utilities: Not on file     Unable to Pay for Housing in the Last Year: No     Has Housin       Family Psychiatric History:     Family History   Problem Relation Age of Onset    Breast cancer Mother     Heart disease Father     Hypertension Father     Intellectual disability Sister     Heart disease Maternal Grandfather     Parkinsonism Paternal Grandfather     Mental illness Neg Hx     Colon cancer Neg Hx     Colon polyps Neg Hx     Substance Abuse Neg Hx     Seizures Neg Hx        History Review: The following portions of the patient's history were reviewed and updated as appropriate: allergies, current medications, past family history, past medical history, past social history, past surgical history, and problem list.         OBJECTIVE:     Vital signs in last 24 hours:    There were no vitals filed for this visit.    Mental Status Evaluation:    Appearance age appropriate, casually dressed   Behavior Cooperative, mildly anxious  "  Speech normal rate, normal volume, normal pitch   Mood \"Okay\"   Affect Constricted   Thought Processes organized, goal directed   Associations intact associations   Thought Content no overt delusions   Perceptual Disturbances: no auditory hallucinations, no visual hallucinations   Abnormal Thoughts  Risk Potential Suicidal ideation - None at present  Homicidal ideation - None at present  Potential for aggression - Not at present   Orientation oriented to person, place, time/date, and situation   Memory recent and remote memory grossly intact   Consciousness alert and awake   Attention Span Concentration Span attention span and concentration are age appropriate   Intellect appears to be of average intelligence   Insight intact   Judgement intact   Muscle Strength and  Gait unable to assess today due to virtual visit   Motor activity unable to assess today due to virtual visit   Language no difficulty naming common objects, no difficulty repeating a phrase, no difficulty writing a sentence   Fund of Knowledge adequate knowledge of current events  adequate fund of knowledge regarding past history  adequate fund of knowledge regarding vocabulary    Pain none   Pain Scale Did not ask patient to formally rate       Laboratory Results: I have personally reviewed all pertinent laboratory/tests results    Recent Labs (last 2 months):   Appointment on 01/10/2025   Component Date Value    Sodium 01/10/2025 137     Potassium 01/10/2025 3.8     Chloride 01/10/2025 106     CO2 01/10/2025 24     ANION GAP 01/10/2025 7     BUN 01/10/2025 16     Creatinine 01/10/2025 1.16     Glucose 01/10/2025 106     Calcium 01/10/2025 8.6     AST 01/10/2025 20     ALT 01/10/2025 17     Alkaline Phosphatase 01/10/2025 70     Total Protein 01/10/2025 6.6     Albumin 01/10/2025 3.9     Total Bilirubin 01/10/2025 0.40     eGFR 01/10/2025 77    Admission on 01/03/2025, Discharged on 01/05/2025   Component Date Value    WBC 01/03/2025 8.32     RBC " 01/03/2025 4.99     Hemoglobin 01/03/2025 12.6     Hematocrit 01/03/2025 39.5     MCV 01/03/2025 79 (L)     MCH 01/03/2025 25.3 (L)     MCHC 01/03/2025 31.9     RDW 01/03/2025 14.1     MPV 01/03/2025 11.1     Platelets 01/03/2025 181     nRBC 01/03/2025 0     Segmented % 01/03/2025 69     Immature Grans % 01/03/2025 1     Lymphocytes % 01/03/2025 22     Monocytes % 01/03/2025 7     Eosinophils Relative 01/03/2025 1     Basophils Relative 01/03/2025 0     Absolute Neutrophils 01/03/2025 5.81     Absolute Immature Grans 01/03/2025 0.04     Absolute Lymphocytes 01/03/2025 1.80     Absolute Monocytes 01/03/2025 0.58     Eosinophils Absolute 01/03/2025 0.06     Basophils Absolute 01/03/2025 0.03     Sodium 01/03/2025 126 (L)     Potassium 01/03/2025 4.0     Chloride 01/03/2025 92 (L)     CO2 01/03/2025 19 (L)     ANION GAP 01/03/2025 15 (H)     BUN 01/03/2025 7     Creatinine 01/03/2025 1.04     Glucose 01/03/2025 111     Calcium 01/03/2025 8.0 (L)     AST 01/03/2025 35     ALT 01/03/2025 23     Alkaline Phosphatase 01/03/2025 75     Total Protein 01/03/2025 6.9     Albumin 01/03/2025 4.1     Total Bilirubin 01/03/2025 0.59     eGFR 01/03/2025 88     Ethanol Lvl 01/03/2025 <10     Amph/Meth UR 01/03/2025 Negative     Barbiturate Ur 01/03/2025 Negative     Benzodiazepine Urine 01/03/2025 Negative     Cocaine Urine 01/03/2025 Negative     Methadone Urine 01/03/2025 Negative     Opiate Urine 01/03/2025 Negative     PCP Ur 01/03/2025 Negative     THC Urine 01/03/2025 Negative     Oxycodone Urine 01/03/2025 Negative     Fentanyl Urine 01/03/2025 Negative     HYDROCODONE URINE 01/03/2025 Negative     Ventricular Rate 01/03/2025 115     Atrial Rate 01/03/2025 115     HI Interval 01/03/2025 150     QRSD Interval 01/03/2025 100     QT Interval 01/03/2025 358     QTC Interval 01/03/2025 495     P [place] 01/03/2025 31     QRS [place] 01/03/2025 37     T Wave [place] 01/03/2025 35     POC Glucose 01/03/2025 114     Color, UA 01/03/2025  Colorless     Clarity, UA 01/03/2025 Clear     Specific Gravity, UA 01/03/2025 <1.005 (L)     pH, UA 01/03/2025 6.5     Leukocytes, UA 01/03/2025 Negative     Nitrite, UA 01/03/2025 Negative     Protein, UA 01/03/2025 Negative     Glucose, UA 01/03/2025 Negative     Ketones, UA 01/03/2025 Negative     Urobilinogen, UA 01/03/2025 <2.0     Bilirubin, UA 01/03/2025 Negative     Occult Blood, UA 01/03/2025 Negative     Sodium 01/03/2025 130 (L)     Potassium 01/03/2025 3.0 (L)     Chloride 01/03/2025 98     CO2 01/03/2025 21     ANION GAP 01/03/2025 11     BUN 01/03/2025 5     Creatinine 01/03/2025 0.92     Glucose 01/03/2025 108     Calcium 01/03/2025 8.7     eGFR 01/03/2025 102     TSH Baseline 01/03/2025 0.659     Osmolality Serum 01/03/2025 268 (L)     Cortisol - AM 01/03/2025 7.8     BNP 01/03/2025 42     Sodium, Ur 01/03/2025 29     Osmolality, Ur 01/03/2025 152 (L)     Uric Acid 01/03/2025 5.3     Sodium 01/03/2025 133 (L)     Potassium 01/03/2025 3.3 (L)     Chloride 01/03/2025 101     CO2 01/03/2025 21     ANION GAP 01/03/2025 11     BUN 01/03/2025 5     Creatinine 01/03/2025 1.01     Glucose 01/03/2025 116     Calcium 01/03/2025 8.3 (L)     eGFR 01/03/2025 91     Osmolality Serum 01/03/2025 278 (L)     Sodium 01/04/2025 138     Potassium 01/04/2025 3.3 (L)     Chloride 01/04/2025 107     CO2 01/04/2025 23     ANION GAP 01/04/2025 8     BUN 01/04/2025 5     Creatinine 01/04/2025 0.96     Glucose 01/04/2025 105     Calcium 01/04/2025 8.2 (L)     eGFR 01/04/2025 97     Osmolality Serum 01/04/2025 287     Magnesium 01/04/2025 1.8 (L)     Sodium 01/04/2025 138     Potassium 01/04/2025 3.3 (L)     Chloride 01/04/2025 107     CO2 01/04/2025 23     ANION GAP 01/04/2025 8     BUN 01/04/2025 5     Creatinine 01/04/2025 0.97     Glucose 01/04/2025 106     Calcium 01/04/2025 8.2 (L)     AST 01/04/2025 20     ALT 01/04/2025 18     Alkaline Phosphatase 01/04/2025 67     Total Protein 01/04/2025 6.3 (L)     Albumin  01/04/2025 3.7     Total Bilirubin 01/04/2025 0.76     eGFR 01/04/2025 96     Phosphorus 01/04/2025 3.0     WBC 01/04/2025 10.47 (H)     RBC 01/04/2025 4.88     Hemoglobin 01/04/2025 12.7     Hematocrit 01/04/2025 38.8     MCV 01/04/2025 80 (L)     MCH 01/04/2025 26.0 (L)     MCHC 01/04/2025 32.7     RDW 01/04/2025 14.2     MPV 01/04/2025 10.8     Platelets 01/04/2025 174     nRBC 01/04/2025 0     Segmented % 01/04/2025 69     Immature Grans % 01/04/2025 0     Lymphocytes % 01/04/2025 22     Monocytes % 01/04/2025 7     Eosinophils Relative 01/04/2025 1     Basophils Relative 01/04/2025 1     Absolute Neutrophils 01/04/2025 7.29     Absolute Immature Grans 01/04/2025 0.04     Absolute Lymphocytes 01/04/2025 2.33     Absolute Monocytes 01/04/2025 0.68     Eosinophils Absolute 01/04/2025 0.08     Basophils Absolute 01/04/2025 0.05     Sodium 01/04/2025 137     Potassium 01/04/2025 3.3 (L)     Chloride 01/04/2025 107     CO2 01/04/2025 21     ANION GAP 01/04/2025 9     BUN 01/04/2025 5     Creatinine 01/04/2025 0.93     Glucose 01/04/2025 113     Calcium 01/04/2025 8.0 (L)     eGFR 01/04/2025 101     Osmolality Serum 01/04/2025 286     Sodium 01/04/2025 137     Potassium 01/04/2025 3.8     Chloride 01/04/2025 108     CO2 01/04/2025 21     ANION GAP 01/04/2025 8     BUN 01/04/2025 10     Creatinine 01/04/2025 1.16     Glucose 01/04/2025 117     Calcium 01/04/2025 9.2     eGFR 01/04/2025 77     Sodium 01/05/2025 139     Potassium 01/05/2025 3.3 (L)     Chloride 01/05/2025 109 (H)     CO2 01/05/2025 22     ANION GAP 01/05/2025 8     BUN 01/05/2025 11     Creatinine 01/05/2025 1.01     Glucose 01/05/2025 110     Calcium 01/05/2025 8.2 (L)     eGFR 01/05/2025 91     Phosphorus 01/05/2025 3.3     Magnesium 01/05/2025 2.0        Suicide/Homicide Risk Assessment:    Risk of Harm to Self:  The following ratings are based on assessment at the time of the interview  Historical Risk Factors include: chronic psychiatric  problems  Protective Factors: no current suicidal ideation, access to mental health treatment, compliant with medications, compliant with mental health treatment, having a desire to be alive, responsibilities and duties to others, strong relationships    Risk of Harm to Others:  The following ratings are based on assessment at the time of the interview  Historical Risk Factors include: none.  Protective Factors: no current homicidal ideation, access to mental health treatment, compliant with medications, compliant with mental health treatment, responsibilities and duties to others    The following interventions are recommended: continue medication management, contracts for safety at present - agrees to go to ED if feeling unsafe, contracts for safety at present - agrees to call Crisis Intervention Service if feeling unsafe      Lethality Statement:    Based on today's assessment and clinical criteria, Benjamin Muñiz contracts for safety and is not an imminent risk of harm to self or others. Outpatient level of care is deemed appropriate at this current time. Benjamin understands that if they can no longer contract for safety, they need to call the office or report to their nearest Emergency Room for immediate evaluation. They voiced understanding and agreement to call 911 or head to the nearest ED should they have any physical or mental decompensation whatsoever.       Assessment/Plan:     1.)  HERI  2.)  PTSD  3.)  ADHD  4.)  MDD, recurrent, mild    After discussion of risks, benefits, potential side effects, alternatives, we will continue risperidone 4 mg nightly, 0.5 mg every morning, Adderall XR 20 in the morning, immediate release 10 mg in the afternoon, hydroxyzine 50 mg every 8 hours as needed for severe anxiety.  Klonopin and Wellbutrin will be discontinued.  Xanax will be initiated at 0.5 mg to 1 mg/day as needed for severe anxiety or panic attack.  He will continue to follow up with his other medical  specialist for treatment of his chronic medical conditions and denies acute mental complaints or concerns at this time        Aware of 24 hour and weekend coverage for urgent situations accessed by calling [facility] main practice number    Medications Risks/Benefits      Risks, Benefits And Possible Side Effects Of Medications:    Risks, benefits, and possible side effects of medications explained to Benjamin and he verbalizes understanding and agreement for treatment.    Controlled Medication Discussion:     Benjamin has been filling controlled prescriptions on time as prescribed according to [place] Prescription Drug Monitoring Program    Psychotherapy Provided:     Individual psychotherapy provided: Crisis/safety plan discussed with Benjamin. Provided at least 16 minutes of distinct psychotherapy using a combination of supportive, interpersonal, motivational, and problem solving approaches to address psychological distress and enhance coping strategies.     Treatment Plan:    Completed and signed during the session:  We will complete at next appointment due to lack of time today      Visit Time    Visit Start Time: 1:00 PM  Visit Stop Time: 1:25 PM  Total Visit Duration:  25 minutes     The total visit duration detailed above includes: patient engagement, medication management, psychotherapy/counseling, discussion regarding treatment goals, documentation, review of past medical records, and coordination of care.      Note Share Disclaimer:     This note was not shared with the patient due to reasonable likelihood of causing patient harm      Ambreen Johnson DO  Psychiatry  02/24/25

## 2025-02-25 ENCOUNTER — TELEPHONE (OUTPATIENT)
Dept: GASTROENTEROLOGY | Facility: CLINIC | Age: 40
End: 2025-02-25

## 2025-02-25 NOTE — TELEPHONE ENCOUNTER
Benjamin Muñiz to [facility] (supporting Geena Roman MD)   SB      2/24/25  8:04 PM  Hey Dr Roman     I don’t know if you can see messages with other providers but I am cleared to have my procedures with you. Do you need anything from them?     Thanks  Erick Muñiz     2/25/25  9:24 AM  Lupe Medina MA routed this conversation to MD Geena Maciel MD to Benjamin Muñiz         2/25/25  9:53 AM  Ady Suarez,     Yes I did see the neurology note. Thank you for your due diligence.      I will have my office call you to reschedule that egd/colon procedures.     Dr Roman    This . [employer] message has not been read.  Geena Roman MD to Me       2/25/25  9:53 AM  Please call to reschedule. Cleared by neuro.

## 2025-02-25 NOTE — TELEPHONE ENCOUNTER
Scheduled date of combo (as of today):03/13/2025  Physician performing combo:Dr Roman  Location of combo:[facility]  Bowel prep:Golytely  Instructions emailed to patient by:luis  Clearances: Neuro (02/06/25 pt message Dr Willis cleared patient.)

## 2025-03-06 ENCOUNTER — TELEPHONE (OUTPATIENT)
Dept: GASTROENTEROLOGY | Facility: CLINIC | Age: 40
End: 2025-03-06

## 2025-03-06 DIAGNOSIS — F41.1 GENERALIZED ANXIETY DISORDER: ICD-10-CM

## 2025-03-06 DIAGNOSIS — F43.10 PTSD (POST-TRAUMATIC STRESS DISORDER): ICD-10-CM

## 2025-03-06 NOTE — TELEPHONE ENCOUNTER
Pt asking for more than 14 tablet refill. Pt states that he has an increase tension.    Reason for call:   [x] Refill   [] Prior Auth  [] Other:     Office:   [] PCP/Provider -   [x] Specialty/Provider - Psych    Medication: Alprazolam    Dose/Frequency: 0.5 mg    Quantity:     Pharmacy: [facility]/pharmacy #[id number] - [place] - [address] [facility] [phone]     Local Pharmacy   Does the patient have enough for 3 days?   [] Yes   [x] No - Send as HP to POD    Mail Away Pharmacy   Does the patient have enough for 10 days?   [] Yes   [] No - Send as HP to POD

## 2025-03-06 NOTE — TELEPHONE ENCOUNTER
"Pt is requesting a larger dispense quantity due to \"increase tension\"    03/04/2025 03/04/2025 oxyCODONE HYDROCHLORIDE 5 MG ORAL TABLET/ACETAMINOPHEN 325 MG (Tablet) 180.0 30 325 MG-5 MG 45.0 LIVE FABIAN [facility] Workers Compensation 0 / 0 [place]      1 [id number] 02/19/2025 02/14/2025 Zolpidem Tartrate (Tablet) 30.0 30 10 MG NA JOSE ENRIQUE NOLASCO [facility] Commercial Insurance 0 / 2 [place]    1 [id number] 02/14/2025 02/14/2025 ALPRAZolam (Tablet) 14.0 7 0.5 MG NA FLORECITA EUGENE [facility]            "

## 2025-03-06 NOTE — TELEPHONE ENCOUNTER
Patient is requesting Clenpiq bowel prep because he has used it in the past. Ok for patient to use for upcoming procedure?

## 2025-03-06 NOTE — TELEPHONE ENCOUNTER
Procedures rescheduled    Scheduled date of combo (as of today):04/23/2025  Physician performing combo:Dr Roman  Location of combo:[facility]  Bowel prep:TBD  Instructions will be emailed  Clearances: Neuro (Per 02/06/25 pt message Dr Willis cleared patient.)

## 2025-03-06 NOTE — TELEPHONE ENCOUNTER
Procedure Prep:  Clenpiq    Sent Via:  Email      Patient needs Clenpiq prescription sent to pharmacy.

## 2025-03-07 DIAGNOSIS — Z12.11 SCREENING FOR COLON CANCER: Primary | ICD-10-CM

## 2025-03-07 RX ORDER — HYDROXYZINE HYDROCHLORIDE 50 MG/1
TABLET, FILM COATED ORAL
Qty: 60 TABLET | Refills: 1 | OUTPATIENT
Start: 2025-03-07

## 2025-03-07 RX ORDER — ALPRAZOLAM 0.5 MG
TABLET ORAL
Qty: 14 TABLET | Refills: 0 | Status: SHIPPED | OUTPATIENT
Start: 2025-03-07

## 2025-03-07 RX ORDER — SODIUM PICOSULFATE, MAGNESIUM OXIDE, AND ANHYDROUS CITRIC ACID 12; 3.5; 1 G/175ML; G/175ML; MG/175ML
LIQUID ORAL
Qty: 350 ML | Refills: 0 | Status: SHIPPED | OUTPATIENT
Start: 2025-03-07

## 2025-03-07 NOTE — ASSESSMENT & PLAN NOTE
Recently evaluated inpatient for new onset seizure (1/3/25)   Neurology was consulted; MRI of the brain without acute findings  EEG planned to be completed in the outpatient setting

## 2025-03-07 NOTE — PROGRESS NOTES
Name: Benjamin Muñiz      : 1985      MRN: [id number]  Encounter Provider: Erica Payne PA-C  Encounter Date: 3/10/2025   Encounter department: [facility] UROLOGY [place]  :  Assessment & Plan  Bipolar disorder in full remission, most recent episode unspecified type ([facility])  Also diagnosed with ADD and PTSD  He remains on a variety of antipsychotic/antidepressant medications including Ambien, Risperdal, Atarax, and Adderall are likely contributing to his worsening erectile dysfunction    Narcotic dependency, continuous ([facility])  Prescribed OxyContin 20 mg twice daily for his chronic pain  Chronic opioid use is also likely contributing to his ongoing erectile dysfunction    Seizure ([facility])  Recently evaluated inpatient for new onset seizure (1/3/25)   Neurology was consulted; MRI of the brain without acute findings  EEG planned to be completed in the outpatient setting       Erectile dysfunction, unspecified erectile dysfunction type  Continue taking Cialis 20 mg as needed for erectile dysfunction as well as Cialis 5 mg daily  Side effects reviewed -- denies issues a this time   Priapism reversal reviewed  Reports erections wax and wane on medication   Both Trimix and IPP have been discussed in the past; patient not interested at this time    Orders:    tadalafil (CIALIS) 20 MG tablet; Take 1 tablet (20 mg total) by mouth daily as needed for erectile dysfunction    tadalafil (CIALIS) 5 MG tablet; Take 1 tablet (5 mg total) by mouth daily as needed for erectile dysfunction    Hypogonadism in male  Continue utilizing testosterone cypionate 200 mg/mL injections as prescribed (inject 1 mL every 2 weeks) --> refill sent to pharmacy   Most recent testosterone 2024 was 216  Most recent PSA from 2024 was 0.262  Most recent CBC revealed Hgb of 12.7, HCT of 38.8  Content with medication results   Repeat labs ordered for future use     Orders:    Testosterone, free, total; Future    CBC and  Platelet; Future    PSA Total, Diagnostic; Future    testosterone cypionate (DEPO-TESTOSTERONE) 200 mg/mL SOLN; Inject 1 mL (200 mg total) into a muscle every 14 (fourteen) days      History of Present Illness   Benjamin Muñiz is a 39 y.o. male with PMH of bipolar disorder, seizures, and HTN -- who presents to the office in follow-up to rediscuss gonad is him and erectile dysfunction.  He continues utilizing testosterone cypionate 200 mg/mL injections as prescribed.  He currently injects 1 mL every 2 weeks and is overall content the medications effects.  His most recent testosterone from September 2024 was 216, PSA 0.2, Hgb 12.7, HCT 38.8.  He currently denies any side effects to the medication and is content with his current dosage.  When it comes to the patient's ED, etiology is likely the multitude of antipsychotic/antidepressant medications the patient takes.  He is also on OxyContin daily which could be contributing as well.  He continues to take Cialis 5 mg daily as well as Cialis 20 mg as needed to achieve an erection.  He reports the medication works well, some days better than others.  He denies any side effects to the medication.  Priapism reversal reviewed as well as side effects.  Both Trimix and IPP have been discussed in the past, the patient continues to decline at this time.  When comes the patient's urinary pattern, he denies any drastic changes over the last 6 months.  He currently does not take any medications for his prostate and or bladder.  He denies dysuria, hematuria, include bladder emptying, or pain in the bladder/bilateral flanks.  Will plan to follow-up with him on an annual basis moving forward.      Review of Systems   Constitutional:  Negative for activity change, chills, fatigue and fever.   Respiratory:  Negative for apnea, cough and shortness of breath.    Cardiovascular:  Negative for chest pain and leg swelling.   Gastrointestinal:  Negative for abdominal distention, abdominal  "pain, constipation, diarrhea, nausea and vomiting.   Genitourinary:  Negative for decreased urine volume, difficulty urinating, dysuria, flank pain, frequency, hematuria, penile pain, testicular pain and urgency.   Neurological:  Negative for dizziness and headaches.   Psychiatric/Behavioral: Negative.       Medical History Reviewed by provider this encounter:     .  Current Outpatient Medications on File Prior to Visit   Medication Sig Dispense Refill    ADDERALL XR, 20MG, 20 MG 24 hr capsule Take 1 capsule (20 mg total) by mouth every morning Max Daily Amount: 20 mg 30 capsule 0    ALPRAZolam (XANAX) 0.5 mg tablet May take up to 1 mg or 2 tablets of 0.5 mg daily as needed for severe anxiety or panic attack.  Never to exceed 2 tablets or 1 mg/day. 14 tablet 0    amphetamine-dextroamphetamine (ADDERALL) 10 mg tablet Take 1 tablet (10 mg total) by mouth daily after lunch Max Daily Amount: 10 mg 30 tablet 0    B-D HYPODERMIC NEEDLE 18GX1.5\" 18G X 1-1/2\" MISC Use as directed---for Testosterone      B-D SYRINGE LUER-MILAGROS 1CC 1 ML MISC USE FOR BI WEEKLY TESTOSTERONE INJECTIONS      B-D SYRINGE LUER-MILAGROS 3CC 3 ML MISC USE AS DIRECTED FOR TESTOSTERONE INJECTION      BD Hypodermic Needle 25G X 1-1/2\" MISC       dexlansoprazole (DEXILANT) 60 MG capsule Take 1 capsule (60 mg total) by mouth daily 90 capsule 3    gabapentin (NEURONTIN) 600 MG tablet Take 600 mg by mouth 2 (two) times a day    0    hydrOXYzine HCL (ATARAX) 50 mg tablet Take 1 tablet (50 mg total) by mouth every 6 (six) hours as needed for anxiety for up to 90 doses 90 tablet 0    ibuprofen (MOTRIN) 800 mg tablet TAKE 1 TABLET BY MOUTH EVERY 8 HOURS AS NEEDED (PAIN).      losartan (COZAAR) 50 mg tablet Take 1 tablet (50 mg total) by mouth daily 90 tablet 3    nicotine (NICODERM CQ) 14 mg/24hr TD 24 hr patch Place 1 patch on the skin over 24 hours every 24 hours 28 patch 0    nicotine (NICODERM CQ) 21 mg/24 hr TD 24 hr patch Place 1 patch on the skin over 24 hours " every 24 hours 30 patch 0    nicotine (NICODERM CQ) 21 mg/24 hr TD 24 hr patch Place 1 patch on the skin over 24 hours every 24 hours 28 patch 0    nicotine (NICODERM CQ) 7 mg/24hr TD 24 hr patch Place 1 patch on the skin over 24 hours every 24 hours 28 patch 0    ondansetron (ZOFRAN-ODT) 4 mg disintegrating tablet Take 1 tablet (4 mg total) by mouth every 6 (six) hours as needed for nausea or vomiting 30 tablet 0    oxyCODONE-acetaminophen (PERCOCET) 5-325 mg per tablet Take 1 tablet by mouth every 4-6 hours as needed      OxyCONTIN 15 MG 12 hr tablet TAKE 1 TABLET BY MOUTH TWICE A DAY FOR PAIN      OxyCONTIN 20 MG 12 hr tablet Take 1 tablet by mouth 2 (two) times a day      risperiDONE (RisperDAL) 0.5 mg tablet Take 1 tablet (0.5 mg total) by mouth every morning 30 tablet 0    risperiDONE (RisperDAL) 4 mg tablet Take 1 tablet (4 mg total) by mouth daily at bedtime 30 tablet 0    rosuvastatin (CRESTOR) 20 MG tablet Take 1 tablet (20 mg total) by mouth daily 100 tablet 0    tadalafil (CIALIS) 20 MG tablet Take 1 tablet (20 mg total) by mouth daily as needed for erectile dysfunction 30 tablet 3    tadalafil (CIALIS) 5 MG tablet Take 1 tablet (5 mg total) by mouth daily as needed for erectile dysfunction 30 tablet 3    testosterone cypionate (DEPO-TESTOSTERONE) 200 mg/mL SOLN INJECT 1ML EVERY 2 WEEKS. DISCARD VIAL AFTER 30 DAYS FROM OPENING DATE      thiamine (VITAMIN B1) 50 mg tablet TAKE 2 TABLETS BY MOUTH EVERY  tablet 1    tiZANidine (ZANAFLEX) 4 mg tablet Take 1 tablet (4 mg total) by mouth daily at bedtime 30 tablet 0    zolpidem (AMBIEN) 10 mg tablet Take 10 mg by mouth daily at bedtime as needed for sleep       No current facility-administered medications on file prior to visit.      Social History     Tobacco Use    Smoking status: Every Day     Current packs/day: 1.00     Average packs/day: 1 pack/day for 26.2 years (25.4 ttl pk-yrs)     Types: Cigarettes, Pipe     Start date: 1999    Smokeless  tobacco: Never   Vaping Use    Vaping status: Never Used   Substance and Sexual Activity    Alcohol use: Yes     Alcohol/week: 10.0 standard drinks of alcohol     Types: 10 Cans of beer per week     Comment: social    Drug use: Never    Sexual activity: Yes     Partners: Female     Birth control/protection: I.U.D.        Objective   There were no vitals taken for this visit.    Physical Exam  Vitals and nursing note reviewed.   Constitutional:       General: He is not in acute distress.     Appearance: Normal appearance. He is well-developed. He is not ill-appearing.   HENT:      Head: Normocephalic and atraumatic.   Eyes:      Conjunctiva/sclera: Conjunctivae normal.   Cardiovascular:      Rate and Rhythm: Normal rate and regular rhythm.      Heart sounds: No murmur heard.  Pulmonary:      Effort: Pulmonary effort is normal. No respiratory distress.      Breath sounds: Normal breath sounds.   Abdominal:      General: Abdomen is flat. There is no distension.      Palpations: Abdomen is soft.      Tenderness: There is no abdominal tenderness. There is no right CVA tenderness or left CVA tenderness.   Musculoskeletal:         General: No swelling.      Cervical back: Neck supple.   Skin:     General: Skin is warm and dry.      Capillary Refill: Capillary refill takes less than 2 seconds.   Neurological:      Mental Status: He is alert.   Psychiatric:         Mood and Affect: Mood normal.         Results   Lab Results   Component Value Date    PSA 0.262 09/14/2024    PSA 0.4 02/09/2023     Lab Results   Component Value Date    CALCIUM 8.6 01/10/2025    K 3.8 01/10/2025    CO2 24 01/10/2025     01/10/2025    BUN 16 01/10/2025    CREATININE 1.16 01/10/2025     Lab Results   Component Value Date    WBC 10.47 (H) 01/04/2025    HGB 12.7 01/04/2025    HCT 38.8 01/04/2025    MCV 80 (L) 01/04/2025     01/04/2025       Office Urine Dip  No results found for this or any previous visit (from the past hour).

## 2025-03-07 NOTE — ASSESSMENT & PLAN NOTE
Continue taking Cialis 20 mg as needed for erectile dysfunction as well as Cialis 5 mg daily  Side effects reviewed -- denies issues a this time   Priapism reversal reviewed  Reports erections wax and wane on medication   Both Trimix and IPP have been discussed in the past; patient not interested at this time    Orders:    tadalafil (CIALIS) 20 MG tablet; Take 1 tablet (20 mg total) by mouth daily as needed for erectile dysfunction    tadalafil (CIALIS) 5 MG tablet; Take 1 tablet (5 mg total) by mouth daily as needed for erectile dysfunction

## 2025-03-07 NOTE — ASSESSMENT & PLAN NOTE
Also diagnosed with ADD and PTSD  He remains on a variety of antipsychotic/antidepressant medications including Ambien, Risperdal, Atarax, and Adderall are likely contributing to his worsening erectile dysfunction

## 2025-03-07 NOTE — TELEPHONE ENCOUNTER
Hello, I would suggest that he make a follow-up appointment so that we may discuss this further.  I will refill only 14 tablets for now until it is clarified how much we are using

## 2025-03-07 NOTE — ASSESSMENT & PLAN NOTE
Prescribed OxyContin 20 mg twice daily for his chronic pain  Chronic opioid use is also likely contributing to his ongoing erectile dysfunction

## 2025-03-07 NOTE — ASSESSMENT & PLAN NOTE
Continue utilizing testosterone cypionate 200 mg/mL injections as prescribed (inject 1 mL every 2 weeks) --> refill sent to pharmacy   Most recent testosterone 9/14/2024 was 216  Most recent PSA from 9/14/2024 was 0.262  Most recent CBC revealed Hgb of 12.7, HCT of 38.8  Content with medication results   Repeat labs ordered for future use     Orders:    Testosterone, free, total; Future    CBC and Platelet; Future    PSA Total, Diagnostic; Future    testosterone cypionate (DEPO-TESTOSTERONE) 200 mg/mL SOLN; Inject 1 mL (200 mg total) into a muscle every 14 (fourteen) days

## 2025-03-10 ENCOUNTER — OFFICE VISIT (OUTPATIENT)
Dept: UROLOGY | Facility: HOSPITAL | Age: 40
End: 2025-03-10
Payer: COMMERCIAL

## 2025-03-10 ENCOUNTER — TELEPHONE (OUTPATIENT)
Age: 40
End: 2025-03-10

## 2025-03-10 ENCOUNTER — TELEPHONE (OUTPATIENT)
Dept: GASTROENTEROLOGY | Facility: CLINIC | Age: 40
End: 2025-03-10

## 2025-03-10 VITALS
DIASTOLIC BLOOD PRESSURE: 92 MMHG | HEIGHT: 75 IN | OXYGEN SATURATION: 95 % | SYSTOLIC BLOOD PRESSURE: 140 MMHG | WEIGHT: 279 LBS | BODY MASS INDEX: 34.69 KG/M2 | HEART RATE: 103 BPM

## 2025-03-10 DIAGNOSIS — E29.1 HYPOGONADISM IN MALE: ICD-10-CM

## 2025-03-10 DIAGNOSIS — R56.9 SEIZURE (HCC): ICD-10-CM

## 2025-03-10 DIAGNOSIS — F31.70 BIPOLAR DISORDER IN FULL REMISSION, MOST RECENT EPISODE UNSPECIFIED TYPE (HCC): Primary | ICD-10-CM

## 2025-03-10 DIAGNOSIS — N52.9 ERECTILE DYSFUNCTION, UNSPECIFIED ERECTILE DYSFUNCTION TYPE: ICD-10-CM

## 2025-03-10 DIAGNOSIS — F11.20 NARCOTIC DEPENDENCY, CONTINUOUS (HCC): ICD-10-CM

## 2025-03-10 PROCEDURE — 99214 OFFICE O/P EST MOD 30 MIN: CPT

## 2025-03-10 RX ORDER — TADALAFIL 5 MG/1
5 TABLET ORAL DAILY PRN
Qty: 30 TABLET | Refills: 3 | Status: SHIPPED | OUTPATIENT
Start: 2025-03-10

## 2025-03-10 RX ORDER — TADALAFIL 20 MG/1
20 TABLET ORAL DAILY PRN
Qty: 30 TABLET | Refills: 3 | Status: SHIPPED | OUTPATIENT
Start: 2025-03-10

## 2025-03-10 RX ORDER — TESTOSTERONE CYPIONATE 200 MG/ML
200 INJECTION, SOLUTION INTRAMUSCULAR
Qty: 10 ML | Refills: 3 | Status: SHIPPED | OUTPATIENT
Start: 2025-03-10

## 2025-03-15 DIAGNOSIS — F31.70 BIPOLAR DISORDER IN FULL REMISSION, MOST RECENT EPISODE UNSPECIFIED TYPE (HCC): ICD-10-CM

## 2025-03-17 DIAGNOSIS — F41.1 GENERALIZED ANXIETY DISORDER: ICD-10-CM

## 2025-03-17 NOTE — TELEPHONE ENCOUNTER
Reason for call:   [x] Refill   [] Prior Auth  [] Other:     Office:   [] PCP/Provider -   [x] Specialty/Provider - Psych    Medication: ALPRAZolam (XANAX) 0.5 mg     Dose/Frequency:  May take up to 1 mg or 2 tablets of 0.5 mg daily as needed     Quantity: 14    Pharmacy:   [facility]/pharmacy #[id number] - [place], [place] - [address] [facility] [address]   Does the patient have enough for 3 days?   [] Yes   [x] No - Send as HP to POD    Mail Away Pharmacy   Does the patient have enough for 10 days?   [] Yes   [] No - Send as HP to POD

## 2025-03-17 NOTE — TELEPHONE ENCOUNTER
03/15/2025 03/04/2025 Testosterone Cypionate (Oil) 10.0 140 200 MG/1 ML NA [facility] Commercial Insurance 0 / 0 [place]   1 [id number] 03/08/2025 01/31/2025 Amphetamine Salt Combo (Tablet) 30.0 30 10 MG NA [facility] Commercial Insurance 0 / 0 [place]   1 [id number] 03/08/2025 01/31/2025 Adderall Xr (Capsule, Extended Release) 30.0 30 20 MG NA [facility] Commercial Insurance 0 / 0 [place]   1 [id number] 03/08/2025 02/13/2025 Zolpidem Tartrate (Tablet) 30.0 30 10 MG NA [facility] Private Pay 0 / 2 [place]   1 [id number] 03/07/2025 03/07/2025 ALPRAZolam (Tablet) 14.0 7 0.5 MG NA [facility] Commercial Insurance 0 / 0 [place]   1 [id number] 03/05/2025 03/04/2025 OxyCONTIN (Tablet, Extended Release) 90.0 30 15 MG 67.50 [facility] Workers Compensation 0 / 0 [place]   1 [id number] 03/04/2025 03/04/2025 oxyCODONE HYDROCHLORIDE 5 MG ORAL TABLET/ACETAMINOPHEN 325 MG (Table

## 2025-03-18 ENCOUNTER — OFFICE VISIT (OUTPATIENT)
Dept: BARIATRICS | Facility: CLINIC | Age: 40
End: 2025-03-18
Payer: COMMERCIAL

## 2025-03-18 VITALS
SYSTOLIC BLOOD PRESSURE: 142 MMHG | DIASTOLIC BLOOD PRESSURE: 90 MMHG | TEMPERATURE: 97.9 F | BODY MASS INDEX: 36.38 KG/M2 | HEART RATE: 99 BPM | WEIGHT: 274.5 LBS | HEIGHT: 73 IN

## 2025-03-18 DIAGNOSIS — E78.2 MIXED HYPERLIPIDEMIA: ICD-10-CM

## 2025-03-18 DIAGNOSIS — K21.9 GASTROESOPHAGEAL REFLUX DISEASE WITHOUT ESOPHAGITIS: ICD-10-CM

## 2025-03-18 DIAGNOSIS — F17.200 TOBACCO DEPENDENCE: ICD-10-CM

## 2025-03-18 DIAGNOSIS — E66.9 OBESITY (BMI 35.0-39.9 WITHOUT COMORBIDITY): Primary | ICD-10-CM

## 2025-03-18 DIAGNOSIS — I10 PRIMARY HYPERTENSION: ICD-10-CM

## 2025-03-18 DIAGNOSIS — R73.01 IMPAIRED FASTING GLUCOSE: ICD-10-CM

## 2025-03-18 PROCEDURE — 99204 OFFICE O/P NEW MOD 45 MIN: CPT | Performed by: SURGERY

## 2025-03-18 RX ORDER — RISPERIDONE 0.5 MG/1
0.5 TABLET ORAL EVERY MORNING
Qty: 90 TABLET | Refills: 1 | OUTPATIENT
Start: 2025-03-18

## 2025-03-18 RX ORDER — ALPRAZOLAM 0.5 MG
TABLET ORAL
Qty: 14 TABLET | Refills: 0 | Status: SHIPPED | OUTPATIENT
Start: 2025-03-18 | End: 2025-03-26 | Stop reason: SDUPTHER

## 2025-03-18 NOTE — ASSESSMENT & PLAN NOTE
Currently on statins.  I have discussed with the patient that there is a significant chance of improvement or even resolution of this condition after metabolic/bariatric surgery.  Continue management as per primary care team.

## 2025-03-18 NOTE — ASSESSMENT & PLAN NOTE
40 y.o. male with a long standing h/o of obesity and inability to sustain any meaningful weight loss on his own despite several attempts.    He is interested in the Laparoscopic gastric bypass possible sleeve gastrectomy.  Have discussed both options with him today.    As a part of his pre op evaluation, he will be referred to a cardiologist for risk stratification and for a sleep evaluation and consult to rule out obstructive sleep apnea if deemed necessary based on his score.    He needs an EGD to evaluate the anatomy of his GI tract.    I have spent over 30 minutes with him face to face in the office today discussing his options and details of the surgery. We have seen an animation of the surgery on the computer that illustrates how the operation is done and how the anatomy will be altered with the procedure. Over 50% of this was coordinating care.    I have used the MBSAQIP bariatric surgical risk/benefit calculator and we have discussed the results as part of the preop education.  I have discussed and educated the patient with regards to the components of our multidisciplinary program and the importance of compliance and follow up in the post operative period.    He was given the opportunity to ask questions and I have answered all of them.    The patient was also instructed with regards to the importance of behavior modification, nutritional counseling, support meeting attendance and lifestyle changes that are important to ensure success.    Although there is a great statistical chance of improvement or even resolution of most of his associated comorbidities, the results vary from patient to patient and they largely depend on his commitment and compliance.     I have reviewed instructions for stopping or tapering anti-obesity medications prior to surgery.      I have encouraged him to lose some weight prior to the operation.

## 2025-03-18 NOTE — ASSESSMENT & PLAN NOTE
Currently taking Cozaar.  I have discussed with the patient that there is a significant chance of improvement or even resolution of this condition after metabolic/bariatric surgery.  Continue management as per primary care team.

## 2025-03-18 NOTE — ASSESSMENT & PLAN NOTE
Have discussed the risks associated with tobacco and bariatric surgery.  He was instructed to quit smoking prior to the operation and he will be testing to assess compliance

## 2025-03-18 NOTE — PROGRESS NOTES
"    BARIATRIC INITIAL CONSULT - BARIATRIC SURGERY    Benjamin Muñiz 40 y.o. male MRN: [id number]  Unit/Bed#:  Encounter: [id number]      HPI:  Benjamin Muñiz is a 40 y.o. male who presents with a longstanding history of morbid obesity and inability to sustain a meaningful weight loss.    Here today to discuss bariatric options.    Visit type: initial visit    Symptoms: excess weight and inability to loss weight    Associated Symptoms: depressed mood and anxiety    Associated Conditions: glucose intolerance, hyperlipidemia, and abdominal obesity  Disease Complications: hypertension  Weight Loss Interest: high  Previous Diet Trials: low calorie     Exercise Frequency:infrequency  Types of Exercise: walking    Review of Systems   All other systems reviewed and are negative.      Historical Information   Past Medical History:   Diagnosis Date    ADD (attention deficit disorder)     Anesthesia complication     \"Takes a lot to get me down\"    Arthritis     Chronic pain     Chronic pain disorder     Depression     GERD (gastroesophageal reflux disease)     Heartburn     Hypertension     Mood disorder (HCC)     Tremor     Uncomplicated alcohol dependence (HCC) 01/09/2024     Past Surgical History:   Procedure Laterality Date    CLOSED REDUCTION CALCANEAL FRACTURE      COLONOSCOPY      PERONEAL TENDON EXPLORATION      OK PADILLA FACETECTOMY & FORAMOTOMY 1 VRT SGM LUMBAR Right 02/06/2024    Procedure: RIGHT L4-5 MIS FORAMINOTOMY;  Surgeon: Al Slade MD;  Location:  MAIN OR;  Service: Neurosurgery    UPPER GASTROINTESTINAL ENDOSCOPY       Social History   Social History     Substance and Sexual Activity   Alcohol Use Yes    Alcohol/week: 10.0 standard drinks of alcohol    Types: 10 Cans of beer per week    Comment: social     Social History     Substance and Sexual Activity   Drug Use Never     Social History     Tobacco Use   Smoking Status Every Day    Current packs/day: 1.00    Average packs/day: 1 pack/day for 26.2 years " "(25.5 ttl pk-yrs)    Types: Cigarettes, Pipe    Start date: 1999   Smokeless Tobacco Never     Family History: Family history non-contributory    Meds/Allergies   all medications and allergies reviewed  No Known Allergies    Objective     Current Vitals:   Blood Pressure: 142/90 (03/18/25 1343)  Pulse: 99 (03/18/25 1343)  Temperature: 97.9 °F (36.6 °C) (03/18/25 1343)  Temp Source: Tympanic (03/18/25 1343)  Height: 6' 1\" (185.4 cm) (03/18/25 1343)  Weight - Scale: 125 kg (274 lb 8 oz) (03/18/25 1343)      Invasive Devices       None                   Physical Exam  Constitutional:       General: He is not in acute distress.     Appearance: He is well-developed.   Neurological:      Mental Status: He is alert and oriented to person, place, and time.   Psychiatric:         Behavior: Behavior normal.         Thought Content: Thought content normal.         Judgment: Judgment normal.         Lab Results: I have personally reviewed pertinent lab results.    Imaging: Results Review Statement: No pertinent imaging studies reviewed.  EKG, Pathology, and Other Studies: Results Review Statement: No pertinent imaging studies reviewed.      Assessment/PLAN:    Mixed hyperlipidemia  Currently on statins.  I have discussed with the patient that there is a significant chance of improvement or even resolution of this condition after metabolic/bariatric surgery.  Continue management as per primary care team.      Tobacco dependence  Have discussed the risks associated with tobacco and bariatric surgery.  He was instructed to quit smoking prior to the operation and he will be testing to assess compliance    Impaired fasting glucose  I have discussed with the patient that there is a significant chance of improvement or even resolution of this condition after metabolic/bariatric surgery.  Continue management as per primary care team.      Primary hypertension  Currently taking Cozaar.  I have discussed with the patient that there is a " significant chance of improvement or even resolution of this condition after metabolic/bariatric surgery.  Continue management as per primary care team.      Gastroesophageal reflux disease without esophagitis  I have discussed with the patient that there is a significant chance of improvement or even resolution of this condition after metabolic/bariatric surgery.  Continue management as per primary care team.      Obesity (BMI 35.0-39.9 without comorbidity)  40 y.o. male with a long standing h/o of obesity and inability to sustain any meaningful weight loss on his own despite several attempts.    He is interested in the Laparoscopic gastric bypass possible sleeve gastrectomy.  Have discussed both options with him today.    As a part of his pre op evaluation, he will be referred to a cardiologist for risk stratification and for a sleep evaluation and consult to rule out obstructive sleep apnea if deemed necessary based on his score.    He needs an EGD to evaluate the anatomy of his GI tract.    I have spent over 30 minutes with him face to face in the office today discussing his options and details of the surgery. We have seen an animation of the surgery on the computer that illustrates how the operation is done and how the anatomy will be altered with the procedure. Over 50% of this was coordinating care.    I have used the MBSAQIP bariatric surgical risk/benefit calculator and we have discussed the results as part of the preop education.  I have discussed and educated the patient with regards to the components of our multidisciplinary program and the importance of compliance and follow up in the post operative period.    He was given the opportunity to ask questions and I have answered all of them.    The patient was also instructed with regards to the importance of behavior modification, nutritional counseling, support meeting attendance and lifestyle changes that are important to ensure success.    Although  there is a great statistical chance of improvement or even resolution of most of his associated comorbidities, the results vary from patient to patient and they largely depend on his commitment and compliance.     I have reviewed instructions for stopping or tapering anti-obesity medications prior to surgery.      I have encouraged him to lose some weight prior to the operation.        Dane Gonzalez MD  3/18/2025  1:52 PM

## 2025-03-18 NOTE — PROGRESS NOTES
- Height/Weight:  73 in , 274.5 lb  - BMI:   36.2  - Medical conditions related to obesity:  Hypertension , GERD on meds   - Does the patient smoke/vape (nicotine, marijuana, hookah, etc): yes smoke  cigarettes.  - StopBAN/8   - Does the patient currently take a weight loss medication: no

## 2025-03-18 NOTE — ASSESSMENT & PLAN NOTE
I have discussed with the patient that there is a significant chance of improvement or even resolution of this condition after metabolic/bariatric surgery.  Continue management as per primary care team.

## 2025-03-19 DIAGNOSIS — F31.70 BIPOLAR DISORDER IN FULL REMISSION, MOST RECENT EPISODE UNSPECIFIED TYPE (HCC): ICD-10-CM

## 2025-03-24 DIAGNOSIS — F31.70 BIPOLAR DISORDER IN FULL REMISSION, MOST RECENT EPISODE UNSPECIFIED TYPE (HCC): ICD-10-CM

## 2025-03-24 RX ORDER — RISPERIDONE 0.5 MG/1
0.5 TABLET ORAL EVERY MORNING
Qty: 30 TABLET | Refills: 0 | OUTPATIENT
Start: 2025-03-24

## 2025-03-24 RX ORDER — RISPERIDONE 4 MG/1
4 TABLET ORAL
Qty: 30 TABLET | Refills: 0 | Status: SHIPPED | OUTPATIENT
Start: 2025-03-24 | End: 2025-03-26 | Stop reason: SDUPTHER

## 2025-03-24 RX ORDER — RISPERIDONE 0.5 MG/1
0.5 TABLET ORAL EVERY MORNING
Qty: 30 TABLET | Refills: 0 | Status: SHIPPED | OUTPATIENT
Start: 2025-03-24 | End: 2025-03-26

## 2025-03-25 ENCOUNTER — TELEPHONE (OUTPATIENT)
Age: 40
End: 2025-03-25

## 2025-03-26 ENCOUNTER — TELEMEDICINE (OUTPATIENT)
Dept: PSYCHIATRY | Facility: CLINIC | Age: 40
End: 2025-03-26
Payer: COMMERCIAL

## 2025-03-26 DIAGNOSIS — F41.1 GENERALIZED ANXIETY DISORDER: ICD-10-CM

## 2025-03-26 DIAGNOSIS — F33.9 RECURRENT MAJOR DEPRESSIVE DISORDER, REMISSION STATUS UNSPECIFIED (HCC): Primary | ICD-10-CM

## 2025-03-26 DIAGNOSIS — F31.70 BIPOLAR DISORDER IN FULL REMISSION, MOST RECENT EPISODE UNSPECIFIED TYPE (HCC): ICD-10-CM

## 2025-03-26 DIAGNOSIS — F90.2 ATTENTION DEFICIT HYPERACTIVITY DISORDER (ADHD), COMBINED TYPE: ICD-10-CM

## 2025-03-26 PROCEDURE — 90833 PSYTX W PT W E/M 30 MIN: CPT | Performed by: STUDENT IN AN ORGANIZED HEALTH CARE EDUCATION/TRAINING PROGRAM

## 2025-03-26 PROCEDURE — 99214 OFFICE O/P EST MOD 30 MIN: CPT | Performed by: STUDENT IN AN ORGANIZED HEALTH CARE EDUCATION/TRAINING PROGRAM

## 2025-03-26 RX ORDER — DEXTROAMPHETAMINE SULFATE, DEXTROAMPHETAMINE SACCHARATE, AMPHETAMINE SULFATE AND AMPHETAMINE ASPARTATE 5; 5; 5; 5 MG/1; MG/1; MG/1; MG/1
20 CAPSULE, EXTENDED RELEASE ORAL EVERY MORNING
Qty: 30 CAPSULE | Refills: 0 | Status: CANCELLED | OUTPATIENT
Start: 2025-03-26

## 2025-03-26 RX ORDER — ALPRAZOLAM 0.5 MG
TABLET ORAL
Qty: 14 TABLET | Refills: 0 | OUTPATIENT
Start: 2025-03-26

## 2025-03-26 RX ORDER — DEXTROAMPHETAMINE SACCHARATE, AMPHETAMINE ASPARTATE, DEXTROAMPHETAMINE SULFATE AND AMPHETAMINE SULFATE 2.5; 2.5; 2.5; 2.5 MG/1; MG/1; MG/1; MG/1
10 TABLET ORAL
Qty: 30 TABLET | Refills: 0 | Status: SHIPPED | OUTPATIENT
Start: 2025-03-26 | End: 2025-04-07 | Stop reason: SDUPTHER

## 2025-03-26 RX ORDER — RISPERIDONE 4 MG/1
4 TABLET ORAL
Qty: 30 TABLET | Refills: 0 | Status: SHIPPED | OUTPATIENT
Start: 2025-03-26 | End: 2025-04-28 | Stop reason: SDUPTHER

## 2025-03-26 RX ORDER — ALPRAZOLAM 0.5 MG
TABLET ORAL
Qty: 60 TABLET | Refills: 0 | Status: SHIPPED | OUTPATIENT
Start: 2025-03-26 | End: 2025-04-28 | Stop reason: SDUPTHER

## 2025-03-26 RX ORDER — DEXTROAMPHETAMINE SULFATE, DEXTROAMPHETAMINE SACCHARATE, AMPHETAMINE SULFATE AND AMPHETAMINE ASPARTATE 5; 5; 5; 5 MG/1; MG/1; MG/1; MG/1
20 CAPSULE, EXTENDED RELEASE ORAL EVERY MORNING
Qty: 30 CAPSULE | Refills: 0 | Status: SHIPPED | OUTPATIENT
Start: 2025-03-26 | End: 2025-04-07 | Stop reason: SDUPTHER

## 2025-03-27 ENCOUNTER — DOCUMENTATION (OUTPATIENT)
Dept: GASTROENTEROLOGY | Facility: CLINIC | Age: 40
End: 2025-03-27

## 2025-03-27 NOTE — PROGRESS NOTES
Sample given to pt Clenpiq 10mg/3.5g/12g per 175ml bottle    Take 2 days before Colonoscopy and again right before taking Clempiq    Lot3 [id number]  Exp. Date 2026-May-31

## 2025-04-07 RX ORDER — DEXTROAMPHETAMINE SULFATE, DEXTROAMPHETAMINE SACCHARATE, AMPHETAMINE SULFATE AND AMPHETAMINE ASPARTATE 5; 5; 5; 5 MG/1; MG/1; MG/1; MG/1
20 CAPSULE, EXTENDED RELEASE ORAL EVERY MORNING
Qty: 30 CAPSULE | Refills: 0 | Status: SHIPPED | OUTPATIENT
Start: 2025-04-07

## 2025-04-07 RX ORDER — DEXTROAMPHETAMINE SACCHARATE, AMPHETAMINE ASPARTATE, DEXTROAMPHETAMINE SULFATE AND AMPHETAMINE SULFATE 2.5; 2.5; 2.5; 2.5 MG/1; MG/1; MG/1; MG/1
10 TABLET ORAL
Qty: 30 TABLET | Refills: 0 | Status: SHIPPED | OUTPATIENT
Start: 2025-04-07

## 2025-04-07 NOTE — PSYCH
MEDICATION MANAGEMENT NOTE        [facility]      Name and Date of Birth:  Benjamin Muñiz 40 y.o. 1985 MRN: [id number]    Date of Visit: April 7, 2025    Reason for Visit: Follow-up visit for medication management     Administrative Statements   Encounter provider Ambreen Johnson DO    The Patient is located at Home and in the following state in which I hold an active license [place].    The patient was identified by name and date of birth. Benjamin Muñiz was informed that this is a telemedicine visit and that the visit is being conducted through the Epic Embedded platform. He agrees to proceed..  My office door was closed. No one else was in the room.  He acknowledged consent and understanding of privacy and security of the video platform. The patient has agreed to participate and understands they can discontinue the visit at any time.    I have spent a total time of 28 minutes in caring for this patient on the day of the visit/encounter including Diagnostic results, Prognosis, Risks and benefits of tx options, Instructions for management, Patient and family education, Importance of tx compliance, Risk factor reductions, Impressions, Counseling / Coordination of care, Documenting in the medical record, Reviewing/placing orders in the medical record (including tests, medications, and/or procedures), and Obtaining or reviewing history  , not including the time spent for establishing the audio/video connection.       SUBJECTIVE:    Benjamin Muñiz is a 40 y.o. male with past psychiatric history significant for MDD, HERI, PTSD, ADHD who was personally seen and evaluated today at the [facility] outpatient clinic for follow-up and medication management. Benjamin denies SI, HI, AVH, delusions, gibson since his last appointment.  After discussion of risks, benefits, potential side effects, alternatives, we will discontinue half a milligram Risperdal dosage,  "substitute alprazolam for clonazepam for patient's occasional panic attacks.  Otherwise, he denies acute mental complaints or concerns at this time      Current Rating Scores:     None completed today.    Review Of Systems:      Constitutional negative   ENT negative   Cardiovascular negative   Respiratory negative   Gastrointestinal negative   Genitourinary negative   Musculoskeletal negative   Integumentary negative   Neurological negative   Endocrine negative   Other Symptoms none, all other systems are negative       Past Psychiatric History: (unchanged information from previous note copied and italicized) - Information that is bolded has been updated.     See intake    Medication trials: Wellbutrin discontinued secondary to side effects.    Substance Abuse History: (unchanged information from previous note copied and italicized) - Information that is bolded has been updated.     See intake    Social History: (unchanged information from previous note copied and italicized) - Information that is bolded has been updated.     See intake    Traumatic History: (unchanged information from previous note copied and italicized) - Information that is bolded has been updated.     See intake      Past Medical History:    Past Medical History:   Diagnosis Date    ADD (attention deficit disorder)     Anesthesia complication     \"Takes a lot to get me down\"    Arthritis     Chronic pain     Chronic pain disorder     Depression     GERD (gastroesophageal reflux disease)     Heartburn     Hypertension     Mood disorder (HCC)     Tremor     Uncomplicated alcohol dependence ([facility]) 01/09/2024        Past Surgical History:   Procedure Laterality Date    CLOSED REDUCTION CALCANEAL FRACTURE      COLONOSCOPY      PERONEAL TENDON EXPLORATION      [place] PADILLA FACETECTOMY & FORAMOTOMY 1 VRT SGM LUMBAR Right 02/06/2024    Procedure: RIGHT L4-5 MIS FORAMINOTOMY;  Surgeon: Al Slade MD;  Location:  MAIN OR;  Service: Neurosurgery    [facility] [place] GASTROINTESTINAL ENDOSCOPY       No Known Allergies    Substance Abuse History:    Social History     Substance and Sexual Activity   Alcohol Use Yes    Alcohol/week: 10.0 standard drinks of alcohol    Types: 10 Cans of beer per week    Comment: social     Social History     Substance and Sexual Activity   Drug Use Never       Social History:    Social History     Socioeconomic History    Marital status: /Civil Union     Spouse name: Not on file    Number of children: Not on file    Years of education: Not on file    Highest education level: Not on file   Occupational History    Not on file   Tobacco Use    Smoking status: Every Day     Current packs/day: 1.00     Average packs/day: 1 pack/day for 26.3 years (25.5 ttl pk-yrs)     Types: Cigarettes, Pipe     Start date: 1999    Smokeless tobacco: Never   Vaping Use    Vaping status: Never Used   Substance and Sexual Activity    Alcohol use: Yes     Alcohol/week: 10.0 standard drinks of alcohol     Types: 10 Cans of beer per week     Comment: social    Drug use: Never    Sexual activity: Yes     Partners: Female     Birth control/protection: I.U.D.   Other Topics Concern    Not on file   Social History Narrative    Lives with wife and step daughter in [place]    Sees dentist occasionally    No living will     Social Drivers of Health     Financial Resource Strain: Not on file   Food Insecurity: No Food Insecurity (1/3/2025)    Nursing - Inadequate Food Risk Classification     Worried About Running Out of Food in the Last Year: Not on file     Ran Out of Food in the Last Year: Not on file     Ran Out of Food in the Last Year: Never true   Transportation Needs: No Transportation Needs (1/3/2025)    Nursing - Transportation Risk Classification     Lack of Transportation: Not on file     Lack of Transportation: No   Physical Activity: Not on file   Stress: Not on file   Social Connections: Not on file   Intimate Partner Violence: Unknown (1/3/2025)    Nursing  "IPS     Feels Physically and Emotionally Safe: Not on file     Physically Hurt by Someone: Not on file     Humiliated or Emotionally Abused by Someone: Not on file     Physically Hurt by Someone: No     Hurt or Threatened by Someone: No   Housing Stability: Unknown (1/3/2025)    Nursing: Inadequate Housing Risk Classification     Has Housing: Not on file     Worried About Losing Housing: Not on file     Unable to Get Utilities: Not on file     Unable to Pay for Housing in the Last Year: No     Has Housin       Family Psychiatric History:     Family History   Problem Relation Age of Onset    Breast cancer Mother     Heart disease Father     Hypertension Father     Intellectual disability Sister     Heart disease Maternal Grandfather     Parkinsonism Paternal Grandfather     Mental illness Neg Hx     Colon cancer Neg Hx     Colon polyps Neg Hx     Substance Abuse Neg Hx     Seizures Neg Hx        History Review: The following portions of the patient's history were reviewed and updated as appropriate: allergies, current medications, past family history, past medical history, past social history, past surgical history, and problem list.         OBJECTIVE:     Vital signs in last 24 hours:    There were no vitals filed for this visit.    Mental Status Evaluation:    Appearance age appropriate, casually dressed   Behavior Cooperative, mildly anxious   Speech normal rate, normal volume, normal pitch   Mood \"Okay\"   Affect Constricted   Thought Processes organized, goal directed   Associations intact associations   Thought Content no overt delusions   Perceptual Disturbances: no auditory hallucinations, no visual hallucinations   Abnormal Thoughts  Risk Potential Suicidal ideation - None at present  Homicidal ideation - None at present  Potential for aggression - Not at present   Orientation oriented to person, place, time/date, and situation   Memory recent and remote memory grossly intact   Consciousness alert and " awake   Attention Span Concentration Span attention span and concentration are age appropriate   Intellect appears to be of average intelligence   Insight intact   Judgement intact   Muscle Strength and  Gait unable to assess today due to virtual visit   Motor activity unable to assess today due to virtual visit   Language no difficulty naming common objects, no difficulty repeating a phrase, no difficulty writing a sentence   Fund of Knowledge adequate knowledge of current events  adequate fund of knowledge regarding past history  adequate fund of knowledge regarding vocabulary    Pain none   Pain Scale Did not ask patient to formally rate       Laboratory Results: I have personally reviewed all pertinent laboratory/tests results    Recent Labs (last 2 months):   No visits with results within 2 Month(s) from this visit.   Latest known visit with results is:   Appointment on 01/10/2025   Component Date Value    Sodium 01/10/2025 137     Potassium 01/10/2025 3.8     Chloride 01/10/2025 106     CO2 01/10/2025 24     ANION GAP 01/10/2025 7     BUN 01/10/2025 16     Creatinine 01/10/2025 1.16     Glucose 01/10/2025 106     Calcium 01/10/2025 8.6     AST 01/10/2025 20     ALT 01/10/2025 17     Alkaline Phosphatase 01/10/2025 70     Total Protein 01/10/2025 6.6     Albumin 01/10/2025 3.9     Total Bilirubin 01/10/2025 0.40     eGFR 01/10/2025 77        Suicide/Homicide Risk Assessment:    Risk of Harm to Self:  The following ratings are based on assessment at the time of the interview  Historical Risk Factors include: chronic psychiatric problems  Protective Factors: no current suicidal ideation, access to mental health treatment, compliant with medications, compliant with mental health treatment, having a desire to be alive, responsibilities and duties to others, strong relationships    Risk of Harm to Others:  The following ratings are based on assessment at the time of the interview  Historical Risk Factors include:  none.  Protective Factors: no current homicidal ideation, access to mental health treatment, compliant with medications, compliant with mental health treatment, responsibilities and duties to others    The following interventions are recommended: continue medication management, contracts for safety at present - agrees to go to ED if feeling unsafe, contracts for safety at present - agrees to call Crisis Intervention Service if feeling unsafe      Lethality Statement:    Based on today's assessment and clinical criteria, Benjamin Muñiz contracts for safety and is not an imminent risk of harm to self or others. Outpatient level of care is deemed appropriate at this current time. Benjamin understands that if they can no longer contract for safety, they need to call the office or report to their nearest Emergency Room for immediate evaluation. They voiced understanding and agreement to call 911 or head to the nearest ED should they have any physical or mental decompensation whatsoever.       Assessment/Plan:     1.)  HERI  2.)  PTSD  3.)  ADHD  4.)  MDD, recurrent, mild    After discussion of risks, benefits, potential side effects, alternatives, we will continue risperidone 4 mg nightly without the half a milligram additional dosage, Adderall XR 20 in the morning, immediate release 10 mg in the afternoon, alprazolam 0.5 mg up to 1 mg/day as needed for severe anxiety or panic attack.  He denies acute mental complaints or concerns at this time        Aware of 24 hour and weekend coverage for urgent situations accessed by calling [facility] main practice number    Medications Risks/Benefits      Risks, Benefits And Possible Side Effects Of Medications:    Risks, benefits, and possible side effects of medications explained to Benjamin and he verbalizes understanding and agreement for treatment.    Controlled Medication Discussion:     Benjamin has been filling controlled prescriptions on time as  prescribed according to [place] Prescription Drug Monitoring Program    Psychotherapy Provided:     Individual psychotherapy provided: Crisis/safety plan discussed with Benjamin. Provided at least 16 minutes of distinct psychotherapy using a combination of supportive, interpersonal, motivational, and problem solving approaches to address psychological distress and enhance coping strategies.     Treatment Plan:    Completed and signed during the session:  We will complete at next appointment due to lack of time today      Visit Time    Visit Start Time: 10:30 AM  Visit Stop Time: 10:58 AM  Total Visit Duration:  28 minutes     The total visit duration detailed above includes: patient engagement, medication management, psychotherapy/counseling, discussion regarding treatment goals, documentation, review of past medical records, and coordination of care.      Note Share Disclaimer:     This note was not shared with the patient due to reasonable likelihood of causing patient harm      Ambreen Johnson DO  Psychiatry  04/07/25

## 2025-04-08 ENCOUNTER — TELEPHONE (OUTPATIENT)
Dept: GASTROENTEROLOGY | Facility: CLINIC | Age: 40
End: 2025-04-08

## 2025-04-08 NOTE — TELEPHONE ENCOUNTER
Procedure confirmed  Colonoscopy/Endoscopy     Via: [facility]    Instructions given: MyChart     Prep Given: Clenpiq    Call the office if there are any questions.

## 2025-04-11 ENCOUNTER — APPOINTMENT (OUTPATIENT)
Dept: LAB | Facility: HOSPITAL | Age: 40
End: 2025-04-11
Payer: COMMERCIAL

## 2025-04-11 ENCOUNTER — OFFICE VISIT (OUTPATIENT)
Dept: FAMILY MEDICINE CLINIC | Facility: HOSPITAL | Age: 40
End: 2025-04-11
Payer: COMMERCIAL

## 2025-04-11 VITALS
HEIGHT: 73 IN | OXYGEN SATURATION: 99 % | SYSTOLIC BLOOD PRESSURE: 112 MMHG | BODY MASS INDEX: 36.84 KG/M2 | WEIGHT: 278 LBS | DIASTOLIC BLOOD PRESSURE: 70 MMHG | HEART RATE: 112 BPM | TEMPERATURE: 97.4 F | RESPIRATION RATE: 16 BRPM

## 2025-04-11 DIAGNOSIS — E78.2 MIXED HYPERLIPIDEMIA: ICD-10-CM

## 2025-04-11 DIAGNOSIS — I10 PRIMARY HYPERTENSION: ICD-10-CM

## 2025-04-11 DIAGNOSIS — I10 PRIMARY HYPERTENSION: Primary | ICD-10-CM

## 2025-04-11 DIAGNOSIS — F17.210 CIGARETTE NICOTINE DEPENDENCE WITHOUT COMPLICATION: ICD-10-CM

## 2025-04-11 DIAGNOSIS — E29.1 HYPOGONADISM IN MALE: ICD-10-CM

## 2025-04-11 DIAGNOSIS — R73.01 IMPAIRED FASTING GLUCOSE: ICD-10-CM

## 2025-04-11 DIAGNOSIS — E87.1 HYPONATREMIA: ICD-10-CM

## 2025-04-11 LAB
ALBUMIN SERPL BCG-MCNC: 3.9 G/DL (ref 3.5–5)
ALP SERPL-CCNC: 59 U/L (ref 34–104)
ALT SERPL W P-5'-P-CCNC: 12 U/L (ref 7–52)
ANION GAP SERPL CALCULATED.3IONS-SCNC: 7 MMOL/L (ref 4–13)
AST SERPL W P-5'-P-CCNC: 22 U/L (ref 13–39)
BILIRUB SERPL-MCNC: 0.45 MG/DL (ref 0.2–1)
BUN SERPL-MCNC: 7 MG/DL (ref 5–25)
CALCIUM SERPL-MCNC: 8.9 MG/DL (ref 8.4–10.2)
CHLORIDE SERPL-SCNC: 106 MMOL/L (ref 96–108)
CHOLEST SERPL-MCNC: 123 MG/DL (ref ?–200)
CO2 SERPL-SCNC: 24 MMOL/L (ref 21–32)
CREAT SERPL-MCNC: 0.84 MG/DL (ref 0.6–1.3)
ERYTHROCYTE [DISTWIDTH] IN BLOOD BY AUTOMATED COUNT: 17.5 % (ref 11.6–15.1)
GFR SERPL CREATININE-BSD FRML MDRD: 107 ML/MIN/1.73SQ M
GLUCOSE P FAST SERPL-MCNC: 78 MG/DL (ref 65–99)
HCT VFR BLD AUTO: 38.5 % (ref 36.5–49.3)
HDLC SERPL-MCNC: 33 MG/DL
HGB BLD-MCNC: 11.5 G/DL (ref 12–17)
LDLC SERPL CALC-MCNC: 53 MG/DL (ref 0–100)
MCH RBC QN AUTO: 24.7 PG (ref 26.8–34.3)
MCHC RBC AUTO-ENTMCNC: 29.9 G/DL (ref 31.4–37.4)
MCV RBC AUTO: 83 FL (ref 82–98)
NONHDLC SERPL-MCNC: 90 MG/DL
PLATELET # BLD AUTO: 176 THOUSANDS/UL (ref 149–390)
PMV BLD AUTO: 10.4 FL (ref 8.9–12.7)
POTASSIUM SERPL-SCNC: 4.5 MMOL/L (ref 3.5–5.3)
PROT SERPL-MCNC: 6.6 G/DL (ref 6.4–8.4)
PSA SERPL-MCNC: 0.4 NG/ML (ref 0–4)
RBC # BLD AUTO: 4.65 MILLION/UL (ref 3.88–5.62)
SODIUM SERPL-SCNC: 137 MMOL/L (ref 135–147)
TRIGL SERPL-MCNC: 184 MG/DL (ref ?–150)
WBC # BLD AUTO: 5.45 THOUSAND/UL (ref 4.31–10.16)

## 2025-04-11 PROCEDURE — 85027 COMPLETE CBC AUTOMATED: CPT

## 2025-04-11 PROCEDURE — 84403 ASSAY OF TOTAL TESTOSTERONE: CPT

## 2025-04-11 PROCEDURE — 84402 ASSAY OF FREE TESTOSTERONE: CPT

## 2025-04-11 PROCEDURE — 36415 COLL VENOUS BLD VENIPUNCTURE: CPT

## 2025-04-11 PROCEDURE — 99213 OFFICE O/P EST LOW 20 MIN: CPT | Performed by: INTERNAL MEDICINE

## 2025-04-11 PROCEDURE — 80061 LIPID PANEL: CPT

## 2025-04-11 PROCEDURE — 84153 ASSAY OF PSA TOTAL: CPT

## 2025-04-11 RX ORDER — AZITHROMYCIN 250 MG/1
TABLET, FILM COATED ORAL
COMMUNITY
Start: 2025-04-10

## 2025-04-11 RX ORDER — NICOTINE 21 MG/24HR
1 PATCH, TRANSDERMAL 24 HOURS TRANSDERMAL EVERY 24 HOURS
Qty: 30 PATCH | Refills: 3 | Status: SHIPPED | OUTPATIENT
Start: 2025-04-11

## 2025-04-11 NOTE — ASSESSMENT & PLAN NOTE
Patient had hyponatremia in January that caused seizure.  Patient's sodium was normal at 137 in April.  Will monitor sodium.

## 2025-04-11 NOTE — ASSESSMENT & PLAN NOTE
Patient is hyperlipidemia.  His LDL was well-controlled today at 53.  LFTs were normal today.  Continue rosuvastatin

## 2025-04-11 NOTE — PROGRESS NOTES
"Name: Benjamin Muñiz      : 1985      MRN: [id number]  Encounter Provider: Ever Tolliver MD  Encounter Date: 2025   Encounter department: [facility] [address]  :  Assessment & Plan  Primary hypertension  He has hypertension.  Blood pressure is controlled today.  His electrolytes and renal function were normal today.  Continue losartan.    Patient is tachycardic today.  He is asymptomatic.  He feels that his heart rate is always higher in the office.  I will check his electrolytes, renal function and thyroid function in   Orders:  •  Comprehensive metabolic panel; Future  •  CBC and Platelet; Future  •  TSH, 3rd generation with Free T4 reflex; Future    Hyponatremia  Patient had hyponatremia in January that caused seizure.  Patient's sodium was normal at 137 in April.  Will monitor sodium.       Cigarette nicotine dependence without complication  He is under a lot of stress, we discussed smoking cessation.  He will try nicotine 21 mg patch for now  Orders:  •  nicotine (NICODERM CQ) 21 mg/24 hr TD 24 hr patch; Place 1 patch on the skin over 24 hours every 24 hours    Mixed hyperlipidemia  Patient is hyperlipidemia.  His LDL was well-controlled today at 53.  LFTs were normal today.  Continue rosuvastatin       Impaired fasting glucose    Orders:  •  Hemoglobin A1C; Future           History of Present Illness   HPI  Review of Systems    Objective   /70 (BP Location: Left arm, Patient Position: Sitting)   Pulse (!) 112   Temp (!) 97.4 °F (36.3 °C) (Tympanic)   Resp 16   Ht 6' 1\" (1.854 m)   Wt 126 kg (278 lb)   SpO2 99%   BMI 36.68 kg/m²      Physical Exam  Constitutional:       General: He is not in acute distress.     Appearance: He is obese. He is not toxic-appearing.   Eyes:      Conjunctiva/sclera: Conjunctivae normal.   Cardiovascular:      Rate and Rhythm: Regular rhythm. Tachycardia present.      Heart sounds: No murmur heard.  Pulmonary:      Effort: No " respiratory distress.      Breath sounds: No wheezing or rales.   Abdominal:      General: Bowel sounds are normal.      Palpations: Abdomen is soft.   Skin:     General: Skin is warm and dry.   Neurological:      Mental Status: He is alert.

## 2025-04-11 NOTE — ASSESSMENT & PLAN NOTE
He is under a lot of stress, we discussed smoking cessation.  He will try nicotine 21 mg patch for now  Orders:  •  nicotine (NICODERM CQ) 21 mg/24 hr TD 24 hr patch; Place 1 patch on the skin over 24 hours every 24 hours

## 2025-04-11 NOTE — ASSESSMENT & PLAN NOTE
He has hypertension.  Blood pressure is controlled today.  His electrolytes and renal function were normal today.  Continue losartan.    Patient is tachycardic today.  He is asymptomatic.  He feels that his heart rate is always higher in the office.  I will check his electrolytes, renal function and thyroid function in June  Orders:  •  Comprehensive metabolic panel; Future  •  CBC and Platelet; Future  •  TSH, 3rd generation with Free T4 reflex; Future

## 2025-04-12 LAB
TESTOST FREE SERPL-MCNC: 40.5 PG/ML (ref 6.8–21.5)
TESTOST SERPL-MCNC: >1500 NG/DL (ref 264–916)

## 2025-04-14 ENCOUNTER — RESULTS FOLLOW-UP (OUTPATIENT)
Dept: UROLOGY | Facility: HOSPITAL | Age: 40
End: 2025-04-14

## 2025-04-14 DIAGNOSIS — E29.1 HYPOGONADISM IN MALE: Primary | ICD-10-CM

## 2025-04-14 NOTE — TELEPHONE ENCOUNTER
Patient's most recent total testosterone level from 4/11/25 returned significantly elevated at >1500.  Previous testosterone ranged between 200-500.  Recommend repeating his testosterone within the next few weeks to determine if this was an error or if his testosterone dosage needs to be decreased substantially.      Remind the patient that testosterone labs are most accurate when collected at or before 9 AM and should be completed after between injection days.

## 2025-04-15 ENCOUNTER — CLINICAL SUPPORT (OUTPATIENT)
Dept: BARIATRICS | Facility: CLINIC | Age: 40
End: 2025-04-15

## 2025-04-15 DIAGNOSIS — E66.812 OBESITY, CLASS II, BMI 35-39.9: Primary | ICD-10-CM

## 2025-04-15 PROCEDURE — RECHECK

## 2025-04-15 NOTE — PROGRESS NOTES
Spoke to patient on the phone:    Patient is interested in the bariatric surgical process. Patient qualifies for surgery with current comorbidities and BMI. Discussed the entire surgical workup process and all requirements of [facility] program and patient's insurance. Answered all questions at the time of the phone call. All SW/RD questions redirected to the next appointment, the 2-hour evaluation.    Patient has been informed to contact insurance to verify there is Bariatric surgery  coverage written on the policy prior next appointment. Also to discuss of any out of pocket expense if insurance does not cover at 100%      Patient verbalized understanding of the surgical process at [facility] and had no further questions at this time.

## 2025-04-15 NOTE — TELEPHONE ENCOUNTER
Called Benjamin back and notifie him that his  testosterone level from 4/11/25 returned significantly elevated at >1500.  Previous testosterone ranged between 200-500.  Recommend repeating his testosterone within the next few weeks to determine if this was an error or if his testosterone dosage needs to be decreased substantially.       Remind the patient that testosterone labs are most accurate when collected at or before 9 AM and should be completed after between injection days.  He states he had just given himself the injection the night before he went for testing He will repeat testosterone

## 2025-04-16 DIAGNOSIS — F43.10 PTSD (POST-TRAUMATIC STRESS DISORDER): ICD-10-CM

## 2025-04-16 DIAGNOSIS — N52.9 ERECTILE DYSFUNCTION, UNSPECIFIED ERECTILE DYSFUNCTION TYPE: ICD-10-CM

## 2025-04-16 DIAGNOSIS — F41.1 GENERALIZED ANXIETY DISORDER: ICD-10-CM

## 2025-04-16 RX ORDER — HYDROXYZINE HYDROCHLORIDE 50 MG/1
50 TABLET, FILM COATED ORAL EVERY 6 HOURS PRN
Qty: 90 TABLET | Refills: 0 | Status: SHIPPED | OUTPATIENT
Start: 2025-04-16

## 2025-04-16 RX ORDER — TADALAFIL 5 MG/1
5 TABLET ORAL DAILY PRN
Qty: 30 TABLET | Refills: 0 | Status: SHIPPED | OUTPATIENT
Start: 2025-04-16

## 2025-04-16 NOTE — TELEPHONE ENCOUNTER
Refill cannot be delegated  1 [id number] 04/07/2025 03/26/2025 Amphetamine Salt Combo (Tablet) 30.0 30 10 MG NA [facility] [place] 0 / 0 [place]   1 [id number] 04/07/2025 03/26/2025 Adderall Xr (Capsule, Extended Release) 30.0 30 20 MG NA [facility] [place] / 0 [place]   2 [id number] 04/04/2025 02/13/2025 Zolpidem Tartrate (Tablet) 30.0 30 10 MG NA JOSE ENRIQUE NOLASCO [facility] [place] 1 / 2 [place]   1 [id number] 04/02/2025 04/01/2025 OxyCONTIN (Tablet, Extended Release) 90.0 30 15 MG 67.50 [facility] Workers [place] / 0 [place]   1 [id number] 04/01/2025 04/01/2025 oxyCODONE HYDROCHLORIDE 5 MG ORAL TABLET/ACETAMINOPHEN 325 MG (Tablet) 180.0 30 325 MG-5 MG 45.0 [facility] [place] / 0 [place]   1 [id number] 03/26/2025 03/26/2025 ALPRAZolam (Tablet) 60.0 30 0.5 MG NA [facility] [place] / 0 [place]   1 [id number] 03/21/2025 02/14/2025 Zolpidem Tartrate (Tablet) 30.0 30 10 MG NA JOSE ENRIQUE NOLASCO [facility] Commercial Insurance 1 / 2 [place]   1 [id number] 03/18/2025 03/18/2025 ALPRAZolam (Tablet) 14.0 7 0.5 MG [facility] Commercial [place] / 0 [place]   1 [id number] 03/15/2025 03/04/2025 Testosterone Cypionate (Oil) 10.0 140 200 MG/1 ML NA [facility] [place] / 0 [place]   1 [id number] 03/08/2025 01/31/2025 Amphetamine Salt Combo (Tablet) 30.0 30 10 MG NA FLORECITA EUGENE [facility] Commercial Insurance 0 / 0

## 2025-04-16 NOTE — TELEPHONE ENCOUNTER
Patient called refill line to check status of refill, advised in process pending approval  He reminded that he is out of medication - I confirmed HP status has been requested.

## 2025-04-16 NOTE — TELEPHONE ENCOUNTER
Reason for call:   [x] Refill   [] Prior Auth  [] Other:     Office:   [] PCP/Provider -   [x] [facility]     Medication:  ALPRAZolam (XANAX) 0.5 mg tablet    Dose/Frequency:  May take up to 1 mg or 2 tablets of 0.5 mg daily as needed for severe anxiety or panic attack     Quantity: 60    Pharmacy: [facility]/pharmacy #[id number] - [place], [place] - [address] [facility]   Does the patient have enough for 3 days?   [] Yes   [x] No - Send as HP to POD    Mail Away Pharmacy   Does the patient have enough for 10 days?   [] Yes   [] No - Send as HP to POD

## 2025-04-16 NOTE — TELEPHONE ENCOUNTER
Reason for call:   [x] Refill   [] Prior Auth  [] Other:     Office:   [] PCP/Provider -   [x] Specialty/Provider -     Medication:  hydrOXYzine HCL (ATARAX) 50 mg tablet    Dose/Frequency: Take 1 tablet (50 mg total) by mouth every 6 (six) hours as needed for anxiety for up to 90 doses,     Quantity: 90    Pharmacy: [facility]/pharmacy #[id number] - [place], [place] - [address] [facility] [address] [facility]   Does the patient have enough for 3 days?   [] Yes   [x] No - Send as HP to POD    Mail Away Pharmacy   Does the patient have enough for 10 days?   [] Yes   [] No - Send as HP to POD

## 2025-04-17 RX ORDER — ALPRAZOLAM 0.5 MG
TABLET ORAL
Qty: 60 TABLET | Refills: 0 | OUTPATIENT
Start: 2025-04-17

## 2025-04-17 NOTE — TELEPHONE ENCOUNTER
Hello, looks like according to the directions of the medications I dispensed he should not be out of the medication.  He received 60 tablets with directions that he can take up to 2/day.  Naturally, this should have lasted him according to the PDMP until 4/26.  It looks like he may have overused this medication.  The only way this could get refilled is when that 30-day timer draws to a close given that his last supply was slated to last 30 days

## 2025-04-18 ENCOUNTER — TELEPHONE (OUTPATIENT)
Age: 40
End: 2025-04-18

## 2025-04-18 NOTE — TELEPHONE ENCOUNTER
Spoke to Benjamin - he said has a few left to get him by. He said for a few days he took an extra one due to increased anxiety. I stressed importance of taking medication as prescribed and contacting his provider first to discuss medication adjustments. I told him he will have to wait until medication is due to get a refill. He verbalized understanding. Nothing further needed.

## 2025-04-22 ENCOUNTER — TELEPHONE (OUTPATIENT)
Dept: PSYCHIATRY | Facility: CLINIC | Age: 40
End: 2025-04-22

## 2025-04-22 NOTE — TELEPHONE ENCOUNTER
LVM regarding issue in regards to secure chat.  Asked that client return call back to me directly.

## 2025-04-25 DIAGNOSIS — F31.70 BIPOLAR DISORDER IN FULL REMISSION, MOST RECENT EPISODE UNSPECIFIED TYPE (HCC): ICD-10-CM

## 2025-04-28 DIAGNOSIS — F41.1 GENERALIZED ANXIETY DISORDER: ICD-10-CM

## 2025-04-28 DIAGNOSIS — F31.70 BIPOLAR DISORDER IN FULL REMISSION, MOST RECENT EPISODE UNSPECIFIED TYPE (HCC): ICD-10-CM

## 2025-04-28 NOTE — TELEPHONE ENCOUNTER
Reason for call:   [x] Refill   [] Prior Auth  [] Other:     Office:   [] PCP/Provider -   [x] Specialty/Provider -     Medication: ALPRAZolam (XANAX) 0.5 mg tablet     Dose/Frequency: May take up to 1 mg or 2 tablets of 0.5 mg daily as needed for severe anxiety or panic attack.     Quantity: 60    Pharmacy:   [facility]/pharmacy #[address] [place], [place] [address] [address] [facility]   Does the patient have enough for 3 days?   [] Yes   [x] No - Send as HP to POD    Mail Away Pharmacy   Does the patient have enough for 10 days?   [] Yes   [] No - Send as HP to POD    Reason for call:   [x] Refill   [] Prior Auth  [] Other:     Office:   [] PCP/Provider -   [x] Specialty/Provider -     Medication:  risperiDONE (RisperDAL) 4 mg tablet    Dose/Frequency: Take 1 tablet (4 mg total) by mouth daily at bedtime     Quantity: 30    Pharmacy:   [facility]/pharmacy #[id number] [place], [place] [id number] [facility]   Does the patient have enough for 3 days?   [x] Yes   [] No - Send as HP to POD    Mail Away Pharmacy   Does the patient have enough for 10 days?   [] Yes   [] No - Send as HP to POD

## 2025-04-29 RX ORDER — ALPRAZOLAM 0.5 MG
TABLET ORAL
Qty: 60 TABLET | Refills: 0 | Status: SHIPPED | OUTPATIENT
Start: 2025-04-29

## 2025-04-29 RX ORDER — RISPERIDONE 4 MG/1
4 TABLET ORAL
Qty: 30 TABLET | Refills: 0 | Status: SHIPPED | OUTPATIENT
Start: 2025-04-29

## 2025-04-30 ENCOUNTER — APPOINTMENT (OUTPATIENT)
Dept: LAB | Facility: HOSPITAL | Age: 40
End: 2025-04-30
Payer: COMMERCIAL

## 2025-04-30 DIAGNOSIS — E29.1 HYPOGONADISM IN MALE: ICD-10-CM

## 2025-04-30 PROCEDURE — 84402 ASSAY OF FREE TESTOSTERONE: CPT

## 2025-04-30 PROCEDURE — 36415 COLL VENOUS BLD VENIPUNCTURE: CPT

## 2025-04-30 PROCEDURE — 84403 ASSAY OF TOTAL TESTOSTERONE: CPT

## 2025-05-01 ENCOUNTER — TELEMEDICINE (OUTPATIENT)
Dept: BEHAVIORAL/MENTAL HEALTH CLINIC | Facility: CLINIC | Age: 40
End: 2025-05-01

## 2025-05-01 DIAGNOSIS — F31.70 BIPOLAR DISORDER IN FULL REMISSION, MOST RECENT EPISODE UNSPECIFIED TYPE (HCC): Primary | ICD-10-CM

## 2025-05-01 DIAGNOSIS — F43.10 PTSD (POST-TRAUMATIC STRESS DISORDER): ICD-10-CM

## 2025-05-01 DIAGNOSIS — F90.2 ATTENTION DEFICIT HYPERACTIVITY DISORDER (ADHD), COMBINED TYPE: ICD-10-CM

## 2025-05-01 NOTE — BH CRISIS PLAN
Client Name: Benjamin Muñiz       Client YOB: 1985    [place] Safety Plan      Creation Date: 5/1/25 Update Date: 5/1/25   Created By: Sharlene Hawley LCSW Last Updated By: Sharlene Hawley LCSW      Step 1: Warning Signs:   Warning Signs   Anger   Irritability   Essential Tremor            Step 2: Internal Coping Strategies:   Internal Coping Strategies   Take a break   Smoke a cigarette   Play with the dog            Step 3: People and social settings that provide distraction:   Name Contact Information   Erica Muñiz Number in phone    Places   Outside           Step 4: People whom I can ask for help during a crisis:      Name Contact Information    Erica Muñiz Number in Phone      Step 5: Professionals or agencies I can contact during a crisis:      Clinican/Agency Name Phone Emergency Contact    [facility] [phone]       [facility] Emergency Department Phone Emergency Department Address     911         Crisis Phone Numbers:   Suicide Prevention Lifeline: Call or Text  987 Crisis Text Line: Text HOME to 709-583   Please note: Some [facility] do not have a separate number for Child/Adolescent specific crisis. If your county is not listed under Child/Adolescent, please call the adult number for your county      Adult Crisis Numbers: Child/Adolescent Crisis Numbers   [facility]: [phone] [facility]: [phone]   [facility]: [phone] [facility]: [phone]   [facility]: [phone] [place]: [phone]   [facility]: [phone] [facility]: [phone]   Critical access hospital/[facility]: [phone]   [facility]: [phone]   [facility]: [phone]   [facility]: [phone] (daytime) [phone] (after hours, weekends, holidays)      Step 6: Making the environment safer (plan for lethal means safety):   Patient did not identify any lethal methods: Yes     Optional: What is most important  to me and worth living for?   My wife.      [place] Safety Plan. Mariela Calderon and Sravan Black. Used with permission of the authors.

## 2025-05-01 NOTE — BH TREATMENT PLAN
"Outpatient Behavioral Health Psychotherapy Treatment Plan    Benjamin Muñiz  1985     Date of Initial Psychotherapy Assessment: 5/1/2025   Date of Current Treatment Plan: 05/01/25  Treatment Plan Target Date: 11/1/2025  Treatment Plan Expiration Date: 11/1/2025    Diagnosis:   No diagnosis found.    Area(s) of Need: anxiety, coping skills, emotion regulation.     Long Term Goal 1 (in the client's own words): \"I want to work on my anxiety and getting out more\"    Stage of Change: Preparation    Target Date for completion: TBD     Anticipated therapeutic modalities: DBT, CBT, Mindfulness     People identified to complete this goal: Sharlene Suarez      Objective 1: Benjamin will learn 3 coping skills/distress tolerance skills      Objective 2: Benjamin will incorporate at least 1 mindfulness strategy into his daily routine.         I am currently under the care of a [facility] psychiatric provider: yes    My [facility] psychiatric provider is: Dr. Johnson    I am currently taking psychiatric medications: Yes, but not as prescribed. (explain) Ct reports some instances of running out of medications due to their PRN nature.     I feel that I will be ready for discharge from mental health care when I reach the following (measurable goal/objective):  I am able to better manage my anxiety    For children and adults who have a legal guardian:   Has there been any change to custody orders and/or guardianship status? NA. If yes, attach updated documentation.    I have created my Crisis Plan and have been offered a copy of this plan    Behavioral Health Treatment Plan [facility]: Diagnosis and Treatment Plan explained to Benjamin Muñiz Benjamin Muñiz acknowledges an understanding of their diagnosis. Benjamin Muñiz agrees to this treatment plan.    I have been offered a copy of this Treatment Plan. yes        "

## 2025-05-01 NOTE — PSYCH
"Virtual Regular VisitName: Benjamin Muñiz      : 1985      MRN: [id number]  Encounter Provider: Sharlene Hawley LCSW  Encounter Date: 2025   Encounter department: [facility] THERAPIST MENTAL HEALTH OUTPATIENT  :  Assessment & Plan  Bipolar disorder in full remission, most recent episode unspecified type (HCC)         Attention deficit hyperactivity disorder (ADHD), combined type         PTSD (post-traumatic stress disorder)             Goals addressed in session: Goal 1     DATA: Benjamin Muñiz is a 40 y.o. male presenting for initial evaluation for talk therapy. Benjamin reports significant anxiety r/t social gatherings, going out in public and work. Benjamin identifies difficulty with stopping or slowing anxiety after the onset of symptoms.   During this session, this clinician used the following therapeutic modalities: Engagement Strategies, Cognitive Behavioral Therapy, and Motivational Interviewing    Substance Abuse was not addressed during this session. If the client is diagnosed with a co-occurring substance use disorder, please indicate any changes in the frequency or amount of use: N/A. Stage of change for addressing substance use diagnoses: No substance use/Not applicable    ASSESSMENT:  Benjamin presents with a Euthymic/ normal mood. Benjamin's affect is Normal range and intensity, which is congruent, with their mood and the content of the session. The client has not made progress on their goals as evidenced by initial assessment.    Benjamin presents with a none risk of suicide, none risk of self-harm, and none risk of harm to others.    For any risk assessment that surpasses a \"low\" rating, a safety plan must be developed.    A safety plan was indicated: no  If yes, describe in detail N/A    PLAN: Between sessions, Benjamin will notice warning signs of increasing anxiety and identify other warning signs. At the next session, the therapist will use Cognitive Behavioral Therapy, " "Dialectical Behavior Therapy, Mindfulness-based Strategies, and Motivational Interviewing to address anxiety.    Behavioral Health Treatment Plan [facility]: Diagnosis and Treatment Plan explained to Benjamin, Benjamin relates understanding diagnosis and is agreeable to Treatment Plan. Yes     Depression Follow-up Plan Completed: Not applicable     Reason for visit is No chief complaint on file.     Recent Visits  No visits were found meeting these conditions.  Showing recent visits within past 7 days and meeting all other requirements  Today's Visits  Date Type Provider Dept   05/01/25 Telemedicine Sharlene Hawley LCSW [facility] Therapist [facility]   Showing today's visits and meeting all other requirements  Future Appointments  No visits were found meeting these conditions.  Showing future appointments within next 150 days and meeting all other requirements     History of Present Illness     HPI    Past Medical History   Past Medical History:   Diagnosis Date    ADD (attention deficit disorder)     Anesthesia complication     \"Takes a lot to get me down\"    Arthritis     Chronic pain     Chronic pain disorder     Depression     GERD (gastroesophageal reflux disease)     Heartburn     Hypertension     Mood disorder (HCC)     Tremor     Uncomplicated alcohol dependence (HCC) 01/09/2024     Past Surgical History:   Procedure Laterality Date    CLOSED REDUCTION CALCANEAL FRACTURE      COLONOSCOPY      PERONEAL TENDON EXPLORATION      NC PADILLA FACETECTOMY & FORAMOTOMY 1 VRT SGM LUMBAR Right 02/06/2024    Procedure: RIGHT L4-5 MIS FORAMINOTOMY;  Surgeon: Al Slade MD;  Location:  MAIN OR;  Service: Neurosurgery    UPPER GASTROINTESTINAL ENDOSCOPY       Current Outpatient Medications   Medication Instructions    ADDERALL XR, 20MG, 20 MG 24 hr capsule 20 mg, Oral, Every morning    ALPRAZolam (XANAX) 0.5 mg tablet May take up to 1 mg or 2 tablets of 0.5 mg daily as needed for severe anxiety or panic attack.  Never to " "exceed 2 tablets or 1 mg/day.    amphetamine-dextroamphetamine (ADDERALL) 10 mg tablet 10 mg, Oral, Daily after lunch    azithromycin (ZITHROMAX) 250 mg tablet     B-D HYPODERMIC NEEDLE 18GX1.5\" 18G X 1-1/2\" MISC Use as directed---for Testosterone    B-D SYRINGE LUER-MILAGROS 1CC 1 ML MISC USE FOR BI WEEKLY TESTOSTERONE INJECTIONS    B-D SYRINGE LUER-MILAGROS 3CC 3 ML MISC USE AS DIRECTED FOR TESTOSTERONE INJECTION    BD Hypodermic Needle 25G X 1-1/2\" MISC     dexlansoprazole (DEXILANT) 60 mg, Oral, Daily    gabapentin (NEURONTIN) 600 mg, 2 times daily    hydrOXYzine HCL (ATARAX) 50 mg, Oral, Every 6 hours PRN    ibuprofen (MOTRIN) 800 mg tablet TAKE 1 TABLET BY MOUTH EVERY 8 HOURS AS NEEDED (PAIN).    losartan (COZAAR) 50 mg, Oral, Daily    nicotine (NICODERM CQ) 14 mg/24hr TD 24 hr patch 1 patch, Transdermal, Every 24 hours    nicotine (NICODERM CQ) 21 mg/24 hr TD 24 hr patch 1 patch, Transdermal, Every 24 hours    nicotine (NICODERM CQ) 21 mg/24 hr TD 24 hr patch 1 patch, Transdermal, Every 24 hours    nicotine (NICODERM CQ) 7 mg/24hr TD 24 hr patch 1 patch, Transdermal, Every 24 hours    oxyCODONE-acetaminophen (PERCOCET) 5-325 mg per tablet 1 tablet    OxyCONTIN 15 MG 12 hr tablet TAKE 1 TABLET BY MOUTH TWICE A DAY FOR PAIN    risperiDONE (RISPERDAL) 4 mg, Oral, Daily at bedtime    rosuvastatin (CRESTOR) 20 mg, Oral, Daily    sodium picosulfate, magnesium oxide, citric acid (Clenpiq) oral solution Take 175 mL (1 bottle) the evening before the colonoscopy, between 5 PM and 9 PM, followed by a second 175 mL bottle 5 hours before the colonoscopy.    tadalafil (CIALIS) 20 mg, Oral, Daily PRN    tadalafil (CIALIS) 5 mg, Oral, Daily PRN    testosterone cypionate (DEPO-TESTOSTERONE) 200 mg, Intramuscular, Every 14 days    zolpidem (AMBIEN) 10 mg, Daily at bedtime PRN     No Known Allergies    Objective   There were no vitals taken for this visit.    Video Exam  Physical Exam     Administrative Statements   Encounter provider " Sharlene Hawley LCSW    The Patient is located at Home and in the following state in which I hold an active license [place].    The patient was identified by name and date of birth. Benjamin Muñiz was informed that this is a telemedicine visit and that the visit is being conducted through the Epic Embedded platform. He agrees to proceed..  My office door was closed. No one else was in the room.  He acknowledged consent and understanding of privacy and security of the video platform. The patient has agreed to participate and understands they can discontinue the visit at any time.    I have spent a total time of 57 minutes in caring for this patient on the day of the visit/encounter including Counseling / Coordination of care, not including the time spent for establishing the audio/video connection.    Visit Time  Start Time: 1301  Stop Time: 1358  Total Visit Time: 57 minutes

## 2025-05-01 NOTE — PSYCH
" Behavioral Health Psychotherapy Assessment    Date of Initial Psychotherapy Assessment: 05/01/25  Referral Source: PCP  Has a release of information been signed for the referral source? NA    Preferred Name: Benjamin Muñiz  Preferred Pronouns: He/dillan  YOB: 1985 Age: 40 y.o.  Sex assigned at birth: male   Gender Identity: Male  Race:   Preferred Language: English    Emergency Contact:  Full Name: Erica Muñiz  Relationship to Client: Wife  Contact information: [phone]     Primary Care Physician:  Ever Tolliver MD  [address] [place] [address]  [phone]  Has a release of information been signed? No    Physical Health History:  Past surgical procedures: 2010 - Surgical reconstruction of both feet - Required 13 surgeries for this (fell from a roof)    Do you have a history of any of the following: seizures, related to low sodium  Do you have any mobility issues? Yes, describe: Pain with walking due to reconstruction surgery.   Developmental History:  Normal    Relevant Family History:  Denies family history of mental health issues  Denies family history of substance use  Denies family history of Suicide attempts    Presenting Problem (What brings you in?)  Benjamin Muñiz is a 39yo  male presenting for initial evaluation for talk therapy. Client reports increased anxiety relating to social settings and going to places that are unfamiliar. He denies recent mood lability and states last manic episode was \"years ago\". Client endorses interest in learning to control anxiety to be able to socialize more.     Mental Health Advance Directive:  Do you currently have a Mental Health Advance Directive?no    Diagnosis:   Diagnosis ICD-10-CM Associated Orders   1. Bipolar disorder in full remission, most recent episode unspecified type (HCC)  F31.70       2. Attention deficit hyperactivity disorder (ADHD), combined type  F90.2       3. PTSD (post-traumatic stress disorder)  " F43.10           Initial Assessment:     Current Mental Status:    Appearance: appropriate, casual and neat      Behavior/Manner: cooperative      Affect/Mood:  Euthymic    Speech:  Normal    Sleep:  Insomnia    Oriented to: oriented to self, oriented to place and oriented to time       Clinical Symptoms    Anxiety: yes      Depression Symptoms: restlessness and irritable      Anxiety Symptoms: excessive worry, irritable, tremulousness, fear of losing control, difficulty controlling worry, restlessness and chest tightness      Have you ever been assaultive to others or the environment: No      Have you ever been self-injurious: No      Counseling History:  Previous Counseling or Treatment  (Mental Health or Drug & Alcohol): Yes    Previous Counseling Details:  Client reports he was in therapy from ages [age]. Parents had client start therapy d/t anger issues.   Have you previously taken psychiatric medications: Yes    Previous Medications Attempted:  Risperdal, Abilify, Lithium    Suicide Risk Assessment  Have you ever had a suicide attempt: No    Have you had incidents of suicidal ideation: No    Are you currently experiencing suicidal thoughts: No      Substance Abuse/Addiction Assessment:  Alcohol: Yes    Last use:  Socially  Heroin: No    Fentanyl: No    Opiates: No    Cocaine: No    Amphetamines: No    Hallucinogens: No    Club Drugs: No    Benzodiazepines: No    Other Rx Meds: No    Marijuana: No    Tobacco/Nicotine: Yes    Age of First Use:  16yo  Age of regular use:  20yo  Frequency:  Daily  Amount:  1PPD  Method:  Smoke/pipe  Last Use:  Today  Are you interested in resources for smoking cessation: No    Have you experienced blackouts as a result of substance use: No    Have you experienced symptoms of withdrawal: No    Have you ever overdosed on any substances?: No    Are you currently using any Medication Assisted Treatment for Substance Use: No      Disordered Eating History:  Do you have a history of  "disordered eating: No      Social Determinants of Health:    SDOH:  Stress and social isolation    Trauma and Abuse History:    Have you ever been abused: No      Legal History:    Have you ever been arrested  or had a DUI: No      Have you been incarcerated: No      Are you currently on parole/probation: No      Any current Children and Youth involvement: No      Any pending legal charges: No      Relationship History:    Current marital status:       Natural Supports:  Other    Other natural supports:  Wife    Relationship History:  Client's relationship with parents is strained, \"I hardly talk to them\".   Client has been  to wife since 2020 and has a 22yo step daughter.   Relationship with wife is good, \"she balances me out\". Relationship with step-daughter is strained at times.   Has a few close friends.     Employment History    Are you currently employed: Yes      Longest period of employment:  8 years    Employer/ Job title:  [email or web address]    Future work goals:  \"I want to move up further\". - Thinking about going back to school for healthcare administration    Sources of income/financial support:  Work     History:      Status: no history of  duty  Educational History:     Have you ever been diagnosed with a learning disability: Yes      Learning disability:  ADHD    Highest level of education:  Bachelor's Degree    School attended/attending:  [employer]    Have you ever had an IEP or 504-plan: Yes      IEP/504 plan:  Extra time for tests, special education classes, extra instruction.    Do you need assistance with reading or writing: No      Recommended Treatment:     Psychotherapy:  Individual sessions    Frequency:  2 times    Session frequency:  Monthly      Visit start and stop times:    05/01/25  Start Time: 1301  Stop Time: 1358  Total Visit Time: 57 minutes  "

## 2025-05-02 ENCOUNTER — RESULTS FOLLOW-UP (OUTPATIENT)
Dept: OTHER | Facility: HOSPITAL | Age: 40
End: 2025-05-02

## 2025-05-02 LAB
TESTOST FREE SERPL-MCNC: 2.6 PG/ML (ref 6.8–21.5)
TESTOST SERPL-MCNC: 95 NG/DL (ref 264–916)

## 2025-05-02 NOTE — TELEPHONE ENCOUNTER
I was able to call and speak with patient to let her know Erica's PA last note. Patient understood everything.

## 2025-05-02 NOTE — TELEPHONE ENCOUNTER
Previous testosterone collection (4/11/25) was completed in ERROR as the patient reports injecting testosterone 1 day prior to going for testosterone labs.  Repeat total testosterone from 4/30/25 was significantly decreased at 95.  Recommend patient continues taking his testosterone supplementation as prescribed.

## 2025-05-06 ENCOUNTER — TELEPHONE (OUTPATIENT)
Dept: BARIATRICS | Facility: CLINIC | Age: 40
End: 2025-05-06

## 2025-05-06 NOTE — TELEPHONE ENCOUNTER
MARIA LUZI...  Patient has decided to stop the surgery process and has scheduled over the medical side.  Thank you

## 2025-05-06 NOTE — TELEPHONE ENCOUNTER
Patient was HALTED from the surgical process per his request. In order for him to start the surgical process over again he has to start with the  call.

## 2025-05-09 DIAGNOSIS — F43.10 PTSD (POST-TRAUMATIC STRESS DISORDER): ICD-10-CM

## 2025-05-09 RX ORDER — RISPERIDONE 4 MG/1
4 TABLET ORAL
Qty: 30 TABLET | Refills: 0 | OUTPATIENT
Start: 2025-05-09

## 2025-05-09 RX ORDER — HYDROXYZINE HYDROCHLORIDE 50 MG/1
50 TABLET, FILM COATED ORAL EVERY 6 HOURS PRN
Qty: 90 TABLET | Refills: 0 | Status: SHIPPED | OUTPATIENT
Start: 2025-05-09

## 2025-05-12 DIAGNOSIS — I10 PRIMARY HYPERTENSION: ICD-10-CM

## 2025-05-12 DIAGNOSIS — E78.2 MIXED HYPERLIPIDEMIA: ICD-10-CM

## 2025-05-12 DIAGNOSIS — E29.1 HYPOGONADISM IN MALE: ICD-10-CM

## 2025-05-13 RX ORDER — TESTOSTERONE CYPIONATE 200 MG/ML
200 INJECTION, SOLUTION INTRAMUSCULAR
Qty: 10 ML | Refills: 0 | Status: SHIPPED | OUTPATIENT
Start: 2025-05-13

## 2025-05-13 RX ORDER — ROSUVASTATIN CALCIUM 20 MG/1
20 TABLET, COATED ORAL DAILY
Qty: 100 TABLET | Refills: 0 | Status: SHIPPED | OUTPATIENT
Start: 2025-05-13

## 2025-05-13 RX ORDER — LOSARTAN POTASSIUM 50 MG/1
50 TABLET ORAL DAILY
Qty: 90 TABLET | Refills: 0 | Status: SHIPPED | OUTPATIENT
Start: 2025-05-13

## 2025-05-13 NOTE — TELEPHONE ENCOUNTER
1 [id number] ** 03/21/2025 02/14/2025 Zolpidem Tartrate (Tablet) 30.0 30 10 MG NA JOSE ENRIQUE NOLASCO [facility] Commercial Insurance 1 / 2 [place]   1 [id number] ** 03/18/2025 03/18/2025 ALPRAZolam (Tablet) 14.0 7 0.5 MG NA [facility] Commercial Insurance 0 / 0 [place]   1 [id number] ** 03/15/2025 03/04/2025 Testosterone Cypionate (Oil) 10.0 140 200 MG/1 ML NA [facility]IVE FABIAN [facility] Commercial [place] 0 / 0 [place]   1 [id number] ** 03/08/2025 01/31/2025 Amphetamine Salt Combo (Tablet) 30.0 30 10 MG NA [facility] Commercial [place] 0 / 0 [place]

## 2025-05-14 ENCOUNTER — TELEMEDICINE (OUTPATIENT)
Dept: BEHAVIORAL/MENTAL HEALTH CLINIC | Facility: CLINIC | Age: 40
End: 2025-05-14
Payer: COMMERCIAL

## 2025-05-14 DIAGNOSIS — F43.10 PTSD (POST-TRAUMATIC STRESS DISORDER): ICD-10-CM

## 2025-05-14 DIAGNOSIS — F90.2 ATTENTION DEFICIT HYPERACTIVITY DISORDER (ADHD), COMBINED TYPE: ICD-10-CM

## 2025-05-14 DIAGNOSIS — F31.70 BIPOLAR DISORDER IN FULL REMISSION, MOST RECENT EPISODE UNSPECIFIED TYPE (HCC): Primary | ICD-10-CM

## 2025-05-14 PROCEDURE — 90837 PSYTX W PT 60 MINUTES: CPT | Performed by: SOCIAL WORKER

## 2025-05-15 NOTE — PSYCH
Virtual Regular VisitName: Benjamin Muñiz      : 1985      MRN: [id number]  Encounter Provider: Sharlene Hawley LCSW  Encounter Date: 2025   Encounter department: [facility] THERAPIST MENTAL HEALTH OUTPATIENT  :  Assessment & Plan  Bipolar disorder in full remission, most recent episode unspecified type (HCC)         Attention deficit hyperactivity disorder (ADHD), combined type         PTSD (post-traumatic stress disorder)         Bipolar disorder in full remission, most recent episode unspecified type (HCC)         Attention deficit hyperactivity disorder (ADHD), combined type         PTSD (post-traumatic stress disorder)               Goals addressed in session: Goal 1     DATA: Benjamin and therapist were present for scheduled session. Benjamin reported increased anxiety recently, related to increased stress at work. Therapist and Benjamin processed ways in which he could manage the increased demands. Benjamin reported he has been working until around 4:30pm, takes a break and then resumes work after dinner until bed. Therapist and Benjamin discussed ways in which this is increasing his stress and the demands on him. Therapist and Benjamin discussed reasons why he is working more hours than others on his team. Benjamin expressed he has been helping others when they encounter problems and taking on problems that others cannot figure out. Therapist and Benjamin discussed the importance of boundaries and assisting others, while not allowing it to impact his own day to day functioning. Benjamin agreed to attempt to no longer work on weekends. Benjamin appeared hesitant to try this, focusing on needs of his coworkers and his desire to help the on-. Therapist and Benjamin discussed exceptions and boundaries and Benjamin agreed to try this. Therapist and Benjamin discussed introducing mindfulness techniques to his day, therapist encouraged Benjamin to try progressive muscle relaxation.  "  During this session, this clinician used the following therapeutic modalities: Client-centered Therapy, Cognitive Behavioral Therapy, and Mindfulness-based Strategies    Substance Abuse was not addressed during this session. If the client is diagnosed with a co-occurring substance use disorder, please indicate any changes in the frequency or amount of use: N/A. Stage of change for addressing substance use diagnoses: No substance use/Not applicable    ASSESSMENT:  Benjamin presents with a Euthymic/ normal mood. Benjamin's affect is Normal range and intensity, which is congruent, with their mood and the content of the session. The client has made progress on their goals as evidenced by attending a large event that provokes anxiety and using coping strategies to manage increased anxiety during this event.    Benjamin presents with a none risk of suicide, none risk of self-harm, and none risk of harm to others.    For any risk assessment that surpasses a \"low\" rating, a safety plan must be developed.    A safety plan was indicated: no  If yes, describe in detail N/A    PLAN: Between sessions, Benjamin will stop working extra hours on weekends and practice incorporating progressive muscle relaxation into his day. At the next session, the therapist will use Cognitive Behavioral Therapy and Mindfulness-based Strategies to address anxiety.    Behavioral Health Treatment Plan [facility]: Diagnosis and Treatment Plan explained to Benjamin, Benjamin relates understanding diagnosis and is agreeable to Treatment Plan. Yes     Depression Follow-up Plan Completed: Not applicable     Reason for visit is   Chief Complaint   Patient presents with    Virtual Regular Visit      Recent Visits  Date Type Provider Dept   05/14/25 Telemedicine Sharlene Hawley LCSW [facility] Therapist [place]   Showing recent visits within past 7 days and meeting all other requirements  Future Appointments  No visits were found meeting these " "conditions.  Showing future appointments within next 150 days and meeting all other requirements     History of Present Illness     HPI    Past Medical History   Past Medical History:   Diagnosis Date    ADD (attention deficit disorder)     Anesthesia complication     \"Takes a lot to get me down\"    Arthritis     Chronic pain     Chronic pain disorder     Depression     GERD (gastroesophageal reflux disease)     Heartburn     Hypertension     Mood disorder (HCC)     Tremor     Uncomplicated alcohol dependence (HCC) 01/09/2024     Past Surgical History:   Procedure Laterality Date    CLOSED REDUCTION CALCANEAL FRACTURE      COLONOSCOPY      PERONEAL TENDON EXPLORATION      AR PADILLA FACETECTOMY & FORAMOTOMY 1 VRT SGM LUMBAR Right 02/06/2024    Procedure: RIGHT L4-5 MIS FORAMINOTOMY;  Surgeon: Al Slade MD;  Location:  MAIN OR;  Service: Neurosurgery    UPPER GASTROINTESTINAL ENDOSCOPY       Current Outpatient Medications   Medication Instructions    ADDERALL XR, 20MG, 20 MG 24 hr capsule 20 mg, Oral, Every morning    ALPRAZolam (XANAX) 0.5 mg tablet May take up to 1 mg or 2 tablets of 0.5 mg daily as needed for severe anxiety or panic attack.  Never to exceed 2 tablets or 1 mg/day.    amphetamine-dextroamphetamine (ADDERALL) 10 mg tablet 10 mg, Oral, Daily after lunch    azithromycin (ZITHROMAX) 250 mg tablet     B-D HYPODERMIC NEEDLE 18GX1.5\" 18G X 1-1/2\" MISC Use as directed---for Testosterone    B-D SYRINGE LUER-MILAGROS 1CC 1 ML MISC USE FOR BI WEEKLY TESTOSTERONE INJECTIONS    B-D SYRINGE LUER-MILAGROS 3CC 3 ML MISC USE AS DIRECTED FOR TESTOSTERONE INJECTION    BD Hypodermic Needle 25G X 1-1/2\" MISC     dexlansoprazole (DEXILANT) 60 mg, Oral, Daily    gabapentin (NEURONTIN) 600 mg, 2 times daily    hydrOXYzine HCL (ATARAX) 50 mg, Oral, Every 6 hours PRN    ibuprofen (MOTRIN) 800 mg tablet TAKE 1 TABLET BY MOUTH EVERY 8 HOURS AS NEEDED (PAIN).    losartan (COZAAR) 50 mg, Oral, Daily    nicotine (NICODERM CQ) 14 mg/24hr " TD 24 hr patch 1 patch, Transdermal, Every 24 hours    nicotine (NICODERM CQ) 21 mg/24 hr TD 24 hr patch 1 patch, Transdermal, Every 24 hours    nicotine (NICODERM CQ) 21 mg/24 hr TD 24 hr patch 1 patch, Transdermal, Every 24 hours    nicotine (NICODERM CQ) 7 mg/24hr TD 24 hr patch 1 patch, Transdermal, Every 24 hours    oxyCODONE-acetaminophen (PERCOCET) 5-325 mg per tablet 1 tablet    OxyCONTIN 15 MG 12 hr tablet TAKE 1 TABLET BY MOUTH TWICE A DAY FOR PAIN    risperiDONE (RISPERDAL) 4 mg, Oral, Daily at bedtime    rosuvastatin (CRESTOR) 20 mg, Oral, Daily    sodium picosulfate, magnesium oxide, citric acid (Clenpiq) oral solution Take 175 mL (1 bottle) the evening before the colonoscopy, between 5 PM and 9 PM, followed by a second 175 mL bottle 5 hours before the colonoscopy.    tadalafil (CIALIS) 20 mg, Oral, Daily PRN    tadalafil (CIALIS) 5 mg, Oral, Daily PRN    testosterone cypionate (DEPO-TESTOSTERONE) 200 mg, Intramuscular, Every 14 days    zolpidem (AMBIEN) 10 mg, Daily at bedtime PRN     No Known Allergies    Objective   There were no vitals taken for this visit.    Video Exam  Physical Exam     Administrative Statements   Encounter provider Sharlene Hawley LCSW    The Patient is located at Home and in the following state in which I hold an active license [place].    The patient was identified by name and date of birth. Benjamin Muñiz was informed that this is a telemedicine visit and that the visit is being conducted through the Epic Embedded platform. He agrees to proceed..  My office door was closed. No one else was in the room.  He acknowledged consent and understanding of privacy and security of the video platform. The patient has agreed to participate and understands they can discontinue the visit at any time.    I have spent a total time of 55 minutes in caring for this patient on the day of the visit/encounter including Counseling / Coordination of care, not including the time spent for establishing the  audio/video connection.    Visit Time  Start Time: 1701  Stop Time: 1756  Total Visit Time: 55 minutes

## 2025-05-23 ENCOUNTER — TELEMEDICINE (OUTPATIENT)
Dept: PSYCHIATRY | Facility: CLINIC | Age: 40
End: 2025-05-23

## 2025-05-23 DIAGNOSIS — F33.9 RECURRENT MAJOR DEPRESSIVE DISORDER, REMISSION STATUS UNSPECIFIED (HCC): ICD-10-CM

## 2025-05-23 DIAGNOSIS — F31.70 BIPOLAR DISORDER IN FULL REMISSION, MOST RECENT EPISODE UNSPECIFIED TYPE (HCC): ICD-10-CM

## 2025-05-23 DIAGNOSIS — F41.1 GENERALIZED ANXIETY DISORDER: Primary | ICD-10-CM

## 2025-05-23 DIAGNOSIS — F90.2 ATTENTION DEFICIT HYPERACTIVITY DISORDER (ADHD), COMBINED TYPE: ICD-10-CM

## 2025-05-23 RX ORDER — DEXTROAMPHETAMINE SACCHARATE, AMPHETAMINE ASPARTATE MONOHYDRATE, DEXTROAMPHETAMINE SULFATE AND AMPHETAMINE SULFATE 5; 5; 5; 5 MG/1; MG/1; MG/1; MG/1
20 CAPSULE, EXTENDED RELEASE ORAL EVERY MORNING
Qty: 30 CAPSULE | Refills: 0 | Status: SHIPPED | OUTPATIENT
Start: 2025-06-23

## 2025-05-23 RX ORDER — DEXTROAMPHETAMINE SACCHARATE, AMPHETAMINE ASPARTATE MONOHYDRATE, DEXTROAMPHETAMINE SULFATE AND AMPHETAMINE SULFATE 5; 5; 5; 5 MG/1; MG/1; MG/1; MG/1
20 CAPSULE, EXTENDED RELEASE ORAL EVERY MORNING
Qty: 30 CAPSULE | Refills: 0 | Status: SHIPPED | OUTPATIENT
Start: 2025-05-23

## 2025-05-23 RX ORDER — DEXTROAMPHETAMINE SACCHARATE, AMPHETAMINE ASPARTATE, DEXTROAMPHETAMINE SULFATE AND AMPHETAMINE SULFATE 2.5; 2.5; 2.5; 2.5 MG/1; MG/1; MG/1; MG/1
10 TABLET ORAL
Qty: 60 TABLET | Refills: 0 | Status: SHIPPED | OUTPATIENT
Start: 2025-06-23 | End: 2025-07-23

## 2025-05-23 RX ORDER — ALPRAZOLAM 0.5 MG
TABLET ORAL
Qty: 60 TABLET | Refills: 1 | Status: SHIPPED | OUTPATIENT
Start: 2025-05-23

## 2025-05-23 RX ORDER — DEXTROAMPHETAMINE SACCHARATE, AMPHETAMINE ASPARTATE, DEXTROAMPHETAMINE SULFATE AND AMPHETAMINE SULFATE 2.5; 2.5; 2.5; 2.5 MG/1; MG/1; MG/1; MG/1
10 TABLET ORAL
Qty: 60 TABLET | Refills: 0 | Status: SHIPPED | OUTPATIENT
Start: 2025-05-23 | End: 2025-06-22

## 2025-05-23 RX ORDER — RISPERIDONE 4 MG/1
4 TABLET ORAL
Qty: 30 TABLET | Refills: 2 | Status: SHIPPED | OUTPATIENT
Start: 2025-05-23 | End: 2025-08-21

## 2025-05-23 NOTE — PSYCH
MEDICATION MANAGEMENT NOTE        [facility]      Name and Date of Birth:  Benjamin Muñiz 40 y.o. 1985 MRN: [id number]    Date of Visit: May 23, 2025    Reason for Visit: Follow-up visit for medication management     Administrative Statements   Encounter provider Ambreen Johnson DO    The Patient is located at Home and in the following state in which I hold an active license [place].    The patient was identified by name and date of birth. Benjamin Muñiz was informed that this is a telemedicine visit and that the visit is being conducted through the Epic Embedded platform. He agrees to proceed..  My office door was closed. No one else was in the room.  He acknowledged consent and understanding of privacy and security of the video platform. The patient has agreed to participate and understands they can discontinue the visit at any time.    I have spent a total time of 28 minutes in caring for this patient on the day of the visit/encounter including Diagnostic results, Prognosis, Risks and benefits of tx options, Instructions for management, Patient and family education, Importance of tx compliance, Risk factor reductions, Impressions, Counseling / Coordination of care, Documenting in the medical record, Reviewing/placing orders in the medical record (including tests, medications, and/or procedures), and Obtaining or reviewing history  , not including the time spent for establishing the audio/video connection.       SUBJECTIVE:    Benjamin Muñiz is a 40 y.o. male with past psychiatric history significant for MDD, HERI, PTSD, ADHD who was personally seen and evaluated today at the [facility] outpatient clinic for follow-up and medication management. Benjamin denies SI, HI, AVH, delusions, gibson since his last appointment.  After discussion of risks, benefits, potential side effects, alternatives, we will increase Adderall to 20 mg extended release  "every morning and 20 mg immediate release in the afternoon to control patient's significant ADHD symptoms that are impacting his quality of life and work performance.  He denies acute mental complaints or concerns at this time      Current Rating Scores:     None completed today.    Review Of Systems:      Constitutional negative   ENT negative   Cardiovascular negative   Respiratory negative   Gastrointestinal negative   Genitourinary negative   Musculoskeletal negative   Integumentary negative   Neurological negative   Endocrine negative   Other Symptoms none, all other systems are negative       Past Psychiatric History: (unchanged information from previous note copied and italicized) - Information that is bolded has been updated.     See intake    Medication trials: Wellbutrin discontinued secondary to side effects.    Substance Abuse History: (unchanged information from previous note copied and italicized) - Information that is bolded has been updated.     See intake    Social History: (unchanged information from previous note copied and italicized) - Information that is bolded has been updated.     See intake    Traumatic History: (unchanged information from previous note copied and italicized) - Information that is bolded has been updated.     See intake      Past Medical History:    Past Medical History:   Diagnosis Date    ADD (attention deficit disorder)     Anesthesia complication     \"Takes a lot to get me down\"    Arthritis     Chronic pain     Chronic pain disorder     Depression     GERD (gastroesophageal reflux disease)     Heartburn     Hypertension     Mood disorder ([facility])     Tremor     Uncomplicated alcohol dependence ([facility]) 01/09/2024        Past Surgical History:   Procedure Laterality Date    CLOSED REDUCTION CALCANEAL FRACTURE      COLONOSCOPY      PERONEAL TENDON EXPLORATION      NM PADILLA FACETECTOMY & FORAMOTOMY 1 VRT SGM LUMBAR Right 02/06/2024    Procedure: RIGHT L4-5 MIS FORAMINOTOMY;  " Surgeon: Al Slade MD;  Location:  MAIN OR;  Service: Neurosurgery    UPPER GASTROINTESTINAL ENDOSCOPY       No Known Allergies    Substance Abuse History:    Social History     Substance and Sexual Activity   Alcohol Use Yes    Alcohol/week: 10.0 standard drinks of alcohol    Types: 10 Cans of beer per week    Comment: social     Social History     Substance and Sexual Activity   Drug Use Never       Social History:    Social History     Socioeconomic History    Marital status: /Civil Union     Spouse name: Not on file    Number of children: Not on file    Years of education: Not on file    Highest education level: Not on file   Occupational History    Not on file   Tobacco Use    Smoking status: Every Day     Current packs/day: 1.00     Average packs/day: 1 pack/day for 26.4 years (25.6 ttl pk-yrs)     Types: Cigarettes, Pipe     Start date: 1999    Smokeless tobacco: Never   Vaping Use    Vaping status: Never Used   Substance and Sexual Activity    Alcohol use: Yes     Alcohol/week: 10.0 standard drinks of alcohol     Types: 10 Cans of beer per week     Comment: social    Drug use: Never    Sexual activity: Yes     Partners: Female     Birth control/protection: I.U.D.   Other Topics Concern    Not on file   Social History Narrative    Lives with wife and step daughter in [place]    Sees dentist occasionally    No living will     Social Drivers of Health     Financial Resource Strain: Not on file   Food Insecurity: No Food Insecurity (1/3/2025)    Nursing - Inadequate Food Risk Classification     Worried About Running Out of Food in the Last Year: Not on file     Ran Out of Food in the Last Year: Not on file     Ran Out of Food in the Last Year: Never true   Transportation Needs: No Transportation Needs (1/3/2025)    Nursing - Transportation Risk Classification     Lack of Transportation: Not on file     Lack of Transportation: No   Physical Activity: Not on file   Stress: Not on file   Social  "Connections: Not on file   Intimate Partner Violence: Unknown (1/3/2025)    Nursing IPS     Feels Physically and Emotionally Safe: Not on file     Physically Hurt by Someone: Not on file     Humiliated or Emotionally Abused by Someone: Not on file     Physically Hurt by Someone: No     Hurt or Threatened by Someone: No   Housing Stability: Unknown (1/3/2025)    Nursing: Inadequate Housing Risk Classification     Has Housing: Not on file     Worried About Losing Housing: Not on file     Unable to Get Utilities: Not on file     Unable to Pay for Housing in the Last Year: No     Has Housin       Family Psychiatric History:     Family History   Problem Relation Name Age of Onset    Breast cancer Mother      Heart disease Father      Hypertension Father      Intellectual disability Sister      Heart disease Maternal Grandfather      Parkinsonism Paternal Grandfather      Mental illness Neg Hx      Colon cancer Neg Hx      Colon polyps Neg Hx      Substance Abuse Neg Hx      Seizures Neg Hx         History Review: The following portions of the patient's history were reviewed and updated as appropriate: allergies, current medications, past family history, past medical history, past social history, past surgical history, and problem list.         OBJECTIVE:     Vital signs in last 24 hours:    There were no vitals filed for this visit.    Mental Status Evaluation:    Appearance age appropriate, casually dressed   Behavior Cooperative, mildly anxious   Speech normal rate, normal volume, normal pitch   Mood \"Okay\"   Affect Constricted   Thought Processes organized, goal directed   Associations intact associations   Thought Content no overt delusions   Perceptual Disturbances: no auditory hallucinations, no visual hallucinations   Abnormal Thoughts  Risk Potential Suicidal ideation - None at present  Homicidal ideation - None at present  Potential for aggression - Not at present   Orientation oriented to person, place, " time/date, and situation   Memory recent and remote memory grossly intact   Consciousness alert and awake   Attention Span Concentration Span Attention span and concentration are decreased   Intellect appears to be of average intelligence   Insight intact   Judgement intact   Muscle Strength and  Gait unable to assess today due to virtual visit   Motor activity unable to assess today due to virtual visit   Language no difficulty naming common objects, no difficulty repeating a phrase, no difficulty writing a sentence   Fund of Knowledge adequate knowledge of current events  adequate fund of knowledge regarding past history  adequate fund of knowledge regarding vocabulary    Pain none   Pain Scale Did not ask patient to formally rate       Laboratory Results: I have personally reviewed all pertinent laboratory/tests results    Recent Labs (last 2 months):   Appointment on 04/30/2025   Component Date Value    Testosterone, Free 04/30/2025 2.6 (L)     TESTOSTERONE TOTAL 04/30/2025 95 (L)    Appointment on 04/11/2025   Component Date Value    Cholesterol 04/11/2025 123     Triglycerides 04/11/2025 184 (H)     HDL, Direct 04/11/2025 33 (L)     LDL Calculated 04/11/2025 53     Non-HDL-Chol (CHOL-HDL) 04/11/2025 90     Testosterone, Free 04/11/2025 40.5 (H)     TESTOSTERONE TOTAL 04/11/2025 >1500 (H)     WBC 04/11/2025 5.45     RBC 04/11/2025 4.65     Hemoglobin 04/11/2025 11.5 (L)     Hematocrit 04/11/2025 38.5     MCV 04/11/2025 83     MCH 04/11/2025 24.7 (L)     MCHC 04/11/2025 29.9 (L)     RDW 04/11/2025 17.5 (H)     Platelets 04/11/2025 176     MPV 04/11/2025 10.4     PSA, Diagnostic 04/11/2025 0.403        Suicide/Homicide Risk Assessment:    Risk of Harm to Self:  The following ratings are based on assessment at the time of the interview  Historical Risk Factors include: chronic psychiatric problems  Protective Factors: no current suicidal ideation, access to mental health treatment, compliant with medications,  compliant with mental health treatment, having a desire to be alive, responsibilities and duties to others, strong relationships    Risk of Harm to Others:  The following ratings are based on assessment at the time of the interview  Historical Risk Factors include: none.  Protective Factors: no current homicidal ideation, access to mental health treatment, compliant with medications, compliant with mental health treatment, responsibilities and duties to others    The following interventions are recommended: continue medication management, contracts for safety at present - agrees to go to ED if feeling unsafe, contracts for safety at present - agrees to call Crisis Intervention Service if feeling unsafe      Lethality Statement:    Based on today's assessment and clinical criteria, Benjamin Muñiz contracts for safety and is not an imminent risk of harm to self or others. Outpatient level of care is deemed appropriate at this current time. Benjamin understands that if they can no longer contract for safety, they need to call the office or report to their nearest Emergency Room for immediate evaluation. They voiced understanding and agreement to call 911 or head to the nearest ED should they have any physical or mental decompensation whatsoever.       Assessment/Plan:     1.)  HERI  2.)  PTSD  3.)  ADHD  4.)  MDD, recurrent, mild    After discussion of risks, benefits, potential side effects, alternatives, we will continue risperidone 4 mg nightly, Adderall XR 20 in the morning, alprazolam 0.5 mg up to 1 mg/day as needed for severe anxiety or panic attack and increase Adderall immediate release to 20 mg in the afternoon.  He denies acute mental health complaints or concerns at this time.  Overall, patient was prescribed a regimen in the past for misdiagnosed bipolar disorder.  Since then, he has chosen to remain on current regimen as is because he feels as if it is still helpful for his mood and he is not yet ready to  make further changes.        Aware of 24 hour and weekend coverage for urgent situations accessed by calling [facility] main practice number    Medications Risks/Benefits      Risks, Benefits And Possible Side Effects Of Medications:    Risks, benefits, and possible side effects of medications explained to Benjamin and he verbalizes understanding and agreement for treatment.    Controlled Medication Discussion:     Benjamin has been filling controlled prescriptions on time as prescribed according to [place] Prescription Drug Monitoring Program    Psychotherapy Provided:     Individual psychotherapy provided: Crisis/safety plan discussed with Benjamin. Provided at least 16 minutes of distinct psychotherapy using a combination of supportive, interpersonal, motivational, and problem solving approaches to address psychological distress and enhance coping strategies.     Treatment Plan:    Completed and signed during the session: We will complete at next appointment due to lack of time today      Visit Time    Visit Start Time: 10:30 AM  Visit Stop Time: 10:58 AM  Total Visit Duration: 28 minutes     The total visit duration detailed above includes: patient engagement, medication management, psychotherapy/counseling, discussion regarding treatment goals, documentation, review of past medical records, and coordination of care.      Note Share Disclaimer:     This note was not shared with the patient due to reasonable likelihood of causing patient harm      Ambreen Johnson DO  Psychiatry  05/23/25

## 2025-05-28 ENCOUNTER — TELEMEDICINE (OUTPATIENT)
Dept: BEHAVIORAL/MENTAL HEALTH CLINIC | Facility: CLINIC | Age: 40
End: 2025-05-28
Payer: COMMERCIAL

## 2025-05-28 DIAGNOSIS — F43.10 PTSD (POST-TRAUMATIC STRESS DISORDER): ICD-10-CM

## 2025-05-28 DIAGNOSIS — F31.70 BIPOLAR DISORDER IN FULL REMISSION, MOST RECENT EPISODE UNSPECIFIED TYPE (HCC): Primary | ICD-10-CM

## 2025-05-28 PROCEDURE — 90837 PSYTX W PT 60 MINUTES: CPT | Performed by: SOCIAL WORKER

## 2025-05-29 NOTE — PSYCH
Virtual Regular VisitName: Benjamin Muñiz      : 1985      MRN: [id number]  Encounter Provider: Sharlene Hawley LCSW  Encounter Date: 2025   Encounter department: [facility] THERAPIST MENTAL HEALTH OUTPATIENT  :  Assessment & Plan  Bipolar disorder in full remission, most recent episode unspecified type (HCC)         PTSD (post-traumatic stress disorder)               Goals addressed in session: Goal 1     DATA: Benjamin and therapist were present for scheduled session. Benjamin reported recent increase in anxiety d/t several large projects at work. Therapist and Benjamin discussed ways to manage this increase in stress. Therapist and Benjamin discussed how taking a break from work on weekends has been going. Benjamin reported he recently went away for the weekend and was able to leave his laptop at home during this time. Benjamin reported extreme anxiety when he returned from the trip and reported spending 4 hours going through emails that accumulated over the weekend. Therapist and Benjamin discussed ways in which to manage this to account for anxiety stacking when he returns to work. Therapist and Benjamin discussed ways to balance work and personal life. Benjamin appears resistant to reducing time spent working outside of business hours and appears resistant to voice concerns about workload to supervisor. Therapist and Benjamin will continue to explore ways in which he can appropriately address these.   During this session, this clinician used the following therapeutic modalities: Cognitive Behavioral Therapy    Substance Abuse was not addressed during this session. If the client is diagnosed with a co-occurring substance use disorder, please indicate any changes in the frequency or amount of use: n/a. Stage of change for addressing substance use diagnoses: No substance use/Not applicable    ASSESSMENT:  Benjamin presents with a Euthymic/ normal mood. Benjamin's affect is Normal range and  "intensity, which is congruent, with their mood and the content of the session. The client has made progress on their goals as evidenced by reducing work hours on weekends.    Benjamin presents with a none risk of suicide, none risk of self-harm, and none risk of harm to others.    For any risk assessment that surpasses a \"low\" rating, a safety plan must be developed.    A safety plan was indicated: no  If yes, describe in detail n/a    PLAN: Between sessions, Benjamin will continue to reduce working hours on weekends. At the next session, the therapist will use Cognitive Behavioral Therapy to address anxiety.    Behavioral Health Treatment Plan [facility]: Diagnosis and Treatment Plan explained to Benjamin, Benjamin relates understanding diagnosis and is agreeable to Treatment Plan. No    Depression Follow-up Plan Completed: Not applicable     Reason for visit is   Chief Complaint   Patient presents with    Virtual Regular Visit      Recent Visits  Date Type Provider Dept   05/28/25 Telemedicine Sharlene Hawley LCSW [facility] Therapist [facility]   Showing recent visits within past 7 days and meeting all other requirements  Future Appointments  No visits were found meeting these conditions.  Showing future appointments within next 150 days and meeting all other requirements     History of Present Illness     HPI    Past Medical History   Past Medical History:   Diagnosis Date    ADD (attention deficit disorder)     Anesthesia complication     \"Takes a lot to get me down\"    Arthritis     Chronic pain     Chronic pain disorder     Depression     GERD (gastroesophageal reflux disease)     Heartburn     Hypertension     Mood disorder (HCC)     Tremor     Uncomplicated alcohol dependence (HCC) 01/09/2024     Past Surgical History:   Procedure Laterality Date    CLOSED REDUCTION CALCANEAL FRACTURE      COLONOSCOPY      PERONEAL TENDON EXPLORATION      [place] FACETECTOMY & FORAMOTOMY 1 VRT SGM LUMBAR Right 02/06/2024    " "Procedure: RIGHT L4-5 MIS FORAMINOTOMY;  Surgeon: Al Slade MD;  Location:  MAIN OR;  Service: Neurosurgery    UPPER GASTROINTESTINAL ENDOSCOPY       Current Outpatient Medications   Medication Instructions    ADDERALL XR, 20MG, 20 MG 24 hr capsule 20 mg, Oral, Every morning    ALPRAZolam (XANAX) 0.5 mg tablet May take up to 1 mg or 2 tablets of 0.5 mg daily as needed for severe anxiety or panic attack.  Never to exceed 2 tablets or 1 mg/day.    amphetamine-dextroamphetamine (ADDERALL XR, 20MG,) 20 MG 24 hr capsule 20 mg, Oral, Every morning    [START ON 6/23/2025] amphetamine-dextroamphetamine (ADDERALL XR, 20MG,) 20 MG 24 hr capsule 20 mg, Oral, Every morning    amphetamine-dextroamphetamine (ADDERALL) 10 mg tablet 10 mg, Oral, 2 times daily (morning and afternoon)    [START ON 6/23/2025] amphetamine-dextroamphetamine (ADDERALL) 10 mg tablet 10 mg, Oral, 2 times daily (morning and afternoon)    azithromycin (ZITHROMAX) 250 mg tablet     B-D HYPODERMIC NEEDLE 18GX1.5\" 18G X 1-1/2\" MISC Use as directed---for Testosterone    B-D SYRINGE LUER-MILAGROS 1CC 1 ML MISC USE FOR BI WEEKLY TESTOSTERONE INJECTIONS    B-D SYRINGE LUER-MILAGROS 3CC 3 ML MISC USE AS DIRECTED FOR TESTOSTERONE INJECTION    BD Hypodermic Needle 25G X 1-1/2\" MISC     dexlansoprazole (DEXILANT) 60 mg, Oral, Daily    gabapentin (NEURONTIN) 600 mg, 2 times daily    ibuprofen (MOTRIN) 800 mg tablet TAKE 1 TABLET BY MOUTH EVERY 8 HOURS AS NEEDED (PAIN).    losartan (COZAAR) 50 mg, Oral, Daily    nicotine (NICODERM CQ) 14 mg/24hr TD 24 hr patch 1 patch, Transdermal, Every 24 hours    nicotine (NICODERM CQ) 21 mg/24 hr TD 24 hr patch 1 patch, Transdermal, Every 24 hours    nicotine (NICODERM CQ) 21 mg/24 hr TD 24 hr patch 1 patch, Transdermal, Every 24 hours    nicotine (NICODERM CQ) 7 mg/24hr TD 24 hr patch 1 patch, Transdermal, Every 24 hours    oxyCODONE-acetaminophen (PERCOCET) 5-325 mg per tablet 1 tablet    OxyCONTIN 15 MG 12 hr tablet TAKE 1 TABLET BY " MOUTH TWICE A DAY FOR PAIN    risperiDONE (RISPERDAL) 4 mg, Oral, Daily at bedtime    rosuvastatin (CRESTOR) 20 mg, Oral, Daily    sodium picosulfate, magnesium oxide, citric acid (Clenpiq) oral solution Take 175 mL (1 bottle) the evening before the colonoscopy, between 5 PM and 9 PM, followed by a second 175 mL bottle 5 hours before the colonoscopy.    tadalafil (CIALIS) 20 mg, Oral, Daily PRN    tadalafil (CIALIS) 5 mg, Oral, Daily PRN    testosterone cypionate (DEPO-TESTOSTERONE) 200 mg, Intramuscular, Every 14 days    zolpidem (AMBIEN) 10 mg, Daily at bedtime PRN     No Known Allergies    Objective   There were no vitals taken for this visit.    Video Exam  Physical Exam     Administrative Statements   Encounter provider Sharlene Hawley LCSW    The Patient is located at Home and in the following state in which I hold an active license [place].    The patient was identified by name and date of birth. Benjamin Muñiz was informed that this is a telemedicine visit and that the visit is being conducted through the Epic Embedded platform. He agrees to proceed..  My office door was closed. No one else was in the room.  He acknowledged consent and understanding of privacy and security of the video platform. The patient has agreed to participate and understands they can discontinue the visit at any time.    I have spent a total time of 57 minutes in caring for this patient on the day of the visit/encounter including Counseling / Coordination of care, not including the time spent for establishing the audio/video connection.    Visit Time  Start Time: 1702  Stop Time: 1759  Total Visit Time: 57 minutes

## 2025-05-30 DIAGNOSIS — N52.9 ERECTILE DYSFUNCTION, UNSPECIFIED ERECTILE DYSFUNCTION TYPE: ICD-10-CM

## 2025-05-30 RX ORDER — TADALAFIL 5 MG/1
5 TABLET ORAL DAILY PRN
Qty: 30 TABLET | Refills: 1 | Status: SHIPPED | OUTPATIENT
Start: 2025-05-30

## 2025-06-11 ENCOUNTER — TELEMEDICINE (OUTPATIENT)
Dept: BEHAVIORAL/MENTAL HEALTH CLINIC | Facility: CLINIC | Age: 40
End: 2025-06-11
Payer: COMMERCIAL

## 2025-06-11 DIAGNOSIS — F31.70 BIPOLAR DISORDER IN FULL REMISSION, MOST RECENT EPISODE UNSPECIFIED TYPE (HCC): Primary | ICD-10-CM

## 2025-06-11 DIAGNOSIS — F43.10 PTSD (POST-TRAUMATIC STRESS DISORDER): ICD-10-CM

## 2025-06-11 DIAGNOSIS — F90.2 ATTENTION DEFICIT HYPERACTIVITY DISORDER (ADHD), COMBINED TYPE: ICD-10-CM

## 2025-06-11 PROCEDURE — 90834 PSYTX W PT 45 MINUTES: CPT | Performed by: SOCIAL WORKER

## 2025-06-12 NOTE — PSYCH
Virtual Regular VisitName: Benjamin Muñiz      : 1985      MRN: [id number]  Encounter Provider: Sharlene Hawley LCSW  Encounter Date: 2025   Encounter department: [facility] THERAPIST MENTAL HEALTH OUTPATIENT  :  Assessment & Plan  Bipolar disorder in full remission, most recent episode unspecified type (HCC)         Attention deficit hyperactivity disorder (ADHD), combined type         PTSD (post-traumatic stress disorder)               Goals addressed in session: Goal 1     DATA: Benjamin and therapist were present for scheduled session. 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 Therapist and Benjamin explored reactions and emotions from these outings and explored ways in which to cope with anxiety during future outings. Therapist and Benjamin discussed progress in limiting his work hours to prevent excessive overtime hours. Therapist congratulated Benjamin on his ability to stay off his computer on Saturday and for the majority of . Therapist encouraged Benjamin to use phone settings such as do not disturb to limit the interruptions from his coworkers.   During this session, this clinician used the following therapeutic modalities: Cognitive Behavioral Therapy and Supportive Psychotherapy    Substance Abuse was not addressed during this session. If the client is diagnosed with a co-occurring substance use disorder, please indicate any changes in the frequency or amount of use: n/a. Stage of change for addressing substance use diagnoses: No substance use/Not applicable    ASSESSMENT:  Benjamin presents with a Euthymic/ normal mood. Benjamin's affect is Normal range and intensity, which is congruent, with their mood and the content of the session. The client has made progress on their goals as evidenced by limiting amount of overtime at work.    Benjamin presents with a none risk of  "suicide, none risk of self-harm, and none risk of harm to others.    For any risk assessment that surpasses a \"low\" rating, a safety plan must be developed.    A safety plan was indicated: no  If yes, describe in detail n/a    PLAN: Between sessions, Benjamin will continue to work towards limiting amount of extra work hours. At the next session, the therapist will use Cognitive Behavioral Therapy and Mindfulness-based Strategies to address anxiety.    Behavioral Health Treatment Plan [facility]: Diagnosis and Treatment Plan explained to Benjamin, Benjamin relates understanding diagnosis and is agreeable to Treatment Plan. Yes     Depression Follow-up Plan Completed: Not applicable     Reason for visit is   Chief Complaint   Patient presents with    Virtual Regular Visit      Recent Visits  Date Type Provider Dept   06/11/25 Telemedicine Sharlene Hawley LCSW [facility] Therapist [facility]   Showing recent visits within past 7 days and meeting all other requirements  Future Appointments  No visits were found meeting these conditions.  Showing future appointments within next 150 days and meeting all other requirements     History of Present Illness     HPI    Past Medical History   Past Medical History:   Diagnosis Date    ADD (attention deficit disorder)     Anesthesia complication     \"Takes a lot to get me down\"    Arthritis     Chronic pain     Chronic pain disorder     Depression     GERD (gastroesophageal reflux disease)     Heartburn     Hypertension     Mood disorder (HCC)     Tremor     Uncomplicated alcohol dependence (HCC) 01/09/2024     Past Surgical History:   Procedure Laterality Date    CLOSED REDUCTION CALCANEAL FRACTURE      COLONOSCOPY      PERONEAL TENDON EXPLORATION      [place] FACETECTOMY & FORAMOTOMY 1 VRT SGM LUMBAR Right 02/06/2024    Procedure: RIGHT L4-5 MIS FORAMINOTOMY;  Surgeon: Al Slade MD;  Location:  MAIN OR;  Service: Neurosurgery    UPPER GASTROINTESTINAL ENDOSCOPY       Current " "Outpatient Medications   Medication Instructions    ADDERALL XR, 20MG, 20 MG 24 hr capsule 20 mg, Oral, Every morning    ALPRAZolam (XANAX) 0.5 mg tablet May take up to 1 mg or 2 tablets of 0.5 mg daily as needed for severe anxiety or panic attack.  Never to exceed 2 tablets or 1 mg/day.    amphetamine-dextroamphetamine (ADDERALL XR, 20MG,) 20 MG 24 hr capsule 20 mg, Oral, Every morning    [START ON 6/23/2025] amphetamine-dextroamphetamine (ADDERALL XR, 20MG,) 20 MG 24 hr capsule 20 mg, Oral, Every morning    amphetamine-dextroamphetamine (ADDERALL) 10 mg tablet 10 mg, Oral, 2 times daily (morning and afternoon)    [START ON 6/23/2025] amphetamine-dextroamphetamine (ADDERALL) 10 mg tablet 10 mg, Oral, 2 times daily (morning and afternoon)    azithromycin (ZITHROMAX) 250 mg tablet     B-D HYPODERMIC NEEDLE 18GX1.5\" 18G X 1-1/2\" MISC Use as directed---for Testosterone    B-D SYRINGE LUER-MILAGROS 1CC 1 ML MISC USE FOR BI WEEKLY TESTOSTERONE INJECTIONS    B-D SYRINGE LUER-MILAGROS 3CC 3 ML MISC USE AS DIRECTED FOR TESTOSTERONE INJECTION    BD Hypodermic Needle 25G X 1-1/2\" MISC     dexlansoprazole (DEXILANT) 60 mg, Oral, Daily    gabapentin (NEURONTIN) 600 mg, 2 times daily    ibuprofen (MOTRIN) 800 mg tablet TAKE 1 TABLET BY MOUTH EVERY 8 HOURS AS NEEDED (PAIN).    losartan (COZAAR) 50 mg, Oral, Daily    nicotine (NICODERM CQ) 14 mg/24hr TD 24 hr patch 1 patch, Transdermal, Every 24 hours    nicotine (NICODERM CQ) 21 mg/24 hr TD 24 hr patch 1 patch, Transdermal, Every 24 hours    nicotine (NICODERM CQ) 21 mg/24 hr TD 24 hr patch 1 patch, Transdermal, Every 24 hours    nicotine (NICODERM CQ) 7 mg/24hr TD 24 hr patch 1 patch, Transdermal, Every 24 hours    oxyCODONE-acetaminophen (PERCOCET) 5-325 mg per tablet 1 tablet    OxyCONTIN 15 MG 12 hr tablet TAKE 1 TABLET BY MOUTH TWICE A DAY FOR PAIN    risperiDONE (RISPERDAL) 4 mg, Oral, Daily at bedtime    rosuvastatin (CRESTOR) 20 mg, Oral, Daily    sodium picosulfate, magnesium " oxide, citric acid (Clenpiq) oral solution Take 175 mL (1 bottle) the evening before the colonoscopy, between 5 PM and 9 PM, followed by a second 175 mL bottle 5 hours before the colonoscopy.    tadalafil (CIALIS) 20 mg, Oral, Daily PRN    tadalafil (CIALIS) 5 mg, Oral, Daily PRN    testosterone cypionate (DEPO-TESTOSTERONE) 200 mg, Intramuscular, Every 14 days    zolpidem (AMBIEN) 10 mg, Daily at bedtime PRN     No Known Allergies    Objective   There were no vitals taken for this visit.    Video Exam  Physical Exam     Administrative Statements   Encounter provider Sharlene Hawley LCSW    The Patient is located at Home and in the following state in which I hold an active license [place].    The patient was identified by name and date of birth. Benjamin Muñiz was informed that this is a telemedicine visit and that the visit is being conducted through the Epic Embedded platform. He agrees to proceed..  My office door was closed. No one else was in the room.  He acknowledged consent and understanding of privacy and security of the video platform. The patient has agreed to participate and understands they can discontinue the visit at any time.    I have spent a total time of 45 minutes in caring for this patient on the day of the visit/encounter including Counseling / Coordination of care, not including the time spent for establishing the audio/video connection.    Visit Time  Start Time: 1704  Stop Time: 1749  Total Visit Time: 45 minutes

## 2025-06-17 ENCOUNTER — TELEPHONE (OUTPATIENT)
Dept: GASTROENTEROLOGY | Facility: CLINIC | Age: 40
End: 2025-06-17

## 2025-06-17 DIAGNOSIS — K21.9 GASTROESOPHAGEAL REFLUX DISEASE WITHOUT ESOPHAGITIS: ICD-10-CM

## 2025-06-17 RX ORDER — DEXLANSOPRAZOLE 60 MG/1
1 CAPSULE, DELAYED RELEASE ORAL DAILY
Qty: 90 CAPSULE | Refills: 1 | Status: SHIPPED | OUTPATIENT
Start: 2025-06-17 | End: 2025-06-25 | Stop reason: CLARIF

## 2025-06-17 RX ORDER — DEXLANSOPRAZOLE 60 MG/1
1 CAPSULE, DELAYED RELEASE ORAL DAILY
Qty: 90 CAPSULE | Refills: 1 | OUTPATIENT
Start: 2025-06-17

## 2025-06-17 NOTE — TELEPHONE ENCOUNTER
PA for DEXLANSOPRAZOLE 60MG SUBMITTED to       via      [x][employer]-Case ID #   [id number]       [x]PA sent as URGENT      All office notes, labs and other pertaining documents and studies sent. Clinical questions answered. Awaiting determination from insurance company.     Turnaround time for your insurance to make a decision on your Prior Authorization can take 7-21 business days.

## 2025-06-20 NOTE — TELEPHONE ENCOUNTER
PA for dexlansorpazole DENIED    Reason:(Screenshot if applicable)  ONE of the following (medical records required): - The trial and failure of at least ONE additional formulary alternative medications such as: - Esomeprazole - Omeprazole - Lansoprazole; OR - information stating that ALL available formulary alternatives are not recommended, likely to be less effective, or will cause an adverse reaction or other harm to the patient (detailed rationale must be provided     NOTE to GI office (ov notes do not state any tried an fails      Message sent to office clinical pool Yes    Denial letter scanned into Media No letter but message from SS see below    We can gladly do an appeal but the process can take about 30-60 days to provide determination. Please have the office staff schedule a Peer to Peer at phone [phone]  . If an appeal is truly warranted please have Provider send clinical documentation to the PA department to support the appeal.       **Please follow up with your patient regarding denial and next steps**      Note from payer: [place] Case: [id number], Status: Denied, Denial Rationale: Your medication request has been denied as it does not appear to meet medically necessary requirements. Plan rules require clinical parameters (diagnosis, lab values, test results, physical exam findings etc) be met for medical necessity approval. Information submitted does not indicate required parameter results were met. Coverage for Dexlansoprazole 60MG Capsule Delayed Release is denied. It does not meet medical necessity. Dexlansoprazole 60MG Capsule Delayed Release has been requested for the treatment of gastro-esophageal reflux disease without esophagitis. The information provided by your prescriber does not meet [employer]'s guideline. The guideline used is: Coverage Exception Criteria. Information provided does not show that the policy requirements have been met. The requirement(s) not met are: ONE of the following  (medical records required): - The trial and failure of at least ONE additional formulary alternative medications such as: - Esomeprazole - Omeprazole - Lansoprazole; OR - information stating that ALL available formulary alternatives are not recommended, likely to be less effective, or will cause an adverse reaction or other harm to the patient (detailed rationale must be provided) Note: Preferred formulary alternatives may require a prior authorization review. Note: Submission of medical records is required for review. Therefore, coverage for Dexlansoprazole 60MG Capsule Delayed Release is denied. Questions? Contact [phone].  Payer: [employer] Case ID: [id number]  Electronic appeal: Not supported  Prior auth initiated by: Maria M Cleveland

## 2025-06-23 DIAGNOSIS — F90.2 ATTENTION DEFICIT HYPERACTIVITY DISORDER (ADHD), COMBINED TYPE: ICD-10-CM

## 2025-06-23 DIAGNOSIS — F41.1 GENERALIZED ANXIETY DISORDER: ICD-10-CM

## 2025-06-23 NOTE — TELEPHONE ENCOUNTER
Refill cannot be delegated. Change in pharmacy     1 [id number] ** 06/20/2025 05/19/2025 Zolpidem Tartrate (Tablet) 30.0 30 10 MG NA [facility] Commercial Insurance 1 / 2 [place]   1 [id number] ** 06/14/2025 02/24/2025 Amphetamine Salt Combo (Tablet) 30.0 30 10 MG NA [facility] Commercial Insurance 0 / 0 [place]   1 [id number] ** 06/06/2025 02/24/2025 Adderall Xr (Capsule, Extended Release) 30.0 30 20 MG NA [facility] Commercial Insurance 0 / 0 [place]   1 [id number] ** 06/01/2025 05/03/2025 Zolpidem Tartrate (Tablet) 30.0 30 10 MG NA [facility] Private Pay 0 / 2 [place]   1 [id number] ** 05/28/2025 05/27/2025 OxyCONTIN (Tablet, Extended Release) 90.0 30 15 MG 67.50 [facility] Commercial Insurance 0 / 0 [place]   1 [id number] ** 05/27/2025 05/27/2025 oxyCODONE HYDROCHLORIDE 5 MG ORAL TABLET/ACETAMINOPHEN 325 MG (Tablet) 165.0 30 325 MG-5 MG 41.25 [facility] Commercial Insurance 0 / 0 [place]   1 [id number] ** 05/23/2025 05/23/2025 Amphetamine Salt Combo (Tablet) 60.0 30 10 MG [facility] [place] 0 / 0 [place]   1 [id number] ** 05/23/2025 05/23/2025 ALPRAZolam (Tablet) 60.0 30 0.5 MG NA [facility] Commercial Insurance 0 / 1 [place]   1 [id number] ** 05/20/2025 05/19/2025 Zolpidem Tartrate (Tablet) 30.0 30 10 MG [facility] [place] 0 / 2 [place]   1 [id number] ** 05/15/2025 05/13/2025 Testosterone Cypionate (Oil) 10.0 90 200 MG/1 ML [facility] FRANC[facility] Private Pay 0 / 0 [place]

## 2025-06-23 NOTE — TELEPHONE ENCOUNTER
Reason for call:   [x] Refill   [] Prior Auth  [x] Other: Not a duplicate - Change in Pharmacy states [facility] is taking too long to mail out script    Office:   [] PCP/Provider -   [x] Specialty/Provider - Ambreen Johnson DO    Medication: ALPRAZolam (XANAX)   Dose/Frequency: 0.5 mg  Quantity: 60    Medication: amphetamine-dextroamphetamine (ADDERALL XR, 20MG,)   Dose/Frequency: 20 mg  Quantity: 30    Medication: amphetamine-dextroamphetamine (ADDERALL)   Dose/Frequency: 10 mg  Quantity: 60    Pharmacy: [id number] - [place], [place] - [address] [[id number]]     Local Pharmacy   Does the patient have enough for 3 days?   [] Yes   [x] No - Send as HP to POD

## 2025-06-23 NOTE — TELEPHONE ENCOUNTER
Checked [employer].  No records.  Epic shows documentation of patient being on Dexilant in 2017 listed in other provider notes.  I don't see any tried/failed PPI's though.

## 2025-06-23 NOTE — TELEPHONE ENCOUNTER
Patient called to verify refill status advised a refill was requested on 6/23/2025 and is pending approval. Patient verbalized understanding and is in agreement.     Does the patient have enough for 3 days?   [] Yes   [x] No - Send as HP to POD

## 2025-06-24 NOTE — TELEPHONE ENCOUNTER
Called Benjamin and informed him that there is a refill on file for xanax and scripts for adderall. He is requesting a refill of hydroxyzine 50 mg as well.

## 2025-06-25 DIAGNOSIS — K21.9 GASTROESOPHAGEAL REFLUX DISEASE WITHOUT ESOPHAGITIS: Primary | ICD-10-CM

## 2025-06-25 RX ORDER — ESOMEPRAZOLE MAGNESIUM 40 MG/1
40 CAPSULE, DELAYED RELEASE ORAL
Qty: 90 CAPSULE | Refills: 1 | Status: SHIPPED | OUTPATIENT
Start: 2025-06-25

## 2025-06-27 NOTE — TELEPHONE ENCOUNTER
"Called patient to discuss previous PPI's.  Per patient he did respond to the [employer] message that he had tried Nexium in the past and \"benjie\" had just sent nexium to the pharmacy and he picked up.  Advised if med not working to let us know.  "

## 2025-07-02 ENCOUNTER — TELEMEDICINE (OUTPATIENT)
Dept: BEHAVIORAL/MENTAL HEALTH CLINIC | Facility: CLINIC | Age: 40
End: 2025-07-02
Payer: COMMERCIAL

## 2025-07-02 DIAGNOSIS — F43.10 PTSD (POST-TRAUMATIC STRESS DISORDER): ICD-10-CM

## 2025-07-02 DIAGNOSIS — F31.70 BIPOLAR DISORDER IN FULL REMISSION, MOST RECENT EPISODE UNSPECIFIED TYPE (HCC): Primary | ICD-10-CM

## 2025-07-02 DIAGNOSIS — F41.1 GENERALIZED ANXIETY DISORDER: ICD-10-CM

## 2025-07-02 DIAGNOSIS — F90.2 ATTENTION DEFICIT HYPERACTIVITY DISORDER (ADHD), COMBINED TYPE: ICD-10-CM

## 2025-07-02 PROCEDURE — 90837 PSYTX W PT 60 MINUTES: CPT | Performed by: SOCIAL WORKER

## 2025-07-03 PROBLEM — F41.1 GENERALIZED ANXIETY DISORDER: Status: ACTIVE | Noted: 2025-07-03

## 2025-07-03 NOTE — PSYCH
"Virtual Regular VisitName: Benjamin Muñiz      : 1985      MRN: [id number]  Encounter Provider: Sharlene Hawley LCSW  Encounter Date: 2025   Encounter department: [facility] THERAPIST MENTAL HEALTH OUTPATIENT  :  Assessment & Plan  Bipolar disorder in full remission, most recent episode unspecified type (HCC)         Attention deficit hyperactivity disorder (ADHD), combined type         PTSD (post-traumatic stress disorder)         Generalized anxiety disorder               Goals addressed in session: Goal 1     DATA: Benjamin and therapist were present for scheduled session. Benjamin reported feeling a slight improvement in anxiety d/t a large project from work being complete. Benjamin reported his project launched the day prior and while there were many hiccups, he was able to navigate this. Therapist asked Benjamin to rate anxiety leading up to, during and after project to measure how much work is impacting his anxiety. Benjamin reported scores of 10, 15 and 7 respectively. Therapist and Benjamin continued discussion regarding setting boundaries at work and not working more hours than required. Benjamin continues to struggle with setting these boundaries and frequently states, \"once I get this next project done\". Therapist and Benjamin discussed nature of his work and discussed that projects will never truly cease. Therapist encourage Benjamin to consider boundaries he does feel comfortable implementing during his work day. Therapist and Benjamin explored possibility of taking a lunch break or a few smaller breaks throughout the day. Benjamin agrees to consider what changes he can make in his daily schedule. Benjamin reports he has not yet attempted progressive muscle relaxation and agrees to try this between sessions.  During this session, this clinician used the following therapeutic modalities: Cognitive Behavioral Therapy and Motivational Interviewing    Substance Abuse was not addressed " "during this session. If the client is diagnosed with a co-occurring substance use disorder, please indicate any changes in the frequency or amount of use: n/a. Stage of change for addressing substance use diagnoses: No substance use/Not applicable    ASSESSMENT:  Benjamin presents with a Euthymic/ normal mood. Benjamin's affect is Normal range and intensity, which is congruent, with their mood and the content of the session. The client has not made progress on their goals as evidenced by poor work boundaries.    Benjamin presents with a none risk of suicide, none risk of self-harm, and none risk of harm to others.    For any risk assessment that surpasses a \"low\" rating, a safety plan must be developed.    A safety plan was indicated: no  If yes, describe in detail n/a    PLAN: Between sessions, Benjamin will consider boundaries he can work towards implementing at work. At the next session, the therapist will use Cognitive Behavioral Therapy, Mindfulness-based Strategies, and Supportive Psychotherapy to address anxiety.    Behavioral Health Treatment Plan [facility]: Diagnosis and Treatment Plan explained to Benjamin, Benjamin relates understanding diagnosis and is agreeable to Treatment Plan. Yes     Depression Follow-up Plan Completed: Not applicable     Reason for visit is   Chief Complaint   Patient presents with    Virtual Regular Visit      Recent Visits  Date Type Provider Dept   07/02/25 Telemedicine Sharlene Hawley LCSW [facility] Therapist [place]   Showing recent visits within past 7 days and meeting all other requirements  Future Appointments  No visits were found meeting these conditions.  Showing future appointments within next 150 days and meeting all other requirements     History of Present Illness     HPI    Past Medical History   Past Medical History:   Diagnosis Date    ADD (attention deficit disorder)     Anesthesia complication     \"Takes a lot to get me down\"    Arthritis     Chronic pain     " "Chronic pain disorder     Depression     GERD (gastroesophageal reflux disease)     Heartburn     Hypertension     Mood disorder (HCC)     Tremor     Uncomplicated alcohol dependence (HCC) 01/09/2024     Past Surgical History:   Procedure Laterality Date    CLOSED REDUCTION CALCANEAL FRACTURE      COLONOSCOPY      PERONEAL TENDON EXPLORATION      AZ PADILLA FACETECTOMY & FORAMOTOMY 1 VRT SGM LUMBAR Right 02/06/2024    Procedure: RIGHT L4-5 MIS FORAMINOTOMY;  Surgeon: Al Slade MD;  Location:  MAIN OR;  Service: Neurosurgery    UPPER GASTROINTESTINAL ENDOSCOPY       Current Outpatient Medications   Medication Instructions    ADDERALL XR, 20MG, 20 MG 24 hr capsule 20 mg, Oral, Every morning    ALPRAZolam (XANAX) 0.5 mg tablet May take up to 1 mg or 2 tablets of 0.5 mg daily as needed for severe anxiety or panic attack.  Never to exceed 2 tablets or 1 mg/day.    amphetamine-dextroamphetamine (ADDERALL XR, 20MG,) 20 MG 24 hr capsule 20 mg, Oral, Every morning    amphetamine-dextroamphetamine (ADDERALL XR, 20MG,) 20 MG 24 hr capsule 20 mg, Oral, Every morning    amphetamine-dextroamphetamine (ADDERALL) 10 mg tablet 10 mg, Oral, 2 times daily (morning and afternoon)    amphetamine-dextroamphetamine (ADDERALL) 10 mg tablet 10 mg, Oral, 2 times daily (morning and afternoon)    azithromycin (ZITHROMAX) 250 mg tablet     B-D HYPODERMIC NEEDLE 18GX1.5\" 18G X 1-1/2\" MISC Use as directed---for Testosterone    B-D SYRINGE LUER-MILAGROS 1CC 1 ML MISC USE FOR BI WEEKLY TESTOSTERONE INJECTIONS    B-D SYRINGE LUER-MILAGROS 3CC 3 ML MISC USE AS DIRECTED FOR TESTOSTERONE INJECTION    BD Hypodermic Needle 25G X 1-1/2\" MISC     esomeprazole (NEXIUM) 40 mg, Oral, Daily before breakfast    gabapentin (NEURONTIN) 600 mg, 2 times daily    ibuprofen (MOTRIN) 800 mg tablet TAKE 1 TABLET BY MOUTH EVERY 8 HOURS AS NEEDED (PAIN).    losartan (COZAAR) 50 mg, Oral, Daily    nicotine (NICODERM CQ) 14 mg/24hr TD 24 hr patch 1 patch, Transdermal, Every 24 " hours    nicotine (NICODERM CQ) 21 mg/24 hr TD 24 hr patch 1 patch, Transdermal, Every 24 hours    nicotine (NICODERM CQ) 21 mg/24 hr TD 24 hr patch 1 patch, Transdermal, Every 24 hours    nicotine (NICODERM CQ) 7 mg/24hr TD 24 hr patch 1 patch, Transdermal, Every 24 hours    oxyCODONE-acetaminophen (PERCOCET) 5-325 mg per tablet 1 tablet    OxyCONTIN 15 MG 12 hr tablet TAKE 1 TABLET BY MOUTH TWICE A DAY FOR PAIN    risperiDONE (RISPERDAL) 4 mg, Oral, Daily at bedtime    rosuvastatin (CRESTOR) 20 mg, Oral, Daily    sodium picosulfate, magnesium oxide, citric acid (Clenpiq) oral solution Take 175 mL (1 bottle) the evening before the colonoscopy, between 5 PM and 9 PM, followed by a second 175 mL bottle 5 hours before the colonoscopy.    tadalafil (CIALIS) 20 mg, Oral, Daily PRN    tadalafil (CIALIS) 5 mg, Oral, Daily PRN    testosterone cypionate (DEPO-TESTOSTERONE) 200 mg, Intramuscular, Every 14 days    zolpidem (AMBIEN) 10 mg, Daily at bedtime PRN     No Known Allergies    Objective   There were no vitals taken for this visit.    Video Exam  Physical Exam     Administrative Statements   Encounter provider Sharlene Hawley LCSW    The Patient is located at Home and in the following state in which I hold an active license [place].    The patient was identified by name and date of birth. Benjamin Muñiz was informed that this is a telemedicine visit and that the visit is being conducted through the Epic Embedded platform. He agrees to proceed..  My office door was closed. No one else was in the room.  He acknowledged consent and understanding of privacy and security of the video platform. The patient has agreed to participate and understands they can discontinue the visit at any time.    I have spent a total time of 73 minutes in caring for this patient on the day of the visit/encounter including Counseling / Coordination of care, not including the time spent for establishing the audio/video connection.    Visit Time  Start  Time: 1702  Stop Time: 1815  Total Visit Time: 73 minutes

## 2025-07-05 ENCOUNTER — APPOINTMENT (OUTPATIENT)
Dept: LAB | Facility: HOSPITAL | Age: 40
End: 2025-07-05
Payer: COMMERCIAL

## 2025-07-05 DIAGNOSIS — R73.01 IMPAIRED FASTING GLUCOSE: ICD-10-CM

## 2025-07-05 DIAGNOSIS — I10 PRIMARY HYPERTENSION: ICD-10-CM

## 2025-07-05 LAB
ALBUMIN SERPL BCG-MCNC: 4 G/DL (ref 3.5–5)
ALP SERPL-CCNC: 47 U/L (ref 34–104)
ALT SERPL W P-5'-P-CCNC: 13 U/L (ref 7–52)
ANION GAP SERPL CALCULATED.3IONS-SCNC: 8 MMOL/L (ref 4–13)
AST SERPL W P-5'-P-CCNC: 16 U/L (ref 13–39)
BILIRUB SERPL-MCNC: 0.41 MG/DL (ref 0.2–1)
BUN SERPL-MCNC: 13 MG/DL (ref 5–25)
CALCIUM SERPL-MCNC: 8.8 MG/DL (ref 8.4–10.2)
CHLORIDE SERPL-SCNC: 105 MMOL/L (ref 96–108)
CO2 SERPL-SCNC: 22 MMOL/L (ref 21–32)
CREAT SERPL-MCNC: 1.14 MG/DL (ref 0.6–1.3)
ERYTHROCYTE [DISTWIDTH] IN BLOOD BY AUTOMATED COUNT: 17.6 % (ref 11.6–15.1)
EST. AVERAGE GLUCOSE BLD GHB EST-MCNC: 117 MG/DL
GFR SERPL CREATININE-BSD FRML MDRD: 78 ML/MIN/1.73SQ M
GLUCOSE SERPL-MCNC: 136 MG/DL (ref 65–140)
HBA1C MFR BLD: 5.7 %
HCT VFR BLD AUTO: 35.9 % (ref 36.5–49.3)
HGB BLD-MCNC: 11 G/DL (ref 12–17)
MCH RBC QN AUTO: 25.5 PG (ref 26.8–34.3)
MCHC RBC AUTO-ENTMCNC: 30.6 G/DL (ref 31.4–37.4)
MCV RBC AUTO: 83 FL (ref 82–98)
PLATELET # BLD AUTO: 238 THOUSANDS/UL (ref 149–390)
PMV BLD AUTO: 10.9 FL (ref 8.9–12.7)
POTASSIUM SERPL-SCNC: 3.6 MMOL/L (ref 3.5–5.3)
PROT SERPL-MCNC: 6.6 G/DL (ref 6.4–8.4)
RBC # BLD AUTO: 4.32 MILLION/UL (ref 3.88–5.62)
SODIUM SERPL-SCNC: 135 MMOL/L (ref 135–147)
TSH SERPL DL<=0.05 MIU/L-ACNC: 1.51 UIU/ML (ref 0.45–4.5)
WBC # BLD AUTO: 8.34 THOUSAND/UL (ref 4.31–10.16)

## 2025-07-05 PROCEDURE — 83036 HEMOGLOBIN GLYCOSYLATED A1C: CPT

## 2025-07-05 PROCEDURE — 36415 COLL VENOUS BLD VENIPUNCTURE: CPT

## 2025-07-05 PROCEDURE — 80053 COMPREHEN METABOLIC PANEL: CPT

## 2025-07-05 PROCEDURE — 84443 ASSAY THYROID STIM HORMONE: CPT

## 2025-07-05 PROCEDURE — 85027 COMPLETE CBC AUTOMATED: CPT

## 2025-07-07 RX ORDER — DEXTROAMPHETAMINE SACCHARATE, AMPHETAMINE ASPARTATE, DEXTROAMPHETAMINE SULFATE AND AMPHETAMINE SULFATE 2.5; 2.5; 2.5; 2.5 MG/1; MG/1; MG/1; MG/1
10 TABLET ORAL
Qty: 60 TABLET | Refills: 0 | OUTPATIENT
Start: 2025-07-07 | End: 2025-08-06

## 2025-07-07 RX ORDER — ALPRAZOLAM 0.5 MG
TABLET ORAL
Qty: 60 TABLET | Refills: 1 | OUTPATIENT
Start: 2025-07-07

## 2025-07-07 RX ORDER — DEXTROAMPHETAMINE SACCHARATE, AMPHETAMINE ASPARTATE MONOHYDRATE, DEXTROAMPHETAMINE SULFATE AND AMPHETAMINE SULFATE 5; 5; 5; 5 MG/1; MG/1; MG/1; MG/1
20 CAPSULE, EXTENDED RELEASE ORAL EVERY MORNING
Qty: 30 CAPSULE | Refills: 0 | OUTPATIENT
Start: 2025-07-07

## 2025-07-08 ENCOUNTER — DOCUMENTATION (OUTPATIENT)
Dept: GASTROENTEROLOGY | Facility: CLINIC | Age: 40
End: 2025-07-08

## 2025-07-09 ENCOUNTER — TELEMEDICINE (OUTPATIENT)
Dept: BEHAVIORAL/MENTAL HEALTH CLINIC | Facility: CLINIC | Age: 40
End: 2025-07-09
Payer: COMMERCIAL

## 2025-07-09 DIAGNOSIS — F43.10 PTSD (POST-TRAUMATIC STRESS DISORDER): ICD-10-CM

## 2025-07-09 DIAGNOSIS — F41.1 GENERALIZED ANXIETY DISORDER: ICD-10-CM

## 2025-07-09 DIAGNOSIS — F90.2 ATTENTION DEFICIT HYPERACTIVITY DISORDER (ADHD), COMBINED TYPE: ICD-10-CM

## 2025-07-09 DIAGNOSIS — F31.70 BIPOLAR DISORDER IN FULL REMISSION, MOST RECENT EPISODE UNSPECIFIED TYPE (HCC): Primary | ICD-10-CM

## 2025-07-09 PROCEDURE — 90837 PSYTX W PT 60 MINUTES: CPT | Performed by: SOCIAL WORKER

## 2025-07-10 ENCOUNTER — TELEPHONE (OUTPATIENT)
Age: 40
End: 2025-07-10

## 2025-07-10 NOTE — PSYCH
Virtual Regular VisitName: Benjamin Muñiz      : 1985      MRN: [id number]  Encounter Provider: Sharlene Hawley LCSW  Encounter Date: 2025   Encounter department: [facility] THERAPIST MENTAL HEALTH OUTPATIENT  :  Assessment & Plan  Bipolar disorder in full remission, most recent episode unspecified type (HCC)         Attention deficit hyperactivity disorder (ADHD), combined type         PTSD (post-traumatic stress disorder)         Generalized anxiety disorder         Bipolar disorder in full remission, most recent episode unspecified type (HCC)         Attention deficit hyperactivity disorder (ADHD), combined type         PTSD (post-traumatic stress disorder)         Generalized anxiety disorder               Goals addressed in session: Goal 1     DATA: Benjamin and therapist were present for scheduled session. Benjamin reported significant increase in anxiety leading up to the conclusion of a large project he has been working on. Therapist and Benjamin discussed onset of anxiety and how this presents for him. Therapist and Benjamin reviewed use of mindfulness exercises to help with managing this anxiety. Benjamin reported attempting some mindfulness techniques but reports they have been largely unhelpful as he feels his anxiety builds too quickly. Therapist and Benjamin reviewed need to practice these skills while in a calmer state to allow him to use the skills more effectively. Therapist and Benjamin continued discussion regarding setting boundaries with work.   During this session, this clinician used the following therapeutic modalities: Cognitive Behavioral Therapy and Mindfulness-based Strategies    Substance Abuse was not addressed during this session. If the client is diagnosed with a co-occurring substance use disorder, please indicate any changes in the frequency or amount of use: n/a. Stage of change for addressing substance use diagnoses: No substance use/Not  "applicable    ASSESSMENT:  Benjamin presents with a Euthymic/ normal mood. Benjamin's affect is Normal range and intensity, which is congruent, with their mood and the content of the session. The client has made progress on their goals as evidenced by attempting mindfulness strategies.    Benjamin presents with a none risk of suicide, none risk of self-harm, and none risk of harm to others.    For any risk assessment that surpasses a \"low\" rating, a safety plan must be developed.    A safety plan was indicated: no  If yes, describe in detail n/a    PLAN: Between sessions, Benjamin will continue to practice mindfulness strategies and work to set firm boundaries with work. At the next session, the therapist will use Cognitive Behavioral Therapy and Mindfulness-based Strategies to address anxiety.    Behavioral Health Treatment Plan [facility]: Diagnosis and Treatment Plan explained to Benjamin Alexander relates understanding diagnosis and is agreeable to Treatment Plan. Yes     Depression Follow-up Plan Completed: Not applicable     Reason for visit is   Chief Complaint   Patient presents with    Virtual Regular Visit      Recent Visits  Date Type Provider Dept   07/09/25 Telemedicine Sharlene Hawley LCSW [facility] Therapist [place]   Showing recent visits within past 7 days and meeting all other requirements  Future Appointments  No visits were found meeting these conditions.  Showing future appointments within next 150 days and meeting all other requirements     History of Present Illness     HPI    Past Medical History   Past Medical History:   Diagnosis Date    ADD (attention deficit disorder)     Anesthesia complication     \"Takes a lot to get me down\"    Arthritis     Chronic pain     Chronic pain disorder     Depression     GERD (gastroesophageal reflux disease)     Heartburn     Hypertension     Mood disorder (HCC)     Tremor     Uncomplicated alcohol dependence (HCC) 01/09/2024     Past Surgical History: " "  Procedure Laterality Date    CLOSED REDUCTION CALCANEAL FRACTURE      COLONOSCOPY      PERONEAL TENDON EXPLORATION      CA PADILLA FACETECTOMY & FORAMOTOMY 1 VRT SGM LUMBAR Right 02/06/2024    Procedure: RIGHT L4-5 MIS FORAMINOTOMY;  Surgeon: Al Slade MD;  Location:  MAIN OR;  Service: Neurosurgery    UPPER GASTROINTESTINAL ENDOSCOPY       Current Outpatient Medications   Medication Instructions    ADDERALL XR, 20MG, 20 MG 24 hr capsule 20 mg, Oral, Every morning    ALPRAZolam (XANAX) 0.5 mg tablet May take up to 1 mg or 2 tablets of 0.5 mg daily as needed for severe anxiety or panic attack.  Never to exceed 2 tablets or 1 mg/day.    amphetamine-dextroamphetamine (ADDERALL XR, 20MG,) 20 MG 24 hr capsule 20 mg, Oral, Every morning    amphetamine-dextroamphetamine (ADDERALL XR, 20MG,) 20 MG 24 hr capsule 20 mg, Oral, Every morning    amphetamine-dextroamphetamine (ADDERALL) 10 mg tablet 10 mg, Oral, 2 times daily (morning and afternoon)    amphetamine-dextroamphetamine (ADDERALL) 10 mg tablet 10 mg, Oral, 2 times daily (morning and afternoon)    azithromycin (ZITHROMAX) 250 mg tablet     B-D HYPODERMIC NEEDLE 18GX1.5\" 18G X 1-1/2\" MISC Use as directed---for Testosterone    B-D SYRINGE LUER-MILAGROS 1CC 1 ML MISC USE FOR BI WEEKLY TESTOSTERONE INJECTIONS    B-D SYRINGE LUER-MILAGROS 3CC 3 ML MISC USE AS DIRECTED FOR TESTOSTERONE INJECTION    BD Hypodermic Needle 25G X 1-1/2\" MISC     esomeprazole (NEXIUM) 40 mg, Oral, Daily before breakfast    gabapentin (NEURONTIN) 600 mg, 2 times daily    ibuprofen (MOTRIN) 800 mg tablet TAKE 1 TABLET BY MOUTH EVERY 8 HOURS AS NEEDED (PAIN).    losartan (COZAAR) 50 mg, Oral, Daily    nicotine (NICODERM CQ) 14 mg/24hr TD 24 hr patch 1 patch, Transdermal, Every 24 hours    nicotine (NICODERM CQ) 21 mg/24 hr TD 24 hr patch 1 patch, Transdermal, Every 24 hours    nicotine (NICODERM CQ) 21 mg/24 hr TD 24 hr patch 1 patch, Transdermal, Every 24 hours    nicotine (NICODERM CQ) 7 mg/24hr TD 24 hr " patch 1 patch, Transdermal, Every 24 hours    oxyCODONE-acetaminophen (PERCOCET) 5-325 mg per tablet 1 tablet    OxyCONTIN 15 MG 12 hr tablet TAKE 1 TABLET BY MOUTH TWICE A DAY FOR PAIN    risperiDONE (RISPERDAL) 4 mg, Oral, Daily at bedtime    rosuvastatin (CRESTOR) 20 mg, Oral, Daily    sodium picosulfate, magnesium oxide, citric acid (Clenpiq) oral solution Take 175 mL (1 bottle) the evening before the colonoscopy, between 5 PM and 9 PM, followed by a second 175 mL bottle 5 hours before the colonoscopy.    tadalafil (CIALIS) 20 mg, Oral, Daily PRN    tadalafil (CIALIS) 5 mg, Oral, Daily PRN    testosterone cypionate (DEPO-TESTOSTERONE) 200 mg, Intramuscular, Every 14 days    zolpidem (AMBIEN) 10 mg, Daily at bedtime PRN     No Known Allergies    Objective   There were no vitals taken for this visit.    Video Exam  Physical Exam     Administrative Statements   Encounter provider Sharlene Hawley LCSW    The Patient is located at Home and in the following state in which I hold an active license [place].    The patient was identified by name and date of birth. Benjamin Muñiz was informed that this is a telemedicine visit and that the visit is being conducted through the Epic Embedded platform. He agrees to proceed..  My office door was closed. No one else was in the room.  He acknowledged consent and understanding of privacy and security of the video platform. The patient has agreed to participate and understands they can discontinue the visit at any time.    I have spent a total time of 57 minutes in caring for this patient on the day of the visit/encounter including Counseling / Coordination of care, not including the time spent for establishing the audio/video connection.    Visit Time  Start Time: 1703  Stop Time: 1800  Total Visit Time: 57 minutes

## 2025-07-11 ENCOUNTER — HOSPITAL ENCOUNTER (OUTPATIENT)
Dept: GASTROENTEROLOGY | Facility: HOSPITAL | Age: 40
Setting detail: OUTPATIENT SURGERY
Discharge: HOME | End: 2025-07-11
Attending: INTERNAL MEDICINE
Payer: COMMERCIAL

## 2025-07-11 ENCOUNTER — ANESTHESIA (OUTPATIENT)
Dept: GASTROENTEROLOGY | Facility: HOSPITAL | Age: 40
End: 2025-07-11
Payer: COMMERCIAL

## 2025-07-11 ENCOUNTER — ANESTHESIA EVENT (OUTPATIENT)
Dept: GASTROENTEROLOGY | Facility: HOSPITAL | Age: 40
End: 2025-07-11
Payer: COMMERCIAL

## 2025-07-11 VITALS
TEMPERATURE: 98 F | SYSTOLIC BLOOD PRESSURE: 134 MMHG | BODY MASS INDEX: 34.57 KG/M2 | DIASTOLIC BLOOD PRESSURE: 78 MMHG | RESPIRATION RATE: 20 BRPM | OXYGEN SATURATION: 99 % | HEIGHT: 75 IN | WEIGHT: 278 LBS | HEART RATE: 82 BPM

## 2025-07-11 DIAGNOSIS — K31.A0 GASTRIC INTESTINAL METAPLASIA: ICD-10-CM

## 2025-07-11 DIAGNOSIS — Z86.0100 HISTORY OF COLONIC POLYPS: ICD-10-CM

## 2025-07-11 PROCEDURE — G0105 COLORECTAL SCRN; HI RISK IND: HCPCS | Performed by: INTERNAL MEDICINE

## 2025-07-11 PROCEDURE — 88341 IMHCHEM/IMCYTCHM EA ADD ANTB: CPT | Performed by: PATHOLOGY

## 2025-07-11 PROCEDURE — 43239 EGD BIOPSY SINGLE/MULTIPLE: CPT | Performed by: INTERNAL MEDICINE

## 2025-07-11 PROCEDURE — 88305 TISSUE EXAM BY PATHOLOGIST: CPT | Performed by: PATHOLOGY

## 2025-07-11 PROCEDURE — 88342 IMHCHEM/IMCYTCHM 1ST ANTB: CPT | Performed by: PATHOLOGY

## 2025-07-11 RX ORDER — LIDOCAINE HYDROCHLORIDE 20 MG/ML
INJECTION, SOLUTION EPIDURAL; INFILTRATION; INTRACAUDAL; PERINEURAL AS NEEDED
Status: DISCONTINUED | OUTPATIENT
Start: 2025-07-11 | End: 2025-07-11

## 2025-07-11 RX ORDER — SODIUM CHLORIDE 9 MG/ML
INJECTION, SOLUTION INTRAVENOUS CONTINUOUS PRN
Status: DISCONTINUED | OUTPATIENT
Start: 2025-07-11 | End: 2025-07-11

## 2025-07-11 RX ORDER — PROPOFOL 10 MG/ML
INJECTION, EMULSION INTRAVENOUS AS NEEDED
Status: DISCONTINUED | OUTPATIENT
Start: 2025-07-11 | End: 2025-07-11

## 2025-07-11 RX ORDER — SODIUM CHLORIDE 9 MG/ML
100 INJECTION, SOLUTION INTRAVENOUS CONTINUOUS
Status: DISCONTINUED | OUTPATIENT
Start: 2025-07-11 | End: 2025-07-15 | Stop reason: HOSPADM

## 2025-07-11 RX ADMIN — SODIUM CHLORIDE: 0.9 INJECTION, SOLUTION INTRAVENOUS at 14:41

## 2025-07-11 RX ADMIN — LIDOCAINE HYDROCHLORIDE 100 MG: 20 INJECTION, SOLUTION EPIDURAL; INFILTRATION; INTRACAUDAL; PERINEURAL at 14:41

## 2025-07-11 RX ADMIN — PROPOFOL 200 MG: 10 INJECTION, EMULSION INTRAVENOUS at 14:41

## 2025-07-11 RX ADMIN — PROPOFOL 50 MG: 10 INJECTION, EMULSION INTRAVENOUS at 14:48

## 2025-07-11 RX ADMIN — PROPOFOL 50 MG: 10 INJECTION, EMULSION INTRAVENOUS at 14:52

## 2025-07-11 RX ADMIN — PROPOFOL 50 MG: 10 INJECTION, EMULSION INTRAVENOUS at 14:56

## 2025-07-11 RX ADMIN — SODIUM CHLORIDE: 0.9 INJECTION, SOLUTION INTRAVENOUS at 15:04

## 2025-07-11 RX ADMIN — PROPOFOL 50 MG: 10 INJECTION, EMULSION INTRAVENOUS at 15:01

## 2025-07-11 RX ADMIN — PROPOFOL 50 MG: 10 INJECTION, EMULSION INTRAVENOUS at 14:46

## 2025-07-11 RX ADMIN — PROPOFOL 50 MG: 10 INJECTION, EMULSION INTRAVENOUS at 14:44

## 2025-07-11 NOTE — ANESTHESIA PREPROCEDURE EVALUATION
Procedure:  COLONOSCOPY  EGD    Relevant Problems   ANESTHESIA (within normal limits)      CARDIO   (+) Mixed hyperlipidemia   (+) Primary hypertension      GI/HEPATIC   (+) Gastroesophageal reflux disease without esophagitis      MUSCULOSKELETAL   (+) Chronic right-sided low back pain with right-sided sciatica      NEURO/PSYCH   (+) Chronic right-sided low back pain with right-sided sciatica   (+) Generalized anxiety disorder   (+) PTSD (post-traumatic stress disorder)   (+) Seizure (HCC)      Behavioral Health   (+) Attention deficit hyperactivity disorder (ADHD), combined type   (+) Bipolar disorder (HCC)   (+) Narcotic dependency, continuous (HCC)   (+) Nicotine dependence   (+) Tobacco dependence      Orthopedic/Musculoskeletal   (+) Lumbosacral radiculopathy        Physical Exam    Airway     Mallampati score: I  TM Distance: >3 FB  Neck ROM: full      Cardiovascular  Cardiovascular exam normal    Dental   No notable dental hx     Pulmonary  Pulmonary exam normal     Neurological  - normal exam    Other Findings        Anesthesia Plan  ASA Score- 3     Anesthesia Type- IV sedation with anesthesia with ASA Monitors.         Additional Monitors:     Airway Plan:     Comment: I discussed risks (reviewed with patient on the anesthesia consent form), benefits and alternatives of monitored sedation including the possibility under sedation to have recall or mild discomfort.  .       Plan Factors-    Chart reviewed.    Patient summary reviewed.                  Induction- intravenous.    Postoperative Plan- .   Monitoring Plan - Monitoring plan - standard ASA monitoring          Informed Consent- Anesthetic plan and risks discussed with patient.  I personally reviewed this patient with the CRNA. Discussed and agreed on the Anesthesia Plan with the CRNA..      NPO Status:  Vitals Value Taken Time   Date of last liquid 07/11/25 07/11/25 12:53   Time of last liquid 0700 07/11/25 12:53   Date of last solid 07/09/25  07/11/25 12:53   Time of last solid 1900 07/11/25 12:53

## 2025-07-11 NOTE — H&P
"History and Physical - [facility]    Benjamin Muñiz 40 y.o. male MRN: [id number]      HPI: Benjamin Muñiz is a 40 y.o. male who presents for     1. Gastric intestinal metaplasia  Last egd in 2021 showed gastric intestinal metaplasia. Still smoking and drinking.      - EGD w gastric mapping     2. Personal history of colonic polyps    No Known Allergies        REVIEW OF SYSTEMS: Per the HPI, and otherwise unremarkable.    Historical Information     Past Medical History[1]  Past Surgical History[2]  Social History   Social History     Substance and Sexual Activity   Alcohol Use Not Currently    Comment: haven't drank in 5 weeks- 7/1/2025     Social History     Substance and Sexual Activity   Drug Use Never     Tobacco Use History[3]  Family History[4]    Meds/Allergies     Current Medications[5]        Objective     /82   Pulse 99   Temp 98 °F (36.7 °C)   Resp 18   Ht 6' 3\" (1.905 m)   Wt 126 kg (278 lb)   SpO2 96%   BMI 34.75 kg/m²       PHYSICAL EXAM    Gen: NAD AAOx3  Head: Normocephalic, Atraumatic  CV: S1S2 RRR no m/r/g  CHEST: Clear b/l no c/r/w  ABD: soft, +BS NT/ND no masses  EXT: no edema      ASSESSMENT/PLAN:  This is a 40 y.o. year old male here for egd/colon, and he is stable and optimized for his procedure.             [1]   Past Medical History:  Diagnosis Date    ADD (attention deficit disorder)     Anesthesia complication     \"Takes a lot to get me down\"    Arthritis     Chronic pain     Chronic pain disorder     Depression     GERD (gastroesophageal reflux disease)     Heartburn     Hyperlipidemia     Hypertension     Mood disorder (HCC)     Tremor     Uncomplicated alcohol dependence (HCC) 01/09/2024   [2]   Past Surgical History:  Procedure Laterality Date    CLOSED REDUCTION CALCANEAL FRACTURE      COLONOSCOPY      PERONEAL TENDON EXPLORATION      AZ PADILLA FACETECTOMY & FORAMOTOMY 1 VRT SGM LUMBAR Right 02/06/2024    Procedure: RIGHT L4-5 MIS " "FORAMINOTOMY;  Surgeon: Al Slade MD;  Location:  MAIN OR;  Service: Neurosurgery    UPPER GASTROINTESTINAL ENDOSCOPY     [3]   Social History  Tobacco Use   Smoking Status Every Day    Current packs/day: 1.00    Average packs/day: 1 pack/day for 26.5 years (25.8 ttl pk-yrs)    Types: Cigarettes, Pipe    Start date: 1999   Smokeless Tobacco Never   [4]   Family History  Problem Relation Name Age of Onset    Breast cancer Mother      Heart disease Father      Hypertension Father      Intellectual disability Sister      Heart disease Maternal Grandfather      Parkinsonism Paternal Grandfather      Mental illness Neg Hx      Colon cancer Neg Hx      Colon polyps Neg Hx      Substance Abuse Neg Hx      Seizures Neg Hx     [5]   Current Outpatient Medications:     ADDERALL XR, 20MG, 20 MG 24 hr capsule    ALPRAZolam (XANAX) 0.5 mg tablet    amphetamine-dextroamphetamine (ADDERALL) 10 mg tablet    B-D HYPODERMIC NEEDLE 18GX1.5\" 18G X 1-1/2\" MISC    esomeprazole (NexIUM) 40 MG capsule    gabapentin (NEURONTIN) 600 MG tablet    ibuprofen (MOTRIN) 800 mg tablet    losartan (COZAAR) 50 mg tablet    nicotine (NICODERM CQ) 21 mg/24 hr TD 24 hr patch    oxyCODONE-acetaminophen (PERCOCET) 5-325 mg per tablet    OxyCONTIN 15 MG 12 hr tablet    risperiDONE (RisperDAL) 4 mg tablet    rosuvastatin (CRESTOR) 20 MG tablet    tadalafil (CIALIS) 20 MG tablet    tadalafil (CIALIS) 5 MG tablet    testosterone cypionate (DEPO-TESTOSTERONE) 200 mg/mL SOLN    amphetamine-dextroamphetamine (ADDERALL XR, 20MG,) 20 MG 24 hr capsule    B-D SYRINGE LUER-MILAGROS 1CC 1 ML MISC    B-D SYRINGE LUER-MILAGROS 3CC 3 ML MISC    BD Hypodermic Needle 25G X 1-1/2\" MISC    nicotine (NICODERM CQ) 7 mg/24hr TD 24 hr patch    sodium picosulfate, magnesium oxide, citric acid (Clenpiq) oral solution    Current Facility-Administered Medications:     sodium chloride 0.9 % infusion, 100 mL/hr, Intravenous, Continuous    "

## 2025-07-14 ENCOUNTER — TELEPHONE (OUTPATIENT)
Dept: PSYCHIATRY | Facility: CLINIC | Age: 40
End: 2025-07-14

## 2025-07-14 DIAGNOSIS — F41.1 GENERALIZED ANXIETY DISORDER: Primary | ICD-10-CM

## 2025-07-14 RX ORDER — ALPRAZOLAM 0.5 MG
0.5 TABLET ORAL 2 TIMES DAILY PRN
Qty: 10 TABLET | Refills: 0 | Status: SHIPPED | OUTPATIENT
Start: 2025-07-14 | End: 2025-07-21

## 2025-07-14 NOTE — TELEPHONE ENCOUNTER
Flaquita Alexander ([phone]) and reviewed Dr. Johnson note. He verbalized understanding and will discuss with Dr. Johnson on 7/18.

## 2025-07-14 NOTE — TELEPHONE ENCOUNTER
This is an as needed medication.  I will not increase the dose at this time especially not without speaking to him at our upcoming appointment.  As a one-time courtesy measure I will provide a 5-day supply.  In the future, if he runs out early no refills will be given and should he experience any withdrawal symptoms he should go to the emergency room or call 911.

## 2025-07-14 NOTE — TELEPHONE ENCOUNTER
Patient called to request increased dose for Xanax  Stress at work, increase discussed at last visit.     Please send to [facility], he only has 3 pills left

## 2025-07-15 ENCOUNTER — DOCUMENTATION (OUTPATIENT)
Dept: PSYCHIATRY | Facility: CLINIC | Age: 40
End: 2025-07-15

## 2025-07-15 ENCOUNTER — TELEPHONE (OUTPATIENT)
Dept: OTHER | Facility: OTHER | Age: 40
End: 2025-07-15

## 2025-07-16 ENCOUNTER — TELEMEDICINE (OUTPATIENT)
Dept: BEHAVIORAL/MENTAL HEALTH CLINIC | Facility: CLINIC | Age: 40
End: 2025-07-16
Payer: COMMERCIAL

## 2025-07-16 DIAGNOSIS — F43.10 PTSD (POST-TRAUMATIC STRESS DISORDER): ICD-10-CM

## 2025-07-16 DIAGNOSIS — F90.2 ATTENTION DEFICIT HYPERACTIVITY DISORDER (ADHD), COMBINED TYPE: ICD-10-CM

## 2025-07-16 DIAGNOSIS — F31.70 BIPOLAR DISORDER IN FULL REMISSION, MOST RECENT EPISODE UNSPECIFIED TYPE (HCC): Primary | ICD-10-CM

## 2025-07-16 PROCEDURE — 90837 PSYTX W PT 60 MINUTES: CPT | Performed by: SOCIAL WORKER

## 2025-07-17 NOTE — PSYCH
Virtual Regular VisitName: Benjamin Muñiz      : 1985      MRN: [id number]  Encounter Provider: Sharlene Hawley LCSW  Encounter Date: 2025   Encounter department: [facility] THERAPIST MENTAL HEALTH OUTPATIENT  :  Assessment & Plan  Bipolar disorder in full remission, most recent episode unspecified type (HCC)         Attention deficit hyperactivity disorder (ADHD), combined type         PTSD (post-traumatic stress disorder)         Bipolar disorder in full remission, most recent episode unspecified type (HCC)         Attention deficit hyperactivity disorder (ADHD), combined type         PTSD (post-traumatic stress disorder)               Goals addressed in session: Goal 1     DATA: Benjamin and therapist were present for scheduled session. Erick reported increased stress and anxiety r/t upcoming work project. He advised he has been struggling with problems in the project and states this has led to increased work hours and inability to manage anxiety with coping strategies. Therapist and Erick reviewed strategies that may be helpful and therapist discussed self-care strategies with Erick. Erick appears resistant to setting work boundaries, frequently stating that he is attempting to move up in the company and struggles with saying no. Therapist and Erick discussed long term impacts of failing to set boundaries and discussed reasonable boundaries he can enforce.     During this session, this clinician used the following therapeutic modalities: Cognitive Behavioral Therapy, Motivational Interviewing, and Supportive Psychotherapy    Substance Abuse was not addressed during this session. If the client is diagnosed with a co-occurring substance use disorder, please indicate any changes in the frequency or amount of use: n/a. Stage of change for addressing substance use diagnoses: No substance use/Not applicable    ASSESSMENT:  Benjamin presents with a Euthymic/ normal mood. Benjamin's affect is  "Normal range and intensity, which is congruent, with their mood and the content of the session. The client has made progress on their goals as evidenced by continuing to utilize coping skills in times of low stress to increase proficiency.    Benjamin presents with a none risk of suicide, none risk of self-harm, and none risk of harm to others.    For any risk assessment that surpasses a \"low\" rating, a safety plan must be developed.    A safety plan was indicated: no  If yes, describe in detail n/a    PLAN: Between sessions, Benjamin will continue to identify anxiety provoking situations and use coping strategies as needed . At the next session, the therapist will use Cognitive Behavioral Therapy and Mindfulness-based Strategies to address anxiety.    Behavioral Health Treatment Plan [facility]: Diagnosis and Treatment Plan explained to Benjamin Alexander relates understanding diagnosis and is agreeable to Treatment Plan. Yes     Depression Follow-up Plan Completed: Not applicable     Reason for visit is   Chief Complaint   Patient presents with    Virtual Regular Visit      Recent Visits  Date Type Provider Dept   07/16/25 Telemedicine Sharlene Hawley LCSW [facility] Therapist op   Showing recent visits within past 7 days and meeting all other requirements  Future Appointments  No visits were found meeting these conditions.  Showing future appointments within next 150 days and meeting all other requirements     History of Present Illness     HPI    Past Medical History   Past Medical History:   Diagnosis Date    ADD (attention deficit disorder)     Anesthesia complication     \"Takes a lot to get me down\"    Arthritis     Chronic pain     Chronic pain disorder     Depression     GERD (gastroesophageal reflux disease)     Heartburn     Hyperlipidemia     Hypertension     Mood disorder (HCC)     Tremor     Uncomplicated alcohol dependence (HCC) 01/09/2024     Past Surgical History:   Procedure Laterality Date    " "CLOSED REDUCTION CALCANEAL FRACTURE      COLONOSCOPY      PERONEAL TENDON EXPLORATION      WY PADILLA FACETECTOMY & FORAMOTOMY 1 VRT SGM LUMBAR Right 02/06/2024    Procedure: RIGHT L4-5 MIS FORAMINOTOMY;  Surgeon: Al Slade MD;  Location:  MAIN OR;  Service: Neurosurgery    UPPER GASTROINTESTINAL ENDOSCOPY       Current Outpatient Medications   Medication Instructions    ADDERALL XR, 20MG, 20 MG 24 hr capsule 20 mg, Oral, Every morning    ALPRAZolam (XANAX) 0.5 mg tablet May take up to 1 mg or 2 tablets of 0.5 mg daily as needed for severe anxiety or panic attack.  Never to exceed 2 tablets or 1 mg/day.    ALPRAZolam (XANAX) 0.5 mg, Oral, 2 times daily PRN, Not to exceed 1 mg/day  Or 2 tablets in total.    amphetamine-dextroamphetamine (ADDERALL XR, 20MG,) 20 MG 24 hr capsule 20 mg, Oral, Every morning    amphetamine-dextroamphetamine (ADDERALL) 10 mg tablet 10 mg, Oral, 2 times daily (morning and afternoon)    B-D HYPODERMIC NEEDLE 18GX1.5\" 18G X 1-1/2\" MISC Use as directed---for Testosterone    B-D SYRINGE LUER-MILAGROS 1CC 1 ML MISC     B-D SYRINGE LUER-MILAGROS 3CC 3 ML MISC     BD Hypodermic Needle 25G X 1-1/2\" MISC     esomeprazole (NEXIUM) 40 mg, Oral, Daily before breakfast    gabapentin (NEURONTIN) 600 mg, 2 times daily    ibuprofen (MOTRIN) 800 mg tablet     losartan (COZAAR) 50 mg, Oral, Daily    nicotine (NICODERM CQ) 21 mg/24 hr TD 24 hr patch 1 patch, Transdermal, Every 24 hours    nicotine (NICODERM CQ) 7 mg/24hr TD 24 hr patch 1 patch, Transdermal, Every 24 hours    oxyCODONE-acetaminophen (PERCOCET) 5-325 mg per tablet 1 tablet    OxyCONTIN 15 MG 12 hr tablet     risperiDONE (RISPERDAL) 4 mg, Oral, Daily at bedtime    rosuvastatin (CRESTOR) 20 mg, Oral, Daily    tadalafil (CIALIS) 20 mg, Oral, Daily PRN    tadalafil (CIALIS) 5 mg, Oral, Daily PRN    testosterone cypionate (DEPO-TESTOSTERONE) 200 mg, Intramuscular, Every 14 days     No Known Allergies    Objective   There were no vitals taken for this " visit.    Video Exam  Physical Exam     Administrative Statements   Encounter provider Sharlene Hawley LCSW    The Patient is located at Home and in the following state in which I hold an active license [place].    The patient was identified by name and date of birth. Benjamin Muñiz was informed that this is a telemedicine visit and that the visit is being conducted through the Epic Embedded platform. He agrees to proceed..  My office door was closed. No one else was in the room.  He acknowledged consent and understanding of privacy and security of the video platform. The patient has agreed to participate and understands they can discontinue the visit at any time.    I have spent a total time of 58 minutes in caring for this patient on the day of the visit/encounter including Counseling / Coordination of care, not including the time spent for establishing the audio/video connection.    Visit Time  Start Time: 1700  Stop Time: 1758  Total Visit Time: 58 minutes

## 2025-07-18 ENCOUNTER — TELEMEDICINE (OUTPATIENT)
Dept: PSYCHIATRY | Facility: CLINIC | Age: 40
End: 2025-07-18
Payer: COMMERCIAL

## 2025-07-18 DIAGNOSIS — F41.1 GENERALIZED ANXIETY DISORDER: ICD-10-CM

## 2025-07-18 DIAGNOSIS — F33.9 RECURRENT MAJOR DEPRESSIVE DISORDER, REMISSION STATUS UNSPECIFIED (HCC): ICD-10-CM

## 2025-07-18 DIAGNOSIS — F90.2 ATTENTION DEFICIT HYPERACTIVITY DISORDER (ADHD), COMBINED TYPE: Primary | ICD-10-CM

## 2025-07-18 PROCEDURE — 99214 OFFICE O/P EST MOD 30 MIN: CPT | Performed by: STUDENT IN AN ORGANIZED HEALTH CARE EDUCATION/TRAINING PROGRAM

## 2025-07-18 PROCEDURE — 88305 TISSUE EXAM BY PATHOLOGIST: CPT | Performed by: PATHOLOGY

## 2025-07-18 PROCEDURE — 90833 PSYTX W PT W E/M 30 MIN: CPT | Performed by: STUDENT IN AN ORGANIZED HEALTH CARE EDUCATION/TRAINING PROGRAM

## 2025-07-18 PROCEDURE — 88341 IMHCHEM/IMCYTCHM EA ADD ANTB: CPT | Performed by: PATHOLOGY

## 2025-07-18 PROCEDURE — 88342 IMHCHEM/IMCYTCHM 1ST ANTB: CPT | Performed by: PATHOLOGY

## 2025-07-18 RX ORDER — ALPRAZOLAM 0.5 MG
TABLET ORAL
Qty: 60 TABLET | Refills: 1 | Status: CANCELLED | OUTPATIENT
Start: 2025-07-18

## 2025-07-21 DIAGNOSIS — F41.1 GENERALIZED ANXIETY DISORDER: ICD-10-CM

## 2025-07-21 DIAGNOSIS — F31.70 BIPOLAR DISORDER IN FULL REMISSION, MOST RECENT EPISODE UNSPECIFIED TYPE (HCC): ICD-10-CM

## 2025-07-21 DIAGNOSIS — F90.2 ATTENTION DEFICIT HYPERACTIVITY DISORDER (ADHD), COMBINED TYPE: ICD-10-CM

## 2025-07-21 RX ORDER — RISPERIDONE 4 MG/1
4 TABLET ORAL
Qty: 90 TABLET | Refills: 1 | Status: SHIPPED | OUTPATIENT
Start: 2025-07-21 | End: 2026-01-17

## 2025-07-21 RX ORDER — DEXTROAMPHETAMINE SACCHARATE, AMPHETAMINE ASPARTATE, DEXTROAMPHETAMINE SULFATE AND AMPHETAMINE SULFATE 2.5; 2.5; 2.5; 2.5 MG/1; MG/1; MG/1; MG/1
10 TABLET ORAL
Qty: 60 TABLET | Refills: 0 | Status: SHIPPED | OUTPATIENT
Start: 2025-07-21 | End: 2025-08-20

## 2025-07-21 RX ORDER — ALPRAZOLAM 0.5 MG
0.5 TABLET ORAL 3 TIMES DAILY PRN
Qty: 90 TABLET | Refills: 0 | Status: SHIPPED | OUTPATIENT
Start: 2025-07-21 | End: 2025-08-20

## 2025-07-21 RX ORDER — DEXTROAMPHETAMINE SACCHARATE, AMPHETAMINE ASPARTATE MONOHYDRATE, DEXTROAMPHETAMINE SULFATE AND AMPHETAMINE SULFATE 5; 5; 5; 5 MG/1; MG/1; MG/1; MG/1
20 CAPSULE, EXTENDED RELEASE ORAL EVERY MORNING
Qty: 30 CAPSULE | Refills: 0 | Status: SHIPPED | OUTPATIENT
Start: 2025-07-21

## 2025-07-21 NOTE — TELEPHONE ENCOUNTER
Reason for call:   [x] Refill   [] Prior Auth  [x] Other: patient had an appt on Fri 7.18 and Dr did not send in medication     Office:   [] PCP/Provider -   [x] Specialty/Provider - : [facility] [place]     Medication:     amphetamine-dextroamphetamine (ADDERALL XR, 20MG,) 20 MG 24 hr capsule       Dose/Frequency:   Summary: Take 1 capsule (20 mg total) by mouth every morning        Quantity: 30      Medication:     amphetamine-dextroamphetamine (ADDERALL) 10 mg tablet       Dose/Frequency:  Take 1 tablet (10 mg total) by mouth in the morning and 1 tablet (10 mg total) at noon.     Quantity: 60      Medication:     risperiDONE (RisperDAL) 4 mg tablet       Dose/Frequency:  Take 1 tablet (4 mg total) by mouth daily at bedtime,     Quantity: 90        Pharmacy: [id number]    Local Pharmacy   Does the patient have enough for 3 days?   [] Yes   [x] No - Send as HP to POD

## 2025-07-21 NOTE — TELEPHONE ENCOUNTER
Risperidone; Refill must be reviewed and completed by the office or provider. The refill is unable to be approved or denied by the medication management team. Cannot be delegated     07/15/2025 07/14/2025 ALPRAZolam (Tablet) 10.0 5 0.5 MG NA [facility] Private Pay 0 / 0 [place]       1 [id number] ** 07/15/2025 07/14/2025 Belsomra (Tablet) 30.0 30 20 MG NA [facility] Commercial Insurance 0 / 0 [place]    2 [id number] ** 07/14/2025 05/23/2025 Amphetamine Salt Combo (Tablet) 60.0 30 10 MG NA [facility] Commercial Insurance 0 / 0 [place]    2 [id number] ** 07/05/2025 05/23/2025 Mixed Amphetamine Salts (Capsule, Extended Release) 30.0 30 20 MG NA [facility]

## 2025-07-24 NOTE — PSYCH
MEDICATION MANAGEMENT NOTE        [facility]      Name and Date of Birth:  Benjamin Muñiz 40 y.o. 1985 MRN: [id number]    Date of Visit: July 24, 2025    Reason for Visit: Follow-up visit for medication management     Administrative Statements   Encounter provider Ambreen Johnson DO    The Patient is located at Home and in the following state in which I hold an active license [place].    The patient was identified by name and date of birth. Benjamin Muñiz was informed that this is a telemedicine visit and that the visit is being conducted through the Epic Embedded platform. He agrees to proceed..  My office door was closed. No one else was in the room.  He acknowledged consent and understanding of privacy and security of the video platform. The patient has agreed to participate and understands they can discontinue the visit at any time.    I have spent a total time of 28 minutes in caring for this patient on the day of the visit/encounter including Diagnostic results, Prognosis, Risks and benefits of tx options, Instructions for management, Patient and family education, Importance of tx compliance, Risk factor reductions, Impressions, Counseling / Coordination of care, Documenting in the medical record, Reviewing/placing orders in the medical record (including tests, medications, and/or procedures), and Obtaining or reviewing history  , not including the time spent for establishing the audio/video connection.       SUBJECTIVE:    Benjamin Muñiz is a 40 y.o. male with past psychiatric history significant for MDD, HERI, PTSD, ADHD who was personally seen and evaluated today at the [facility] outpatient clinic for follow-up and medication management. Benjamin denies SI, HI, AVH, delusions, gibson since his last appointment.  After discussion of risks, benefits, potential side effects, alternatives, we will increase Xanax to 0.5 mg 3 times daily as  "needed for severe anxiety as patient reports that as a result of the current work project that he is engaged in, he has been experiencing a significant increase in panic attacks.  He anticipates that once this project is done within the next 2 to 3 weeks, his panic attacks will subside.  Therefore, we will temporarily increase Xanax as patient had found himself using more recently in order to control his anxiety without any side effects.  Rest of regimen remains the same and he denies acute mental health complaints concerns at this time      Current Rating Scores:     None completed today.    Review Of Systems:      Constitutional negative   ENT negative   Cardiovascular negative   Respiratory negative   Gastrointestinal negative   Genitourinary negative   Musculoskeletal negative   Integumentary negative   Neurological negative   Endocrine negative   Other Symptoms none, all other systems are negative       Past Psychiatric History: (unchanged information from previous note copied and italicized) - Information that is bolded has been updated.     See intake    Medication trials: Wellbutrin discontinued secondary to side effects.    Substance Abuse History: (unchanged information from previous note copied and italicized) - Information that is bolded has been updated.     See intake    Social History: (unchanged information from previous note copied and italicized) - Information that is bolded has been updated.     See intake    Traumatic History: (unchanged information from previous note copied and italicized) - Information that is bolded has been updated.     See intake      Past Medical History:    Past Medical History:   Diagnosis Date    ADD (attention deficit disorder)     Anesthesia complication     \"Takes a lot to get me down\"    Arthritis     Chronic pain     Chronic pain disorder     Depression     GERD (gastroesophageal reflux disease)     Heartburn     Hyperlipidemia     Hypertension     Mood disorder " (HCC)     Tremor     Uncomplicated alcohol dependence (HCC) 01/09/2024        Past Surgical History:   Procedure Laterality Date    CLOSED REDUCTION CALCANEAL FRACTURE      COLONOSCOPY      PERONEAL TENDON EXPLORATION      KS PADILLA FACETECTOMY & FORAMOTOMY 1 VRT SGM LUMBAR Right 02/06/2024    Procedure: RIGHT L4-5 MIS FORAMINOTOMY;  Surgeon: Al Slade MD;  Location:  MAIN OR;  Service: Neurosurgery    UPPER GASTROINTESTINAL ENDOSCOPY       No Known Allergies    Substance Abuse History:    Social History     Substance and Sexual Activity   Alcohol Use Not Currently    Comment: haven't drank in 5 weeks- 7/1/2025     Social History     Substance and Sexual Activity   Drug Use Never       Social History:    Social History     Socioeconomic History    Marital status: /Civil Union     Spouse name: Not on file    Number of children: Not on file    Years of education: Not on file    Highest education level: Not on file   Occupational History    Not on file   Tobacco Use    Smoking status: Every Day     Current packs/day: 1.00     Average packs/day: 1 pack/day for 26.6 years (25.8 ttl pk-yrs)     Types: Cigarettes, Pipe     Start date: 1999    Smokeless tobacco: Never   Vaping Use    Vaping status: Never Used   Substance and Sexual Activity    Alcohol use: Not Currently     Comment: haven't drank in 5 weeks- 7/1/2025    Drug use: Never    Sexual activity: Yes     Partners: Female     Birth control/protection: I.U.D.   Other Topics Concern    Not on file   Social History Narrative    Lives with wife and step daughter in [place]    Sees dentist occasionally    No living will     Social Drivers of Health     Financial Resource Strain: Not on file   Food Insecurity: Patient Unable To Answer (7/11/2025)    Nursing - Inadequate Food Risk Classification     Worried About Running Out of Food in the Last Year: Not on file     Ran Out of Food in the Last Year: Not on file     Ran Out of Food in the Last Year: Patient  "unable to answer   Transportation Needs: Patient Unable To Answer (7/11/2025)    Nursing - Transportation Risk Classification     Lack of Transportation: Not on file     Lack of Transportation: Patient unable to answer   Physical Activity: Not on file   Stress: Not on file   Social Connections: Not on file   Intimate Partner Violence: Patient Unable To Answer (7/11/2025)    Nursing IPS     Feels Physically and Emotionally Safe: Not on file     Physically Hurt by Someone: Not on file     Humiliated or Emotionally Abused by Someone: Not on file     Physically Hurt by Someone: Patient unable to answer     Hurt or Threatened by Someone: Patient unable to answer   Housing Stability: Patient Unable To Answer (7/11/2025)    Nursing: Inadequate Housing Risk Classification     Has Housing: Not on file     Worried About Losing Housing: Not on file     Unable to Get Utilities: Not on file     Unable to Pay for Housing in the Last Year: Patient unable to answer     Has Housing: Patient unable to answer       Family Psychiatric History:     Family History   Problem Relation Name Age of Onset    Breast cancer Mother      Heart disease Father      Hypertension Father      Intellectual disability Sister      Heart disease Maternal Grandfather      Parkinsonism Paternal Grandfather      Mental illness Neg Hx      Colon cancer Neg Hx      Colon polyps Neg Hx      Substance Abuse Neg Hx      Seizures Neg Hx         History Review: The following portions of the patient's history were reviewed and updated as appropriate: allergies, current medications, past family history, past medical history, past social history, past surgical history, and problem list.         OBJECTIVE:     Vital signs in last 24 hours:    There were no vitals filed for this visit.    Mental Status Evaluation:    Appearance age appropriate, casually dressed   Behavior Cooperative, anxious   Speech normal rate, normal volume, normal pitch   Mood \"Anxious\"   Affect " Anxious   Thought Processes organized, goal directed   Associations intact associations   Thought Content no overt delusions   Perceptual Disturbances: no auditory hallucinations, no visual hallucinations   Abnormal Thoughts  Risk Potential Suicidal ideation - None at present  Homicidal ideation - None at present  Potential for aggression - Not at present   Orientation oriented to person, place, time/date, and situation   Memory recent and remote memory grossly intact   Consciousness alert and awake   Attention Span Concentration Span Attention span and concentration are decreased   Intellect appears to be of average intelligence   Insight intact   Judgement intact   Muscle Strength and  Gait unable to assess today due to virtual visit   Motor activity unable to assess today due to virtual visit   Language no difficulty naming common objects, no difficulty repeating a phrase, no difficulty writing a sentence   Fund of Knowledge adequate knowledge of current events  adequate fund of knowledge regarding past history  adequate fund of knowledge regarding vocabulary    Pain none   Pain Scale Did not ask patient to formally rate       Laboratory Results: I have personally reviewed all pertinent laboratory/tests results    Recent Labs (last 2 months):   Hospital Outpatient Visit on 07/11/2025   Component Date Value    Case Report 07/11/2025                      Value:Surgical Pathology Report                         Case: [id number]                                  Authorizing Provider:  Geena Roman MD           Collected:           07/11/2025 1445              Ordering Location:     [facility]     Received:            07/11/2025 1628                                     [facility]                                                             Pathologist:           Kannan Bang MD                                                    Specimens:   A) - Stomach, antrum r/o dysplasia                                                                   B) - Stomach, incisura r/o dysplasia                                                                C) - Stomach, fundus r/o dysplasia                                                                  D) - Stomach, greater curvature r/o dysplasia                                              Final Diagnosis 07/11/2025                      Value:A. Stomach, antrum biopsy:  - Chronic inactive antral gastritis.  - No Helicobacter pylori organisms are identified with immunoperoxidase stain.  - Negative for intestinal metaplasia, dysplasia or carcinoma. Pan keratin stain highlights benign epithelial elements.    B. Stomach, incisura biopsy:  - Chronic inactive antral and oxyntic gastritis.  - No Helicobacter pylori organisms are identified with immunoperoxidase stain.  - Negative for intestinal metaplasia, dysplasia or carcinoma. Pan keratin stain highlights benign epithelial elements.     C. Stomach, fundus biopsy:  - Chronic inactive oxyntic gastritis.  - No Helicobacter pylori organisms are identified with routine H&E stain.  - Negative for intestinal metaplasia, dysplasia or carcinoma.     D. Stomach, greater curvature biopsy:  - Chronic inactive oxyntic gastritis.  - No Helicobacter pylori organisms are identified with immunoperoxidase stain.  - Negative for intestinal metaplasia, dysplasia or carcinoma. Pan keratin stain                           highlights benign epithelial elements.        Additional Information 07/11/2025                      Value:All reported additional testing was performed with appropriately reactive controls.  These tests were developed and their performance characteristics determined by [facility] or appropriate performing facility, though some tests may be performed on tissues which have not been validated for performance characteristics (such as staining performed on alcohol exposed cell blocks and decalcified tissues).   "Results should be interpreted with caution and in the context of the patients’ clinical condition. These tests may not be cleared or approved by the U.S. Food and Drug Administration, though the FDA has determined that such clearance or approval is not necessary. These tests are used for clinical purposes and they should not be regarded as investigational or for research. This laboratory has been approved by CLIA 88, designated as a high-complexity laboratory and is qualified to perform these tests.    Interpretation performed at [facility], [address] [place] [id number]      Gross Description 07/11/2025                      Value:A. The specimen is received in formalin, labeled with the patient's name and hospital number, and is designated \" stomach antrum\".  The specimen consists of 3 tan soft tissue fragments ranging from 0.2 to 0.7 cm in greatest dimension.  Entirely submitted. One screened cassette.  B. The specimen is received in formalin, labeled with the patient's name and hospital number, and is designated \" stomach incisura\".  The specimen consists of 4 tan soft tissue fragments ranging from 0.1 to 0.7 cm in greatest dimension.  Entirely submitted. One screened cassette.  C. The specimen is received in formalin, labeled with the patient's name and hospital number, and is designated \" stomach fundus\".  The specimen consists of 2 tan soft tissue fragments measuring 0.3 and 0.9 cm in greatest dimension.  Entirely submitted. One screened cassette.  D. The specimen is received in formalin, labeled with the patient's name and hospital number, and is designated \" stomach greater curvature\".  The specimen                           consists of 4 tan soft tissue fragments ranging from 0.1 to 0.7 cm in greatest dimension.  Entirely submitted. One screened cassette.    Note: The estimated total formalin fixation time based upon information provided by the submitting clinician and " the standard processing schedule is under 72 hours. Ronn       Appointment on 07/05/2025   Component Date Value    Sodium 07/05/2025 135     Potassium 07/05/2025 3.6     Chloride 07/05/2025 105     CO2 07/05/2025 22     ANION GAP 07/05/2025 8     BUN 07/05/2025 13     Creatinine 07/05/2025 1.14     Glucose 07/05/2025 136     Calcium 07/05/2025 8.8     AST 07/05/2025 16     ALT 07/05/2025 13     Alkaline Phosphatase 07/05/2025 47     Total Protein 07/05/2025 6.6     Albumin 07/05/2025 4.0     Total Bilirubin 07/05/2025 0.41     eGFR 07/05/2025 78     Hemoglobin A1C 07/05/2025 5.7 (H)     EAG 07/05/2025 117     WBC 07/05/2025 8.34     RBC 07/05/2025 4.32     Hemoglobin 07/05/2025 11.0 (L)     Hematocrit 07/05/2025 35.9 (L)     MCV 07/05/2025 83     MCH 07/05/2025 25.5 (L)     MCHC 07/05/2025 30.6 (L)     RDW 07/05/2025 17.6 (H)     Platelets 07/05/2025 238     MPV 07/05/2025 10.9     TSH 3RD GENERATION 07/05/2025 1.514        Suicide/Homicide Risk Assessment:    Risk of Harm to Self:  The following ratings are based on assessment at the time of the interview  Historical Risk Factors include: chronic psychiatric problems  Protective Factors: no current suicidal ideation, access to mental health treatment, compliant with medications, compliant with mental health treatment, having a desire to be alive, responsibilities and duties to others, strong relationships    Risk of Harm to Others:  The following ratings are based on assessment at the time of the interview  Historical Risk Factors include: none.  Protective Factors: no current homicidal ideation, access to mental health treatment, compliant with medications, compliant with mental health treatment, responsibilities and duties to others    The following interventions are recommended: continue medication management, contracts for safety at present - agrees to go to ED if feeling unsafe, contracts for safety at present - agrees to call Crisis Intervention Service  if feeling unsafe      Lethality Statement:    Based on today's assessment and clinical criteria, Benjamin Muñiz contracts for safety and is not an imminent risk of harm to self or others. Outpatient level of care is deemed appropriate at this current time. Benjamin understands that if they can no longer contract for safety, they need to call the office or report to their nearest Emergency Room for immediate evaluation. They voiced understanding and agreement to call 911 or head to the nearest ED should they have any physical or mental decompensation whatsoever.       Assessment/Plan:     1.)  HERI with panic  2.)  PTSD  3.)  ADHD  4.)  MDD, recurrent, mild    After discussion of risks, benefits, potential side effects, alternatives, we will continue risperidone 4 mg nightly, Adderall XR 20 in the morning, Adderall immediate release 20 mg in the afternoon, increase Xanax to 0.5 mg 3 times daily as needed for severe anxiety for the next month.  The plan is to titrate patient back downwards on this once current stressors are decreased.  He denies acute mental health complaints or concerns at this time.  He will continue following with specialists for his other chronic mental conditions and denies any mental health complaints concerns at this time        Aware of 24 hour and weekend coverage for urgent situations accessed by calling [facility] main practice number    Medications Risks/Benefits      Risks, Benefits And Possible Side Effects Of Medications:    Risks, benefits, and possible side effects of medications explained to Benjamin and he verbalizes understanding and agreement for treatment.    Controlled Medication Discussion:     Benjamin has been filling controlled prescriptions on time as prescribed according to [place] Prescription Drug Monitoring Program    Psychotherapy Provided:     Individual psychotherapy provided: Crisis/safety plan discussed with Benjamin. Provided at least 16  minutes of distinct psychotherapy using a combination of supportive, interpersonal, motivational, and problem solving approaches to address psychological distress and enhance coping strategies.     Treatment Plan:    Completed and signed during the session: We will complete at next appointment due to lack of time today      Visit Time    Visit Start Time: 6:30 PM  Visit Stop Time: 6:58 PM  Total Visit Duration: 28 minutes     The total visit duration detailed above includes: patient engagement, medication management, psychotherapy/counseling, discussion regarding treatment goals, documentation, review of past medical records, and coordination of care.      Note Share Disclaimer:     This note was not shared with the patient due to reasonable likelihood of causing patient harm      Ambreen Johnson DO  Psychiatry  07/24/25

## 2025-07-25 ENCOUNTER — TELEPHONE (OUTPATIENT)
Dept: PSYCHIATRY | Facility: CLINIC | Age: 40
End: 2025-07-25

## 2025-07-25 NOTE — TELEPHONE ENCOUNTER
The patient called to schedule a follow up appointment with the provider for: 8.8.25 @ 4:00pm    -Virtual consent on file  -Insurance verified

## 2025-08-03 DIAGNOSIS — E78.2 MIXED HYPERLIPIDEMIA: ICD-10-CM

## 2025-08-03 DIAGNOSIS — E29.1 HYPOGONADISM IN MALE: ICD-10-CM

## 2025-08-03 DIAGNOSIS — N52.9 ERECTILE DYSFUNCTION, UNSPECIFIED ERECTILE DYSFUNCTION TYPE: ICD-10-CM

## 2025-08-03 DIAGNOSIS — I10 PRIMARY HYPERTENSION: ICD-10-CM

## 2025-08-05 RX ORDER — TADALAFIL 20 MG/1
20 TABLET ORAL DAILY PRN
Qty: 30 TABLET | Refills: 2 | Status: SHIPPED | OUTPATIENT
Start: 2025-08-05

## 2025-08-05 RX ORDER — ROSUVASTATIN CALCIUM 20 MG/1
20 TABLET, COATED ORAL DAILY
Qty: 90 TABLET | Refills: 1 | Status: SHIPPED | OUTPATIENT
Start: 2025-08-05

## 2025-08-05 RX ORDER — TESTOSTERONE CYPIONATE 200 MG/ML
200 INJECTION, SOLUTION INTRAMUSCULAR
Qty: 10 ML | Refills: 0 | Status: SHIPPED | OUTPATIENT
Start: 2025-08-05

## 2025-08-05 RX ORDER — TADALAFIL 5 MG/1
5 TABLET ORAL DAILY PRN
Qty: 30 TABLET | Refills: 2 | Status: SHIPPED | OUTPATIENT
Start: 2025-08-05

## 2025-08-05 RX ORDER — LOSARTAN POTASSIUM 50 MG/1
50 TABLET ORAL DAILY
Qty: 90 TABLET | Refills: 1 | Status: SHIPPED | OUTPATIENT
Start: 2025-08-05

## 2025-08-07 ENCOUNTER — TELEPHONE (OUTPATIENT)
Dept: BARIATRICS | Facility: CLINIC | Age: 40
End: 2025-08-07

## 2025-08-08 ENCOUNTER — TELEMEDICINE (OUTPATIENT)
Dept: PSYCHIATRY | Facility: CLINIC | Age: 40
End: 2025-08-08
Payer: COMMERCIAL

## 2025-08-13 ENCOUNTER — TELEMEDICINE (OUTPATIENT)
Dept: BEHAVIORAL/MENTAL HEALTH CLINIC | Facility: CLINIC | Age: 40
End: 2025-08-13
Payer: COMMERCIAL

## (undated) DEVICE — CHLORAPREP HI-LITE 26ML ORANGE

## (undated) DEVICE — GLOVE INDICATOR PI UNDERGLOVE SZ 7.5 BLUE

## (undated) DEVICE — DRESSING MEPILEX AG BORDER 4 X 4 IN

## (undated) DEVICE — TOOL MR8-SD12MH25 MR8 12CM SD MH 2.5MM: Brand: MIDAS REX MR8

## (undated) DEVICE — ANTIBACTERIAL VIOLET BRAIDED (POLYGLACTIN 910), SYNTHETIC ABSORBABLE SUTURE: Brand: COATED VICRYL

## (undated) DEVICE — NEURO PATTIES 1/2 X 1 1/2

## (undated) DEVICE — SNAP KOVER: Brand: UNBRANDED

## (undated) DEVICE — TOOL MR8-SP14MH25T MR8 14CM SP MH3F2.5MM: Brand: MIDAS REX MR8

## (undated) DEVICE — DRAPE MICROSCOPE OPMI PENTERO

## (undated) DEVICE — HEMOSTATIC MATRIX SURGIFLO 8ML W/THROMBIN

## (undated) DEVICE — ADHESIVE SKIN HIGH VISCOSITY EXOFIN 1ML

## (undated) DEVICE — INTENDED FOR TISSUE SEPARATION, AND OTHER PROCEDURES THAT REQUIRE A SHARP SURGICAL BLADE TO PUNCTURE OR CUT.: Brand: BARD-PARKER SAFETY BLADES SIZE 10, STERILE

## (undated) DEVICE — NEEDLE SPINAL18G X 3.5 IN QUINCKE

## (undated) DEVICE — TOOL MR8-SP14MH30T MR8 14CM SP MH 3F 3MM: Brand: MIDAS REX MR8

## (undated) DEVICE — ELECTRODE BLADE MOD  E-Z CLEAN 6.5IN -0014M

## (undated) DEVICE — Device

## (undated) DEVICE — INTENDED FOR TISSUE SEPARATION, AND OTHER PROCEDURES THAT REQUIRE A SHARP SURGICAL BLADE TO PUNCTURE OR CUT.: Brand: BARD-PARKER ® CARBON RIB-BACK BLADES

## (undated) DEVICE — SUT STRATAFIX SPIRAL MONOCRYL PLUS 4-0 PS-2 45CM SXMP1B118

## (undated) DEVICE — TIBURON SPLIT SHEET: Brand: CONVERTORS

## (undated) DEVICE — BETHLEHEM UNIVERSAL SPINE, KIT: Brand: CARDINAL HEALTH

## (undated) DEVICE — PENCIL ELECTROSURG E-Z CLEAN -0035H

## (undated) DEVICE — DISPOSABLE EQUIPMENT COVER: Brand: SMALL TOWEL DRAPE

## (undated) DEVICE — GLOVE SRG BIOGEL 7.5